# Patient Record
Sex: MALE | Race: WHITE | NOT HISPANIC OR LATINO | Employment: OTHER | URBAN - METROPOLITAN AREA
[De-identification: names, ages, dates, MRNs, and addresses within clinical notes are randomized per-mention and may not be internally consistent; named-entity substitution may affect disease eponyms.]

---

## 2019-05-24 PROBLEM — K92.0 HEMATEMESIS: Status: ACTIVE | Noted: 2019-05-24

## 2019-05-24 PROBLEM — K92.0 GASTROINTESTINAL HEMORRHAGE WITH HEMATEMESIS: Status: ACTIVE | Noted: 2019-05-24

## 2019-05-25 PROBLEM — D49.0 GASTRIC TUMOR: Status: ACTIVE | Noted: 2019-05-24

## 2019-05-31 PROBLEM — D21.4 LEIOMYOMA OF STOMACH: Status: ACTIVE | Noted: 2019-05-31

## 2019-06-04 PROBLEM — C49.A0: Status: ACTIVE | Noted: 2019-06-04

## 2019-08-20 PROBLEM — I70.0 ATHEROSCLEROSIS OF ABDOMINAL AORTA: Status: ACTIVE | Noted: 2019-08-20

## 2019-08-20 PROBLEM — N40.1 BENIGN PROSTATIC HYPERPLASIA WITH NOCTURIA: Status: ACTIVE | Noted: 2019-08-20

## 2019-08-20 PROBLEM — F17.210 CIGARETTE SMOKER: Status: ACTIVE | Noted: 2019-08-20

## 2019-08-20 PROBLEM — G47.33 OSA (OBSTRUCTIVE SLEEP APNEA): Status: ACTIVE | Noted: 2019-08-20

## 2019-08-20 PROBLEM — E66.9 OBESITY (BMI 35.0-39.9 WITHOUT COMORBIDITY): Status: ACTIVE | Noted: 2019-08-20

## 2019-08-20 PROBLEM — Z86.2 HISTORY OF ANEMIA: Status: ACTIVE | Noted: 2019-08-20

## 2019-08-20 PROBLEM — K27.9 PUD (PEPTIC ULCER DISEASE): Status: ACTIVE | Noted: 2019-05-24

## 2019-08-20 PROBLEM — R35.1 BENIGN PROSTATIC HYPERPLASIA WITH NOCTURIA: Status: ACTIVE | Noted: 2019-08-20

## 2019-09-01 PROBLEM — K92.2 UPPER GI BLEED: Status: ACTIVE | Noted: 2019-09-01

## 2019-09-02 PROBLEM — D50.0 CHRONIC BLOOD LOSS ANEMIA: Status: ACTIVE | Noted: 2019-09-02

## 2019-09-02 PROBLEM — K92.1 GASTROINTESTINAL HEMORRHAGE WITH MELENA: Status: ACTIVE | Noted: 2019-09-02

## 2019-09-02 PROBLEM — R55 SYNCOPE: Status: ACTIVE | Noted: 2019-09-02

## 2019-09-03 PROBLEM — K27.4 PEPTIC ULCER DISEASE WITH HEMORRHAGE: Status: ACTIVE | Noted: 2019-05-24

## 2019-09-12 ENCOUNTER — TELEPHONE (OUTPATIENT)
Dept: ENDOSCOPY | Facility: HOSPITAL | Age: 74
End: 2019-09-12

## 2019-09-12 NOTE — TELEPHONE ENCOUNTER
----- Message from Maritza Hurley MA sent at 9/12/2019  3:29 PM CDT -----  Good afternoon,    We received a referral from  for this patient to be seen by  for Endoscopic Resection(EMR/ESD). I have scanned the referral and records into  for review. Please reach out to the patient to schedule.    Thank you   Maritza   Emily Sparks

## 2019-09-12 NOTE — TELEPHONE ENCOUNTER
----- Message from Jocelyn Salas sent at 9/12/2019  2:31 PM CDT -----  Contact: Tena Raimundo pt daughter#[phone]  Pt is calling to schedule endo. Pt states that referral was send.

## 2019-09-13 ENCOUNTER — TELEPHONE (OUTPATIENT)
Dept: ENDOSCOPY | Facility: HOSPITAL | Age: 74
End: 2019-09-13

## 2019-09-13 NOTE — TELEPHONE ENCOUNTER
MD Jessica Betts MA   Caller: Unspecified (Yesterday,  6:38 PM)             I need to repeat EUS/EGD and discuss with patient if we really need to remove this.   Make sure he is on PPI.      Please sign order. Can this be at [facility]?

## 2019-09-13 NOTE — TELEPHONE ENCOUNTER
----- Message from Hanane Deluna sent at 9/13/2019 10:37 AM CDT -----  Contact: Tena Daughter  Type:  Patient Returning Call    Who Called: Tena    Who Left Message for Patient: Jessica    Does the patient know what this is regarding?:    Would the patient rather a call back or a response via [facility]? Call    Best Call Back Number: [phone]     Additional Information:

## 2019-09-19 ENCOUNTER — TELEPHONE (OUTPATIENT)
Dept: ENDOSCOPY | Facility: HOSPITAL | Age: 74
End: 2019-09-19

## 2019-09-19 NOTE — TELEPHONE ENCOUNTER
----- Message from Vielka Huggins sent at 9/19/2019 12:08 PM CDT -----  Contact: Pt's Daugther Tena Eubanks [phone]  Pt's Daughter Tena Eubanks called to speak to the nurse regarding a previous conversation as it relates to the pt's care and would like a call back at [phone]

## 2019-09-23 ENCOUNTER — TELEPHONE (OUTPATIENT)
Dept: GASTROENTEROLOGY | Facility: CLINIC | Age: 74
End: 2019-09-23

## 2019-09-23 ENCOUNTER — TELEPHONE (OUTPATIENT)
Dept: ENDOSCOPY | Facility: HOSPITAL | Age: 74
End: 2019-09-23

## 2019-09-23 DIAGNOSIS — D49.0 GASTRIC TUMOR: Primary | ICD-10-CM

## 2019-09-23 NOTE — TELEPHONE ENCOUNTER
----- Message from Jennifer Faye RN sent at 9/23/2019  8:14 AM CDT -----  Regarding: order  This patient needs an order for his procedure on Wednesday.

## 2019-09-23 NOTE — TELEPHONE ENCOUNTER
Spoke with patient about arrival time @0700 .     NPO status reviewed: Patient may eat until 7:00pm.  After 7pm, pt may have CLEAR liquids ONLY until completely NPO at Midnight.    Medications: Do not take Insulin or oral diabetic medications the day of the procedure.    Take as prescribed: heart, seizure and blood pressure medication in the morning with a sip of water (less than an ounce).  Take any breathing medications and bring inhalers to hospital with you.     Leave all valuables and jewelry at home. Wear comfortable clothes to procedure to change into hospital gown.   You cannot drive for 24 hours after your procedure because you will receive sedation for your procedure to make you comfortable.    A ride must be provided at discharge.

## 2019-09-24 ENCOUNTER — TELEPHONE (OUTPATIENT)
Dept: ENDOSCOPY | Facility: HOSPITAL | Age: 74
End: 2019-09-24

## 2019-09-24 NOTE — TELEPHONE ENCOUNTER
----- Message from Teofilo Cisneros MD sent at 9/24/2019  4:11 PM CDT -----  Annia- I spoke to Dr. Higinio Burnett. He does not think we need to repeat an EGD/EUS. Can you arrange for a clinic visit with him tomorrow instead of procedure tomorrow morning? He needs to discuss ESD, and needs to be seen in clinic first.

## 2019-09-25 ENCOUNTER — OFFICE VISIT (OUTPATIENT)
Dept: GASTROENTEROLOGY | Facility: CLINIC | Age: 74
End: 2019-09-25
Payer: MEDICARE

## 2019-09-25 VITALS
HEART RATE: 71 BPM | WEIGHT: 297.19 LBS | DIASTOLIC BLOOD PRESSURE: 78 MMHG | BODY MASS INDEX: 38.14 KG/M2 | HEIGHT: 74 IN | SYSTOLIC BLOOD PRESSURE: 148 MMHG

## 2019-09-25 DIAGNOSIS — D49.0 GASTRIC TUMOR: Primary | ICD-10-CM

## 2019-09-25 PROCEDURE — 99999 PR PBB SHADOW E&M-EST. PATIENT-LVL III: CPT | Mod: PBBFAC,,, | Performed by: INTERNAL MEDICINE

## 2019-09-25 PROCEDURE — 3288F PR FALLS RISK ASSESSMENT DOCUMENTED: ICD-10-PCS | Mod: CPTII,S$GLB,, | Performed by: INTERNAL MEDICINE

## 2019-09-25 PROCEDURE — 3077F PR MOST RECENT SYSTOLIC BLOOD PRESSURE >= 140 MM HG: ICD-10-PCS | Mod: CPTII,S$GLB,, | Performed by: INTERNAL MEDICINE

## 2019-09-25 PROCEDURE — 3077F SYST BP >= 140 MM HG: CPT | Mod: CPTII,S$GLB,, | Performed by: INTERNAL MEDICINE

## 2019-09-25 PROCEDURE — 1100F PTFALLS ASSESS-DOCD GE2>/YR: CPT | Mod: CPTII,S$GLB,, | Performed by: INTERNAL MEDICINE

## 2019-09-25 PROCEDURE — 3078F DIAST BP <80 MM HG: CPT | Mod: CPTII,S$GLB,, | Performed by: INTERNAL MEDICINE

## 2019-09-25 PROCEDURE — 1100F PR PT FALLS ASSESS DOC 2+ FALLS/FALL W/INJURY/YR: ICD-10-PCS | Mod: CPTII,S$GLB,, | Performed by: INTERNAL MEDICINE

## 2019-09-25 PROCEDURE — 3078F PR MOST RECENT DIASTOLIC BLOOD PRESSURE < 80 MM HG: ICD-10-PCS | Mod: CPTII,S$GLB,, | Performed by: INTERNAL MEDICINE

## 2019-09-25 PROCEDURE — 99203 OFFICE O/P NEW LOW 30 MIN: CPT | Mod: S$GLB,,, | Performed by: INTERNAL MEDICINE

## 2019-09-25 PROCEDURE — 99999 PR PBB SHADOW E&M-EST. PATIENT-LVL III: ICD-10-PCS | Mod: PBBFAC,,, | Performed by: INTERNAL MEDICINE

## 2019-09-25 PROCEDURE — 99203 PR OFFICE/OUTPT VISIT, NEW, LEVL III, 30-44 MIN: ICD-10-PCS | Mod: S$GLB,,, | Performed by: INTERNAL MEDICINE

## 2019-09-25 PROCEDURE — 3288F FALL RISK ASSESSMENT DOCD: CPT | Mod: CPTII,S$GLB,, | Performed by: INTERNAL MEDICINE

## 2019-09-25 NOTE — PROGRESS NOTES
Subjective:      Patient ID: Pasha Harmon is a 74 y.o. male.    Chief Complaint: No chief complaint on file.      HPI:  Pasha Harmon is a 74 y.o. male with hx of COPD and DM with melena found to have a ulcerated gastric lesion back in May. He continued to have melena and was rescoped 3 weeks ago showing an ulcerated mass. The mass was previously found to be a lipoma on EUS. Patient is coming today for discussing treatment options.     Review of patient's allergies indicates:   Allergen Reactions    Meloxicam Other (See Comments)     Ulcer, GI bleed        Past Medical History:   Diagnosis Date    COPD (chronic obstructive pulmonary disease)     Diabetes mellitus     Emphysema of lung     Hypertension     MI (myocardial infarction)        Past Surgical History:   Procedure Laterality Date    APPENDECTOMY      CHOLECYSTECTOMY      ENDOSCOPIC ULTRASOUND OF UPPER GASTROINTESTINAL TRACT Left 6/4/2019    Procedure: ULTRASOUND, UPPER GI TRACT, ENDOSCOPIC;  Surgeon: Lavon Parra MD;  Location: [facility];  Service: Endoscopy;  Laterality: Left;  GIF plus Radial    ESOPHAGOGASTRODUODENOSCOPY N/A 5/25/2019    Procedure: EGD (ESOPHAGOGASTRODUODENOSCOPY);  Surgeon: Mitch Buitrago MD;  Location: [facility];  Service: Endoscopy;  Laterality: N/A;    ESOPHAGOGASTRODUODENOSCOPY N/A 6/4/2019    Procedure: EGD (ESOPHAGOGASTRODUODENOSCOPY);  Surgeon: Lavon Parra MD;  Location: [facility];  Service: Endoscopy;  Laterality: N/A;    ESOPHAGOGASTRODUODENOSCOPY N/A 9/3/2019    Procedure: EGD (ESOPHAGOGASTRODUODENOSCOPY);  Surgeon: Keshav Harris MD;  Location: [facility];  Service: Endoscopy;  Laterality: N/A;    KNEE SURGERY Right        Family History   Problem Relation Age of Onset    Heart disease Mother     Cancer Father         brain        Social History     Socioeconomic History    Marital status: Single     Spouse name: Not on file    Number of children: Not on file    Years  of education: Not on file    Highest education level: Not on file   Occupational History    Not on file   Social Needs    Financial resource strain: Not on file    Food insecurity:     Worry: Not on file     Inability: Not on file    Transportation needs:     Medical: Not on file     Non-medical: Not on file   Tobacco Use    Smoking status: Current Every Day Smoker     Packs/day: 2.00     Types: Cigarettes    Smokeless tobacco: Never Used   Substance and Sexual Activity    Alcohol use: No    Drug use: No    Sexual activity: Not on file   Lifestyle    Physical activity:     Days per week: Not on file     Minutes per session: Not on file    Stress: Not on file   Relationships    Social connections:     Talks on phone: Not on file     Gets together: Not on file     Attends [employer] service: Not on file     Active member of club or organization: Not on file     Attends meetings of clubs or organizations: Not on file     Relationship status: Not on file   Other Topics Concern    Not on file   Social History Narrative    Not on file       Current Outpatient Medications   Medication Sig Dispense Refill    acetaminophen (TYLENOL) 650 MG TbSR Take 1,300 mg by mouth 2 (two) times daily.       albuterol (ACCUNEB) 1.25 mg/3 mL Nebu Take 3 mLs (1.25 mg total) by nebulization every 6 (six) hours as needed. Rescue 3 Box 3    albuterol-ipratropium (DUO-NEB) 2.5 mg-0.5 mg/3 mL nebulizer solution Take 3 mLs by nebulization every 6 (six) hours as needed for Wheezing. Rescue      budesonide-formoterol 160-4.5 mcg (SYMBICORT) 160-4.5 mcg/actuation HFAA Inhale 2 puffs into the lungs every 12 (twelve) hours. 1 Inhaler 6    cholecalciferol, vitamin D3, (VITAMIN D3) 5,000 unit Tab Take 5,000 Units by mouth once daily.      citalopram (CELEXA) 20 MG tablet Take 1 tablet (20 mg total) by mouth nightly. 90 tablet 3    diphenhydrAMINE (BENADRYL) 25 mg capsule Take 25 mg by mouth every evening. 1 tab po am, 3 tabs po HS        lisinopril (PRINIVIL,ZESTRIL) 20 MG tablet Take 1 tablet (20 mg total) by mouth once daily. 90 tablet 3    metFORMIN (GLUCOPHAGE) 500 MG tablet Take 1 tablet (500 mg total) by mouth daily with breakfast. 90 tablet 3    multivit with min-FA-lycopene (ONE-A-DAY MEN'S MULTIVITAMIN) 400-300 mcg Tab Take 1 tablet by mouth nightly.      nicotine (NICODERM CQ) 21 mg/24 hr Place 1 patch onto the skin once daily.  0    pantoprazole (PROTONIX) 40 MG tablet Take 1 tablet (40 mg total) by mouth 2 (two) times daily. 180 tablet 3    simvastatin (ZOCOR) 40 MG tablet Take 1 tablet (40 mg total) by mouth every evening. 90 tablet 3    sucralfate (CARAFATE) 1 gram tablet Take 1 tablet (1 g total) by mouth 4 (four) times daily. 120 tablet 0     No current facility-administered medications for this visit.             Objective:     Physical Exam   Pulmonary/Chest: He has wheezes.   Abdominal: Soft. Bowel sounds are normal. He exhibits no distension. There is no tenderness. There is no guarding.       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   Discussed with patient treatment options including ESD. Discussed potential risks and benefits.   Patient is cleared by cardiology.   He is to see pulmonary tomorrow for his COPD.  Will schedule for ESD next available.

## 2019-10-01 ENCOUNTER — TELEPHONE (OUTPATIENT)
Dept: ENDOSCOPY | Facility: HOSPITAL | Age: 74
End: 2019-10-01

## 2019-10-01 DIAGNOSIS — K31.9 GASTRIC LESION: Primary | ICD-10-CM

## 2019-10-01 NOTE — TELEPHONE ENCOUNTER
Spoke with patient's daughter, Tena, about instructions for UESD scheduled 10/9/19 at 0800.  Instructions mailed.

## 2019-10-02 ENCOUNTER — LAB VISIT (OUTPATIENT)
Dept: LAB | Facility: HOSPITAL | Age: 74
End: 2019-10-02
Attending: INTERNAL MEDICINE
Payer: MEDICARE

## 2019-10-02 DIAGNOSIS — D72.829 LEUKOCYTOSIS, UNSPECIFIED TYPE: ICD-10-CM

## 2019-10-02 DIAGNOSIS — D75.839 THROMBOCYTOSIS: ICD-10-CM

## 2019-10-02 DIAGNOSIS — D50.9 IRON DEFICIENCY ANEMIA, UNSPECIFIED IRON DEFICIENCY ANEMIA TYPE: ICD-10-CM

## 2019-10-02 LAB
BASOPHILS # BLD AUTO: 0.1 K/UL (ref 0–0.2)
BASOPHILS NFR BLD: 1 % (ref 0–1.9)
DIFFERENTIAL METHOD: ABNORMAL
EOSINOPHIL # BLD AUTO: 0.4 K/UL (ref 0–0.5)
EOSINOPHIL NFR BLD: 4.4 % (ref 0–8)
ERYTHROCYTE [DISTWIDTH] IN BLOOD BY AUTOMATED COUNT: 16.4 % (ref 11.5–14.5)
FERRITIN SERPL-MCNC: 7 NG/ML (ref 18–464)
HCT VFR BLD AUTO: 32.1 % (ref 40–54)
HGB BLD-MCNC: 9.7 G/DL (ref 14–18)
IMM GRANULOCYTES # BLD AUTO: 0.06 K/UL (ref 0–0.04)
IMM GRANULOCYTES NFR BLD AUTO: 0.6 % (ref 0–0.5)
IRON SATN MFR SERPL: 9 % (ref 20–50)
IRON SERPL-MCNC: 40 UG/DL (ref 45–160)
LYMPHOCYTES # BLD AUTO: 1.8 K/UL (ref 1–4.8)
LYMPHOCYTES NFR BLD: 18.8 % (ref 18–48)
MCH RBC QN AUTO: 24.3 PG (ref 27–31)
MCHC RBC AUTO-ENTMCNC: 30.2 G/DL (ref 32–36)
MCV RBC AUTO: 80 FL (ref 82–98)
MONOCYTES # BLD AUTO: 0.9 K/UL (ref 0.3–1)
MONOCYTES NFR BLD: 9.4 % (ref 4–15)
NEUTROPHILS # BLD AUTO: 6.5 K/UL (ref 1.8–7.7)
NEUTROPHILS NFR BLD: 65.8 % (ref 38–73)
NRBC BLD-RTO: 0 /100 WBC
PLATELET # BLD AUTO: 529 K/UL (ref 150–350)
PMV BLD AUTO: 8.9 FL (ref 9.2–12.9)
RBC # BLD AUTO: 4 M/UL (ref 4.6–6.2)
TOTAL IRON BINDING CAPACITY: 458 UG/DL (ref 261–462)
WBC # BLD AUTO: 9.81 K/UL (ref 3.9–12.7)

## 2019-10-02 PROCEDURE — 83540 ASSAY OF IRON: CPT

## 2019-10-02 PROCEDURE — 85025 COMPLETE CBC W/AUTO DIFF WBC: CPT

## 2019-10-02 PROCEDURE — 82728 ASSAY OF FERRITIN: CPT

## 2019-10-02 PROCEDURE — 81219 CALR GENE COM VARIANTS: CPT

## 2019-10-02 PROCEDURE — 36415 COLL VENOUS BLD VENIPUNCTURE: CPT | Mod: PN

## 2019-10-02 PROCEDURE — 81403 MOPATH PROCEDURE LEVEL 4: CPT

## 2019-10-02 PROCEDURE — 81207 BCR/ABL1 GENE MINOR BP: CPT

## 2019-10-02 PROCEDURE — 82728 ASSAY OF FERRITIN: CPT | Mod: PN

## 2019-10-02 PROCEDURE — 83540 ASSAY OF IRON: CPT | Mod: PN

## 2019-10-02 PROCEDURE — 85025 COMPLETE CBC W/AUTO DIFF WBC: CPT | Mod: PN

## 2019-10-02 PROCEDURE — 81206 BCR/ABL1 GENE MAJOR BP: CPT

## 2019-10-02 PROCEDURE — 81270 JAK2 GENE: CPT

## 2019-10-07 LAB
BCR/ABL RESULT, P190, QUANT, BLD: NORMAL
BCR/ABL,P210 RESULT: NORMAL
JAK2 P.V617F BLD/T QL: NORMAL
JAK2 V617F MUTATION DETECTION BLD RESULT: NORMAL
PATH REPORT.FINAL DX SPEC: NORMAL
PATH REPORT.FINAL DX SPEC: NORMAL
SPECIMEN TYPE, P190, QUANT, BLD: NORMAL
SPECIMEN TYPE: NORMAL

## 2019-10-08 LAB
CALR FINAL DIAGNOSIS: NORMAL
CALR SPECIMEN TYPE: NORMAL
PATH REPORT.FINAL DX SPEC: NORMAL

## 2019-10-09 ENCOUNTER — ANESTHESIA (OUTPATIENT)
Dept: ENDOSCOPY | Facility: HOSPITAL | Age: 74
DRG: 003 | End: 2019-10-09
Payer: MEDICARE

## 2019-10-09 ENCOUNTER — ANESTHESIA EVENT (OUTPATIENT)
Dept: ENDOSCOPY | Facility: HOSPITAL | Age: 74
DRG: 003 | End: 2019-10-09
Payer: MEDICARE

## 2019-10-09 ENCOUNTER — HOSPITAL ENCOUNTER (INPATIENT)
Facility: HOSPITAL | Age: 74
LOS: 21 days | Discharge: NURSING, REHAB OR LONG-TERM CARE | DRG: 003 | End: 2019-10-31
Attending: INTERNAL MEDICINE | Admitting: HOSPITALIST
Payer: MEDICARE

## 2019-10-09 DIAGNOSIS — K31.9 SUBEPITHELIAL GASTRIC LESION: ICD-10-CM

## 2019-10-09 DIAGNOSIS — N17.9 AKI (ACUTE KIDNEY INJURY): ICD-10-CM

## 2019-10-09 DIAGNOSIS — K31.89 PERFORATED STOMACH, ACUTE: ICD-10-CM

## 2019-10-09 DIAGNOSIS — K63.1 BOWEL PERFORATION: ICD-10-CM

## 2019-10-09 DIAGNOSIS — N17.0 ATN (ACUTE TUBULAR NECROSIS): ICD-10-CM

## 2019-10-09 DIAGNOSIS — K66.8 FREE INTRAPERITONEAL AIR: ICD-10-CM

## 2019-10-09 DIAGNOSIS — J96.02 ACUTE RESPIRATORY FAILURE WITH HYPOXIA AND HYPERCARBIA: ICD-10-CM

## 2019-10-09 DIAGNOSIS — E11.9 TYPE 2 DIABETES MELLITUS WITHOUT COMPLICATION, WITHOUT LONG-TERM CURRENT USE OF INSULIN: ICD-10-CM

## 2019-10-09 DIAGNOSIS — Z99.11 VENTILATOR DEPENDENCE: ICD-10-CM

## 2019-10-09 DIAGNOSIS — J96.01 ACUTE RESPIRATORY FAILURE WITH HYPOXIA AND HYPERCARBIA: ICD-10-CM

## 2019-10-09 DIAGNOSIS — D49.0 GASTRIC TUMOR: Primary | ICD-10-CM

## 2019-10-09 DIAGNOSIS — E87.20 METABOLIC ACIDOSIS: ICD-10-CM

## 2019-10-09 DIAGNOSIS — K22.3 ESOPHAGEAL PERFORATION: ICD-10-CM

## 2019-10-09 DIAGNOSIS — A41.9 SEPSIS, DUE TO UNSPECIFIED ORGANISM, UNSPECIFIED WHETHER ACUTE ORGAN DYSFUNCTION PRESENT: ICD-10-CM

## 2019-10-09 DIAGNOSIS — Z98.890 H/O EXPLORATORY LAPAROTOMY: ICD-10-CM

## 2019-10-09 PROBLEM — G47.00 INSOMNIA: Status: ACTIVE | Noted: 2019-10-09

## 2019-10-09 LAB
ABO + RH BLD: NORMAL
ANION GAP SERPL CALC-SCNC: 11 MMOL/L (ref 8–16)
BASOPHILS # BLD AUTO: 0.07 K/UL (ref 0–0.2)
BASOPHILS NFR BLD: 0.3 % (ref 0–1.9)
BLD GP AB SCN CELLS X3 SERPL QL: NORMAL
BUN SERPL-MCNC: 14 MG/DL (ref 8–23)
CALCIUM SERPL-MCNC: 8.3 MG/DL (ref 8.7–10.5)
CHLORIDE SERPL-SCNC: 104 MMOL/L (ref 95–110)
CO2 SERPL-SCNC: 22 MMOL/L (ref 23–29)
CREAT SERPL-MCNC: 1.4 MG/DL (ref 0.5–1.4)
DIFFERENTIAL METHOD: ABNORMAL
EOSINOPHIL # BLD AUTO: 0.1 K/UL (ref 0–0.5)
EOSINOPHIL NFR BLD: 0.2 % (ref 0–8)
ERYTHROCYTE [DISTWIDTH] IN BLOOD BY AUTOMATED COUNT: 16.7 % (ref 11.5–14.5)
EST. GFR  (AFRICAN AMERICAN): 57.2 ML/MIN/1.73 M^2
EST. GFR  (NON AFRICAN AMERICAN): 49.5 ML/MIN/1.73 M^2
GLUCOSE SERPL-MCNC: 168 MG/DL (ref 70–110)
HCT VFR BLD AUTO: 31.1 % (ref 40–54)
HGB BLD-MCNC: 9.6 G/DL (ref 14–18)
IMM GRANULOCYTES # BLD AUTO: 0.12 K/UL (ref 0–0.04)
IMM GRANULOCYTES NFR BLD AUTO: 0.6 % (ref 0–0.5)
LYMPHOCYTES # BLD AUTO: 0.8 K/UL (ref 1–4.8)
LYMPHOCYTES NFR BLD: 3.5 % (ref 18–48)
MAGNESIUM SERPL-MCNC: 1.9 MG/DL (ref 1.6–2.6)
MCH RBC QN AUTO: 25.3 PG (ref 27–31)
MCHC RBC AUTO-ENTMCNC: 30.9 G/DL (ref 32–36)
MCV RBC AUTO: 82 FL (ref 82–98)
MONOCYTES # BLD AUTO: 1.1 K/UL (ref 0.3–1)
MONOCYTES NFR BLD: 4.9 % (ref 4–15)
NEUTROPHILS # BLD AUTO: 19.6 K/UL (ref 1.8–7.7)
NEUTROPHILS NFR BLD: 90.5 % (ref 38–73)
NRBC BLD-RTO: 0 /100 WBC
PHOSPHATE SERPL-MCNC: 4.8 MG/DL (ref 2.7–4.5)
PLATELET # BLD AUTO: 475 K/UL (ref 150–350)
PMV BLD AUTO: 9.3 FL (ref 9.2–12.9)
POCT GLUCOSE: 131 MG/DL (ref 70–110)
POCT GLUCOSE: 138 MG/DL (ref 70–110)
POCT GLUCOSE: 169 MG/DL (ref 70–110)
POTASSIUM SERPL-SCNC: 4.7 MMOL/L (ref 3.5–5.1)
RBC # BLD AUTO: 3.8 M/UL (ref 4.6–6.2)
SODIUM SERPL-SCNC: 137 MMOL/L (ref 136–145)
WBC # BLD AUTO: 21.63 K/UL (ref 3.9–12.7)

## 2019-10-09 PROCEDURE — 86920 COMPATIBILITY TEST SPIN: CPT

## 2019-10-09 PROCEDURE — 25000003 PHARM REV CODE 250: Performed by: NURSE ANESTHETIST, CERTIFIED REGISTERED

## 2019-10-09 PROCEDURE — 27202363 HC INJECTION AGENT, SUBMUCOSAL, ANY: Performed by: INTERNAL MEDICINE

## 2019-10-09 PROCEDURE — 25000003 PHARM REV CODE 250: Performed by: INTERNAL MEDICINE

## 2019-10-09 PROCEDURE — D9220A PRA ANESTHESIA: ICD-10-PCS | Mod: ANES,,, | Performed by: ANESTHESIOLOGY

## 2019-10-09 PROCEDURE — 27201030 HC OVERTUBE: Performed by: INTERNAL MEDICINE

## 2019-10-09 PROCEDURE — 84100 ASSAY OF PHOSPHORUS: CPT

## 2019-10-09 PROCEDURE — 43999 UNLISTED PROCEDURE STOMACH: CPT | Mod: ,,, | Performed by: INTERNAL MEDICINE

## 2019-10-09 PROCEDURE — 27200997: Performed by: INTERNAL MEDICINE

## 2019-10-09 PROCEDURE — 43999 PR ENDOSCOPIC SUBMUCOSAL DISSECTION - EGD: ICD-10-PCS | Mod: ,,, | Performed by: INTERNAL MEDICINE

## 2019-10-09 PROCEDURE — D9220A PRA ANESTHESIA: ICD-10-PCS | Mod: CRNA,,, | Performed by: NURSE ANESTHETIST, CERTIFIED REGISTERED

## 2019-10-09 PROCEDURE — 25000003 PHARM REV CODE 250: Performed by: PHYSICIAN ASSISTANT

## 2019-10-09 PROCEDURE — 86901 BLOOD TYPING SEROLOGIC RH(D): CPT

## 2019-10-09 PROCEDURE — G0378 HOSPITAL OBSERVATION PER HR: HCPCS

## 2019-10-09 PROCEDURE — 27000190 HC CPAP FULL FACE MASK W/VALVE

## 2019-10-09 PROCEDURE — 85025 COMPLETE CBC W/AUTO DIFF WBC: CPT

## 2019-10-09 PROCEDURE — 82962 GLUCOSE BLOOD TEST: CPT | Performed by: INTERNAL MEDICINE

## 2019-10-09 PROCEDURE — 83735 ASSAY OF MAGNESIUM: CPT

## 2019-10-09 PROCEDURE — 43999 UNLISTED PROCEDURE STOMACH: CPT | Performed by: INTERNAL MEDICINE

## 2019-10-09 PROCEDURE — 94640 AIRWAY INHALATION TREATMENT: CPT

## 2019-10-09 PROCEDURE — 27202299 HC NEEDLE KNIFE, TISSUE RESECTION: Performed by: INTERNAL MEDICINE

## 2019-10-09 PROCEDURE — 88305 TISSUE EXAM BY PATHOLOGIST: CPT | Performed by: PATHOLOGY

## 2019-10-09 PROCEDURE — 37000008 HC ANESTHESIA 1ST 15 MINUTES: Performed by: INTERNAL MEDICINE

## 2019-10-09 PROCEDURE — 80048 BASIC METABOLIC PNL TOTAL CA: CPT

## 2019-10-09 PROCEDURE — 27201042 HC RETRIEVAL NET: Performed by: INTERNAL MEDICINE

## 2019-10-09 PROCEDURE — 27201028 HC NEEDLE, SCLERO: Performed by: INTERNAL MEDICINE

## 2019-10-09 PROCEDURE — 37000009 HC ANESTHESIA EA ADD 15 MINS: Performed by: INTERNAL MEDICINE

## 2019-10-09 PROCEDURE — 99223 1ST HOSP IP/OBS HIGH 75: CPT | Mod: ,,, | Performed by: PHYSICIAN ASSISTANT

## 2019-10-09 PROCEDURE — 27200967 HC COAGRASPER: Performed by: INTERNAL MEDICINE

## 2019-10-09 PROCEDURE — 99223 PR INITIAL HOSPITAL CARE,LEVL III: ICD-10-PCS | Mod: ,,, | Performed by: PHYSICIAN ASSISTANT

## 2019-10-09 PROCEDURE — 27201089 HC SNARE, DISP (ANY): Performed by: INTERNAL MEDICINE

## 2019-10-09 PROCEDURE — 63600175 PHARM REV CODE 636 W HCPCS: Performed by: NURSE ANESTHETIST, CERTIFIED REGISTERED

## 2019-10-09 PROCEDURE — D9220A PRA ANESTHESIA: Mod: ANES,,, | Performed by: ANESTHESIOLOGY

## 2019-10-09 PROCEDURE — 88305 TISSUE EXAM BY PATHOLOGIST: CPT | Mod: 26,,, | Performed by: PATHOLOGY

## 2019-10-09 PROCEDURE — 63600175 PHARM REV CODE 636 W HCPCS: Performed by: INTERNAL MEDICINE

## 2019-10-09 PROCEDURE — 63600175 PHARM REV CODE 636 W HCPCS: Performed by: STUDENT IN AN ORGANIZED HEALTH CARE EDUCATION/TRAINING PROGRAM

## 2019-10-09 PROCEDURE — 99900035 HC TECH TIME PER 15 MIN (STAT)

## 2019-10-09 PROCEDURE — 25000242 PHARM REV CODE 250 ALT 637 W/ HCPCS: Performed by: ANESTHESIOLOGY

## 2019-10-09 PROCEDURE — D9220A PRA ANESTHESIA: Mod: CRNA,,, | Performed by: NURSE ANESTHETIST, CERTIFIED REGISTERED

## 2019-10-09 PROCEDURE — 36415 COLL VENOUS BLD VENIPUNCTURE: CPT

## 2019-10-09 PROCEDURE — 63600175 PHARM REV CODE 636 W HCPCS: Performed by: PHYSICIAN ASSISTANT

## 2019-10-09 PROCEDURE — 88305 TISSUE SPECIMEN TO PATHOLOGY - SURGERY: ICD-10-PCS | Mod: 26,,, | Performed by: PATHOLOGY

## 2019-10-09 RX ORDER — SODIUM CHLORIDE 0.9 % (FLUSH) 0.9 %
10 SYRINGE (ML) INJECTION
Status: DISCONTINUED | OUTPATIENT
Start: 2019-10-09 | End: 2019-10-31 | Stop reason: HOSPADM

## 2019-10-09 RX ORDER — IPRATROPIUM BROMIDE AND ALBUTEROL SULFATE 2.5; .5 MG/3ML; MG/3ML
3 SOLUTION RESPIRATORY (INHALATION) ONCE
Status: COMPLETED | OUTPATIENT
Start: 2019-10-09 | End: 2019-10-09

## 2019-10-09 RX ORDER — MIDAZOLAM HYDROCHLORIDE 1 MG/ML
INJECTION, SOLUTION INTRAMUSCULAR; INTRAVENOUS
Status: DISCONTINUED | OUTPATIENT
Start: 2019-10-09 | End: 2019-10-09

## 2019-10-09 RX ORDER — SUCCINYLCHOLINE CHLORIDE 20 MG/ML
INJECTION INTRAMUSCULAR; INTRAVENOUS
Status: DISCONTINUED | OUTPATIENT
Start: 2019-10-09 | End: 2019-10-09

## 2019-10-09 RX ORDER — SODIUM CHLORIDE 0.9 % (FLUSH) 0.9 %
5 SYRINGE (ML) INJECTION
Status: DISCONTINUED | OUTPATIENT
Start: 2019-10-09 | End: 2019-10-10

## 2019-10-09 RX ORDER — IBUPROFEN 200 MG
16 TABLET ORAL
Status: DISCONTINUED | OUTPATIENT
Start: 2019-10-09 | End: 2019-10-10

## 2019-10-09 RX ORDER — ONDANSETRON 2 MG/ML
4 INJECTION INTRAMUSCULAR; INTRAVENOUS DAILY PRN
Status: DISCONTINUED | OUTPATIENT
Start: 2019-10-09 | End: 2019-10-09 | Stop reason: HOSPADM

## 2019-10-09 RX ORDER — SIMVASTATIN 20 MG/1
40 TABLET, FILM COATED ORAL NIGHTLY
Status: DISCONTINUED | OUTPATIENT
Start: 2019-10-09 | End: 2019-10-10

## 2019-10-09 RX ORDER — SODIUM CHLORIDE 9 MG/ML
INJECTION, SOLUTION INTRAVENOUS CONTINUOUS
Status: DISCONTINUED | OUTPATIENT
Start: 2019-10-09 | End: 2019-10-10

## 2019-10-09 RX ORDER — METHYLENE BLUE 5 MG/ML
INJECTION INTRAVENOUS
Status: COMPLETED | OUTPATIENT
Start: 2019-10-09 | End: 2019-10-09

## 2019-10-09 RX ORDER — ROCURONIUM BROMIDE 10 MG/ML
INJECTION, SOLUTION INTRAVENOUS
Status: DISCONTINUED | OUTPATIENT
Start: 2019-10-09 | End: 2019-10-09

## 2019-10-09 RX ORDER — SODIUM CHLORIDE 0.9 G/100ML
IRRIGANT IRRIGATION
Status: COMPLETED | OUTPATIENT
Start: 2019-10-09 | End: 2019-10-09

## 2019-10-09 RX ORDER — BISACODYL 10 MG
10 SUPPOSITORY, RECTAL RECTAL DAILY PRN
Status: DISCONTINUED | OUTPATIENT
Start: 2019-10-09 | End: 2019-10-10

## 2019-10-09 RX ORDER — ACETAMINOPHEN 500 MG
5000 TABLET ORAL DAILY
Status: DISCONTINUED | OUTPATIENT
Start: 2019-10-10 | End: 2019-10-10

## 2019-10-09 RX ORDER — FLUTICASONE FUROATE AND VILANTEROL 100; 25 UG/1; UG/1
1 POWDER RESPIRATORY (INHALATION) DAILY
Status: DISCONTINUED | OUTPATIENT
Start: 2019-10-10 | End: 2019-10-31 | Stop reason: HOSPADM

## 2019-10-09 RX ORDER — LIDOCAINE HCL/PF 100 MG/5ML
SYRINGE (ML) INTRAVENOUS
Status: DISCONTINUED | OUTPATIENT
Start: 2019-10-09 | End: 2019-10-09

## 2019-10-09 RX ORDER — EPHEDRINE SULFATE 50 MG/ML
INJECTION, SOLUTION INTRAVENOUS
Status: DISCONTINUED | OUTPATIENT
Start: 2019-10-09 | End: 2019-10-09

## 2019-10-09 RX ORDER — IPRATROPIUM BROMIDE AND ALBUTEROL SULFATE 2.5; .5 MG/3ML; MG/3ML
3 SOLUTION RESPIRATORY (INHALATION) EVERY 4 HOURS PRN
Status: DISCONTINUED | OUTPATIENT
Start: 2019-10-09 | End: 2019-10-31 | Stop reason: HOSPADM

## 2019-10-09 RX ORDER — IBUPROFEN 200 MG
1 TABLET ORAL DAILY
Status: DISCONTINUED | OUTPATIENT
Start: 2019-10-10 | End: 2019-10-31 | Stop reason: HOSPADM

## 2019-10-09 RX ORDER — PHENYLEPHRINE HYDROCHLORIDE 10 MG/ML
INJECTION INTRAVENOUS
Status: DISCONTINUED | OUTPATIENT
Start: 2019-10-09 | End: 2019-10-09

## 2019-10-09 RX ORDER — GLUCAGON 1 MG
1 KIT INJECTION
Status: DISCONTINUED | OUTPATIENT
Start: 2019-10-09 | End: 2019-10-31 | Stop reason: HOSPADM

## 2019-10-09 RX ORDER — NEOSTIGMINE METHYLSULFATE 0.5 MG/ML
INJECTION, SOLUTION INTRAVENOUS
Status: DISCONTINUED | OUTPATIENT
Start: 2019-10-09 | End: 2019-10-09

## 2019-10-09 RX ORDER — ACETAMINOPHEN 325 MG/1
650 TABLET ORAL EVERY 4 HOURS PRN
Status: DISCONTINUED | OUTPATIENT
Start: 2019-10-09 | End: 2019-10-10

## 2019-10-09 RX ORDER — ONDANSETRON 2 MG/ML
INJECTION INTRAMUSCULAR; INTRAVENOUS
Status: DISCONTINUED | OUTPATIENT
Start: 2019-10-09 | End: 2019-10-09

## 2019-10-09 RX ORDER — MORPHINE SULFATE 2 MG/ML
1 INJECTION, SOLUTION INTRAMUSCULAR; INTRAVENOUS ONCE AS NEEDED
Status: COMPLETED | OUTPATIENT
Start: 2019-10-10 | End: 2019-10-09

## 2019-10-09 RX ORDER — GLYCOPYRROLATE 0.2 MG/ML
INJECTION INTRAMUSCULAR; INTRAVENOUS
Status: DISCONTINUED | OUTPATIENT
Start: 2019-10-09 | End: 2019-10-09

## 2019-10-09 RX ORDER — SUCRALFATE 1 G/1
1 TABLET ORAL 4 TIMES DAILY
Status: DISCONTINUED | OUTPATIENT
Start: 2019-10-09 | End: 2019-10-10

## 2019-10-09 RX ORDER — FENTANYL CITRATE 50 UG/ML
25 INJECTION, SOLUTION INTRAMUSCULAR; INTRAVENOUS EVERY 5 MIN PRN
Status: DISCONTINUED | OUTPATIENT
Start: 2019-10-09 | End: 2019-10-09 | Stop reason: HOSPADM

## 2019-10-09 RX ORDER — PANTOPRAZOLE SODIUM 40 MG/1
40 TABLET, DELAYED RELEASE ORAL 2 TIMES DAILY
Status: DISCONTINUED | OUTPATIENT
Start: 2019-10-09 | End: 2019-10-10

## 2019-10-09 RX ORDER — DIPHENHYDRAMINE HCL 25 MG
25 CAPSULE ORAL NIGHTLY
Status: DISCONTINUED | OUTPATIENT
Start: 2019-10-09 | End: 2019-10-10

## 2019-10-09 RX ORDER — INSULIN ASPART 100 [IU]/ML
0-5 INJECTION, SOLUTION INTRAVENOUS; SUBCUTANEOUS
Status: DISCONTINUED | OUTPATIENT
Start: 2019-10-09 | End: 2019-10-31 | Stop reason: HOSPADM

## 2019-10-09 RX ORDER — FENTANYL CITRATE 50 UG/ML
INJECTION, SOLUTION INTRAMUSCULAR; INTRAVENOUS
Status: DISCONTINUED | OUTPATIENT
Start: 2019-10-09 | End: 2019-10-09

## 2019-10-09 RX ORDER — ONDANSETRON 2 MG/ML
4 INJECTION INTRAMUSCULAR; INTRAVENOUS EVERY 8 HOURS PRN
Status: DISCONTINUED | OUTPATIENT
Start: 2019-10-09 | End: 2019-10-10

## 2019-10-09 RX ORDER — SODIUM CHLORIDE 0.9 % (FLUSH) 0.9 %
10 SYRINGE (ML) INJECTION
Status: DISCONTINUED | OUTPATIENT
Start: 2019-10-09 | End: 2019-10-09 | Stop reason: HOSPADM

## 2019-10-09 RX ORDER — PROPOFOL 10 MG/ML
VIAL (ML) INTRAVENOUS
Status: DISCONTINUED | OUTPATIENT
Start: 2019-10-09 | End: 2019-10-09

## 2019-10-09 RX ORDER — IBUPROFEN 200 MG
24 TABLET ORAL
Status: DISCONTINUED | OUTPATIENT
Start: 2019-10-09 | End: 2019-10-10

## 2019-10-09 RX ORDER — CITALOPRAM 20 MG/1
20 TABLET, FILM COATED ORAL NIGHTLY
Status: DISCONTINUED | OUTPATIENT
Start: 2019-10-09 | End: 2019-10-10

## 2019-10-09 RX ADMIN — FENTANYL CITRATE 50 MCG: 50 INJECTION, SOLUTION INTRAMUSCULAR; INTRAVENOUS at 11:10

## 2019-10-09 RX ADMIN — PROPOFOL 30 MG: 10 INJECTION, EMULSION INTRAVENOUS at 10:10

## 2019-10-09 RX ADMIN — SODIUM CHLORIDE, SODIUM GLUCONATE, SODIUM ACETATE, POTASSIUM CHLORIDE, MAGNESIUM CHLORIDE, SODIUM PHOSPHATE, DIBASIC, AND POTASSIUM PHOSPHATE: .53; .5; .37; .037; .03; .012; .00082 INJECTION, SOLUTION INTRAVENOUS at 01:10

## 2019-10-09 RX ADMIN — ONDANSETRON 4 MG: 2 INJECTION INTRAMUSCULAR; INTRAVENOUS at 12:10

## 2019-10-09 RX ADMIN — SODIUM CHLORIDE: 0.9 INJECTION, SOLUTION INTRAVENOUS at 07:10

## 2019-10-09 RX ADMIN — ROCURONIUM BROMIDE 10 MG: 10 INJECTION, SOLUTION INTRAVENOUS at 08:10

## 2019-10-09 RX ADMIN — ONDANSETRON 4 MG: 2 INJECTION INTRAMUSCULAR; INTRAVENOUS at 04:10

## 2019-10-09 RX ADMIN — ACETAMINOPHEN 650 MG: 325 TABLET ORAL at 04:10

## 2019-10-09 RX ADMIN — PHENYLEPHRINE HYDROCHLORIDE 100 MCG: 10 INJECTION INTRAVENOUS at 10:10

## 2019-10-09 RX ADMIN — EPHEDRINE SULFATE 5 MG: 50 INJECTION, SOLUTION INTRAMUSCULAR; INTRAVENOUS; SUBCUTANEOUS at 10:10

## 2019-10-09 RX ADMIN — SUCRALFATE 1 G: 1 TABLET ORAL at 09:10

## 2019-10-09 RX ADMIN — GLYCOPYRROLATE 0.4 MG: 0.2 INJECTION, SOLUTION INTRAMUSCULAR; INTRAVENOUS at 01:10

## 2019-10-09 RX ADMIN — PROPOFOL 50 MG: 10 INJECTION, EMULSION INTRAVENOUS at 11:10

## 2019-10-09 RX ADMIN — EPHEDRINE SULFATE 10 MG: 50 INJECTION, SOLUTION INTRAMUSCULAR; INTRAVENOUS; SUBCUTANEOUS at 10:10

## 2019-10-09 RX ADMIN — IPRATROPIUM BROMIDE AND ALBUTEROL SULFATE 3 ML: .5; 3 SOLUTION RESPIRATORY (INHALATION) at 03:10

## 2019-10-09 RX ADMIN — SUCCINYLCHOLINE CHLORIDE 120 MG: 20 INJECTION, SOLUTION INTRAMUSCULAR; INTRAVENOUS at 08:10

## 2019-10-09 RX ADMIN — PHENYLEPHRINE HYDROCHLORIDE 200 MCG: 10 INJECTION INTRAVENOUS at 09:10

## 2019-10-09 RX ADMIN — SODIUM CHLORIDE 500 ML: 0.9 IRRIGANT IRRIGATION at 08:10

## 2019-10-09 RX ADMIN — PHENYLEPHRINE HYDROCHLORIDE 100 MCG: 10 INJECTION INTRAVENOUS at 08:10

## 2019-10-09 RX ADMIN — FENTANYL CITRATE 50 MCG: 50 INJECTION, SOLUTION INTRAMUSCULAR; INTRAVENOUS at 12:10

## 2019-10-09 RX ADMIN — EPHEDRINE SULFATE 10 MG: 50 INJECTION, SOLUTION INTRAMUSCULAR; INTRAVENOUS; SUBCUTANEOUS at 09:10

## 2019-10-09 RX ADMIN — SUCRALFATE 1 G: 1 TABLET ORAL at 04:10

## 2019-10-09 RX ADMIN — FENTANYL CITRATE 50 MCG: 50 INJECTION, SOLUTION INTRAMUSCULAR; INTRAVENOUS at 10:10

## 2019-10-09 RX ADMIN — ROCURONIUM BROMIDE 40 MG: 10 INJECTION, SOLUTION INTRAVENOUS at 08:10

## 2019-10-09 RX ADMIN — EPHEDRINE SULFATE 5 MG: 50 INJECTION, SOLUTION INTRAMUSCULAR; INTRAVENOUS; SUBCUTANEOUS at 09:10

## 2019-10-09 RX ADMIN — PROMETHAZINE HYDROCHLORIDE 6.25 MG: 25 INJECTION INTRAMUSCULAR; INTRAVENOUS at 02:10

## 2019-10-09 RX ADMIN — ONDANSETRON 4 MG: 2 INJECTION INTRAMUSCULAR; INTRAVENOUS at 02:10

## 2019-10-09 RX ADMIN — MIDAZOLAM HYDROCHLORIDE 1 MG: 1 INJECTION, SOLUTION INTRAMUSCULAR; INTRAVENOUS at 08:10

## 2019-10-09 RX ADMIN — MORPHINE SULFATE 1 MG: 2 INJECTION, SOLUTION INTRAMUSCULAR; INTRAVENOUS at 11:10

## 2019-10-09 RX ADMIN — PHENYLEPHRINE HYDROCHLORIDE 100 MCG: 10 INJECTION INTRAVENOUS at 09:10

## 2019-10-09 RX ADMIN — PROPOFOL 150 MG: 10 INJECTION, EMULSION INTRAVENOUS at 08:10

## 2019-10-09 RX ADMIN — SODIUM CHLORIDE, SODIUM GLUCONATE, SODIUM ACETATE, POTASSIUM CHLORIDE, MAGNESIUM CHLORIDE, SODIUM PHOSPHATE, DIBASIC, AND POTASSIUM PHOSPHATE: .53; .5; .37; .037; .03; .012; .00082 INJECTION, SOLUTION INTRAVENOUS at 09:10

## 2019-10-09 RX ADMIN — FENTANYL CITRATE 50 MCG: 50 INJECTION, SOLUTION INTRAMUSCULAR; INTRAVENOUS at 09:10

## 2019-10-09 RX ADMIN — METHYLENE BLUE 5 MG: 5 INJECTION INTRAVENOUS at 08:10

## 2019-10-09 RX ADMIN — NEOSTIGMINE METHYLSULFATE 3 MG: 0.5 INJECTION INTRAVENOUS at 01:10

## 2019-10-09 RX ADMIN — PANTOPRAZOLE SODIUM 40 MG: 40 TABLET, DELAYED RELEASE ORAL at 09:10

## 2019-10-09 RX ADMIN — ROCURONIUM BROMIDE 10 MG: 10 INJECTION, SOLUTION INTRAVENOUS at 09:10

## 2019-10-09 RX ADMIN — SIMVASTATIN 40 MG: 40 TABLET, FILM COATED ORAL at 09:10

## 2019-10-09 RX ADMIN — CITALOPRAM HYDROBROMIDE 20 MG: 20 TABLET ORAL at 09:10

## 2019-10-09 RX ADMIN — ROCURONIUM BROMIDE 10 MG: 10 INJECTION, SOLUTION INTRAVENOUS at 10:10

## 2019-10-09 RX ADMIN — FENTANYL CITRATE 50 MCG: 50 INJECTION, SOLUTION INTRAMUSCULAR; INTRAVENOUS at 08:10

## 2019-10-09 RX ADMIN — LIDOCAINE HYDROCHLORIDE 100 MG: 20 INJECTION, SOLUTION INTRAVENOUS at 08:10

## 2019-10-09 NOTE — TRANSFER OF CARE
"Anesthesia Transfer of Care Note    Patient: Pasha Harmon    Procedure(s) Performed: Procedure(s) (LRB):  DISSECTION, LESION, ESOPHAGUS, STOMACH, OR DUODENUM, SUBMUCOSAL, ENDOSCOPIC (N/A)    Patient location: PACU    Anesthesia Type: general    Transport from OR: Transported from OR on 6-10 L/min O2 by face mask with adequate spontaneous ventilation    Post pain: adequate analgesia    Post assessment: no apparent anesthetic complications and tolerated procedure well    Post vital signs: stable    Level of consciousness: awake    Nausea/Vomiting: no nausea/vomiting    Complications: none    Transfer of care protocol was followed      Last vitals:   Visit Vitals  /76 (BP Location: Left arm, Patient Position: Lying)   Pulse 64   Temp 36.6 °C (97.9 °F) (Temporal)   Resp 16   Ht 6' 2" (1.88 m)   Wt 133.8 kg (295 lb)   SpO2 100%   BMI 37.88 kg/m²     "

## 2019-10-09 NOTE — PLAN OF CARE
Assisted to room via w/c. POC initiated. Pt verbalized understanding. No c/o. No distress noted.

## 2019-10-09 NOTE — [employer] PATIENT INSTRUCTIONS
Discharge Summary/Instructions after an Endoscopic Procedure  Patient Name: Pasha Harmon  Patient MRN: [id number]  Patient YOB: 1945 Wednesday, October 09, 2019  Ghislaine Burnett MD  RESTRICTIONS:  During your procedure today, you received medications for sedation.  These   medications may affect your judgment, balance and coordination.  Therefore,   for 24 hours, you have the following restrictions:   - DO NOT drive a car, operate machinery, make legal/financial decisions,   sign important papers or drink alcohol.    ACTIVITY:  Today: no heavy lifting, straining or running due to procedural   sedation/anesthesia.  The following day: return to full activity including work.  DIET:  Eat and drink normally unless instructed otherwise.     TREATMENT FOR COMMON SIDE EFFECTS:  - Mild abdominal pain, nausea, belching, bloating or excessive gas:  rest,   eat lightly and use a heating pad.  - Sore Throat: treat with throat lozenges and/or gargle with warm salt   water.  - Because air was used during the procedure, expelling large amounts of air   from your rectum or belching is normal.  - If a bowel prep was taken, you may not have a bowel movement for 1-3 days.    This is normal.  SYMPTOMS TO WATCH FOR AND REPORT TO YOUR PHYSICIAN:  1. Abdominal pain or bloating, other than gas cramps.  2. Chest pain.  3. Back pain.  4. Signs of infection such as: chills or fever occurring within 24 hours   after the procedure.  5. Rectal bleeding, which would show as bright red, maroon, or black stools.   (A tablespoon of blood from the rectum is not serious, especially if   hemorrhoids are present.)  6. Vomiting.  7. Weakness or dizziness.  GO DIRECTLY TO THE NEAREST EMERGENCY ROOM IF YOU HAVE ANY OF THE FOLLOWING:      Difficulty breathing              Chills and/or fever over 101 F   Persistent vomiting and/or vomiting blood   Severe abdominal pain   Severe chest pain   Black, tarry stools   Bleeding- more than one  tablespoon   Any other symptom or condition that you feel may need urgent attention  Your doctor recommends these additional instructions:  If any biopsies were taken, your doctors clinic will contact you in 1 to 2   weeks with any results.  - Admit the patient to hospital [facility] for observation.   - Clear liquid diet for 3 days.   - Continue present medications.   - Await pathology results.   - Return to referring physician.   - Carafate Qid for 10 days.  - PPI Bid.    - Repeat EGD in 2 months to check on healing.   For questions, problems or results please call your physician - Ghislaine Burnett MD at Work:  [phone].  [facility], EMERGENCY ROOM PHONE NUMBER: [phone]  IF A COMPLICATION OR EMERGENCY SITUATION ARISES AND YOU ARE UNABLE TO REACH   YOUR PHYSICIAN - GO DIRECTLY TO THE EMERGENCY ROOM.  Ghislaine Burnett MD  10/9/2019 2:46:23 PM  This report has been verified and signed electronically.  [employer]

## 2019-10-09 NOTE — HPI
"Pasha Harmon is a 74M with COPD (current smoker), DMII, CAD, gastric lipoma admitted to hospital medicine after EGD/ ESD done today 10/9. Patient seen in PACU ~45 min post procedure, still groggy, not able to participate much in history. Per chart review, patient was having intermittent episodes of melena, suspected secondary to known large bleeding gastric lipoma. Patient underwent scheduled outpatient ESD today.     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 Known to be a large bleeding lipoma. S/P ESD successfully. The lesion was too large and bulky and caused a long  mucosal esophageal trear and got lodged into the mid esophagus. The mass had to be cut into smaller pieces to be retrieved. Mucosal tear in the middle and lower third of the esophagus.  Recommendations:Admit the patient to hospital [facility] for observation. Clear liquid diet for 3 days. Carafate Qid for 10 days. PPI Bid. Repeat EGD in 2 months to check on healing. no need for esophagram"    VSS in PACU. Ordered CBC, CMP.  "

## 2019-10-09 NOTE — ASSESSMENT & PLAN NOTE
Home regimen: metformin 500mg daily  Lab Results   Component Value Date    HGBA1C 6.1 (H) 09/19/2019   BG well controlled, low dose SSI while on CLD  Check BG ACHS

## 2019-10-09 NOTE — ANESTHESIA PREPROCEDURE EVALUATION
10/09/2019  Psaha Harmon is a 74 y.o., male.  Patient Active Problem List   Diagnosis    COPD (chronic obstructive pulmonary disease)    Abscess of gluteal cleft    Type 2 diabetes mellitus without complication, without long-term current use of insulin    Hyperlipidemia    Essential hypertension    Hyponatremia    Depression    Anemia    Gastric tumor    Peptic ulcer disease with hemorrhage    Leiomyoma of stomach    Stromal tumor of digestive system    History of anemia    Atherosclerosis of abdominal aorta    Obesity (BMI 35.0-39.9 without comorbidity)    Cigarette smoker    Benign prostatic hyperplasia with nocturia    MIGUELINA (obstructive sleep apnea)    Upper GI bleed    Chronic blood loss anemia    Gastrointestinal hemorrhage with melena    Syncope    Leukocytosis    Thrombocytosis    Iron deficiency anemia, unspecified    Subepithelial gastric lesion   2019   · Normal left ventricular systolic function. The estimated ejection fraction is 60%  · Mild concentric left ventricular hypertrophy         Anesthesia Evaluation    I have reviewed the Patient Summary Reports.        Review of Systems      Physical Exam  General:  Well nourished, Obesity    Airway/Jaw/Neck:  Airway Findings: Mouth Opening: Normal General Airway Assessment: Adult  Mallampati: III  Improves to III with phonation.  Jaw/Neck Findings:  Limited Ability to Prognath  Neck ROM: Normal ROM     Eyes/Ears/Nose:  Eyes/Ears/Nose Findings:    Dental:  Dental Findings: In tact   Chest/Lungs:  Chest/Lungs Findings: Clear to auscultation, Normal Respiratory Rate     Heart/Vascular:  Heart Findings: Rate: Normal  Rhythm: Regular Rhythm  Sounds: Normal     Abdomen:  Abdomen Findings:  Normal     Musculoskeletal:  Musculoskeletal Findings:    Skin:  Skin Findings:     Mental Status:  Mental Status Findings:   Cooperative, Alert and Oriented         Anesthesia Plan  Type of Anesthesia, risks & benefits discussed:  Anesthesia Type:  general, MAC  Patient's Preference:   Intra-op Monitoring Plan:   Intra-op Monitoring Plan Comments:   Post Op Pain Control Plan:   Post Op Pain Control Plan Comments:   Induction:   IV  Beta Blocker:  Patient is not currently on a Beta-Blocker (No further documentation required).       Informed Consent: Patient understands risks and agrees with Anesthesia plan.  Questions answered. Anesthesia consent signed with patient.  ASA Score: 2     Day of Surgery Review of History & Physical:    H&P update referred to the surgeon.         Ready For Surgery From Anesthesia Perspective.

## 2019-10-09 NOTE — TREATMENT PLAN
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 Known to be                         a large bleeding lipoma. S/P ESD successfully. The                         lesion was too large and bulky and caused a long                         mucosal esophageal trear and got lodged into the                         mid esophagus. The mass had to be cut into smaller                         pieces to be retrieved.                        - Normal examined duodenum.                        - Mucosal tear in the middle and lower third of the                         esophagus.  Recommendation:  - Admit the patient to hospital [facility] for observation.  - Clear liquid diet for 3 days.  - Continue present medications.  - Await pathology results.  - Return to referring physician.  - Carafate Qid for 10 days.  - PPI Bid.  - Repeat EGD in 2 months to check on healing.  - no need for esophagram

## 2019-10-09 NOTE — ASSESSMENT & PLAN NOTE
"Per chart review, pt initially presented with melena and was found to have gastric lesion in May, found to be lipoma. 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 Known to be a large bleeding lipoma. S/P ESD successfully. The lesion was too large and bulky and caused a long mucosal esophageal trear and got lodged into the mid esophagus. The mass had to be cut into smaller  pieces to be retrieved. Mucosal tear in the middle and lower third of the esophagus."  Recommendation:  - Clear liquid diet for 3 days.  - Continue present medications.  - Await pathology results.  - Carafate Qid for 10 days.  - PPI Bid.  - Repeat EGD in 2 months to check on healing.  - no need for esophagram      - will T&S as precaution  - CLD   - continue home protonix 40mg BID and carafate 1g qid   - tele  "

## 2019-10-09 NOTE — NURSING TRANSFER
Nursing Transfer Note      10/9/2019     Transfer To: [facility]    Transfer via stretcher    Transfer with  to O2    Transported by pct    Medicines sent: none    Chart send with patient: Yes    Notified: family

## 2019-10-09 NOTE — H&P
"[facility] Medicine  History & Physical    Patient Name: Pasha Harmon  MRN: [id number]  Admission Date: 10/9/2019  Attending Physician: Marc Whitman, *   Primary Care Provider: Eze Root MD    [facility] Medicine Team: [facility] Jeanie Bedoya PA-C     Patient information was obtained from patient, past medical records and ER records.     Subjective:     Principal Problem:Subepithelial gastric lesion    Chief Complaint: No chief complaint on file.       HPI: Pasha Harmon is a 74M with COPD (current smoker), DMII, CAD, gastric lipoma admitted to hospital medicine after EGD/ ESD done today 10/9. Patient seen in PACU ~45 min post procedure, still groggy, not able to participate much in history. Per chart review, patient was having intermittent episodes of melena, suspected secondary to known large bleeding gastric lipoma. Patient underwent scheduled outpatient ESD today.     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 Known to be a large bleeding lipoma. S/P ESD successfully. The lesion was too large and bulky and caused a long  mucosal esophageal trear and got lodged into the mid esophagus. The mass had to be cut into smaller pieces to be retrieved. Mucosal tear in the middle and lower third of the esophagus.  Recommendations:Admit the patient to hospital [facility] for observation. Clear liquid diet for 3 days. Carafate Qid for 10 days. PPI Bid. Repeat EGD in 2 months to check on healing. no need for esophagram"    VSS in PACU. Ordered CBC, CMP.    Past Medical History:   Diagnosis Date    COPD (chronic obstructive pulmonary disease)     Diabetes mellitus     Emphysema of lung     Gastric ulcer     Hypertension     MI (myocardial infarction)        Past Surgical History:   Procedure Laterality Date    APPENDECTOMY      CHOLECYSTECTOMY      ENDOSCOPIC ULTRASOUND OF UPPER GASTROINTESTINAL TRACT Left 6/4/2019    Procedure: ULTRASOUND, " UPPER GI TRACT, ENDOSCOPIC;  Surgeon: Lavon Parra MD;  Location: [facility] ENDO;  Service: Endoscopy;  Laterality: Left;  GIF plus Radial    ESOPHAGOGASTRODUODENOSCOPY N/A 5/25/2019    Procedure: EGD (ESOPHAGOGASTRODUODENOSCOPY);  Surgeon: Mitch Buitrago MD;  Location: [facility] ENDO;  Service: Endoscopy;  Laterality: N/A;    ESOPHAGOGASTRODUODENOSCOPY N/A 6/4/2019    Procedure: EGD (ESOPHAGOGASTRODUODENOSCOPY);  Surgeon: Lavon Parra MD;  Location: [facility] ENDO;  Service: Endoscopy;  Laterality: N/A;    ESOPHAGOGASTRODUODENOSCOPY N/A 9/3/2019    Procedure: EGD (ESOPHAGOGASTRODUODENOSCOPY);  Surgeon: Keshav Harris MD;  Location: [facility] ENDO;  Service: Endoscopy;  Laterality: N/A;    KNEE SURGERY Right        Review of patient's allergies indicates:   Allergen Reactions    Meloxicam Other (See Comments)     Ulcer, GI bleed        No current facility-administered medications on file prior to encounter.      Current Outpatient Medications on File Prior to Encounter   Medication Sig    acetaminophen (TYLENOL) 650 MG TbSR Take 1,300 mg by mouth 2 (two) times daily.     budesonide-formoterol 160-4.5 mcg (SYMBICORT) 160-4.5 mcg/actuation HFAA Inhale 2 puffs into the lungs every 12 (twelve) hours.    cholecalciferol, vitamin D3, (VITAMIN D3) 5,000 unit Tab Take 5,000 Units by mouth once daily.    citalopram (CELEXA) 20 MG tablet Take 1 tablet (20 mg total) by mouth nightly.    diphenhydrAMINE (BENADRYL) 25 mg capsule Take 25 mg by mouth every evening. 1 tab po am, 3 tabs po HS     lisinopril (PRINIVIL,ZESTRIL) 20 MG tablet Take 1 tablet (20 mg total) by mouth once daily.    metFORMIN (GLUCOPHAGE) 500 MG tablet Take 1 tablet (500 mg total) by mouth daily with breakfast.    multivit with min-FA-lycopene (ONE-A-DAY MEN'S MULTIVITAMIN) 400-300 mcg Tab Take 1 tablet by mouth nightly.    pantoprazole (PROTONIX) 40 MG tablet Take 1 tablet (40 mg total) by mouth 2 (two) times daily.    simvastatin (ZOCOR)  40 MG tablet Take 1 tablet (40 mg total) by mouth every evening.    sucralfate (CARAFATE) 1 gram tablet Take 1 tablet (1 g total) by mouth 4 (four) times daily.    albuterol (ACCUNEB) 1.25 mg/3 mL Nebu Take 3 mLs (1.25 mg total) by nebulization every 6 (six) hours as needed. Rescue    albuterol-ipratropium (DUO-NEB) 2.5 mg-0.5 mg/3 mL nebulizer solution Take 3 mLs by nebulization every 6 (six) hours as needed for Wheezing. Rescue    nicotine (NICODERM CQ) 21 mg/24 hr Place 1 patch onto the skin once daily.     Family History     Problem Relation (Age of Onset)    Cancer Father    Heart disease Mother        Tobacco Use    Smoking status: Current Every Day Smoker     Packs/day: 2.00     Types: Cigarettes    Smokeless tobacco: Never Used   Substance and Sexual Activity    Alcohol use: No    Drug use: No    Sexual activity: Not on file     Review of Systems   Unable to perform ROS: Other (pt in PACU post procedure)     Objective:     Vital Signs (Most Recent):  Temp: 97.9 °F (36.6 °C) (10/09/19 1412)  Pulse: 74 (10/09/19 1515)  Resp: 18 (10/09/19 1515)  BP: (!) 118/57 (10/09/19 1515)  SpO2: 100 % (10/09/19 1515) Vital Signs (24h Range):  Temp:  [97.9 °F (36.6 °C)-98.8 °F (37.1 °C)] 97.9 °F (36.6 °C)  Pulse:  [64-82] 74  Resp:  [16-23] 18  SpO2:  [94 %-100 %] 100 %  BP: ()/(50-76) 118/57     Weight: 133.8 kg (295 lb)  Body mass index is 37.88 kg/m².    Physical Exam   Constitutional: He appears well-developed and well-nourished. No distress.   HENT:   Head: Normocephalic and atraumatic.   Right Ear: External ear normal.   Left Ear: External ear normal.   Eyes:   Eyes closed, will open eyes to verbal stimuli   Neck: Normal range of motion.   Cardiovascular: Normal rate and regular rhythm.   Pulmonary/Chest: Effort normal. No respiratory distress. He has wheezes (slight wheezing throughout lungfields).   Abdominal: Soft. Bowel sounds are normal. He exhibits no distension. There is no tenderness. There is no  "guarding.   Musculoskeletal: He exhibits no edema or tenderness.   Neurological: He is alert.   Pt seen in PACU ~30 min post procedure, following commands but groggy   Skin: Skin is warm and dry. He is not diaphoretic.   Psychiatric: He is inattentive (sleepy).           Significant Labs: All pertinent labs within the past 24 hours have been reviewed.    Significant Imaging: I have reviewed all pertinent imaging results/findings within the past 24 hours.    Assessment/Plan:     * Subepithelial gastric lesion  Per chart review, pt initially presented with melena and was found to have gastric lesion in May, found to be lipoma. 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 Known to be a large bleeding lipoma. S/P ESD successfully. The lesion was too large and bulky and caused a long mucosal esophageal trear and got lodged into the mid esophagus. The mass had to be cut into smaller  pieces to be retrieved. Mucosal tear in the middle and lower third of the esophagus."  Recommendation:  - Clear liquid diet for 3 days.  - Continue present medications.  - Await pathology results.  - Carafate Qid for 10 days.  - PPI Bid.  - Repeat EGD in 2 months to check on healing.  - no need for esophagram      - will T&S as precaution  - CLD   - continue home protonix 40mg BID and carafate 1g qid   - tele    COPD (chronic obstructive pulmonary disease)  Still active smoker  Maintain sats >88%  - continue home duoneb PRN and symbicort      Essential hypertension  Stable, well controlled  Hold lisinopril 20mg for now      Type 2 diabetes mellitus without complication, without long-term current use of insulin  Home regimen: metformin 500mg daily  Lab Results   Component Value Date    HGBA1C 6.1 (H) 09/19/2019   BG well controlled, low dose SSI while on CLD  Check BG ACHS      Hyperlipidemia  Continue zocor 40mg qhs      MIGUELINA (obstructive sleep apnea)  CPAP " qhs      Anemia  BL ~8-9  Bleeding gastric lipoma treated today      Insomnia  Appears pt takes benadryl qhs for insomnia, continue         VTE Risk Mitigation (From admission, onward)         Ordered     IP VTE HIGH RISK PATIENT  Once      10/09/19 1443     Place PAULY hose  Until discontinued      10/09/19 1443     Place sequential compression device  Until discontinued      10/09/19 1443                   Jeanie Bedoya PA-C  Department of Hospital Medicine   [facility]

## 2019-10-09 NOTE — SUBJECTIVE & OBJECTIVE
Past Medical History:   Diagnosis Date    COPD (chronic obstructive pulmonary disease)     Diabetes mellitus     Emphysema of lung     Gastric ulcer     Hypertension     MI (myocardial infarction)        Past Surgical History:   Procedure Laterality Date    APPENDECTOMY      CHOLECYSTECTOMY      ENDOSCOPIC ULTRASOUND OF UPPER GASTROINTESTINAL TRACT Left 6/4/2019    Procedure: ULTRASOUND, UPPER GI TRACT, ENDOSCOPIC;  Surgeon: Lavon Parra MD;  Location: [facility];  Service: Endoscopy;  Laterality: Left;  GIF plus Radial    ESOPHAGOGASTRODUODENOSCOPY N/A 5/25/2019    Procedure: EGD (ESOPHAGOGASTRODUODENOSCOPY);  Surgeon: Mitch Buitrago MD;  Location: [facility];  Service: Endoscopy;  Laterality: N/A;    ESOPHAGOGASTRODUODENOSCOPY N/A 6/4/2019    Procedure: EGD (ESOPHAGOGASTRODUODENOSCOPY);  Surgeon: Lavon Parra MD;  Location: [facility];  Service: Endoscopy;  Laterality: N/A;    ESOPHAGOGASTRODUODENOSCOPY N/A 9/3/2019    Procedure: EGD (ESOPHAGOGASTRODUODENOSCOPY);  Surgeon: Keshav Harris MD;  Location: [facility];  Service: Endoscopy;  Laterality: N/A;    KNEE SURGERY Right        Review of patient's allergies indicates:   Allergen Reactions    Meloxicam Other (See Comments)     Ulcer, GI bleed        No current facility-administered medications on file prior to encounter.      Current Outpatient Medications on File Prior to Encounter   Medication Sig    acetaminophen (TYLENOL) 650 MG TbSR Take 1,300 mg by mouth 2 (two) times daily.     budesonide-formoterol 160-4.5 mcg (SYMBICORT) 160-4.5 mcg/actuation HFAA Inhale 2 puffs into the lungs every 12 (twelve) hours.    cholecalciferol, vitamin D3, (VITAMIN D3) 5,000 unit Tab Take 5,000 Units by mouth once daily.    citalopram (CELEXA) 20 MG tablet Take 1 tablet (20 mg total) by mouth nightly.    diphenhydrAMINE (BENADRYL) 25 mg capsule Take 25 mg by mouth every evening. 1 tab po am, 3 tabs po HS     lisinopril (PRINIVIL,ZESTRIL)  20 MG tablet Take 1 tablet (20 mg total) by mouth once daily.    metFORMIN (GLUCOPHAGE) 500 MG tablet Take 1 tablet (500 mg total) by mouth daily with breakfast.    multivit with min-FA-lycopene (ONE-A-DAY MEN'S MULTIVITAMIN) 400-300 mcg Tab Take 1 tablet by mouth nightly.    pantoprazole (PROTONIX) 40 MG tablet Take 1 tablet (40 mg total) by mouth 2 (two) times daily.    simvastatin (ZOCOR) 40 MG tablet Take 1 tablet (40 mg total) by mouth every evening.    sucralfate (CARAFATE) 1 gram tablet Take 1 tablet (1 g total) by mouth 4 (four) times daily.    albuterol (ACCUNEB) 1.25 mg/3 mL Nebu Take 3 mLs (1.25 mg total) by nebulization every 6 (six) hours as needed. Rescue    albuterol-ipratropium (DUO-NEB) 2.5 mg-0.5 mg/3 mL nebulizer solution Take 3 mLs by nebulization every 6 (six) hours as needed for Wheezing. Rescue    nicotine (NICODERM CQ) 21 mg/24 hr Place 1 patch onto the skin once daily.     Family History     Problem Relation (Age of Onset)    Cancer Father    Heart disease Mother        Tobacco Use    Smoking status: Current Every Day Smoker     Packs/day: 2.00     Types: Cigarettes    Smokeless tobacco: Never Used   Substance and Sexual Activity    Alcohol use: No    Drug use: No    Sexual activity: Not on file     Review of Systems   Unable to perform ROS: Other (pt in PACU post procedure)     Objective:     Vital Signs (Most Recent):  Temp: 97.9 °F (36.6 °C) (10/09/19 1412)  Pulse: 74 (10/09/19 1515)  Resp: 18 (10/09/19 1515)  BP: (!) 118/57 (10/09/19 1515)  SpO2: 100 % (10/09/19 1515) Vital Signs (24h Range):  Temp:  [97.9 °F (36.6 °C)-98.8 °F (37.1 °C)] 97.9 °F (36.6 °C)  Pulse:  [64-82] 74  Resp:  [16-23] 18  SpO2:  [94 %-100 %] 100 %  BP: ()/(50-76) 118/57     Weight: 133.8 kg (295 lb)  Body mass index is 37.88 kg/m².    Physical Exam   Constitutional: He appears well-developed and well-nourished. No distress.   HENT:   Head: Normocephalic and atraumatic.   Right Ear: External ear  normal.   Left Ear: External ear normal.   Eyes:   Eyes closed, will open eyes to verbal stimuli   Neck: Normal range of motion.   Cardiovascular: Normal rate and regular rhythm.   Pulmonary/Chest: Effort normal. No respiratory distress. He has wheezes (slight wheezing throughout lungfields).   Abdominal: Soft. Bowel sounds are normal. He exhibits no distension. There is no tenderness. There is no guarding.   Musculoskeletal: He exhibits no edema or tenderness.   Neurological: He is alert.   Pt seen in PACU ~30 min post procedure, following commands but groggy   Skin: Skin is warm and dry. He is not diaphoretic.   Psychiatric: He is inattentive (sleepy).           Significant Labs: All pertinent labs within the past 24 hours have been reviewed.    Significant Imaging: I have reviewed all pertinent imaging results/findings within the past 24 hours.

## 2019-10-09 NOTE — H&P
History & Physical - Short Stay  Gastroenterology      SUBJECTIVE:     Procedure: EGD    Chief Complaint/Indication for Procedure: gastric lesion    History of Present Illness:  Patient is a 74 y.o. male with large bleeding gastric lipoma coming for ESD.     PTA Medications   Medication Sig    acetaminophen (TYLENOL) 650 MG TbSR Take 1,300 mg by mouth 2 (two) times daily.     albuterol (ACCUNEB) 1.25 mg/3 mL Nebu Take 3 mLs (1.25 mg total) by nebulization every 6 (six) hours as needed. Rescue    albuterol-ipratropium (DUO-NEB) 2.5 mg-0.5 mg/3 mL nebulizer solution Take 3 mLs by nebulization every 6 (six) hours as needed for Wheezing. Rescue    budesonide-formoterol 160-4.5 mcg (SYMBICORT) 160-4.5 mcg/actuation HFAA Inhale 2 puffs into the lungs every 12 (twelve) hours.    cholecalciferol, vitamin D3, (VITAMIN D3) 5,000 unit Tab Take 5,000 Units by mouth once daily.    citalopram (CELEXA) 20 MG tablet Take 1 tablet (20 mg total) by mouth nightly.    diphenhydrAMINE (BENADRYL) 25 mg capsule Take 25 mg by mouth every evening. 1 tab po am, 3 tabs po HS     lisinopril (PRINIVIL,ZESTRIL) 20 MG tablet Take 1 tablet (20 mg total) by mouth once daily.    metFORMIN (GLUCOPHAGE) 500 MG tablet Take 1 tablet (500 mg total) by mouth daily with breakfast.    multivit with min-FA-lycopene (ONE-A-DAY MEN'S MULTIVITAMIN) 400-300 mcg Tab Take 1 tablet by mouth nightly.    nicotine (NICODERM CQ) 21 mg/24 hr Place 1 patch onto the skin once daily.    pantoprazole (PROTONIX) 40 MG tablet Take 1 tablet (40 mg total) by mouth 2 (two) times daily.    simvastatin (ZOCOR) 40 MG tablet Take 1 tablet (40 mg total) by mouth every evening.    sucralfate (CARAFATE) 1 gram tablet Take 1 tablet (1 g total) by mouth 4 (four) times daily.       Review of patient's allergies indicates:   Allergen Reactions    Meloxicam Other (See Comments)     Ulcer, GI bleed         Past Medical History:   Diagnosis Date    COPD (chronic obstructive  pulmonary disease)     Diabetes mellitus     Emphysema of lung     Gastric ulcer     Hypertension     MI (myocardial infarction)      Past Surgical History:   Procedure Laterality Date    APPENDECTOMY      CHOLECYSTECTOMY      ENDOSCOPIC ULTRASOUND OF UPPER GASTROINTESTINAL TRACT Left 6/4/2019    Procedure: ULTRASOUND, UPPER GI TRACT, ENDOSCOPIC;  Surgeon: Lavon Parra MD;  Location: [facility];  Service: Endoscopy;  Laterality: Left;  GIF plus Radial    ESOPHAGOGASTRODUODENOSCOPY N/A 5/25/2019    Procedure: EGD (ESOPHAGOGASTRODUODENOSCOPY);  Surgeon: Mitch Buitrago MD;  Location: [facility];  Service: Endoscopy;  Laterality: N/A;    ESOPHAGOGASTRODUODENOSCOPY N/A 6/4/2019    Procedure: EGD (ESOPHAGOGASTRODUODENOSCOPY);  Surgeon: Lavon Parra MD;  Location: [facility];  Service: Endoscopy;  Laterality: N/A;    ESOPHAGOGASTRODUODENOSCOPY N/A 9/3/2019    Procedure: EGD (ESOPHAGOGASTRODUODENOSCOPY);  Surgeon: Keshav Harris MD;  Location: [facility] ENDO;  Service: Endoscopy;  Laterality: N/A;    KNEE SURGERY Right      Family History   Problem Relation Age of Onset    Heart disease Mother     Cancer Father         brain      Social History     Tobacco Use    Smoking status: Current Every Day Smoker     Packs/day: 2.00     Types: Cigarettes    Smokeless tobacco: Never Used   Substance Use Topics    Alcohol use: No    Drug use: No          OBJECTIVE:     Vital Signs (Most Recent)          ASSESSMENT/PLAN:     Patient is a 74 y.o. male with large bleeding gastric lipoma coming for ESD.    Plan: EGD/ ESD    Anesthesia Plan: General    ASA Grade: ASA 3 - Patient with moderate systemic disease with functional limitations

## 2019-10-10 ENCOUNTER — ANESTHESIA EVENT (OUTPATIENT)
Dept: SURGERY | Facility: HOSPITAL | Age: 74
DRG: 003 | End: 2019-10-10
Payer: MEDICARE

## 2019-10-10 ENCOUNTER — ANESTHESIA (OUTPATIENT)
Dept: SURGERY | Facility: HOSPITAL | Age: 74
DRG: 003 | End: 2019-10-10
Payer: MEDICARE

## 2019-10-10 ENCOUNTER — TELEPHONE (OUTPATIENT)
Dept: ENDOSCOPY | Facility: HOSPITAL | Age: 74
End: 2019-10-10

## 2019-10-10 DIAGNOSIS — K31.7 GASTRIC POLYP: Primary | ICD-10-CM

## 2019-10-10 PROBLEM — K31.89 PERFORATED STOMACH, ACUTE: Status: ACTIVE | Noted: 2019-10-10

## 2019-10-10 PROBLEM — K63.1 BOWEL PERFORATION: Status: ACTIVE | Noted: 2019-10-10

## 2019-10-10 LAB
ABO + RH BLD: NORMAL
ALBUMIN SERPL BCP-MCNC: 2.5 G/DL (ref 3.5–5.2)
ALBUMIN SERPL BCP-MCNC: 3.5 G/DL (ref 3.5–5.2)
ALLENS TEST: ABNORMAL
ALP SERPL-CCNC: 43 U/L (ref 55–135)
ALP SERPL-CCNC: 57 U/L (ref 55–135)
ALT SERPL W/O P-5'-P-CCNC: 17 U/L (ref 10–44)
ALT SERPL W/O P-5'-P-CCNC: 25 U/L (ref 10–44)
ANION GAP SERPL CALC-SCNC: 11 MMOL/L (ref 8–16)
ANION GAP SERPL CALC-SCNC: 12 MMOL/L (ref 8–16)
ANISOCYTOSIS BLD QL SMEAR: SLIGHT
AST SERPL-CCNC: 32 U/L (ref 10–40)
AST SERPL-CCNC: 37 U/L (ref 10–40)
BASOPHILS # BLD AUTO: 0.03 K/UL (ref 0–0.2)
BASOPHILS # BLD AUTO: 0.05 K/UL (ref 0–0.2)
BASOPHILS NFR BLD: 0.2 % (ref 0–1.9)
BASOPHILS NFR BLD: 0.2 % (ref 0–1.9)
BILIRUB DIRECT SERPL-MCNC: 0.3 MG/DL (ref 0.1–0.3)
BILIRUB SERPL-MCNC: 0.5 MG/DL (ref 0.1–1)
BILIRUB SERPL-MCNC: 0.5 MG/DL (ref 0.1–1)
BLD GP AB SCN CELLS X3 SERPL QL: NORMAL
BUN SERPL-MCNC: 21 MG/DL (ref 8–23)
BUN SERPL-MCNC: 27 MG/DL (ref 8–23)
CALCIUM SERPL-MCNC: 7.5 MG/DL (ref 8.7–10.5)
CALCIUM SERPL-MCNC: 8.8 MG/DL (ref 8.7–10.5)
CHLORIDE SERPL-SCNC: 102 MMOL/L (ref 95–110)
CHLORIDE SERPL-SCNC: 99 MMOL/L (ref 95–110)
CO2 SERPL-SCNC: 21 MMOL/L (ref 23–29)
CO2 SERPL-SCNC: 22 MMOL/L (ref 23–29)
CREAT SERPL-MCNC: 2 MG/DL (ref 0.5–1.4)
CREAT SERPL-MCNC: 2.8 MG/DL (ref 0.5–1.4)
DELSYS: ABNORMAL
DIFFERENTIAL METHOD: ABNORMAL
DIFFERENTIAL METHOD: ABNORMAL
EOSINOPHIL # BLD AUTO: 0 K/UL (ref 0–0.5)
EOSINOPHIL # BLD AUTO: 0 K/UL (ref 0–0.5)
EOSINOPHIL NFR BLD: 0 % (ref 0–8)
EOSINOPHIL NFR BLD: 0.1 % (ref 0–8)
ERYTHROCYTE [DISTWIDTH] IN BLOOD BY AUTOMATED COUNT: 17.1 % (ref 11.5–14.5)
ERYTHROCYTE [DISTWIDTH] IN BLOOD BY AUTOMATED COUNT: 17.2 % (ref 11.5–14.5)
ERYTHROCYTE [SEDIMENTATION RATE] IN BLOOD BY WESTERGREN METHOD: 16 MM/H
ERYTHROCYTE [SEDIMENTATION RATE] IN BLOOD BY WESTERGREN METHOD: 20 MM/H
EST. GFR  (AFRICAN AMERICAN): 24.7 ML/MIN/1.73 M^2
EST. GFR  (AFRICAN AMERICAN): 37.2 ML/MIN/1.73 M^2
EST. GFR  (NON AFRICAN AMERICAN): 21.4 ML/MIN/1.73 M^2
EST. GFR  (NON AFRICAN AMERICAN): 32.2 ML/MIN/1.73 M^2
FIO2: 100
FIO2: 60
FIO2: 70
GLUCOSE SERPL-MCNC: 118 MG/DL (ref 70–110)
GLUCOSE SERPL-MCNC: 142 MG/DL (ref 70–110)
GRAM STN SPEC: NORMAL
GRAM STN SPEC: NORMAL
HCO3 UR-SCNC: 21.1 MMOL/L (ref 24–28)
HCO3 UR-SCNC: 23.1 MMOL/L (ref 24–28)
HCO3 UR-SCNC: 24.1 MMOL/L (ref 24–28)
HCO3 UR-SCNC: 26.3 MMOL/L (ref 24–28)
HCT VFR BLD AUTO: 30.2 % (ref 40–54)
HCT VFR BLD AUTO: 31.7 % (ref 40–54)
HGB BLD-MCNC: 8.7 G/DL (ref 14–18)
HGB BLD-MCNC: 9.3 G/DL (ref 14–18)
IMM GRANULOCYTES # BLD AUTO: 0.11 K/UL (ref 0–0.04)
IMM GRANULOCYTES # BLD AUTO: 0.44 K/UL (ref 0–0.04)
IMM GRANULOCYTES NFR BLD AUTO: 0.7 % (ref 0–0.5)
IMM GRANULOCYTES NFR BLD AUTO: 1.3 % (ref 0–0.5)
INR PPP: 1.2 (ref 0.8–1.2)
LACTATE SERPL-SCNC: 1 MMOL/L (ref 0.5–2.2)
LACTATE SERPL-SCNC: 1.9 MMOL/L (ref 0.5–2.2)
LDH SERPL L TO P-CCNC: 1.33 MMOL/L (ref 0.36–1.25)
LIPASE SERPL-CCNC: 31 U/L (ref 4–60)
LYMPHOCYTES # BLD AUTO: 0.8 K/UL (ref 1–4.8)
LYMPHOCYTES # BLD AUTO: 1 K/UL (ref 1–4.8)
LYMPHOCYTES NFR BLD: 3.1 % (ref 18–48)
LYMPHOCYTES NFR BLD: 5.1 % (ref 18–48)
MAGNESIUM SERPL-MCNC: 2 MG/DL (ref 1.6–2.6)
MAGNESIUM SERPL-MCNC: 2 MG/DL (ref 1.6–2.6)
MCH RBC QN AUTO: 24.2 PG (ref 27–31)
MCH RBC QN AUTO: 24.5 PG (ref 27–31)
MCHC RBC AUTO-ENTMCNC: 28.8 G/DL (ref 32–36)
MCHC RBC AUTO-ENTMCNC: 29.3 G/DL (ref 32–36)
MCV RBC AUTO: 83 FL (ref 82–98)
MCV RBC AUTO: 84 FL (ref 82–98)
MIN VOL: 15.6
MODE: ABNORMAL
MONOCYTES # BLD AUTO: 1.5 K/UL (ref 0.3–1)
MONOCYTES # BLD AUTO: 2.1 K/UL (ref 0.3–1)
MONOCYTES NFR BLD: 6.5 % (ref 4–15)
MONOCYTES NFR BLD: 9.3 % (ref 4–15)
NEUTROPHILS # BLD AUTO: 13.7 K/UL (ref 1.8–7.7)
NEUTROPHILS # BLD AUTO: 29 K/UL (ref 1.8–7.7)
NEUTROPHILS NFR BLD: 84.6 % (ref 38–73)
NEUTROPHILS NFR BLD: 88.9 % (ref 38–73)
NRBC BLD-RTO: 0 /100 WBC
NRBC BLD-RTO: 0 /100 WBC
PCO2 BLDA: 46.9 MMHG (ref 35–45)
PCO2 BLDA: 49.9 MMHG (ref 35–45)
PCO2 BLDA: 61.1 MMHG (ref 35–45)
PCO2 BLDA: 81.3 MMHG (ref 35–45)
PEEP: 10
PEEP: 5
PEEP: 8
PEEP: 8
PH SMN: 7.12 [PH] (ref 7.35–7.45)
PH SMN: 7.18 [PH] (ref 7.35–7.45)
PH SMN: 7.24 [PH] (ref 7.35–7.45)
PH SMN: 7.32 [PH] (ref 7.35–7.45)
PHOSPHATE SERPL-MCNC: 4.4 MG/DL (ref 2.7–4.5)
PHOSPHATE SERPL-MCNC: 4.7 MG/DL (ref 2.7–4.5)
PIP: 25
PLATELET # BLD AUTO: 444 K/UL (ref 150–350)
PLATELET # BLD AUTO: 488 K/UL (ref 150–350)
PMV BLD AUTO: 9.7 FL (ref 9.2–12.9)
PMV BLD AUTO: 9.9 FL (ref 9.2–12.9)
PO2 BLDA: 147 MMHG (ref 80–100)
PO2 BLDA: 160 MMHG (ref 80–100)
PO2 BLDA: 259 MMHG (ref 80–100)
PO2 BLDA: 87 MMHG (ref 80–100)
POC BE: -2 MMOL/L
POC BE: -3 MMOL/L
POC BE: -5 MMOL/L
POC BE: -6 MMOL/L
POC SATURATED O2: 100 % (ref 95–100)
POC SATURATED O2: 94 % (ref 95–100)
POC SATURATED O2: 99 % (ref 95–100)
POC SATURATED O2: 99 % (ref 95–100)
POC TCO2: 23 MMOL/L (ref 23–27)
POC TCO2: 25 MMOL/L (ref 23–27)
POC TCO2: 26 MMOL/L (ref 23–27)
POC TCO2: 29 MMOL/L (ref 23–27)
POCT GLUCOSE: 105 MG/DL (ref 70–110)
POCT GLUCOSE: 146 MG/DL (ref 70–110)
POCT GLUCOSE: 150 MG/DL (ref 70–110)
POLYCHROMASIA BLD QL SMEAR: ABNORMAL
POTASSIUM SERPL-SCNC: 4.2 MMOL/L (ref 3.5–5.1)
POTASSIUM SERPL-SCNC: 4.8 MMOL/L (ref 3.5–5.1)
PROT SERPL-MCNC: 5 G/DL (ref 6–8.4)
PROT SERPL-MCNC: 6.3 G/DL (ref 6–8.4)
PROTHROMBIN TIME: 11.7 SEC (ref 9–12.5)
RBC # BLD AUTO: 3.6 M/UL (ref 4.6–6.2)
RBC # BLD AUTO: 3.8 M/UL (ref 4.6–6.2)
SAMPLE: ABNORMAL
SITE: ABNORMAL
SODIUM SERPL-SCNC: 132 MMOL/L (ref 136–145)
SODIUM SERPL-SCNC: 135 MMOL/L (ref 136–145)
SP02: 98
VT: 450
VT: 550
WBC # BLD AUTO: 16.2 K/UL (ref 3.9–12.7)
WBC # BLD AUTO: 32.63 K/UL (ref 3.9–12.7)

## 2019-10-10 PROCEDURE — 99223 PR INITIAL HOSPITAL CARE,LEVL III: ICD-10-PCS | Mod: AI,,, | Performed by: HOSPITALIST

## 2019-10-10 PROCEDURE — 80048 BASIC METABOLIC PNL TOTAL CA: CPT

## 2019-10-10 PROCEDURE — 85025 COMPLETE CBC W/AUTO DIFF WBC: CPT

## 2019-10-10 PROCEDURE — 87206 SMEAR FLUORESCENT/ACID STAI: CPT

## 2019-10-10 PROCEDURE — S4991 NICOTINE PATCH NONLEGEND: HCPCS | Performed by: PHYSICIAN ASSISTANT

## 2019-10-10 PROCEDURE — 20000000 HC ICU ROOM

## 2019-10-10 PROCEDURE — 63600175 PHARM REV CODE 636 W HCPCS: Performed by: PHYSICIAN ASSISTANT

## 2019-10-10 PROCEDURE — 87116 MYCOBACTERIA CULTURE: CPT

## 2019-10-10 PROCEDURE — D9220A PRA ANESTHESIA: ICD-10-PCS | Mod: ANES,,, | Performed by: ANESTHESIOLOGY

## 2019-10-10 PROCEDURE — 87015 SPECIMEN INFECT AGNT CONCNTJ: CPT

## 2019-10-10 PROCEDURE — 82803 BLOOD GASES ANY COMBINATION: CPT

## 2019-10-10 PROCEDURE — 99900026 HC AIRWAY MAINTENANCE (STAT)

## 2019-10-10 PROCEDURE — 87075 CULTR BACTERIA EXCEPT BLOOD: CPT

## 2019-10-10 PROCEDURE — 36620 PR INSERT CATH,ART,PERCUT,SHORTTERM: ICD-10-PCS | Mod: 59,,, | Performed by: ANESTHESIOLOGY

## 2019-10-10 PROCEDURE — 83605 ASSAY OF LACTIC ACID: CPT

## 2019-10-10 PROCEDURE — 63600175 PHARM REV CODE 636 W HCPCS: Performed by: STUDENT IN AN ORGANIZED HEALTH CARE EDUCATION/TRAINING PROGRAM

## 2019-10-10 PROCEDURE — 36556 PR INSERT NON-TUNNEL CV CATH 5+ YRS OLD: ICD-10-PCS | Mod: 79,RT,GC, | Performed by: SURGERY

## 2019-10-10 PROCEDURE — 25000003 PHARM REV CODE 250: Performed by: STUDENT IN AN ORGANIZED HEALTH CARE EDUCATION/TRAINING PROGRAM

## 2019-10-10 PROCEDURE — 37000008 HC ANESTHESIA 1ST 15 MINUTES: Performed by: SURGERY

## 2019-10-10 PROCEDURE — 99233 PR SUBSEQUENT HOSPITAL CARE,LEVL III: ICD-10-PCS | Mod: ,,, | Performed by: PHYSICIAN ASSISTANT

## 2019-10-10 PROCEDURE — 25000003 PHARM REV CODE 250: Performed by: NURSE ANESTHETIST, CERTIFIED REGISTERED

## 2019-10-10 PROCEDURE — 83690 ASSAY OF LIPASE: CPT

## 2019-10-10 PROCEDURE — 87186 SC STD MICRODIL/AGAR DIL: CPT

## 2019-10-10 PROCEDURE — D9220A PRA ANESTHESIA: Mod: CRNA,,, | Performed by: NURSE ANESTHETIST, CERTIFIED REGISTERED

## 2019-10-10 PROCEDURE — 99291 CRITICAL CARE FIRST HOUR: CPT | Mod: 24,25,GC, | Performed by: SURGERY

## 2019-10-10 PROCEDURE — 27201037 HC PRESSURE MONITORING SET UP

## 2019-10-10 PROCEDURE — 63600175 PHARM REV CODE 636 W HCPCS: Performed by: ANESTHESIOLOGY

## 2019-10-10 PROCEDURE — 83735 ASSAY OF MAGNESIUM: CPT

## 2019-10-10 PROCEDURE — 44015 PR INSERT TUBE-BOWEL,ENTERAL ALIMENT: ICD-10-PCS | Mod: ,,, | Performed by: SURGERY

## 2019-10-10 PROCEDURE — S0028 INJECTION, FAMOTIDINE, 20 MG: HCPCS | Performed by: STUDENT IN AN ORGANIZED HEALTH CARE EDUCATION/TRAINING PROGRAM

## 2019-10-10 PROCEDURE — 83735 ASSAY OF MAGNESIUM: CPT | Mod: 91

## 2019-10-10 PROCEDURE — 36000708 HC OR TIME LEV III 1ST 15 MIN: Performed by: SURGERY

## 2019-10-10 PROCEDURE — 80053 COMPREHEN METABOLIC PANEL: CPT

## 2019-10-10 PROCEDURE — 36556 INSERT NON-TUNNEL CV CATH: CPT | Mod: 79,RT,GC, | Performed by: SURGERY

## 2019-10-10 PROCEDURE — 84100 ASSAY OF PHOSPHORUS: CPT | Mod: 91

## 2019-10-10 PROCEDURE — 32551 PR TUBE THORACOSTOMY INCLUDES WATER SEAL: ICD-10-PCS | Mod: 50,51,79, | Performed by: SURGERY

## 2019-10-10 PROCEDURE — 25500020 PHARM REV CODE 255: Performed by: HOSPITALIST

## 2019-10-10 PROCEDURE — 37000009 HC ANESTHESIA EA ADD 15 MINS: Performed by: SURGERY

## 2019-10-10 PROCEDURE — 87205 SMEAR GRAM STAIN: CPT

## 2019-10-10 PROCEDURE — 36415 COLL VENOUS BLD VENIPUNCTURE: CPT

## 2019-10-10 PROCEDURE — 86850 RBC ANTIBODY SCREEN: CPT

## 2019-10-10 PROCEDURE — 94640 AIRWAY INHALATION TREATMENT: CPT

## 2019-10-10 PROCEDURE — 80076 HEPATIC FUNCTION PANEL: CPT

## 2019-10-10 PROCEDURE — 85610 PROTHROMBIN TIME: CPT

## 2019-10-10 PROCEDURE — 63600175 PHARM REV CODE 636 W HCPCS: Performed by: NURSE ANESTHETIST, CERTIFIED REGISTERED

## 2019-10-10 PROCEDURE — 43840 PR REPAIR, PERF DUOD/GAST ULC-WND/INJ: ICD-10-PCS | Mod: 79,,, | Performed by: SURGERY

## 2019-10-10 PROCEDURE — 43840 GSTRRPHY SUTR DUOL/GSTR ULCR: CPT | Mod: 79,,, | Performed by: SURGERY

## 2019-10-10 PROCEDURE — 87070 CULTURE OTHR SPECIMN AEROBIC: CPT

## 2019-10-10 PROCEDURE — 44015 INSERT NEEDLE CATH BOWEL: CPT | Mod: ,,, | Performed by: SURGERY

## 2019-10-10 PROCEDURE — 27000221 HC OXYGEN, UP TO 24 HOURS

## 2019-10-10 PROCEDURE — 83605 ASSAY OF LACTIC ACID: CPT | Mod: 91

## 2019-10-10 PROCEDURE — 99233 SBSQ HOSP IP/OBS HIGH 50: CPT | Mod: ,,, | Performed by: PHYSICIAN ASSISTANT

## 2019-10-10 PROCEDURE — D9220A PRA ANESTHESIA: ICD-10-PCS | Mod: CRNA,,, | Performed by: NURSE ANESTHETIST, CERTIFIED REGISTERED

## 2019-10-10 PROCEDURE — 99900035 HC TECH TIME PER 15 MIN (STAT)

## 2019-10-10 PROCEDURE — 99223 1ST HOSP IP/OBS HIGH 75: CPT | Mod: AI,,, | Performed by: HOSPITALIST

## 2019-10-10 PROCEDURE — 99291 PR CRITICAL CARE, E/M 30-74 MINUTES: ICD-10-PCS | Mod: 24,25,GC, | Performed by: SURGERY

## 2019-10-10 PROCEDURE — 87102 FUNGUS ISOLATION CULTURE: CPT

## 2019-10-10 PROCEDURE — 37799 UNLISTED PX VASCULAR SURGERY: CPT

## 2019-10-10 PROCEDURE — S0030 INJECTION, METRONIDAZOLE: HCPCS | Performed by: STUDENT IN AN ORGANIZED HEALTH CARE EDUCATION/TRAINING PROGRAM

## 2019-10-10 PROCEDURE — D9220A PRA ANESTHESIA: Mod: ANES,,, | Performed by: ANESTHESIOLOGY

## 2019-10-10 PROCEDURE — 94761 N-INVAS EAR/PLS OXIMETRY MLT: CPT

## 2019-10-10 PROCEDURE — 25000003 PHARM REV CODE 250: Performed by: PHYSICIAN ASSISTANT

## 2019-10-10 PROCEDURE — 87077 CULTURE AEROBIC IDENTIFY: CPT

## 2019-10-10 PROCEDURE — 94002 VENT MGMT INPAT INIT DAY: CPT

## 2019-10-10 PROCEDURE — 36000709 HC OR TIME LEV III EA ADD 15 MIN: Performed by: SURGERY

## 2019-10-10 PROCEDURE — 87106 FUNGI IDENTIFICATION YEAST: CPT

## 2019-10-10 PROCEDURE — 25500020 PHARM REV CODE 255: Performed by: STUDENT IN AN ORGANIZED HEALTH CARE EDUCATION/TRAINING PROGRAM

## 2019-10-10 PROCEDURE — 63600175 PHARM REV CODE 636 W HCPCS

## 2019-10-10 PROCEDURE — 25000242 PHARM REV CODE 250 ALT 637 W/ HCPCS: Performed by: PHYSICIAN ASSISTANT

## 2019-10-10 PROCEDURE — C1729 CATH, DRAINAGE: HCPCS | Performed by: SURGERY

## 2019-10-10 PROCEDURE — 36620 INSERTION CATHETER ARTERY: CPT | Mod: 59,,, | Performed by: ANESTHESIOLOGY

## 2019-10-10 PROCEDURE — 32551 INSERTION OF CHEST TUBE: CPT | Mod: 50,51,79, | Performed by: SURGERY

## 2019-10-10 PROCEDURE — 84100 ASSAY OF PHOSPHORUS: CPT

## 2019-10-10 RX ORDER — NOREPINEPHRINE BITARTRATE/D5W 4MG/250ML
0.02 PLASTIC BAG, INJECTION (ML) INTRAVENOUS CONTINUOUS
Status: DISCONTINUED | OUTPATIENT
Start: 2019-10-10 | End: 2019-10-14

## 2019-10-10 RX ORDER — CEFAZOLIN SODIUM 1 G/3ML
INJECTION, POWDER, FOR SOLUTION INTRAMUSCULAR; INTRAVENOUS
Status: DISCONTINUED | OUTPATIENT
Start: 2019-10-10 | End: 2019-10-10

## 2019-10-10 RX ORDER — FENTANYL CITRATE 50 UG/ML
INJECTION, SOLUTION INTRAMUSCULAR; INTRAVENOUS
Status: DISCONTINUED | OUTPATIENT
Start: 2019-10-10 | End: 2019-10-10

## 2019-10-10 RX ORDER — VASOPRESSIN 20 [USP'U]/ML
INJECTION, SOLUTION INTRAMUSCULAR; SUBCUTANEOUS CONTINUOUS PRN
Status: DISCONTINUED | OUTPATIENT
Start: 2019-10-10 | End: 2019-10-10

## 2019-10-10 RX ORDER — PROPOFOL 10 MG/ML
VIAL (ML) INTRAVENOUS
Status: DISCONTINUED | OUTPATIENT
Start: 2019-10-10 | End: 2019-10-10

## 2019-10-10 RX ORDER — PROPOFOL 10 MG/ML
5 INJECTION, EMULSION INTRAVENOUS CONTINUOUS
Status: DISCONTINUED | OUTPATIENT
Start: 2019-10-10 | End: 2019-10-15

## 2019-10-10 RX ORDER — VASOPRESSIN 20 [USP'U]/ML
INJECTION, SOLUTION INTRAMUSCULAR; SUBCUTANEOUS
Status: DISCONTINUED | OUTPATIENT
Start: 2019-10-10 | End: 2019-10-10

## 2019-10-10 RX ORDER — CEFEPIME HYDROCHLORIDE 1 G/1
1 INJECTION, POWDER, FOR SOLUTION INTRAMUSCULAR; INTRAVENOUS
Status: DISCONTINUED | OUTPATIENT
Start: 2019-10-10 | End: 2019-10-10

## 2019-10-10 RX ORDER — GLYCOPYRROLATE 0.2 MG/ML
INJECTION INTRAMUSCULAR; INTRAVENOUS
Status: DISCONTINUED | OUTPATIENT
Start: 2019-10-10 | End: 2019-10-10

## 2019-10-10 RX ORDER — ROCURONIUM BROMIDE 10 MG/ML
INJECTION, SOLUTION INTRAVENOUS
Status: DISCONTINUED | OUTPATIENT
Start: 2019-10-10 | End: 2019-10-10

## 2019-10-10 RX ORDER — MORPHINE SULFATE 2 MG/ML
1 INJECTION, SOLUTION INTRAMUSCULAR; INTRAVENOUS EVERY 4 HOURS PRN
Status: DISCONTINUED | OUTPATIENT
Start: 2019-10-10 | End: 2019-10-10

## 2019-10-10 RX ORDER — PHENYLEPHRINE HYDROCHLORIDE 10 MG/ML
INJECTION INTRAVENOUS
Status: DISCONTINUED | OUTPATIENT
Start: 2019-10-10 | End: 2019-10-10

## 2019-10-10 RX ORDER — CEFEPIME HYDROCHLORIDE 2 G/1
2 INJECTION, POWDER, FOR SOLUTION INTRAVENOUS
Status: DISCONTINUED | OUTPATIENT
Start: 2019-10-10 | End: 2019-10-10

## 2019-10-10 RX ORDER — FAMOTIDINE 10 MG/ML
20 INJECTION INTRAVENOUS 2 TIMES DAILY
Status: DISCONTINUED | OUTPATIENT
Start: 2019-10-10 | End: 2019-10-11

## 2019-10-10 RX ORDER — OXYCODONE AND ACETAMINOPHEN 5; 325 MG/1; MG/1
1 TABLET ORAL EVERY 6 HOURS PRN
Status: DISCONTINUED | OUTPATIENT
Start: 2019-10-10 | End: 2019-10-10

## 2019-10-10 RX ORDER — LIDOCAINE HCL/PF 100 MG/5ML
SYRINGE (ML) INTRAVENOUS
Status: DISCONTINUED | OUTPATIENT
Start: 2019-10-10 | End: 2019-10-10

## 2019-10-10 RX ORDER — PROPOFOL 10 MG/ML
INJECTION, EMULSION INTRAVENOUS
Status: COMPLETED
Start: 2019-10-10 | End: 2019-10-10

## 2019-10-10 RX ORDER — ONDANSETRON 2 MG/ML
INJECTION INTRAMUSCULAR; INTRAVENOUS
Status: DISCONTINUED | OUTPATIENT
Start: 2019-10-10 | End: 2019-10-10

## 2019-10-10 RX ORDER — MIDAZOLAM HYDROCHLORIDE 1 MG/ML
INJECTION, SOLUTION INTRAMUSCULAR; INTRAVENOUS
Status: DISCONTINUED | OUTPATIENT
Start: 2019-10-10 | End: 2019-10-10

## 2019-10-10 RX ORDER — VANCOMYCIN 1.75 GRAM/500 ML IN 0.9 % SODIUM CHLORIDE INTRAVENOUS
1750
Status: DISCONTINUED | OUTPATIENT
Start: 2019-10-11 | End: 2019-10-14

## 2019-10-10 RX ORDER — NEOSTIGMINE METHYLSULFATE 0.5 MG/ML
INJECTION, SOLUTION INTRAVENOUS
Status: DISCONTINUED | OUTPATIENT
Start: 2019-10-10 | End: 2019-10-10

## 2019-10-10 RX ORDER — MORPHINE SULFATE 2 MG/ML
1 INJECTION, SOLUTION INTRAMUSCULAR; INTRAVENOUS ONCE AS NEEDED
Status: COMPLETED | OUTPATIENT
Start: 2019-10-10 | End: 2019-10-10

## 2019-10-10 RX ORDER — SUCCINYLCHOLINE CHLORIDE 20 MG/ML
INJECTION INTRAMUSCULAR; INTRAVENOUS
Status: DISCONTINUED | OUTPATIENT
Start: 2019-10-10 | End: 2019-10-10

## 2019-10-10 RX ORDER — CEFEPIME HYDROCHLORIDE 2 G/1
2 INJECTION, POWDER, FOR SOLUTION INTRAVENOUS
Status: DISCONTINUED | OUTPATIENT
Start: 2019-10-10 | End: 2019-10-11

## 2019-10-10 RX ORDER — METRONIDAZOLE 500 MG/100ML
500 INJECTION, SOLUTION INTRAVENOUS
Status: COMPLETED | OUTPATIENT
Start: 2019-10-10 | End: 2019-10-16

## 2019-10-10 RX ORDER — SODIUM CHLORIDE 9 MG/ML
INJECTION, SOLUTION INTRAVENOUS CONTINUOUS
Status: ACTIVE | OUTPATIENT
Start: 2019-10-10 | End: 2019-10-11

## 2019-10-10 RX ADMIN — CEFEPIME 2 G: 2 INJECTION, POWDER, FOR SOLUTION INTRAMUSCULAR; INTRAVENOUS at 05:10

## 2019-10-10 RX ADMIN — FAMOTIDINE 20 MG: 10 INJECTION, SOLUTION INTRAVENOUS at 08:10

## 2019-10-10 RX ADMIN — ROCURONIUM BROMIDE 10 MG: 10 INJECTION, SOLUTION INTRAVENOUS at 01:10

## 2019-10-10 RX ADMIN — FENTANYL CITRATE 50 MCG: 50 INJECTION, SOLUTION INTRAMUSCULAR; INTRAVENOUS at 01:10

## 2019-10-10 RX ADMIN — VANCOMYCIN HYDROCHLORIDE 2750 MG: 1 INJECTION, POWDER, FOR SOLUTION INTRAVENOUS at 06:10

## 2019-10-10 RX ADMIN — PHENYLEPHRINE HYDROCHLORIDE 100 MCG: 10 INJECTION INTRAVENOUS at 01:10

## 2019-10-10 RX ADMIN — NICOTINE 1 PATCH: 21 PATCH, EXTENDED RELEASE TRANSDERMAL at 10:10

## 2019-10-10 RX ADMIN — SODIUM CHLORIDE 500 ML: 0.9 INJECTION, SOLUTION INTRAVENOUS at 05:10

## 2019-10-10 RX ADMIN — VASOPRESSIN 0.04 UNITS/MIN: 20 INJECTION, SOLUTION INTRAMUSCULAR; SUBCUTANEOUS at 03:10

## 2019-10-10 RX ADMIN — SUCCINYLCHOLINE CHLORIDE 200 MG: 20 INJECTION, SOLUTION INTRAMUSCULAR; INTRAVENOUS at 12:10

## 2019-10-10 RX ADMIN — CHOLECALCIFEROL TAB 125 MCG (5000 UNIT) 5000 UNITS: 125 TAB at 10:10

## 2019-10-10 RX ADMIN — SODIUM CHLORIDE, SODIUM GLUCONATE, SODIUM ACETATE, POTASSIUM CHLORIDE, MAGNESIUM CHLORIDE, SODIUM PHOSPHATE, DIBASIC, AND POTASSIUM PHOSPHATE: .53; .5; .37; .037; .03; .012; .00082 INJECTION, SOLUTION INTRAVENOUS at 02:10

## 2019-10-10 RX ADMIN — MORPHINE SULFATE 1 MG: 2 INJECTION, SOLUTION INTRAMUSCULAR; INTRAVENOUS at 05:10

## 2019-10-10 RX ADMIN — ONDANSETRON 4 MG: 2 INJECTION INTRAMUSCULAR; INTRAVENOUS at 03:10

## 2019-10-10 RX ADMIN — VASOPRESSIN 1 UNITS: 20 INJECTION INTRAVENOUS at 02:10

## 2019-10-10 RX ADMIN — ROCURONIUM BROMIDE 20 MG: 10 INJECTION, SOLUTION INTRAVENOUS at 02:10

## 2019-10-10 RX ADMIN — MIDAZOLAM HYDROCHLORIDE 2 MG: 1 INJECTION, SOLUTION INTRAMUSCULAR; INTRAVENOUS at 12:10

## 2019-10-10 RX ADMIN — PHENYLEPHRINE HYDROCHLORIDE 200 MCG: 10 INJECTION INTRAVENOUS at 12:10

## 2019-10-10 RX ADMIN — CEFAZOLIN 2 G: 330 INJECTION, POWDER, FOR SOLUTION INTRAMUSCULAR; INTRAVENOUS at 12:10

## 2019-10-10 RX ADMIN — ROCURONIUM BROMIDE 50 MG: 10 INJECTION, SOLUTION INTRAVENOUS at 12:10

## 2019-10-10 RX ADMIN — IOHEXOL 10 ML: 350 INJECTION, SOLUTION INTRAVENOUS at 12:10

## 2019-10-10 RX ADMIN — NEOSTIGMINE METHYLSULFATE 5 MG: 0.5 INJECTION INTRAVENOUS at 03:10

## 2019-10-10 RX ADMIN — SODIUM CHLORIDE, SODIUM GLUCONATE, SODIUM ACETATE, POTASSIUM CHLORIDE, MAGNESIUM CHLORIDE, SODIUM PHOSPHATE, DIBASIC, AND POTASSIUM PHOSPHATE: .53; .5; .37; .037; .03; .012; .00082 INJECTION, SOLUTION INTRAVENOUS at 12:10

## 2019-10-10 RX ADMIN — PROPOFOL 10 MCG/KG/MIN: 10 INJECTION, EMULSION INTRAVENOUS at 04:10

## 2019-10-10 RX ADMIN — SODIUM CHLORIDE 0.2 MCG/KG/MIN: 9 INJECTION, SOLUTION INTRAVENOUS at 01:10

## 2019-10-10 RX ADMIN — GLYCOPYRROLATE 0.6 MG: 0.2 INJECTION, SOLUTION INTRAMUSCULAR; INTRAVENOUS at 03:10

## 2019-10-10 RX ADMIN — VASOPRESSIN 1 UNITS: 20 INJECTION INTRAVENOUS at 03:10

## 2019-10-10 RX ADMIN — PANTOPRAZOLE SODIUM 40 MG: 40 TABLET, DELAYED RELEASE ORAL at 10:10

## 2019-10-10 RX ADMIN — PROPOFOL 100 MG: 10 INJECTION, EMULSION INTRAVENOUS at 12:10

## 2019-10-10 RX ADMIN — PROPOFOL 40 MG: 10 INJECTION, EMULSION INTRAVENOUS at 03:10

## 2019-10-10 RX ADMIN — PHENYLEPHRINE HYDROCHLORIDE 100 MCG: 10 INJECTION INTRAVENOUS at 12:10

## 2019-10-10 RX ADMIN — PHENYLEPHRINE HYDROCHLORIDE 200 MCG: 10 INJECTION INTRAVENOUS at 02:10

## 2019-10-10 RX ADMIN — PROPOFOL 50 MCG/KG/MIN: 10 INJECTION, EMULSION INTRAVENOUS at 07:10

## 2019-10-10 RX ADMIN — Medication 0.02 MCG/KG/MIN: at 05:10

## 2019-10-10 RX ADMIN — SODIUM CHLORIDE, SODIUM LACTATE, POTASSIUM CHLORIDE, AND CALCIUM CHLORIDE 1000 ML: .6; .31; .03; .02 INJECTION, SOLUTION INTRAVENOUS at 05:10

## 2019-10-10 RX ADMIN — IPRATROPIUM BROMIDE AND ALBUTEROL SULFATE 3 ML: .5; 3 SOLUTION RESPIRATORY (INHALATION) at 11:10

## 2019-10-10 RX ADMIN — PHENYLEPHRINE HYDROCHLORIDE 100 MCG: 10 INJECTION INTRAVENOUS at 02:10

## 2019-10-10 RX ADMIN — METRONIDAZOLE 500 MG: 500 INJECTION, SOLUTION INTRAVENOUS at 05:10

## 2019-10-10 RX ADMIN — SODIUM CHLORIDE, SODIUM LACTATE, POTASSIUM CHLORIDE, AND CALCIUM CHLORIDE 500 ML: .6; .31; .03; .02 INJECTION, SOLUTION INTRAVENOUS at 08:10

## 2019-10-10 RX ADMIN — ONDANSETRON 4 MG: 2 INJECTION INTRAMUSCULAR; INTRAVENOUS at 05:10

## 2019-10-10 RX ADMIN — IOHEXOL 15 ML: 350 INJECTION, SOLUTION INTRAVENOUS at 12:10

## 2019-10-10 RX ADMIN — LIDOCAINE HYDROCHLORIDE 100 MG: 20 INJECTION, SOLUTION INTRAVENOUS at 12:10

## 2019-10-10 RX ADMIN — ONDANSETRON 4 MG: 2 INJECTION INTRAMUSCULAR; INTRAVENOUS at 08:10

## 2019-10-10 RX ADMIN — SUCRALFATE 1 G: 1 TABLET ORAL at 10:10

## 2019-10-10 RX ADMIN — VASOPRESSIN 0.04 UNITS/MIN: 20 INJECTION INTRAVENOUS at 09:10

## 2019-10-10 NOTE — NURSING
Arrived to patients room after being informed that he was vomitting, discussed with daughter current status of patient and the vomitting episode, as well as pain concerns. Informed daughter that fluid isn't currently bile, patient in pain at this time. Call placed to primary for pain medication. Order for Morphine received at this time, will administer once available. Encouraged patient to allow us to reposition him in bed and educated both him and his daughter in regard to the use of the CPAP at night and its benefits. Daughter verbalized understanding and reinforced education with father. Patient only allowed the staff to pull him up so far in the bed at this time. Will notify respiratory of need for treatment. Pending inhaler form pharmacy as well.

## 2019-10-10 NOTE — PROGRESS NOTES
Pharmacokinetic Initial Assessment: IV Vancomycin    Assessment/Plan:    Initiate intravenous vancomycin with loading dose of 2750   mg once followed by a maintenance dose of vancomycin 1750mg IV every 24 hours  Desired empiric serum trough concentration is 15 to 20 mcg/mL  Draw vancomycin trough level 30 min prior to third dose on 10/12 at approximately 1700  Pharmacy will continue to follow and monitor vancomycin.      Please contact pharmacy at extension [phone]   with any questions regarding this assessment.     Thank you for the consult,   Franco Roman       Patient brief summary:  Pasha Harmon is a 74 y.o. male initiated on antimicrobial therapy with IV Vancomycin for treatment of suspected intra-abdominal infection    Drug Allergies:   Review of patient's allergies indicates:   Allergen Reactions    Meloxicam Other (See Comments)     Ulcer, GI bleed        Actual Body Weight:   133.8 kg      Renal Function:   Estimated Creatinine Clearance: 47.8 mL/min (A) (based on SCr of 2 mg/dL (H)).,     Dialysis Method (if applicable):      CBC (last 72 hours):  Recent Labs   Lab Result Units 10/09/19  1517 10/10/19  0417 10/10/19  1610   WBC K/uL 21.63* 32.63* 16.20*   Hemoglobin g/dL 9.6* 9.3* 8.7*   Hematocrit % 31.1* 31.7* 30.2*   Platelets K/uL 475* 488* 444*   Gran% % 90.5* 88.9*  --    Lymph% % 3.5* 3.1*  --    Mono% % 4.9 6.5  --    Eosinophil% % 0.2 0.0  --    Basophil% % 0.3 0.2  --    Differential Method  Automated Automated  --        Metabolic Panel (last 72 hours):  Recent Labs   Lab Result Units 10/09/19  1517 10/10/19  0417   Sodium mmol/L 137 132*   Potassium mmol/L 4.7 4.8   Chloride mmol/L 104 99   CO2 mmol/L 22* 21*   Glucose mg/dL 168* 142*   BUN, Bld mg/dL 14 21   Creatinine mg/dL 1.4 2.0*   Albumin g/dL  --  3.5   Total Bilirubin mg/dL  --  0.5   Alkaline Phosphatase U/L  --  57   AST U/L  --  32   ALT U/L  --  17   Magnesium mg/dL 1.9 2.0   Phosphorus mg/dL 4.8* 4.4       Drug levels (last  3 results):  No results for input(s): VANCOMYCINRA, VANCOMYCINPE, VANCOMYCINTR in the last 72 hours.    Microbiologic Results:  Microbiology Results (last 7 days)     Procedure Component Value Units Date/Time    Gram stain [[id number]] Collected:  10/10/19 1325    Order Status:  Completed Specimen:  Body Fluid from Abdomen Updated:  10/10/19 1641     Gram Stain Result Few WBC's      No organisms seen    Narrative:       Abdominal fluid -cultures    Aerobic culture [[id number]] Collected:  10/10/19 1325    Order Status:  Sent Specimen:  Body Fluid from Abdomen Updated:  10/10/19 1425    AFB Culture & Smear [[id number]] Collected:  10/10/19 1325    Order Status:  Sent Specimen:  Body Fluid from Abdomen Updated:  10/10/19 1425    Fungus culture [[id number]] Collected:  10/10/19 1325    Order Status:  Sent Specimen:  Body Fluid from Abdomen Updated:  10/10/19 1425    Culture, Anaerobe [[id number]] Collected:  10/10/19 1325    Order Status:  Sent Specimen:  Body Fluid from Abdomen Updated:  10/10/19 1425

## 2019-10-10 NOTE — CLINICAL REVIEW
Mimi Randolph, on call for IM-E, paged and notified that pt. Has not voided since surgery and bladder scan shows >250cc. Pt does not have the urge to void. No order at this time. Will continue to monitor.

## 2019-10-10 NOTE — PLAN OF CARE
Problem: Fall Injury Risk  Goal: Absence of Fall and Fall-Related Injury  Outcome: Ongoing, Progressing  Intervention: Identify and Manage Contributors to Fall Injury Risk  Flowsheets (Taken 10/10/2019 2356)  Self-Care Promotion: independence encouraged; BADL personal objects within reach  Medication Review/Management: medications reviewed; dosing adjusted

## 2019-10-10 NOTE — ASSESSMENT & PLAN NOTE
Pt admitted s/p scheduled outpatient EGD/ESD for gastric lipoma 10/9 (see below)  - wbc 21k initially after EGD/ESD, suspected reaction after post-procedure  - 10/10 wbc 32k, pt with acutely worsening abd pain this AM as well as distention  - CXR, abd xray, LA, UA ordered: CXR revealing free air under diaphragm  - staff discussed with GI team; notified gen surg  - pt NPO  - likely surgery today  - appreciate gen surg/ GI assistance

## 2019-10-10 NOTE — ASSESSMENT & PLAN NOTE
"Per chart review, pt initially presented with melena and was found to have gastric lesion in May, found to be lipoma. 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 Known to be a large bleeding lipoma. S/P ESD successfully. The lesion was too large and bulky and caused a long mucosal esophageal trear and got lodged into the mid esophagus. The mass had to be cut into smaller  pieces to be retrieved. Mucosal tear in the middle and lower third of the esophagus."  Recommendation:  - Clear liquid diet for 3 days.  - Continue present medications.  - Await pathology results.  - Carafate Qid for 10 days.  - PPI Bid.  - Repeat EGD in 2 months to check on healing.  - no need for esophagram      - will T&S as precaution  - CLD   - continue home protonix 40mg BID and carafate 1g qid   - tele  Please see above  "

## 2019-10-10 NOTE — HOSPITAL COURSE
Patient admitted for observation after EGD/ESD. Initial labs post-procedure showed leukocytosis, suspected secondary to post-procedure. 10/10 WBC increasing, patient reporting severe abd pain this AM. Lactic acid, CXR, Abd X-ray, UA ordered. CXR revealed free air under diaphragm. Attending MD, Dr. Whitman discussed with GI. General Surgery notified, appreciate assistance. Started patient on Cefepime/Flagyl. Stat CT C/A/P ordered. Likely plan for surgery.

## 2019-10-10 NOTE — ANESTHESIA PROCEDURE NOTES
Arterial    Diagnosis: peritoneal free air    Patient location during procedure: done in OR  Procedure start time: 10/10/2019 12:45 PM  Timeout: 10/10/2019 12:44 PM  Procedure end time: 10/10/2019 12:48 PM    Staffing  Authorizing Provider: Natacha Pendleton MD  Performing Provider: Natacha Pendleton MD    Staffing  Other anesthesia staff: Rafa Smallwood MD  Anesthesiologist was present at the time of the procedure.    Preanesthetic Checklist  Completed: patient identified, site marked, surgical consent, pre-op evaluation, timeout performed, IV checked, risks and benefits discussed, monitors and equipment checked and anesthesia consent givenArterial  Skin Prep: chlorhexidine gluconate  Local Infiltration: none  Orientation: right  Location: radial  Catheter Size: 20 G  Catheter placement by Anatomical landmarks. Heme positive aspiration all ports.Insertion Attempts: 1  Assessment  Dressing: secured with tape and tegaderm  Patient: Tolerated well

## 2019-10-10 NOTE — ANESTHESIA PREPROCEDURE EVALUATION
[facility]  Anesthesia Pre-Operative Evaluation         Patient Name: Pasha Harmon  YOB: 1945  MRN: [id number]    SUBJECTIVE:     Pre-operative evaluation for Procedure(s) (LRB):  LAPAROTOMY, EXPLORATORY (N/A)     10/10/2019    Pasha Harmon is a 74 y.o. male w/ a significant PMHx of COPD (current smoker), DMII, CAD, gastric lipoma admitted to hospital medicine after EGD done 10/9. Now with tachypnea and dyspnea, free air under diaphragm on imaging.     NPO since midnight.     Patient now presents for the above procedure(s).      LDA:        Peripheral IV - Single Lumen 10/09/19 0744 20 G Right Forearm (Active)   Site Assessment Intact;Clean;Dry 10/9/2019  7:40 PM   Line Status Saline locked 10/9/2019  7:40 PM   Dressing Status Clean;Dry;Intact 10/9/2019  7:40 PM   Reason Not Rotated Not due 10/9/2019  3:20 PM   Number of days: 1       Prev airway: Present Prior to Hospital Arrival?: No; Placement Date: 10/09/19; Placement Time: 0820; Method of Intubation: Direct laryngoscopy; Inserted by: CRNA; Airway Device: Endotracheal Tube; Mask Ventilation: Very Diff - oral (2 person bag mask); Intubated: Postinduction; Blade:  #2; Airway Device Size: 7.5; Style: Cuffed; Cuff Inflation: Minimal occlusive pressure; Placement Verified By: Auscultation, Capnometry, ETT Condensation; Grade: Grade I; Complicating Factors: Obesity, Poor neck/head extension; Intubation Findings: Positive EtCO2, Bilateral breath sounds, Atraumatic/Condition of teeth unchanged; Securment: Lips; Complications: None; Breath Sounds: Equal Bilateral; Insertion Attempts: 1;    Drips:    sodium chloride 0.9%         Patient Active Problem List   Diagnosis    COPD (chronic obstructive pulmonary disease)    Abscess of gluteal cleft    Type 2 diabetes mellitus without complication, without long-term current use of insulin    Hyperlipidemia    Essential  hypertension    Hyponatremia    Depression    Anemia    Gastric tumor    Peptic ulcer disease with hemorrhage    Leiomyoma of stomach    Stromal tumor of digestive system    History of anemia    Atherosclerosis of abdominal aorta    Obesity (BMI 35.0-39.9 without comorbidity)    Cigarette smoker    Benign prostatic hyperplasia with nocturia    MIGUELINA (obstructive sleep apnea)    Upper GI bleed    Chronic blood loss anemia    Gastrointestinal hemorrhage with melena    Syncope    Leukocytosis    Thrombocytosis    Iron deficiency anemia, unspecified    Subepithelial gastric lesion    Insomnia    Bowel perforation       Review of patient's allergies indicates:   Allergen Reactions    Meloxicam Other (See Comments)     Ulcer, GI bleed        Current Inpatient Medications:   ceFEPime (MAXIPIME) IVPB  2 g Intravenous Q24H    cholecalciferol (vitamin D3)  5,000 Units Oral Daily    citalopram  20 mg Oral Nightly    diphenhydrAMINE  25 mg Oral QHS    fluticasone furoate-vilanterol  1 puff Inhalation Daily    metronidazole  500 mg Intravenous Q8H    nicotine  1 patch Transdermal Daily    pantoprazole  40 mg Oral BID    simvastatin  40 mg Oral QHS    sodium chloride 0.9%  1,000 mL Intravenous Once    sucralfate  1 g Oral QID       No current facility-administered medications on file prior to encounter.      Current Outpatient Medications on File Prior to Encounter   Medication Sig Dispense Refill    acetaminophen (TYLENOL) 650 MG TbSR Take 1,300 mg by mouth 2 (two) times daily.       budesonide-formoterol 160-4.5 mcg (SYMBICORT) 160-4.5 mcg/actuation HFAA Inhale 2 puffs into the lungs every 12 (twelve) hours. 1 Inhaler 6    cholecalciferol, vitamin D3, (VITAMIN D3) 5,000 unit Tab Take 5,000 Units by mouth once daily.      citalopram (CELEXA) 20 MG tablet Take 1 tablet (20 mg total) by mouth nightly. 90 tablet 3    diphenhydrAMINE (BENADRYL) 25 mg capsule Take 25 mg by mouth every evening. 1  tab po am, 3 tabs po HS       lisinopril (PRINIVIL,ZESTRIL) 20 MG tablet Take 1 tablet (20 mg total) by mouth once daily. 90 tablet 3    metFORMIN (GLUCOPHAGE) 500 MG tablet Take 1 tablet (500 mg total) by mouth daily with breakfast. 90 tablet 3    multivit with min-FA-lycopene (ONE-A-DAY MEN'S MULTIVITAMIN) 400-300 mcg Tab Take 1 tablet by mouth nightly.      pantoprazole (PROTONIX) 40 MG tablet Take 1 tablet (40 mg total) by mouth 2 (two) times daily. 180 tablet 3    simvastatin (ZOCOR) 40 MG tablet Take 1 tablet (40 mg total) by mouth every evening. 90 tablet 3    sucralfate (CARAFATE) 1 gram tablet Take 1 tablet (1 g total) by mouth 4 (four) times daily. 120 tablet 0    albuterol (ACCUNEB) 1.25 mg/3 mL Nebu Take 3 mLs (1.25 mg total) by nebulization every 6 (six) hours as needed. Rescue 3 Box 3    albuterol-ipratropium (DUO-NEB) 2.5 mg-0.5 mg/3 mL nebulizer solution Take 3 mLs by nebulization every 6 (six) hours as needed for Wheezing. Rescue      nicotine (NICODERM CQ) 21 mg/24 hr Place 1 patch onto the skin once daily.  0       Past Surgical History:   Procedure Laterality Date    APPENDECTOMY      CHOLECYSTECTOMY      ENDOSCOPIC ULTRASOUND OF UPPER GASTROINTESTINAL TRACT Left 6/4/2019    Procedure: ULTRASOUND, UPPER GI TRACT, ENDOSCOPIC;  Surgeon: Lavon Parra MD;  Location: [facility];  Service: Endoscopy;  Laterality: Left;  GIF plus Radial    ESOPHAGOGASTRODUODENOSCOPY N/A 5/25/2019    Procedure: EGD (ESOPHAGOGASTRODUODENOSCOPY);  Surgeon: Mitch Buitrago MD;  Location: [facility];  Service: Endoscopy;  Laterality: N/A;    ESOPHAGOGASTRODUODENOSCOPY N/A 6/4/2019    Procedure: EGD (ESOPHAGOGASTRODUODENOSCOPY);  Surgeon: Lavon Parra MD;  Location: [facility];  Service: Endoscopy;  Laterality: N/A;    ESOPHAGOGASTRODUODENOSCOPY N/A 9/3/2019    Procedure: EGD (ESOPHAGOGASTRODUODENOSCOPY);  Surgeon: Keshav Harris MD;  Location: [facility];  Service: Endoscopy;  Laterality:  N/A;    KNEE SURGERY Right        Social History     Socioeconomic History    Marital status: Single     Spouse name: Not on file    Number of children: Not on file    Years of education: Not on file    Highest education level: Not on file   Occupational History    Not on file   Social Needs    Financial resource strain: Not on file    Food insecurity:     Worry: Not on file     Inability: Not on file    Transportation needs:     Medical: Not on file     Non-medical: Not on file   Tobacco Use    Smoking status: Current Every Day Smoker     Packs/day: 2.00     Types: Cigarettes    Smokeless tobacco: Never Used   Substance and Sexual Activity    Alcohol use: No    Drug use: No    Sexual activity: Not on file   Lifestyle    Physical activity:     Days per week: Not on file     Minutes per session: Not on file    Stress: Not on file   Relationships    Social connections:     Talks on phone: Not on file     Gets together: Not on file     Attends [employer] service: Not on file     Active member of club or organization: Not on file     Attends meetings of clubs or organizations: Not on file     Relationship status: Not on file   Other Topics Concern    Not on file   Social History Narrative    Not on file       OBJECTIVE:     Vital Signs Range (Last 24H):  Temp:  [36.2 °C (97.1 °F)-37.5 °C (99.5 °F)]   Pulse:  []   Resp:  [16-36]   BP: ()/(50-76)   SpO2:  [91 %-100 %]       Significant Labs:  Lab Results   Component Value Date    WBC 32.63 (H) 10/10/2019    HGB 9.3 (L) 10/10/2019    HCT 31.7 (L) 10/10/2019     (H) 10/10/2019    CHOL 136 09/19/2019    TRIG 113 09/19/2019    HDL 44 09/19/2019    ALT 17 10/10/2019    AST 32 10/10/2019     (L) 10/10/2019    K 4.8 10/10/2019    CL 99 10/10/2019    CREATININE 2.0 (H) 10/10/2019    BUN 21 10/10/2019    CO2 21 (L) 10/10/2019    PSA 0.30 09/19/2019    INR 1.2 05/24/2019    HGBA1C 6.1 (H) 09/19/2019       Diagnostic Studies: No relevant  studies.    EKG:   Results for orders placed or performed during the hospital encounter of 19   EKG 12-lead    Collection Time: 19  3:15 PM    Narrative                             [place]                                                                                  Test Date:    2019  Pat Name:     ALVAREZ FLEMING           Department:     Patient ID:   [id number]                  Room:         EXAM 07EX 07  Gender:       Male                     Technician:   LEXIE  :          1945               Requested By:   Order Number:                          Reading MD:   Alonzo Griggs                                   Measurements  Intervals                              Axis            Rate:         76                       P:            -2  OR:           166                      QRS:          41  QRSD:         86                       T:            38  QT:           452                                      QTc:          508                                                                 Interpretive Statements  Normal sinus rhythm  Prolonged QT  Abnormal ECG  Compared to ECG 2019 21:48:07  Prolonged QT interval now present  Myocardial infarct finding no longer present    Electronically Signed On 9-3-2019 9:55:09 CDT by Alonzo Griggs         ECHOCARDIOGRAM:  TTE:  Results for orders placed or performed during the hospital encounter of 19   Echo   Result Value Ref Range    LVIDS 3.09 2.1 - 4.0 cm    STJ 2.89 cm    AV mean gradient 6 mmHg    Ao peak dejan 1.81 m/s    Ao VTI 36.7 cm    IVS 1.22 (A) 0.6 - 1.1 cm    LA size 4.4 cm    LVIDD 4.65 3.5 - 6.0 cm    LVOT diameter 2.2 cm    LVOT peak VTI 36.40 cm    PW 1.08 0.6 - 1.1 cm    MV Peak A Dejan 1.31 m/s    E wave decelartion time 382 msec    RA Major Axis 4.09 cm    RA Width 3.67 cm    LA WIDTH 4.01 cm    LVOT peak dejan 159.00 m/s    BSA 2.68 m2    TDI SEPTAL 0.09 m/s    LV LATERAL E/E' RATIO 11.09 m/s    LV SEPTAL  E/E' RATIO 13.56 m/s    TDI LATERAL 0.11 m/s    FS 34 28 - 44 %    LA volume 81.51 cm3    LV mass 196.25 g    Left Ventricle Relative Wall Thickness 0.46 cm    AV valve area 3.77 cm2    AV Velocity Ratio 87.85     AV index (prosthetic) 0.99     E/A ratio 0.93     Mean e' 0.10 m/s    LVOT area 3.8 cm2    LVOT stroke volume 138.30 cm3    AV peak gradient 13 mmHg    E/E' ratio 12.20 m/s    MV Peak E Dejan 1.22 m/s    LA Volume Index 31.4 mL/m2    LV Mass Index 76 g/m2    Left Atrium Minor Axis 5.47 cm    Left Atrium Major Axis 5.40 cm    Right Atrial Pressure (from IVC) 3 mmHg    Narrative    · Normal left ventricular systolic function. The estimated ejection   fraction is 60%  · Mild concentric left ventricular hypertrophy.        No results found for this or any previous visit.    NELI:  No results found for this or any previous visit.  No results found for this or any previous visit.    STRESS:  No results found for this or any previous visit.  No results found for this or any previous visit.        ASSESSMENT/PLAN:       Anesthesia Evaluation    I have reviewed the Patient Summary Reports.    I have reviewed the Nursing Notes.   I have reviewed the Medications.     Review of Systems  Anesthesia Hx:  No problems with previous Anesthesia  History of prior surgery of interest to airway management or planning: Denies Family Hx of Anesthesia complications.   Denies Personal Hx of Anesthesia complications.   Cardiovascular:   Hypertension Past MI     Pulmonary:   COPD, severe    Hepatic/GI:   PUD,    Neurological:   Denies TIA. Denies CVA. Denies Seizures.    Endocrine:   Diabetes        Physical Exam  General:  Well nourished, Obesity Dyspnea/tachypnea     Airway/Jaw/Neck:  Airway Findings: Mouth Opening: Normal General Airway Assessment: Adult  Mallampati: I  Jaw/Neck Findings:  Limited Ability to Prognath  Neck ROM: Normal ROM     Eyes/Ears/Nose:  Eyes/Ears/Nose Findings:    Dental:  Dental Findings: Edentulous    Chest/Lungs:  Chest/Lungs Findings: Decreased Breath Sounds Bilateral     Heart/Vascular:  Heart Findings: Rate: Normal  Rhythm: Regular Rhythm  Sounds: Normal     Abdomen:  Abdomen Findings:  Normal     Musculoskeletal:  Musculoskeletal Findings:    Skin:  Skin Findings:     Mental Status:  Mental Status Findings:  Cooperative, Alert and Oriented         Anesthesia Plan  Type of Anesthesia, risks & benefits discussed:  Anesthesia Type:  general  Patient's Preference:   Intra-op Monitoring Plan: standard ASA monitors and arterial line  Intra-op Monitoring Plan Comments:   Post Op Pain Control Plan: multimodal analgesia  Post Op Pain Control Plan Comments:   Induction:   IV  Beta Blocker:  Patient is not currently on a Beta-Blocker (No further documentation required).       Informed Consent: Patient representative understands risks and agrees with Anesthesia plan.  Questions answered. Anesthesia consent signed with patient representative.  ASA Score: 5  emergent   Day of Surgery Review of History & Physical:    H&P update referred to the surgeon.         Ready For Surgery From Anesthesia Perspective.

## 2019-10-10 NOTE — TREATMENT PLAN
[facility]  AES  Treatment plant    Subjective:       He reports significant abdominal pain this morning, nausea, vomiting. WBC elevated.     Objective:     Vitals:    10/10/19 1140   BP:    Pulse:    Resp: (!) 24   Temp:        General: In distress  Abdomen: Hypoactive bowel sounds. Non-distended. Normal tympany. Soft. Tender to palpation diffuse. Distended, firm abdominal pain, guarding present.   Extremities: No peripheral edema.   Neurologic: No gross neurological Deficits  Psych: Calm, cooperative. Normal mood and affect.    Significant Labs:  Recent Labs   Lab 10/09/19  1517 10/10/19  0417   HGB 9.6* 9.3*       Lab Results   Component Value Date    WBC 32.63 (H) 10/10/2019    HGB 9.3 (L) 10/10/2019    HCT 31.7 (L) 10/10/2019    MCV 83 10/10/2019     (H) 10/10/2019       Lab Results   Component Value Date     (L) 10/10/2019    K 4.8 10/10/2019    CL 99 10/10/2019    CO2 21 (L) 10/10/2019    BUN 21 10/10/2019    CREATININE 2.0 (H) 10/10/2019    CALCIUM 8.8 10/10/2019    ANIONGAP 12 10/10/2019    ESTGFRAFRICA 37.2 (A) 10/10/2019    EGFRNONAA 32.2 (A) 10/10/2019       Lab Results   Component Value Date    ALT 17 10/10/2019    AST 32 10/10/2019    ALKPHOS 57 10/10/2019    BILITOT 0.5 10/10/2019       Lab Results   Component Value Date    INR 1.2 05/24/2019       Significant Imaging:  Reviewed pertinent radiology findings.       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 The mass had to be cut into smaller  pieces to be retrieved.). Patient has diffuse abdominal pain, elevated wbc count, xray shows air under diaphragm, discussed with Dr. Villegas.     Plan:  1. Recommend IV abx (agree with cefepime/flagyl)  2. CT abdomen with IV contrast  3. Surgery consult  4. Discussed  plan with Dr. Villegas, primary team, and surgical team  5. Surgery aware- planning for surgery today after CT abdomen    Thank you for involving us in the care of Pasha Harmon. Please call with any additional questions, concerns or changes in the patient's clinical status.    Wally Radford MD  Gastroenterology Fellow PGY IV   [facility]

## 2019-10-10 NOTE — TRANSFER OF CARE
"Anesthesia Transfer of Care Note    Patient: Pasha Harmon    Procedure(s) Performed: Procedure(s) (LRB):  LAPAROTOMY, EXPLORATORY (N/A)  EGD (ESOPHAGOGASTRODUODENOSCOPY) (N/A)    Patient location: ICU    Anesthesia Type: general    Transport from OR: Transported from OR on 6-10 L/min O2 by face mask with adequate spontaneous ventilation. Continuous SpO2 monitoring in transport. Continuous ECG monitoring in transport. Upon arrival to PACU/ICU, patient attached to ventilator and auscultated to confirm bilateral breath sounds and adequate TV. Continuos invasive BP monitoring in transport    Post pain: adequate analgesia    Post assessment: no apparent anesthetic complications and tolerated procedure well    Post vital signs: stable    Level of consciousness: sedated    Nausea/Vomiting: no nausea/vomiting    Complications: none    Transfer of care protocol was followed      Last vitals:   Visit Vitals  /62   Pulse 105   Temp 36.7 °C (98 °F)   Resp (!) 24   Ht 6' 2" (1.88 m)   Wt 133.8 kg (295 lb)   SpO2 (!) 93%   BMI 37.88 kg/m²     "

## 2019-10-10 NOTE — PROGRESS NOTES
"[facility] Hospital Medicine  Progress Note    Patient Name: Pasha Harmon  MRN: [id number]  Patient Class: IP- Inpatient   Admission Date: 10/9/2019  Length of Stay: 0 days  Attending Physician: Marc Whitman, *  Primary Care Provider: Eze Root MD    [facility] Medicine Team: [facility] MED E Jeanie Bedoya PA-C    Subjective:     Principal Problem:Bowel perforation        HPI:  Pasha Harmon is a 74M with COPD (current smoker), DMII, CAD, gastric lipoma admitted to hospital medicine after EGD/ ESD done today 10/9. Patient seen in PACU ~45 min post procedure, still groggy, not able to participate much in history. Per chart review, patient was having intermittent episodes of melena, suspected secondary to known large bleeding gastric lipoma. Patient underwent scheduled outpatient ESD today.     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 Known to be a large bleeding lipoma. S/P ESD successfully. The lesion was too large and bulky and caused a long  mucosal esophageal trear and got lodged into the mid esophagus. The mass had to be cut into smaller pieces to be retrieved. Mucosal tear in the middle and lower third of the esophagus.  Recommendations:Admit the patient to hospital [facility] for observation. Clear liquid diet for 3 days. Carafate Qid for 10 days. PPI Bid. Repeat EGD in 2 months to check on healing. no need for esophagram"    VSS in PACU. Ordered CBC, CMP.    Overview/Hospital Course:  Patient admitted for observation after EGD/ESD. Initial labs post-procedure showed leukocytosis, suspected secondary to post-procedure. 10/10 WBC increasing, patient reporting severe abd pain this AM. Lactic acid, CXR, Abd X-ray, UA ordered. CXR revealed free air under diaphragm. Attending MD, Dr. Whitman discussed with GI. General Surgery notified, appreciate assistance. Started patient on Cefepime/Flagyl. Stat CT C/A/P ordered. Likely plan for surgery. "     Interval History: Overnight pt with difficulty urinating, bladder scan shows >250cc. Straight cath done with 450cc dark yellow urine obtained. This AM pt with increasing WBC 32k, BHASKAR cr 2.0. Notified that pt had worsening abd pain this AM. Ordered CXR, abd x-ray, UA, Lactic acid, IVF. CXR showing free air under diaphragm. GI discussed with hospital medicine staff MD. Case discussed with general surgery. CT C/A/P ordered. Evaluated pt at bedside with gen surg, likely plan for surgery today. Pt made NPO, started Cefepime/Flagyl.     Review of Systems   Unable to perform ROS: Acuity of condition (pt in PACU post procedure)     Objective:     Vital Signs (Most Recent):  Temp: 98 °F (36.7 °C) (10/10/19 1126)  Pulse: 105 (10/10/19 1129)  Resp: (!) 36 (10/10/19 1126)  BP: 120/62 (10/10/19 1126)  SpO2: (!) 93 % (10/10/19 1126) Vital Signs (24h Range):  Temp:  [97.1 °F (36.2 °C)-99.5 °F (37.5 °C)] 98 °F (36.7 °C)  Pulse:  [] 105  Resp:  [16-36] 36  SpO2:  [91 %-100 %] 93 %  BP: ()/(50-76) 120/62     Weight: 133.8 kg (295 lb)  Body mass index is 37.88 kg/m².    Intake/Output Summary (Last 24 hours) at 10/10/2019 1135  Last data filed at 10/10/2019 0530  Gross per 24 hour   Intake 2110 ml   Output 560 ml   Net 1550 ml      Physical Exam   Constitutional: He is oriented to person, place, and time. No distress.   HENT:   Head: Normocephalic and atraumatic.   Eyes: EOM are normal. Right eye exhibits no discharge. Left eye exhibits no discharge.   Neck: Normal range of motion.   Cardiovascular: Normal rate and regular rhythm.   Pulmonary/Chest: He is in respiratory distress (mild respiratory distress, tachypneic; speaking in full sentences).   Abdominal: He exhibits distension. There is tenderness. There is guarding.   Neurological: He is alert and oriented to person, place, and time.   Skin: Skin is warm and dry. He is not diaphoretic.   Nursing note and vitals reviewed.      Significant Labs: All pertinent labs  "within the past 24 hours have been reviewed.    Significant Imaging: I have reviewed all pertinent imaging results/findings within the past 24 hours.      Assessment/Plan:      * Bowel perforation  Pt admitted s/p scheduled outpatient EGD/ESD for gastric lipoma 10/9 (see below)  - wbc 21k initially after EGD/ESD, suspected reaction after post-procedure  - 10/10 wbc 32k, pt with acutely worsening abd pain this AM as well as distention  - CXR, abd xray, LA, UA ordered: CXR revealing free air under diaphragm  - staff discussed with GI team; notified gen surg  - pt NPO  - likely surgery today  - appreciate gen surg/ GI assistance      Subepithelial gastric lesion  Per chart review, pt initially presented with melena and was found to have gastric lesion in May, found to be lipoma. 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 Known to be a large bleeding lipoma. S/P ESD successfully. The lesion was too large and bulky and caused a long mucosal esophageal trear and got lodged into the mid esophagus. The mass had to be cut into smaller  pieces to be retrieved. Mucosal tear in the middle and lower third of the esophagus."  Recommendation:  - Clear liquid diet for 3 days.  - Continue present medications.  - Await pathology results.  - Carafate Qid for 10 days.  - PPI Bid.  - Repeat EGD in 2 months to check on healing.  - no need for esophagram      - will T&S as precaution  - CLD   - continue home protonix 40mg BID and carafate 1g qid   - tele  Please see above    Essential hypertension  Stable, well controlled  Continue home lisinopril 20mg daily when able      COPD (chronic obstructive pulmonary disease)  Still active smoker  Maintain sats >88%  - continue home duoneb PRN and symbicort      Type 2 diabetes mellitus without complication, without long-term current use of insulin  Home regimen: metformin 500mg daily  Lab Results   Component Value Date "    HGBA1C 6.1 (H) 09/19/2019   BG well controlled, low dose SSI while on CLD  Check BG ACHS      Hyperlipidemia  Continue zocor 40mg qhs      MIGUELINA (obstructive sleep apnea)  CPAP qhs      Anemia  BL ~8-9  Bleeding gastric lipoma treated today      Insomnia  Appears pt takes benadryl qhs for insomnia, continue         VTE Risk Mitigation (From admission, onward)         Ordered     IP VTE HIGH RISK PATIENT  Once      10/09/19 1443     Place PAULY hose  Until discontinued      10/09/19 1443     Place sequential compression device  Until discontinued      10/09/19 1443                    Discussed with staff, gen surg team  Jeanie Bedoya PA-C  Department of Hospital Medicine   [facility]

## 2019-10-10 NOTE — NURSING
"Report received from Anesthesia. Pt transported to SICU [address] with portable telemetryAmbubag" in use. Pt connected to ICU monitor Ventilator. SICU team called and made aware of patient arrival. New orders received and implemented. Pt assessed, immediate needs met. Family brought to bedside, updated on the patient's current condition and PoC for remainder of shift. All questions answered, emotional support provided.     Admit Skin Note: No skin breakdown noted.     "

## 2019-10-10 NOTE — ASSESSMENT & PLAN NOTE
Patient is 73 yo M with esophageal vs gastric perforation and free air    Stat CT chest/abd/pelvis to try to delineate sites of injury  To OR class A for ex lap  Type and screen ordered  Consent obtained

## 2019-10-10 NOTE — CONSULTS
"[facility]  General Surgery  Consult Note    Patient Name: Pasha Harmon  MRN: [id number]  Code Status: Full Code  Admission Date: 10/9/2019  Hospital Length of Stay: 0 days  Attending Physician: Marc Whitman, *  Primary Care Provider: Eze Root MD    Patient information was obtained from patient, past medical records and ER records.     Inpatient consult to General Surgery  Consult performed by: Too Solo MD  Consult ordered by: Jeanie Bedoya PA-C  Reason for consult: Free air        Subjective:     Principal Problem: Bowel perforation    History of Present Illness: Patient is a 74 yo M with history of COPD and bleeding gastric mass who underwent EGD with [facility] yesterday where they removed a 7 cm "ulcerated lipoma" from his gastric antrum using submucosal endoscopic dissection. When removing the mass, it became stuck in the distal esophagus. They had to removed it piecemeal, and caused some mucosal esophageal tears. He was admitted for obs overnight, and this morning his WBC was elevated and he was complaining of abd pain. CXR showed free intraperitoneal free air and general surgery was consulted for evaluation.    No current facility-administered medications on file prior to encounter.      Current Outpatient Medications on File Prior to Encounter   Medication Sig    acetaminophen (TYLENOL) 650 MG TbSR Take 1,300 mg by mouth 2 (two) times daily.     budesonide-formoterol 160-4.5 mcg (SYMBICORT) 160-4.5 mcg/actuation HFAA Inhale 2 puffs into the lungs every 12 (twelve) hours.    cholecalciferol, vitamin D3, (VITAMIN D3) 5,000 unit Tab Take 5,000 Units by mouth once daily.    citalopram (CELEXA) 20 MG tablet Take 1 tablet (20 mg total) by mouth nightly.    diphenhydrAMINE (BENADRYL) 25 mg capsule Take 25 mg by mouth every evening. 1 tab po am, 3 tabs po HS     lisinopril (PRINIVIL,ZESTRIL) 20 MG tablet Take 1 tablet (20 mg total) by mouth once daily.    " metFORMIN (GLUCOPHAGE) 500 MG tablet Take 1 tablet (500 mg total) by mouth daily with breakfast.    multivit with min-FA-lycopene (ONE-A-DAY MEN'S MULTIVITAMIN) 400-300 mcg Tab Take 1 tablet by mouth nightly.    pantoprazole (PROTONIX) 40 MG tablet Take 1 tablet (40 mg total) by mouth 2 (two) times daily.    simvastatin (ZOCOR) 40 MG tablet Take 1 tablet (40 mg total) by mouth every evening.    sucralfate (CARAFATE) 1 gram tablet Take 1 tablet (1 g total) by mouth 4 (four) times daily.    albuterol (ACCUNEB) 1.25 mg/3 mL Nebu Take 3 mLs (1.25 mg total) by nebulization every 6 (six) hours as needed. Rescue    albuterol-ipratropium (DUO-NEB) 2.5 mg-0.5 mg/3 mL nebulizer solution Take 3 mLs by nebulization every 6 (six) hours as needed for Wheezing. Rescue    nicotine (NICODERM CQ) 21 mg/24 hr Place 1 patch onto the skin once daily.       Review of patient's allergies indicates:   Allergen Reactions    Meloxicam Other (See Comments)     Ulcer, GI bleed        Past Medical History:   Diagnosis Date    COPD (chronic obstructive pulmonary disease)     Diabetes mellitus     Emphysema of lung     Gastric ulcer     Hypertension     MI (myocardial infarction)      Past Surgical History:   Procedure Laterality Date    APPENDECTOMY      CHOLECYSTECTOMY      ENDOSCOPIC ULTRASOUND OF UPPER GASTROINTESTINAL TRACT Left 6/4/2019    Procedure: ULTRASOUND, UPPER GI TRACT, ENDOSCOPIC;  Surgeon: Lavon Parra MD;  [facility];  Service: Endoscopy;  Laterality: Left;  GIF plus Radial    ESOPHAGOGASTRODUODENOSCOPY N/A 5/25/2019    Procedure: EGD (ESOPHAGOGASTRODUODENOSCOPY);  Surgeon: Mitch Buitrago MD;  [facility];  Service: Endoscopy;  Laterality: N/A;    ESOPHAGOGASTRODUODENOSCOPY N/A 6/4/2019    Procedure: EGD (ESOPHAGOGASTRODUODENOSCOPY);  Surgeon: Lavon Parra MD;  [facility];  Service: Endoscopy;  Laterality: N/A;    ESOPHAGOGASTRODUODENOSCOPY N/A 9/3/2019    Procedure:  EGD (ESOPHAGOGASTRODUODENOSCOPY);  Surgeon: Keshav Harris MD;  Location: [facility];  Service: Endoscopy;  Laterality: N/A;    KNEE SURGERY Right      Family History     Problem Relation (Age of Onset)    Cancer Father    Heart disease Mother        Tobacco Use    Smoking status: Current Every Day Smoker     Packs/day: 2.00     Types: Cigarettes    Smokeless tobacco: Never Used   Substance and Sexual Activity    Alcohol use: No    Drug use: No    Sexual activity: Not on file     Review of Systems   Constitutional: Negative for chills and fever.   HENT: Negative for sinus pain and trouble swallowing.    Eyes: Negative for discharge and redness.   Respiratory: Negative for cough and shortness of breath.    Cardiovascular: Negative for chest pain.   Gastrointestinal: Positive for abdominal distention, abdominal pain and nausea. Negative for constipation, diarrhea and vomiting.   Genitourinary: Negative for dysuria.   Musculoskeletal: Negative for back pain and neck pain.   Skin: Negative for color change.   Allergic/Immunologic: Negative for immunocompromised state.   Neurological: Negative for light-headedness and headaches.   Hematological: Negative for adenopathy.     Objective:     Vital Signs (Most Recent):  Temp: 98 °F (36.7 °C) (10/10/19 1126)  Pulse: 105 (10/10/19 1129)  Resp: (!) 24 (10/10/19 1140)  BP: 120/62 (10/10/19 1126)  SpO2: (!) 93 % (10/10/19 1126) Vital Signs (24h Range):  Temp:  [97.1 °F (36.2 °C)-99.5 °F (37.5 °C)] 98 °F (36.7 °C)  Pulse:  [] 105  Resp:  [16-36] 24  SpO2:  [91 %-100 %] 93 %  BP: ()/(50-76) 120/62     Weight: 133.8 kg (295 lb)  Body mass index is 37.88 kg/m².    Physical Exam   Constitutional: He is oriented to person, place, and time. He appears well-developed and well-nourished. No distress.   HENT:   Head: Normocephalic and atraumatic.   Eyes: Pupils are equal, round, and reactive to light. EOM are normal.   Neck: Normal range of motion. Neck supple.    Cardiovascular: Normal rate and regular rhythm.   Pulmonary/Chest: Effort normal. No respiratory distress.   Abdominal: Soft. He exhibits distension. There is tenderness. There is guarding.   Diffusely distended and peritonitic  Old open appy scar in RLQ, well healed   Musculoskeletal: Normal range of motion.   Neurological: He is alert and oriented to person, place, and time.   Skin: Skin is warm and dry. He is not diaphoretic. There is pallor.   Psychiatric: He has a normal mood and affect. His behavior is normal. Judgment and thought content normal.   Nursing note and vitals reviewed.      Significant Labs:  CBC:   Recent Labs   Lab 10/10/19  0417   WBC 32.63*   RBC 3.80*   HGB 9.3*   HCT 31.7*   *   MCV 83   MCH 24.5*   MCHC 29.3*     CMP:   Recent Labs   Lab 10/10/19  0417   *   CALCIUM 8.8   ALBUMIN 3.5   PROT 6.3   *   K 4.8   CO2 21*   CL 99   BUN 21   CREATININE 2.0*   ALKPHOS 57   ALT 17   AST 32   BILITOT 0.5     Coagulation: No results for input(s): LABPROT, INR, APTT in the last 168 hours.    Significant Diagnostics:  I have reviewed all pertinent imaging results/findings within the past 24 hours.    Assessment/Plan:     * Bowel perforation  Patient is 73 yo M with esophageal vs gastric perforation and free air    Stat CT chest/abd/pelvis to try to delineate sites of injury  To OR class A for ex lap  Type and screen ordered  Consent obtained      VTE Risk Mitigation (From admission, onward)         Ordered     IP VTE HIGH RISK PATIENT  Once      10/09/19 1443     Place PAULY hose  Until discontinued      10/09/19 1443     Place sequential compression device  Until discontinued      10/09/19 1443                Thank you for your consult. I will follow-up with patient. Please contact us if you have any additional questions.    Too Solo MD  General Surgery  [facility]

## 2019-10-10 NOTE — HPI
"Patient is a 74 yo M with history of COPD and bleeding gastric mass who underwent EGD with [facility] yesterday where they removed a 7 cm "ulcerated lipoma" from his gastric antrum using submucosal endoscopic dissection. When removing the mass, it became stuck in the distal esophagus. They had to removed it piecemeal, and caused some mucosal esophageal tears. He was admitted for obs overnight, and this morning his WBC was elevated and he was complaining of abd pain. CXR showed free intraperitoneal free air and general surgery was consulted for evaluation.  "

## 2019-10-10 NOTE — PLAN OF CARE
CM to bedside for d/c planning assessment. NARCISA Everett and ICU resident at bedside. Patient to transfer to surgical ICU at this time.

## 2019-10-10 NOTE — SUBJECTIVE & OBJECTIVE
No current facility-administered medications on file prior to encounter.      Current Outpatient Medications on File Prior to Encounter   Medication Sig    acetaminophen (TYLENOL) 650 MG TbSR Take 1,300 mg by mouth 2 (two) times daily.     budesonide-formoterol 160-4.5 mcg (SYMBICORT) 160-4.5 mcg/actuation HFAA Inhale 2 puffs into the lungs every 12 (twelve) hours.    cholecalciferol, vitamin D3, (VITAMIN D3) 5,000 unit Tab Take 5,000 Units by mouth once daily.    citalopram (CELEXA) 20 MG tablet Take 1 tablet (20 mg total) by mouth nightly.    diphenhydrAMINE (BENADRYL) 25 mg capsule Take 25 mg by mouth every evening. 1 tab po am, 3 tabs po HS     lisinopril (PRINIVIL,ZESTRIL) 20 MG tablet Take 1 tablet (20 mg total) by mouth once daily.    metFORMIN (GLUCOPHAGE) 500 MG tablet Take 1 tablet (500 mg total) by mouth daily with breakfast.    multivit with min-FA-lycopene (ONE-A-DAY MEN'S MULTIVITAMIN) 400-300 mcg Tab Take 1 tablet by mouth nightly.    pantoprazole (PROTONIX) 40 MG tablet Take 1 tablet (40 mg total) by mouth 2 (two) times daily.    simvastatin (ZOCOR) 40 MG tablet Take 1 tablet (40 mg total) by mouth every evening.    sucralfate (CARAFATE) 1 gram tablet Take 1 tablet (1 g total) by mouth 4 (four) times daily.    albuterol (ACCUNEB) 1.25 mg/3 mL Nebu Take 3 mLs (1.25 mg total) by nebulization every 6 (six) hours as needed. Rescue    albuterol-ipratropium (DUO-NEB) 2.5 mg-0.5 mg/3 mL nebulizer solution Take 3 mLs by nebulization every 6 (six) hours as needed for Wheezing. Rescue    nicotine (NICODERM CQ) 21 mg/24 hr Place 1 patch onto the skin once daily.       Review of patient's allergies indicates:   Allergen Reactions    Meloxicam Other (See Comments)     Ulcer, GI bleed        Past Medical History:   Diagnosis Date    COPD (chronic obstructive pulmonary disease)     Diabetes mellitus     Emphysema of lung     Gastric ulcer     Hypertension     MI (myocardial infarction)       Past Surgical History:   Procedure Laterality Date    APPENDECTOMY      CHOLECYSTECTOMY      ENDOSCOPIC ULTRASOUND OF UPPER GASTROINTESTINAL TRACT Left 6/4/2019    Procedure: ULTRASOUND, UPPER GI TRACT, ENDOSCOPIC;  Surgeon: Lavon Parra MD;  Location: [facility];  Service: Endoscopy;  Laterality: Left;  GIF plus Radial    ESOPHAGOGASTRODUODENOSCOPY N/A 5/25/2019    Procedure: EGD (ESOPHAGOGASTRODUODENOSCOPY);  Surgeon: Mitch Buitrago MD;  Location: [facility];  Service: Endoscopy;  Laterality: N/A;    ESOPHAGOGASTRODUODENOSCOPY N/A 6/4/2019    Procedure: EGD (ESOPHAGOGASTRODUODENOSCOPY);  Surgeon: Lavon Parra MD;  Location: [facility];  Service: Endoscopy;  Laterality: N/A;    ESOPHAGOGASTRODUODENOSCOPY N/A 9/3/2019    Procedure: EGD (ESOPHAGOGASTRODUODENOSCOPY);  Surgeon: Keshav Harris MD;  Location: [facility];  Service: Endoscopy;  Laterality: N/A;    KNEE SURGERY Right      Family History     Problem Relation (Age of Onset)    Cancer Father    Heart disease Mother        Tobacco Use    Smoking status: Current Every Day Smoker     Packs/day: 2.00     Types: Cigarettes    Smokeless tobacco: Never Used   Substance and Sexual Activity    Alcohol use: No    Drug use: No    Sexual activity: Not on file     Review of Systems   Constitutional: Negative for chills and fever.   HENT: Negative for sinus pain and trouble swallowing.    Eyes: Negative for discharge and redness.   Respiratory: Negative for cough and shortness of breath.    Cardiovascular: Negative for chest pain.   Gastrointestinal: Positive for abdominal distention, abdominal pain and nausea. Negative for constipation, diarrhea and vomiting.   Genitourinary: Negative for dysuria.   Musculoskeletal: Negative for back pain and neck pain.   Skin: Negative for color change.   Allergic/Immunologic: Negative for immunocompromised state.   Neurological: Negative for light-headedness and headaches.   Hematological: Negative for  adenopathy.     Objective:     Vital Signs (Most Recent):  Temp: 98 °F (36.7 °C) (10/10/19 1126)  Pulse: 105 (10/10/19 1129)  Resp: (!) 24 (10/10/19 1140)  BP: 120/62 (10/10/19 1126)  SpO2: (!) 93 % (10/10/19 1126) Vital Signs (24h Range):  Temp:  [97.1 °F (36.2 °C)-99.5 °F (37.5 °C)] 98 °F (36.7 °C)  Pulse:  [] 105  Resp:  [16-36] 24  SpO2:  [91 %-100 %] 93 %  BP: ()/(50-76) 120/62     Weight: 133.8 kg (295 lb)  Body mass index is 37.88 kg/m².    Physical Exam   Constitutional: He is oriented to person, place, and time. He appears well-developed and well-nourished. No distress.   HENT:   Head: Normocephalic and atraumatic.   Eyes: Pupils are equal, round, and reactive to light. EOM are normal.   Neck: Normal range of motion. Neck supple.   Cardiovascular: Normal rate and regular rhythm.   Pulmonary/Chest: Effort normal. No respiratory distress.   Abdominal: Soft. He exhibits distension. There is tenderness. There is guarding.   Diffusely distended and peritonitic  Old open appy scar in RLQ, well healed   Musculoskeletal: Normal range of motion.   Neurological: He is alert and oriented to person, place, and time.   Skin: Skin is warm and dry. He is not diaphoretic. There is pallor.   Psychiatric: He has a normal mood and affect. His behavior is normal. Judgment and thought content normal.   Nursing note and vitals reviewed.      Significant Labs:  CBC:   Recent Labs   Lab 10/10/19  0417   WBC 32.63*   RBC 3.80*   HGB 9.3*   HCT 31.7*   *   MCV 83   MCH 24.5*   MCHC 29.3*     CMP:   Recent Labs   Lab 10/10/19  0417   *   CALCIUM 8.8   ALBUMIN 3.5   PROT 6.3   *   K 4.8   CO2 21*   CL 99   BUN 21   CREATININE 2.0*   ALKPHOS 57   ALT 17   AST 32   BILITOT 0.5     Coagulation: No results for input(s): LABPROT, INR, APTT in the last 168 hours.    Significant Diagnostics:  I have reviewed all pertinent imaging results/findings within the past 24 hours.

## 2019-10-10 NOTE — CLINICAL REVIEW
Mimi Randolph, on call, paged and notified of pts complaint of pain, with no med.other than TYlenol ordered and that pt. Still has not voided. Orders noted. Will continue to monitor.

## 2019-10-10 NOTE — PT/OT/SLP PROGRESS
Speech Language Pathology      Pasha Harmon  MRN: [id number]    SLP Evaluation Orders received and reviewed with PA.  MD team informed ST Pt with change in status and not appropriate for assessment at this time. Patient not seen today secondary to MD hold. ST to d/c orders per review with MD team. MD team to reconsult if needed. Thank you.     MERNA Montero., [facility]SLP  Speech-Language Pathology  Pager: [phone]  10/10/2019

## 2019-10-10 NOTE — SUBJECTIVE & OBJECTIVE
Interval History: Overnight pt with difficulty urinating, bladder scan shows >250cc. Straight cath done with 450cc dark yellow urine obtained. This AM pt with increasing WBC 32k, BHASKAR cr 2.0. Notified that pt had worsening abd pain this AM. Ordered CXR, abd x-ray, UA, Lactic acid, IVF. CXR showing free air under diaphragm. GI discussed with hospital medicine staff MD. Case discussed with general surgery. CT C/A/P ordered. Evaluated pt at bedside with gen surg, likely plan for surgery today. Pt made NPO, started Cefepime/Flagyl.     Review of Systems   Unable to perform ROS: Acuity of condition (pt in PACU post procedure)     Objective:     Vital Signs (Most Recent):  Temp: 98 °F (36.7 °C) (10/10/19 1126)  Pulse: 105 (10/10/19 1129)  Resp: (!) 36 (10/10/19 1126)  BP: 120/62 (10/10/19 1126)  SpO2: (!) 93 % (10/10/19 1126) Vital Signs (24h Range):  Temp:  [97.1 °F (36.2 °C)-99.5 °F (37.5 °C)] 98 °F (36.7 °C)  Pulse:  [] 105  Resp:  [16-36] 36  SpO2:  [91 %-100 %] 93 %  BP: ()/(50-76) 120/62     Weight: 133.8 kg (295 lb)  Body mass index is 37.88 kg/m².    Intake/Output Summary (Last 24 hours) at 10/10/2019 1135  Last data filed at 10/10/2019 0530  Gross per 24 hour   Intake 2110 ml   Output 560 ml   Net 1550 ml      Physical Exam   Constitutional: He is oriented to person, place, and time. No distress.   HENT:   Head: Normocephalic and atraumatic.   Eyes: EOM are normal. Right eye exhibits no discharge. Left eye exhibits no discharge.   Neck: Normal range of motion.   Cardiovascular: Normal rate and regular rhythm.   Pulmonary/Chest: He is in respiratory distress (mild respiratory distress, tachypneic; speaking in full sentences).   Abdominal: He exhibits distension. There is tenderness. There is guarding.   Neurological: He is alert and oriented to person, place, and time.   Skin: Skin is warm and dry. He is not diaphoretic.   Nursing note and vitals reviewed.      Significant Labs: All pertinent labs  within the past 24 hours have been reviewed.    Significant Imaging: I have reviewed all pertinent imaging results/findings within the past 24 hours.

## 2019-10-10 NOTE — BRIEF OP NOTE
[facility]  Brief Operative Note     SUMMARY     Surgery Date: 10/10/2019     Surgeon(s) and Role:     * Paulino Parrish MD - Primary     * Gabino Jacob MD - Resident - Assisting     * Too Solo MD - Resident - Assisting        Pre-op Diagnosis:  Free intraperitoneal air [K66.8]    Post-op Diagnosis:  Post-Op Diagnosis Codes:     * Free intraperitoneal air [K66.8]    Procedure(s) (LRB):  LAPAROTOMY, EXPLORATORY (N/A)  EGD (ESOPHAGOGASTRODUODENOSCOPY) (N/A)  INSERTION, CATHETER, INTERCOSTAL, FOR DRAINAGE (Bilateral)  REPAIR, PERFORATION, GASTRIC (N/A)  INSERTION, GASTROSTOMY TUBE, PERCUTANEOUS  INSERTION, JEJUNOSTOMY TUBE    Anesthesia: General    Description of the findings of the procedure: 2 x gastric perforations with large amount of free air and gastric contents. Also with 7 cm vertical esophageal defect.    Findings/Key Components: Gastric perf closed in 2 layers and omentum patch placed. NGT placed endoscopically past esophageal defect and will be placed to low continuous suction.    Estimated Blood Loss: 40 mL         Specimens:   Specimen (12h ago, onward)    None        Dispo: Patient to SICU in critical but stable condition

## 2019-10-10 NOTE — CARE UPDATE
Rapid Response Respiratory Therapy Proactive Rounding Note      Time of visit: 1133    Code Status: Full Code   : 1945  Age: 74 y.o.  Weight:   Wt Readings from Last 1 Encounters:   10/09/19 133.8 kg (295 lb)     Sex: male  Race: White   Bed: 81636/20799 A:   MRN: [id number]    SITUATION     Evaluated patient for: tachypenic. resp therapist called to rapid resp therapist to evaluate pt    BACKGROUND     Patient has a past medical history of COPD (chronic obstructive pulmonary disease), Diabetes mellitus, Emphysema of lung, Gastric ulcer, Hypertension, and MI (myocardial infarction).    ASSESSMENT/INTERVENTIONS     Upon arrival in room pt had increased work of breathing. Surgical team at bedside to take pt to CT and OR. Team members were ok with pt resp status at this time. I placed ambu bag on bed for transport and resp placed an ambu bag in room.    Pulse: 105 Respiratory rate: 36 Temperature: Temp: 99 °F (37.2 °C) BP: BP: (!) 110/56 SpO2: 93  Level of Consciousness: Level of Consciousness (AVPU): responds to pain  Respiratory Effort: Respiratory Effort: Normal, Unlabored Expansion/Accessory Muscle Usage: Expansion/Accessory Muscles/Retractions: no retractions, expansion symmetric, no use of accessory muscles  All Lung Field Breath Sounds: All Lung Fields Breath Sounds: Anterior:, Lateral:  Mobility at time of assessment:    O2 Device/Concentration: NC 2 lpm  Most recent blood gas:   Recent Labs     10/10/19  1725   PH 7.185*   PCO2 61.1*   PO2 87   HCO3 23.1*   POCSATURATED 94*   BE -5        NIPPV: No Surgical airway: No  ETCO2 monitored:    Ambu at bedside: Ambu bag with the patient?: No(rapid resp supplied ambu bag. pt going to OR)      RECOMMENDATIONS     No recommendations at this time    ESCALATION      Physician Escalation (Yes/No) NO     Discussed plan of care primary RTGEORGETTE     FOLLOW-UP     Please call back the Rapid Response RT, Unique Vo, CRT at x [phone] for any questions or  concerns.

## 2019-10-11 PROBLEM — S11.21XA ESOPHAGEAL TEAR: Status: ACTIVE | Noted: 2019-10-11

## 2019-10-11 LAB
ALBUMIN SERPL BCP-MCNC: 2.4 G/DL (ref 3.5–5.2)
ALLENS TEST: ABNORMAL
ALLENS TEST: ABNORMAL
ALP SERPL-CCNC: 42 U/L (ref 55–135)
ALT SERPL W/O P-5'-P-CCNC: 21 U/L (ref 10–44)
ANION GAP SERPL CALC-SCNC: 12 MMOL/L (ref 8–16)
ANION GAP SERPL CALC-SCNC: 12 MMOL/L (ref 8–16)
ANISOCYTOSIS BLD QL SMEAR: SLIGHT
ANISOCYTOSIS BLD QL SMEAR: SLIGHT
AST SERPL-CCNC: 33 U/L (ref 10–40)
BASO STIPL BLD QL SMEAR: ABNORMAL
BASO STIPL BLD QL SMEAR: ABNORMAL
BASOPHILS # BLD AUTO: ABNORMAL K/UL (ref 0–0.2)
BASOPHILS # BLD AUTO: ABNORMAL K/UL (ref 0–0.2)
BASOPHILS NFR BLD: 0.5 % (ref 0–1.9)
BASOPHILS NFR BLD: 0.5 % (ref 0–1.9)
BILIRUB SERPL-MCNC: 0.4 MG/DL (ref 0.1–1)
BUN SERPL-MCNC: 31 MG/DL (ref 8–23)
BUN SERPL-MCNC: 31 MG/DL (ref 8–23)
CALCIUM SERPL-MCNC: 7.7 MG/DL (ref 8.7–10.5)
CALCIUM SERPL-MCNC: 7.7 MG/DL (ref 8.7–10.5)
CHLORIDE SERPL-SCNC: 101 MMOL/L (ref 95–110)
CHLORIDE SERPL-SCNC: 101 MMOL/L (ref 95–110)
CO2 SERPL-SCNC: 19 MMOL/L (ref 23–29)
CO2 SERPL-SCNC: 19 MMOL/L (ref 23–29)
CREAT SERPL-MCNC: 2.5 MG/DL (ref 0.5–1.4)
CREAT SERPL-MCNC: 2.5 MG/DL (ref 0.5–1.4)
DELSYS: ABNORMAL
DELSYS: ABNORMAL
DIFFERENTIAL METHOD: ABNORMAL
DIFFERENTIAL METHOD: ABNORMAL
EOSINOPHIL # BLD AUTO: ABNORMAL K/UL (ref 0–0.5)
EOSINOPHIL # BLD AUTO: ABNORMAL K/UL (ref 0–0.5)
EOSINOPHIL NFR BLD: 0 % (ref 0–8)
EOSINOPHIL NFR BLD: 0 % (ref 0–8)
ERYTHROCYTE [DISTWIDTH] IN BLOOD BY AUTOMATED COUNT: 17.4 % (ref 11.5–14.5)
ERYTHROCYTE [DISTWIDTH] IN BLOOD BY AUTOMATED COUNT: 17.4 % (ref 11.5–14.5)
ERYTHROCYTE [SEDIMENTATION RATE] IN BLOOD BY WESTERGREN METHOD: 20 MM/H
ERYTHROCYTE [SEDIMENTATION RATE] IN BLOOD BY WESTERGREN METHOD: 8 MM/H
EST. GFR  (AFRICAN AMERICAN): 28.4 ML/MIN/1.73 M^2
EST. GFR  (AFRICAN AMERICAN): 28.4 ML/MIN/1.73 M^2
EST. GFR  (NON AFRICAN AMERICAN): 24.6 ML/MIN/1.73 M^2
EST. GFR  (NON AFRICAN AMERICAN): 24.6 ML/MIN/1.73 M^2
FIO2: 40
GLUCOSE SERPL-MCNC: 103 MG/DL (ref 70–110)
GLUCOSE SERPL-MCNC: 147 MG/DL (ref 70–110)
GLUCOSE SERPL-MCNC: 147 MG/DL (ref 70–110)
HCO3 UR-SCNC: 20 MMOL/L (ref 24–28)
HCO3 UR-SCNC: 20.2 MMOL/L (ref 24–28)
HCO3 UR-SCNC: 24.7 MMOL/L (ref 24–28)
HCT VFR BLD AUTO: 27.2 % (ref 40–54)
HCT VFR BLD AUTO: 27.2 % (ref 40–54)
HCT VFR BLD CALC: 28 %PCV (ref 36–54)
HGB BLD-MCNC: 8.2 G/DL (ref 14–18)
HGB BLD-MCNC: 8.2 G/DL (ref 14–18)
HYPOCHROMIA BLD QL SMEAR: ABNORMAL
HYPOCHROMIA BLD QL SMEAR: ABNORMAL
IMM GRANULOCYTES # BLD AUTO: ABNORMAL K/UL (ref 0–0.04)
IMM GRANULOCYTES # BLD AUTO: ABNORMAL K/UL (ref 0–0.04)
IMM GRANULOCYTES NFR BLD AUTO: ABNORMAL % (ref 0–0.5)
IMM GRANULOCYTES NFR BLD AUTO: ABNORMAL % (ref 0–0.5)
LACTATE SERPL-SCNC: 1.7 MMOL/L (ref 0.5–2.2)
LDH SERPL L TO P-CCNC: 1.23 MMOL/L (ref 0.36–1.25)
LYMPHOCYTES # BLD AUTO: ABNORMAL K/UL (ref 1–4.8)
LYMPHOCYTES # BLD AUTO: ABNORMAL K/UL (ref 1–4.8)
LYMPHOCYTES NFR BLD: 5 % (ref 18–48)
LYMPHOCYTES NFR BLD: 5 % (ref 18–48)
MAGNESIUM SERPL-MCNC: 1.7 MG/DL (ref 1.6–2.6)
MAGNESIUM SERPL-MCNC: 1.7 MG/DL (ref 1.6–2.6)
MCH RBC QN AUTO: 24.6 PG (ref 27–31)
MCH RBC QN AUTO: 24.6 PG (ref 27–31)
MCHC RBC AUTO-ENTMCNC: 30.1 G/DL (ref 32–36)
MCHC RBC AUTO-ENTMCNC: 30.1 G/DL (ref 32–36)
MCV RBC AUTO: 82 FL (ref 82–98)
MCV RBC AUTO: 82 FL (ref 82–98)
METAMYELOCYTES NFR BLD MANUAL: 0.5 %
METAMYELOCYTES NFR BLD MANUAL: 0.5 %
MODE: ABNORMAL
MODE: ABNORMAL
MONOCYTES # BLD AUTO: ABNORMAL K/UL (ref 0.3–1)
MONOCYTES # BLD AUTO: ABNORMAL K/UL (ref 0.3–1)
MONOCYTES NFR BLD: 5 % (ref 4–15)
MONOCYTES NFR BLD: 5 % (ref 4–15)
MYELOCYTES NFR BLD MANUAL: 0.5 %
MYELOCYTES NFR BLD MANUAL: 0.5 %
NEUTROPHILS NFR BLD: 77 % (ref 38–73)
NEUTROPHILS NFR BLD: 77 % (ref 38–73)
NEUTS BAND NFR BLD MANUAL: 11.5 %
NEUTS BAND NFR BLD MANUAL: 11.5 %
NRBC BLD-RTO: 0 /100 WBC
NRBC BLD-RTO: 0 /100 WBC
PCO2 BLDA: 33.8 MMHG (ref 35–45)
PCO2 BLDA: 38.4 MMHG (ref 35–45)
PCO2 BLDA: 57.7 MMHG (ref 35–45)
PEEP: 5
PEEP: 8
PH SMN: 7.24 [PH] (ref 7.35–7.45)
PH SMN: 7.33 [PH] (ref 7.35–7.45)
PH SMN: 7.38 [PH] (ref 7.35–7.45)
PHOSPHATE SERPL-MCNC: 4.3 MG/DL (ref 2.7–4.5)
PHOSPHATE SERPL-MCNC: 4.3 MG/DL (ref 2.7–4.5)
PLATELET # BLD AUTO: 432 K/UL (ref 150–350)
PLATELET # BLD AUTO: 432 K/UL (ref 150–350)
PLATELET BLD QL SMEAR: ABNORMAL
PLATELET BLD QL SMEAR: ABNORMAL
PMV BLD AUTO: 9.9 FL (ref 9.2–12.9)
PMV BLD AUTO: 9.9 FL (ref 9.2–12.9)
PO2 BLDA: 105 MMHG (ref 80–100)
PO2 BLDA: 254 MMHG (ref 80–100)
PO2 BLDA: 91 MMHG (ref 80–100)
POC BE: -3 MMOL/L
POC BE: -5 MMOL/L
POC BE: -6 MMOL/L
POC IONIZED CALCIUM: 1.12 MMOL/L (ref 1.06–1.42)
POC SATURATED O2: 100 % (ref 95–100)
POC SATURATED O2: 96 % (ref 95–100)
POC SATURATED O2: 98 % (ref 95–100)
POC TCO2: 21 MMOL/L (ref 23–27)
POC TCO2: 21 MMOL/L (ref 23–27)
POC TCO2: 26 MMOL/L (ref 23–27)
POCT GLUCOSE: 111 MG/DL (ref 70–110)
POCT GLUCOSE: 117 MG/DL (ref 70–110)
POCT GLUCOSE: 136 MG/DL (ref 70–110)
POIKILOCYTOSIS BLD QL SMEAR: SLIGHT
POIKILOCYTOSIS BLD QL SMEAR: SLIGHT
POLYCHROMASIA BLD QL SMEAR: ABNORMAL
POLYCHROMASIA BLD QL SMEAR: ABNORMAL
POTASSIUM BLD-SCNC: 3.7 MMOL/L (ref 3.5–5.1)
POTASSIUM SERPL-SCNC: 4.7 MMOL/L (ref 3.5–5.1)
POTASSIUM SERPL-SCNC: 4.7 MMOL/L (ref 3.5–5.1)
PROT SERPL-MCNC: 4.9 G/DL (ref 6–8.4)
RBC # BLD AUTO: 3.33 M/UL (ref 4.6–6.2)
RBC # BLD AUTO: 3.33 M/UL (ref 4.6–6.2)
SAMPLE: ABNORMAL
SITE: ABNORMAL
SITE: ABNORMAL
SODIUM BLD-SCNC: 135 MMOL/L (ref 136–145)
SODIUM SERPL-SCNC: 132 MMOL/L (ref 136–145)
SODIUM SERPL-SCNC: 132 MMOL/L (ref 136–145)
VT: 600
VT: 600
WBC # BLD AUTO: 18.4 K/UL (ref 3.9–12.7)
WBC # BLD AUTO: 18.4 K/UL (ref 3.9–12.7)

## 2019-10-11 PROCEDURE — 25000003 PHARM REV CODE 250: Performed by: STUDENT IN AN ORGANIZED HEALTH CARE EDUCATION/TRAINING PROGRAM

## 2019-10-11 PROCEDURE — 99291 CRITICAL CARE FIRST HOUR: CPT | Mod: 24,GC,, | Performed by: SURGERY

## 2019-10-11 PROCEDURE — S0028 INJECTION, FAMOTIDINE, 20 MG: HCPCS | Performed by: STUDENT IN AN ORGANIZED HEALTH CARE EDUCATION/TRAINING PROGRAM

## 2019-10-11 PROCEDURE — 99223 PR INITIAL HOSPITAL CARE,LEVL III: ICD-10-PCS | Mod: ,,, | Performed by: SURGERY

## 2019-10-11 PROCEDURE — 94761 N-INVAS EAR/PLS OXIMETRY MLT: CPT

## 2019-10-11 PROCEDURE — 63600175 PHARM REV CODE 636 W HCPCS: Performed by: SURGERY

## 2019-10-11 PROCEDURE — 20000000 HC ICU ROOM

## 2019-10-11 PROCEDURE — 99900026 HC AIRWAY MAINTENANCE (STAT)

## 2019-10-11 PROCEDURE — 85007 BL SMEAR W/DIFF WBC COUNT: CPT

## 2019-10-11 PROCEDURE — S4991 NICOTINE PATCH NONLEGEND: HCPCS | Performed by: PHYSICIAN ASSISTANT

## 2019-10-11 PROCEDURE — 63600175 PHARM REV CODE 636 W HCPCS: Performed by: STUDENT IN AN ORGANIZED HEALTH CARE EDUCATION/TRAINING PROGRAM

## 2019-10-11 PROCEDURE — 82803 BLOOD GASES ANY COMBINATION: CPT

## 2019-10-11 PROCEDURE — 99291 PR CRITICAL CARE, E/M 30-74 MINUTES: ICD-10-PCS | Mod: 24,GC,, | Performed by: SURGERY

## 2019-10-11 PROCEDURE — 36415 COLL VENOUS BLD VENIPUNCTURE: CPT

## 2019-10-11 PROCEDURE — 99223 1ST HOSP IP/OBS HIGH 75: CPT | Mod: ,,, | Performed by: SURGERY

## 2019-10-11 PROCEDURE — 80053 COMPREHEN METABOLIC PANEL: CPT

## 2019-10-11 PROCEDURE — 99900035 HC TECH TIME PER 15 MIN (STAT)

## 2019-10-11 PROCEDURE — 83605 ASSAY OF LACTIC ACID: CPT

## 2019-10-11 PROCEDURE — 27000221 HC OXYGEN, UP TO 24 HOURS

## 2019-10-11 PROCEDURE — S0030 INJECTION, METRONIDAZOLE: HCPCS | Performed by: STUDENT IN AN ORGANIZED HEALTH CARE EDUCATION/TRAINING PROGRAM

## 2019-10-11 PROCEDURE — 94003 VENT MGMT INPAT SUBQ DAY: CPT

## 2019-10-11 PROCEDURE — 27200966 HC CLOSED SUCTION SYSTEM

## 2019-10-11 PROCEDURE — 84100 ASSAY OF PHOSPHORUS: CPT

## 2019-10-11 PROCEDURE — 85027 COMPLETE CBC AUTOMATED: CPT

## 2019-10-11 PROCEDURE — 25000003 PHARM REV CODE 250: Performed by: PHYSICIAN ASSISTANT

## 2019-10-11 PROCEDURE — 83735 ASSAY OF MAGNESIUM: CPT

## 2019-10-11 PROCEDURE — 37799 UNLISTED PX VASCULAR SURGERY: CPT

## 2019-10-11 RX ORDER — CHLORHEXIDINE GLUCONATE ORAL RINSE 1.2 MG/ML
15 SOLUTION DENTAL 2 TIMES DAILY
Status: DISCONTINUED | OUTPATIENT
Start: 2019-10-11 | End: 2019-10-15

## 2019-10-11 RX ORDER — FENTANYL CITRATE-0.9 % NACL/PF 10 MCG/ML
25 PLASTIC BAG, INJECTION (ML) INTRAVENOUS CONTINUOUS
Status: DISCONTINUED | OUTPATIENT
Start: 2019-10-11 | End: 2019-10-11

## 2019-10-11 RX ORDER — FAMOTIDINE 10 MG/ML
20 INJECTION INTRAVENOUS DAILY
Status: DISCONTINUED | OUTPATIENT
Start: 2019-10-12 | End: 2019-10-17

## 2019-10-11 RX ORDER — FENTANYL CITRATE 50 UG/ML
25 INJECTION, SOLUTION INTRAMUSCULAR; INTRAVENOUS
Status: DISCONTINUED | OUTPATIENT
Start: 2019-10-11 | End: 2019-10-11

## 2019-10-11 RX ORDER — ONDANSETRON 2 MG/ML
4 INJECTION INTRAMUSCULAR; INTRAVENOUS EVERY 6 HOURS PRN
Status: DISCONTINUED | OUTPATIENT
Start: 2019-10-11 | End: 2019-10-31 | Stop reason: HOSPADM

## 2019-10-11 RX ORDER — CEFEPIME HYDROCHLORIDE 2 G/1
2 INJECTION, POWDER, FOR SOLUTION INTRAVENOUS
Status: COMPLETED | OUTPATIENT
Start: 2019-10-11 | End: 2019-10-16

## 2019-10-11 RX ORDER — SODIUM CHLORIDE 9 MG/ML
INJECTION, SOLUTION INTRAVENOUS CONTINUOUS
Status: ACTIVE | OUTPATIENT
Start: 2019-10-11 | End: 2019-10-12

## 2019-10-11 RX ORDER — HEPARIN SODIUM 5000 [USP'U]/ML
5000 INJECTION, SOLUTION INTRAVENOUS; SUBCUTANEOUS EVERY 8 HOURS
Status: DISCONTINUED | OUTPATIENT
Start: 2019-10-11 | End: 2019-10-18

## 2019-10-11 RX ORDER — MAGNESIUM SULFATE HEPTAHYDRATE 40 MG/ML
2 INJECTION, SOLUTION INTRAVENOUS ONCE
Status: COMPLETED | OUTPATIENT
Start: 2019-10-11 | End: 2019-10-11

## 2019-10-11 RX ORDER — FLUCONAZOLE 2 MG/ML
200 INJECTION, SOLUTION INTRAVENOUS
Status: COMPLETED | OUTPATIENT
Start: 2019-10-11 | End: 2019-10-16

## 2019-10-11 RX ORDER — FENTANYL CITRATE-0.9 % NACL/PF 10 MCG/ML
25 PLASTIC BAG, INJECTION (ML) INTRAVENOUS CONTINUOUS
Status: DISCONTINUED | OUTPATIENT
Start: 2019-10-11 | End: 2019-10-14

## 2019-10-11 RX ORDER — IPRATROPIUM BROMIDE AND ALBUTEROL SULFATE 2.5; .5 MG/3ML; MG/3ML
3 SOLUTION RESPIRATORY (INHALATION) EVERY 4 HOURS
Status: DISCONTINUED | OUTPATIENT
Start: 2019-10-11 | End: 2019-10-28

## 2019-10-11 RX ADMIN — Medication 0.03 MCG/KG/MIN: at 12:10

## 2019-10-11 RX ADMIN — CEFEPIME 2 G: 2 INJECTION, POWDER, FOR SOLUTION INTRAVENOUS at 09:10

## 2019-10-11 RX ADMIN — NICOTINE 1 PATCH: 21 PATCH, EXTENDED RELEASE TRANSDERMAL at 08:10

## 2019-10-11 RX ADMIN — METRONIDAZOLE 500 MG: 500 INJECTION, SOLUTION INTRAVENOUS at 01:10

## 2019-10-11 RX ADMIN — HEPARIN SODIUM 5000 UNITS: 5000 INJECTION, SOLUTION INTRAVENOUS; SUBCUTANEOUS at 09:10

## 2019-10-11 RX ADMIN — METRONIDAZOLE 500 MG: 500 INJECTION, SOLUTION INTRAVENOUS at 08:10

## 2019-10-11 RX ADMIN — CHLORHEXIDINE GLUCONATE 0.12% ORAL RINSE 15 ML: 1.2 LIQUID ORAL at 09:10

## 2019-10-11 RX ADMIN — HEPARIN SODIUM 5000 UNITS: 5000 INJECTION, SOLUTION INTRAVENOUS; SUBCUTANEOUS at 01:10

## 2019-10-11 RX ADMIN — PROPOFOL 50 MCG/KG/MIN: 10 INJECTION, EMULSION INTRAVENOUS at 09:10

## 2019-10-11 RX ADMIN — METRONIDAZOLE 500 MG: 500 INJECTION, SOLUTION INTRAVENOUS at 06:10

## 2019-10-11 RX ADMIN — FAMOTIDINE 20 MG: 10 INJECTION, SOLUTION INTRAVENOUS at 08:10

## 2019-10-11 RX ADMIN — MAGNESIUM SULFATE IN WATER 2 G: 40 INJECTION, SOLUTION INTRAVENOUS at 07:10

## 2019-10-11 RX ADMIN — PROPOFOL 50 MCG/KG/MIN: 10 INJECTION, EMULSION INTRAVENOUS at 08:10

## 2019-10-11 RX ADMIN — PROPOFOL 50 MCG/KG/MIN: 10 INJECTION, EMULSION INTRAVENOUS at 10:10

## 2019-10-11 RX ADMIN — PROPOFOL 50 MCG/KG/MIN: 10 INJECTION, EMULSION INTRAVENOUS at 03:10

## 2019-10-11 RX ADMIN — FLUCONAZOLE 200 MG: 2 INJECTION, SOLUTION INTRAVENOUS at 06:10

## 2019-10-11 RX ADMIN — PROPOFOL 5 MCG/KG/MIN: 10 INJECTION, EMULSION INTRAVENOUS at 07:10

## 2019-10-11 RX ADMIN — SODIUM CHLORIDE: 0.9 INJECTION, SOLUTION INTRAVENOUS at 10:10

## 2019-10-11 RX ADMIN — SODIUM CHLORIDE, SODIUM LACTATE, POTASSIUM CHLORIDE, AND CALCIUM CHLORIDE 500 ML: .6; .31; .03; .02 INJECTION, SOLUTION INTRAVENOUS at 02:10

## 2019-10-11 RX ADMIN — VANCOMYCIN HYDROCHLORIDE 1750 MG: 100 INJECTION, POWDER, LYOPHILIZED, FOR SOLUTION INTRAVENOUS at 06:10

## 2019-10-11 RX ADMIN — SODIUM CHLORIDE, SODIUM LACTATE, POTASSIUM CHLORIDE, AND CALCIUM CHLORIDE 500 ML: .6; .31; .03; .02 INJECTION, SOLUTION INTRAVENOUS at 03:10

## 2019-10-11 RX ADMIN — Medication 25 MCG/HR: at 10:10

## 2019-10-11 RX ADMIN — PROPOFOL 50 MCG/KG/MIN: 10 INJECTION, EMULSION INTRAVENOUS at 12:10

## 2019-10-11 NOTE — OP NOTE
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  Patient underwent endoscopic resection on the October 9th 2019 by Gastroenterology.  There was concern for esophageal mucosal tear and the patient was admitted for observation to medical service.  Overnight the patient complained of severe abdominal pain became hemodynamically labile an abdominal x-ray was obtained that showed free air under the diaphragm.  Surgical consultation obtained.  A CT scan of the chest abdomen pelvis was ordered with oral contrast and concern for possible esophageal injury and gastric injury. The decision then made to take the patient to the operating room for exploration.  With concern for esophageal injury thoracic surgery was also made aware of the patient with plan to perform intraoperative EGD to evaluate the esophageal mucosa.  The plan was discussed with the patient and his family their questions were addressed and answered to their satisfaction risks, benefits, alternatives to the procedures were discussed with the patient and his family we discussed abdominal exploration gastric repair as well as placement of gastrostomy and jejunostomy tubes.  Informed consent was signed and witnessed.    ANESTHESIA: GETA    COMPLICATIONS: None    ESTIMATED BLOOD LOSS: 100cc    PROCEDURE IN DETAIL:    Patient was brought to the operating room and laid supine on the operating table. SCDs were placed on bilateral lower extremities to prevent  against venous thromboembolism.  Patient's pressure points were padded to prevent nerve injury. General endotracheal anesthesia in juice by the Anesthesia Department.  Preoperative antibiotics were given prior to incision.  The patient's abdomen was prepped and draped in the usual sterile fashion. Thoracic surgery then performed an EGD with endoscopic guided placement of nasogastric tube. It appeared on EGD that there was a mucosal injury though not full-thickness.  Thoracic surgery will dictate their portion of the case separately.  Subsequent to the EGD a midline laparotomy incision was made with a 10 blade scalpel Bovie electrocautery was then used to enter the abdominal cavity. Upon entry into the abdomen large volume gastric content encountered this was sent for culture.  The anterior wall the stomach was inspected and appeared healthy.  The lesser sac entered and along the greater curvature of the stomach two small perforations were appreciated.  At this time the endoscope was again passed into the stomach for diagnostic purposes looking for any other small possible perforations.  There were no further perforations appreciated.  The 2 perforations were then closed in 2 layers with 3 0 Vicryl suture in 2 0 silk suture in an interrupted fashion.  A thick tongue of omentum was then tacked in place over the repair with a 3 0 Vicryl suture in an interrupted fashion. The remainder of the stomach was inspected and no further perforations were appreciated.  The small bowel was then run in its entirety there appeared to be no abnormalities.  The abdominal cavity was then irrigated copiously with multiple L of normal saline in all 4 quadrants as well as the pelvis until the affluent was entirely clear.  At this time a jejunostomy tube was placed approximately 40 cm distal to the ligament of Treitz.  A 3 0 silk suture was placed in a pursestring fashion around a small enterotomy made with electrocautery. A 14 Telugu red  rubber then placed through the enterotomy and brought out through the left anterior abdominal wall. This was then Witzeled placed with 3 0 silk suture and tacked to the abdominal wall in a Yo fashion with 2 0 silk suture. This was sutured to the skin with a 0 silk suture. At this time a gastrostomy tube was placed with a 2 0 silk suture in a double pursestring fashion around a small gastrostomy made with Bovie electrocautery. A 20 Afghan Hallman catheter then placed through the  Gastrostomy and brought out through the left upper quadrant anterior abdominal wall. This was then tacked to the abdominal wall with a 2 0 silk suture in a Yo fashion the balloon inflated with 10 cc of saline the tube was secured to the skin with an 0 silk suture.  819 Afghan Yoel drain was then placed in the lesser sac near the gastric repair and brought out through a stab incision in the right upper quadrant.  This was secured in place with a 0 silk suture.  The fascia was then closed with a #1  Looped PDS suture in a running fashion.  The subcutaneous tissue was copiously irrigated.  The skin was loosely approximated with staples.  A sterile dressing was then applied. All sponge, needle, instrument counts were correct at conclusion of this portion of the case I was present scrubbed throughout the entire procedure.      Attention then turned to the patient's chest for placement of bilateral thoracostomy tubes.  This was recommended by the thoracic surgery team with concern for possible esophageal injury to drain the pleural spaces.  At this time the patient's chest was prepped draped the usual sterile fashion.  New clean instruments on a clean tray were prepared. Incisions were made in the mid axillary line in approximately the 8th rib spaces bilaterally with a 15 blade scalpel. The incisions were continued down to the chest wall. Chest cavity was entered bilaterally with tonsil clamps.  Twenty-eight Afghan straight chest tubes were  then placed bilaterally. There was return of clear fluid.  The tubes were then secured in place with 0 silk suture. Sterile dressings were then applied. Sponge, needle, instrument counts were correct at the conclusion of this portion of the case. I was present scrubbed throughout this entire procedure.    The patient was then transferred to the SICU in critical condition intubated and on multiple vasopressors.    SPECIMEN: Abdominal fluid for culture    DISPOSITION: Patient critically ill, transferred to ICU intubated, on pressors.

## 2019-10-11 NOTE — HPI
74 yo male with COPF recently underwent EGD for resection of 7cm gastric leiomyoma. There was difficulty retrieving mass through the distal esophagus. Mass was subsequently removed piecemeal and there was concern for esophageal tears. POD 1 had increasing leukocytosis and pneumoperitoneum on AXR. ACS took patient to OR for emergent exp laparotomy. Thoracic surgery available during exp lap and performed EGD which showed long segment esophageal tear however no full thickness tear. NG tube was placed under direct vision at site of defect - it is not in the stomach and should not be advanced. Brought up from OR intubated. Stable overnight. Remains on levo.ACS placed bilateral chest tubes which have minimal output. Leukocytosis stable.

## 2019-10-11 NOTE — PROCEDURES
"Pasha Harmon is a 74 y.o. male patient.    Temp: 99 °F (37.2 °C) (10/10/19 1600)  Pulse: 89 (10/10/19 1845)  Resp: (!) 23 (10/10/19 1845)  BP: (!) 119/46 (10/10/19 1815)  SpO2: (!) 94 % (10/10/19 1845)  Weight: 133.8 kg (295 lb) (10/09/19 0740)  Height: 6' 2" (188 cm) (10/09/19 0740)       Central Line  Date/Time: 10/10/2019 7:30 PM  Location procedure was performed: [facility] CRITICAL CARE SURGERY  Performed by: Johan Morataya MD  Consent Done: Yes  Time out: Immediately prior to procedure a "time out" was called to verify the correct patient, procedure, equipment, support staff and site/side marked as required.  Indications: med administration and vascular access  Anesthesia: see MAR for details    Anesthesia:  Local anesthesia used: no  Preparation: skin prepped with ChloraPrep  Skin prep agent dried: skin prep agent completely dried prior to procedure  Sterile barriers: all five maximum sterile barriers used - cap, mask, sterile gown, sterile gloves, and large sterile sheet  Hand hygiene: hand hygiene performed prior to central venous catheter insertion  Location details: right internal jugular  Catheter type: triple lumen  Catheter size: 7 Fr  Catheter Length: 16cm    Ultrasound guidance: yes  Vessel Caliber: large, patent, compressibility normal  Needle advanced into vessel with real time Ultrasound guidance.  Guidewire confirmed in vessel.  Sterile sheath used.  Manometry: Yes  Number of attempts: 1  Complications: none  Estimated blood loss (mL): 5  Post-procedure: line sutured,  chlorhexidine patch,  sterile dressing applied and blood return through all ports  Complications: No          Johan Morataya  10/10/2019  "

## 2019-10-11 NOTE — HPI
Mr Harmon is a 73M with a history of COPD, current smoker, DMII, CAD, and bleeding gastric mass who underwent EGD with AES 10/9/19 where a 7 cm ulcerated lipoma was removed from his gastric antrum. The mass became stuck int he distal esophagus and was removed piecemeal. He was admitted for observation overnight, and in the morning his WBC was elevated and he was complaining of abdominal pain. CXR showed intraperitoneal free air. He was then taken for a diagnostic ex lap where gastric and esophageal perforations were found, with intraperitoneal gastric contents. The defects were repaired.    He arrived in the SICU intubated requiring vasopressin and phenylephrine. A propofol drip was started and a central line was placed.

## 2019-10-11 NOTE — ASSESSMENT & PLAN NOTE
74M current smoker who underwent excision of bleeding gastric mass, post op course complicated by intraperitoneal free air. Now admitted to SICU following ex-lap 10/10/19 which revealed esophageal and gastric perforations which have been repaired.    Neuro:  - Sedated on propofol  - PRN pain control    CV  - Fluid resuscitate as necessary  - Titrate norepinephrine and vasopressin  - Echo 9/12/10 wnl    Pulm  - Intubated, on vent  - Plan to wean vent tomorrow  - Significant history of smoking    GI  - Post-op esophageal and gastric perforations 10/9, now repaired via ex-lap  - Cefepime, flagyl, and vancomycin  - Monitor drains, care per surgery    Renal  - BHASKAR; Cr 2.8 on admit on baseline 1  - Monitor UOP  - Daily CMP  - Electrolyte repletion PRN\    Heme/ID  - Hgb >8, no signs of active bleeding  - CBC daily  - WBC down, monitor  - Cefepime, flagyl, vancomycin for contaminated peritoneum    Endo  - DM2  - POCT glucose q4h  - SSI    Dispo: Continue ICU care

## 2019-10-11 NOTE — SUBJECTIVE & OBJECTIVE
Follow-up For: Procedure(s) (LRB):  LAPAROTOMY, EXPLORATORY (N/A)  EGD (ESOPHAGOGASTRODUODENOSCOPY) (N/A)  INSERTION, CATHETER, INTERCOSTAL, FOR DRAINAGE (Bilateral)  REPAIR, PERFORATION, GASTRIC (N/A)  INSERTION, GASTROSTOMY TUBE, PERCUTANEOUS (Left)  INSERTION, JEJUNOSTOMY TUBE (Left)    Post-Operative Day: Day of Surgery     Past Medical History:   Diagnosis Date    COPD (chronic obstructive pulmonary disease)     Diabetes mellitus     Emphysema of lung     Gastric ulcer     Hypertension     MI (myocardial infarction)        Past Surgical History:   Procedure Laterality Date    APPENDECTOMY      CHOLECYSTECTOMY      ENDOSCOPIC ULTRASOUND OF UPPER GASTROINTESTINAL TRACT Left 6/4/2019    Procedure: ULTRASOUND, UPPER GI TRACT, ENDOSCOPIC;  Surgeon: Lavon Parra MD;  Location: [facility];  Service: Endoscopy;  Laterality: Left;  GIF plus Radial    ESOPHAGOGASTRODUODENOSCOPY N/A 5/25/2019    Procedure: EGD (ESOPHAGOGASTRODUODENOSCOPY);  Surgeon: Mitch Buitrago MD;  Location: [facility];  Service: Endoscopy;  Laterality: N/A;    ESOPHAGOGASTRODUODENOSCOPY N/A 6/4/2019    Procedure: EGD (ESOPHAGOGASTRODUODENOSCOPY);  Surgeon: Lavon Parra MD;  Location: [facility];  Service: Endoscopy;  Laterality: N/A;    ESOPHAGOGASTRODUODENOSCOPY N/A 9/3/2019    Procedure: EGD (ESOPHAGOGASTRODUODENOSCOPY);  Surgeon: Keshav Harris MD;  Location: [facility];  Service: Endoscopy;  Laterality: N/A;    KNEE SURGERY Right        Review of patient's allergies indicates:   Allergen Reactions    Meloxicam Other (See Comments)     Ulcer, GI bleed        Family History     Problem Relation (Age of Onset)    Cancer Father    Heart disease Mother        Tobacco Use    Smoking status: Current Every Day Smoker     Packs/day: 2.00     Types: Cigarettes    Smokeless tobacco: Never Used   Substance and Sexual Activity    Alcohol use: No    Drug use: No    Sexual activity: Not on file      Review of Systems    Unable to perform ROS: Intubated     Objective:     Vital Signs (Most Recent):  Temp: 99 °F (37.2 °C) (10/10/19 1600)  Pulse: 91 (10/10/19 1953)  Resp: (!) 23 (10/10/19 1953)  BP: (!) 119/46 (10/10/19 1815)  SpO2: 97 % (10/10/19 1953) Vital Signs (24h Range):  Temp:  [98 °F (36.7 °C)-99.5 °F (37.5 °C)] 99 °F (37.2 °C)  Pulse:  [] 91  Resp:  [18-36] 23  SpO2:  [88 %-100 %] 97 %  BP: (110-132)/(46-76) 119/46  Arterial Line BP: ()/(48-62) 105/54     Weight: 133.8 kg (295 lb)  Body mass index is 37.88 kg/m².      Intake/Output Summary (Last 24 hours) at 10/10/2019 2018  Last data filed at 10/10/2019 1800  Gross per 24 hour   Intake 4442 ml   Output 1145 ml   Net 3297 ml       Physical Exam   Constitutional: He appears well-developed and well-nourished.   HENT:   Head: Normocephalic and atraumatic.   Eyes: Pupils are equal, round, and reactive to light. Conjunctivae are normal.   Neck: Normal range of motion.   Cardiovascular: Normal rate, regular rhythm and normal heart sounds.   Pulmonary/Chest: Breath sounds normal.   Intubated   Abdominal:   Abdominal drains present, dressing c/d/i   Neurological:   Sedated   Skin: Skin is warm and dry.       Vents:  Vent Mode: A/C (10/10/19 1953)  Ventilator Initiated: Yes (10/10/19 1621)  Set Rate: 20 bmp (10/10/19 1953)  Vt Set: 550 mL (10/10/19 1953)  PEEP/CPAP: 8 cmH20 (10/10/19 1953)  Oxygen Concentration (%): 69 (10/10/19 1953)  Peak Airway Pressure: 24 cmH2O (10/10/19 1953)  Plateau Pressure: 0 cmH20 (10/10/19 1953)  Total Ve: 13.3 mL (10/10/19 1953)  F/VT Ratio<105 (RSBI): (!) 38.85 (10/10/19 1953)    Lines/Drains/Airways     Central Venous Catheter Line                 Percutaneous Central Line Insertion/Assessment - triple lumen  10/10/19 1853 right internal jugular less than 1 day          Drain                 Chest Tube 10/10/19 1510 1 Right Midaxillary;Fourth intercostal space 28 Fr. less than 1 day         Chest Tube 10/10/19 1513 Left Midaxillary;Fourth  intercostal space 28 Fr. less than 1 day         Closed/Suction Drain 10/10/19 1349 Right RUQ Bulb 19 Fr. less than 1 day         Closed/Suction Drain 10/10/19 1408 Left;Superior LUQ Other (Comment) 20 Fr. less than 1 day         Closed/Suction Drain 10/10/19 1434 Left;Distal LUQ Other (Comment) 14 Fr. less than 1 day         Urethral Catheter 10/10/19 1530 Straight-tip;Double-lumen;Non-latex 16 Fr. less than 1 day          Airway                 Airway - Non-Surgical 10/10/19 1234 Endotracheal Tube less than 1 day          Arterial Line                 Arterial Line 10/10/19 1245 Right Radial less than 1 day          Peripheral Intravenous Line                 Peripheral IV - Single Lumen 10/09/19 0744 20 G Right Forearm 1 day         Peripheral IV - Single Lumen 10/10/19 1237 16 G Left Antecubital less than 1 day         Peripheral IV - Single Lumen 10/10/19 1240 18 G Right Antecubital less than 1 day                Significant Labs:    CBC/Anemia Profile:  Recent Labs   Lab 10/09/19  1517 10/10/19  0417 10/10/19  1610   WBC 21.63* 32.63* 16.20*   HGB 9.6* 9.3* 8.7*   HCT 31.1* 31.7* 30.2*   * 488* 444*   MCV 82 83 84   RDW 16.7* 17.1* 17.2*        Chemistries:  Recent Labs   Lab 10/09/19  1517 10/10/19  0417 10/10/19  1610    132* 135*   K 4.7 4.8 4.2    99 102   CO2 22* 21* 22*   BUN 14 21 27*   CREATININE 1.4 2.0* 2.8*   CALCIUM 8.3* 8.8 7.5*   ALBUMIN  --  3.5 2.5*   PROT  --  6.3 5.0*   BILITOT  --  0.5 0.5   ALKPHOS  --  57 43*   ALT  --  17 25   AST  --  32 37   MG 1.9 2.0 2.0   PHOS 4.8* 4.4 4.7*       All pertinent labs within the past 24 hours have been reviewed.    Significant Imaging: I have reviewed all pertinent imaging results/findings within the past 24 hours.

## 2019-10-11 NOTE — PROGRESS NOTES
"[facility]  General Surgery  Progress Note    Subjective:     History of Present Illness:  Patient is a 72 yo M with history of COPD and bleeding gastric mass who underwent EGD with AES yesterday where they removed a 7 cm "ulcerated lipoma" from his gastric antrum using submucosal endoscopic dissection. When removing the mass, it became stuck in the distal esophagus. They had to removed it piecemeal, and caused some mucosal esophageal tears. He was admitted for obs overnight, and this morning his WBC was elevated and he was complaining of abd pain. CXR showed free intraperitoneal free air and general surgery was consulted for evaluation.    Post-Op Info:  Procedure(s) (LRB):  LAPAROTOMY, EXPLORATORY (N/A)  EGD (ESOPHAGOGASTRODUODENOSCOPY) (N/A)  INSERTION, CATHETER, INTERCOSTAL, FOR DRAINAGE (Bilateral)  REPAIR, PERFORATION, GASTRIC (N/A)  INSERTION, GASTROSTOMY TUBE, PERCUTANEOUS (Left)  INSERTION, JEJUNOSTOMY TUBE (Left)   1 Day Post-Op     Interval History: NAEON, pressors weaned, UOP low, vent minimal    Medications:  Continuous Infusions:   sodium chloride 0.9%      norepinephrine bitartrate-D5W 0.01 mcg/kg/min (10/11/19 0500)    propofol 50 mcg/kg/min (10/11/19 0500)    vasopressin (PITRESSIN) infusion Stopped (10/11/19 0300)     Scheduled Meds:   ceFEPime (MAXIPIME) IVPB  2 g Intravenous Q24H    famotidine (PF)  20 mg Intravenous BID    fluconazole (DIFLUCAN) IVPB  200 mg Intravenous Q24H    fluticasone furoate-vilanterol  1 puff Inhalation Daily    magnesium sulfate IVPB  2 g Intravenous Once    metronidazole  500 mg Intravenous Q8H    nicotine  1 patch Transdermal Daily    vancomycin (VANCOCIN) IVPB  1,750 mg Intravenous Q24H     PRN Meds:albuterol-ipratropium, Dextrose 10% Bolus, Dextrose 10% Bolus, glucagon (human recombinant), insulin aspart U-100, sodium chloride 0.9%     Review of patient's allergies indicates:   Allergen Reactions    Meloxicam Other (See Comments)     " Ulcer, GI bleed      Objective:     Vital Signs (Most Recent):  Temp: 98.9 °F (37.2 °C) (10/11/19 0300)  Pulse: 100 (10/11/19 0600)  Resp: (!) 27 (10/11/19 0600)  BP: (!) 107/55 (10/11/19 0600)  SpO2: 95 % (10/11/19 0600) Vital Signs (24h Range):  Temp:  [98 °F (36.7 °C)-99.1 °F (37.3 °C)] 98.9 °F (37.2 °C)  Pulse:  [] 100  Resp:  [20-38] 27  SpO2:  [88 %-100 %] 95 %  BP: (107-132)/(46-88) 107/55  Arterial Line BP: ()/(48-62) 99/53     Weight: 133.8 kg (295 lb)  Body mass index is 37.88 kg/m².    Intake/Output - Last 3 Shifts       10/09 0700 - 10/10 0659 10/10 0700 - 10/11 0659 10/11 0700 - 10/12 0659    P.O. 650      I.V. (mL/kg) 2360 (17.6) 4670 (34.9)     IV Piggyback 500 1000     Total Intake(mL/kg) 3510 (26.2) 5670 (42.4)     Urine (mL/kg/hr) 560 (0.2) 545 (0.2)     Drains  255     Blood 100 300     Chest Tube  200     Total Output 660 1300     Net +2850 +4370                  Physical Exam   Constitutional: He appears well-developed and well-nourished. No distress.   HENT:   Head: Normocephalic and atraumatic.   NGT with bridle, to low continuous suction   Cardiovascular: Normal rate and regular rhythm.   Pulmonary/Chest: Effort normal. No respiratory distress.   Intubated  Bilateral chest tubes with serosanguinous drainage, to water seal, no air leaks   Abdominal: Soft. He exhibits no distension. There is tenderness (Appropriate). There is no guarding.   J tube clamped  G tube to gravity, gastric contents in bag  RUQ DENISE drain with serosanguinous fluid  Midline incision CDI, dressing in place   Genitourinary:   Genitourinary Comments: Hallman in palce   Neurological:   Sedated   Skin: Skin is warm and dry. He is not diaphoretic.   Nursing note and vitals reviewed.      Significant Labs:  CBC:   Recent Labs   Lab 10/11/19  0308   WBC 18.40*  18.40*   RBC 3.33*  3.33*   HGB 8.2*  8.2*   HCT 27.2*  27.2*   *  432*   MCV 82  82   MCH 24.6*  24.6*   MCHC 30.1*  30.1*     CMP:   Recent Labs    Lab 10/11/19  0308   *  147*   CALCIUM 7.7*  7.7*   ALBUMIN 2.4*   PROT 4.9*   *  132*   K 4.7  4.7   CO2 19*  19*     101   BUN 31*  31*   CREATININE 2.5*  2.5*   ALKPHOS 42*   ALT 21   AST 33   BILITOT 0.4     ABGs:   Recent Labs   Lab 10/11/19  0337   PH 7.380   PCO2 33.8*   PO2 105*   HCO3 20.0*   POCSATURATED 98   BE -5       Significant Diagnostics:  I have reviewed all pertinent imaging results/findings within the past 24 hours.    Assessment/Plan:     * Bowel perforation  Patient is 73 yo M with 7 cm contained esophageal perforation and gastric perforation after AES procedure who underwent emergent ex lap and EGD on 10/10/19    Keep NGT to low continuous suction - NOT IN STOMACH - do not advance, do not remove - management per thoracic  Bilateral chest tubes in place to monitor for esophageal leak - do not remove unless thoracic surgery ok  Continue G tube to gravity for gastric decompression - do not use for meds or feeds  Continue to clamp J tube - do not start tube feeds today  Continue to resuscitate and wean pressors and vent as tolerated  Rest of care per SICU         Too Solo MD  General Surgery  [facility]

## 2019-10-11 NOTE — PROGRESS NOTES
[facility]  AES  Progress note.     Subjective:       Patient sedated and intubated. Discussed with nursing staff, no acute events overnight. Minimal pressor requirements    Objective:     Vitals:    10/11/19 0730   BP:    Pulse: 96   Resp: (!) 27   Temp:        General: sedated and intubated  Abdomen: Hypoactive bowel sounds. Distended. Soft.       Significant Labs:  Recent Labs   Lab 10/10/19  0417 10/10/19  1610 10/11/19  0308   HGB 9.3* 8.7* 8.2*  8.2*       Lab Results   Component Value Date    WBC 18.40 (H) 10/11/2019    WBC 18.40 (H) 10/11/2019    HGB 8.2 (L) 10/11/2019    HGB 8.2 (L) 10/11/2019    HCT 27.2 (L) 10/11/2019    HCT 27.2 (L) 10/11/2019    MCV 82 10/11/2019    MCV 82 10/11/2019     (H) 10/11/2019     (H) 10/11/2019       Lab Results   Component Value Date     (L) 10/11/2019     (L) 10/11/2019    K 4.7 10/11/2019    K 4.7 10/11/2019     10/11/2019     10/11/2019    CO2 19 (L) 10/11/2019    CO2 19 (L) 10/11/2019    BUN 31 (H) 10/11/2019    BUN 31 (H) 10/11/2019    CREATININE 2.5 (H) 10/11/2019    CREATININE 2.5 (H) 10/11/2019    CALCIUM 7.7 (L) 10/11/2019    CALCIUM 7.7 (L) 10/11/2019    ANIONGAP 12 10/11/2019    ANIONGAP 12 10/11/2019    ESTGFRAFRICA 28.4 (A) 10/11/2019    ESTGFRAFRICA 28.4 (A) 10/11/2019    EGFRNONAA 24.6 (A) 10/11/2019    EGFRNONAA 24.6 (A) 10/11/2019       Lab Results   Component Value Date    ALT 21 10/11/2019    AST 33 10/11/2019    ALKPHOS 42 (L) 10/11/2019    BILITOT 0.4 10/11/2019       Lab Results   Component Value Date    INR 1.2 10/10/2019    INR 1.2 05/24/2019       Significant Imaging:  Reviewed pertinent radiology findings.       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 The mass had to be cut into  smaller  pieces to be retrieved.). Patient had diffuse abdominal pain, elevated wbc count, xray shows air under diaphragm CT showed pneumoperitoneum w/ free fluid likely from contrast mixing along the lateral aspect of the greater curvature extending downward concerning for gastric perforation, esophagus showed no extravasation. General surgery performed ex lap, found 2 X gastric perforations with large amount of free air and gastric contents and 7 cm vertical esophogeal defect(no perforation noted) (EGD done, no perforation noted, only tear). Gastric perforation closed in 2 layers and omentum patch placed, NGT placed endoscopically past esophageal defect and is on low continuous suction. Patient stayed intubated post procedure, intermittently requiring pressors. Thoracic surgery on board, bilateral chest tubes in place to monitor for esophgeal leak, G tube for decompression, J tube in place.     Plan:  1. Recommend IV abx broad spectrum  2. SICU on board  3. We will continue to follow      Thank you for involving us in the care of Pasha Harmon. Please call with any additional questions, concerns or changes in the patient's clinical status.    Wally Radford MD  Gastroenterology Fellow PGY IV   [facility]

## 2019-10-11 NOTE — PROGRESS NOTES
[facility]  Critical Care - Surgery  Progress Note    Patient Name: Pasha Harmon  MRN: [id number]  Admission Date: 10/9/2019  Hospital Length of Stay: 1 days  Code Status: Full Code  Attending Provider: Paulino Parrish MD  Primary Care Provider: Eze Root MD   Principal Problem: Bowel perforation    Subjective:     Hospital/ICU Course:  10/10: Admitted to SICU. R IJ TLC placed. On minimal pressors.   10/11: Remaining intubated. On minimal pressors.     Interval History/Significant Events: Patient intubated on propofol. No analgesics. On and off minimal pressors throughout the night, s/p 1.5L of fluid. Slightly overbreathing the vent. Oliguric but per family this may baseline. On broad spectrum Abx.     Follow-up For: Procedure(s) (LRB):  LAPAROTOMY, EXPLORATORY (N/A)  EGD (ESOPHAGOGASTRODUODENOSCOPY) (N/A)  INSERTION, CATHETER, INTERCOSTAL, FOR DRAINAGE (Bilateral)  REPAIR, PERFORATION, GASTRIC (N/A)  INSERTION, GASTROSTOMY TUBE, PERCUTANEOUS (Left)  INSERTION, JEJUNOSTOMY TUBE (Left)    Post-Operative Day: 1 Day Post-Op    Objective:     Vital Signs (Most Recent):  Temp: 99.8 °F (37.7 °C) (10/11/19 1129)  Pulse: 94 (10/11/19 1310)  Resp: (!) 25 (10/11/19 1310)  BP: (!) 124/57 (10/11/19 1300)  SpO2: 97 % (10/11/19 1310) Vital Signs (24h Range):  Temp:  [98.9 °F (37.2 °C)-99.8 °F (37.7 °C)] 99.8 °F (37.7 °C)  Pulse:  [] 94  Resp:  [20-38] 25  SpO2:  [88 %-100 %] 97 %  BP: (106-130)/(46-88) 124/57  Arterial Line BP: ()/(44-62) 121/58     Weight: 133.8 kg (295 lb)  Body mass index is 37.86 kg/m².      Intake/Output Summary (Last 24 hours) at 10/11/2019 1329  Last data filed at 10/11/2019 1300  Gross per 24 hour   Intake 5220 ml   Output 2003 ml   Net 3217 ml       Physical Exam   Constitutional: He appears well-developed and well-nourished.   HENT:   Head: Normocephalic and atraumatic.   Eyes: Pupils are equal, round, and reactive to light. Conjunctivae are normal.   Neck:  Normal range of motion.   Cardiovascular: Normal rate, regular rhythm and normal heart sounds.   Pulmonary/Chest: Breath sounds normal.   Intubated   Abdominal:   G-tube to gravity with bilious output.  J-tube clamped.    Neurological:   Sedated   Skin: Skin is warm and dry.       Vents:  Vent Mode: A/C (10/11/19 1310)  Ventilator Initiated: Yes (10/10/19 1621)  Set Rate: 20 bmp (10/11/19 1310)  Vt Set: 600 mL (10/11/19 1310)  PEEP/CPAP: 8 cmH20 (10/11/19 1310)  Oxygen Concentration (%): 40 (10/11/19 1310)  Peak Airway Pressure: 21 cmH2O (10/11/19 1310)  Plateau Pressure: 0 cmH20 (10/11/19 1310)  Total Ve: 14.1 mL (10/11/19 1310)  F/VT Ratio<105 (RSBI): (!) 41.19 (10/11/19 1310)    Lines/Drains/Airways     Central Venous Catheter Line                 Percutaneous Central Line Insertion/Assessment - triple lumen  10/10/19 1853 right internal jugular less than 1 day          Drain                 NG/OG Tube 10/10/19 Left nostril 1 day         Chest Tube 10/10/19 1510 1 Right Midaxillary;Fourth intercostal space 28 Fr. less than 1 day         Chest Tube 10/10/19 1513 Left Midaxillary;Fourth intercostal space 28 Fr. less than 1 day         Closed/Suction Drain 10/10/19 1349 Right RUQ Bulb 19 Fr. less than 1 day         Closed/Suction Drain 10/10/19 1408 Left;Superior LUQ Other (Comment) 20 Fr. less than 1 day         Closed/Suction Drain 10/10/19 1434 Left;Distal LUQ Other (Comment) 14 Fr. less than 1 day         Urethral Catheter 10/10/19 1530 Straight-tip;Double-lumen;Non-latex 16 Fr. less than 1 day          Airway                 Airway - Non-Surgical 10/10/19 1234 Endotracheal Tube 1 day          Arterial Line                 Arterial Line 10/10/19 1245 Right Radial 1 day          Peripheral Intravenous Line                 Peripheral IV - Single Lumen 10/09/19 0744 20 G Right Forearm 2 days         Peripheral IV - Single Lumen 10/10/19 1237 16 G Left Antecubital 1 day         Peripheral IV - Single Lumen 10/10/19  1240 18 G Right Antecubital 1 day                Significant Labs:    CBC/Anemia Profile:  Recent Labs   Lab 10/10/19  0417 10/10/19  1401 10/10/19  1610 10/11/19  0308   WBC 32.63*  --  16.20* 18.40*  18.40*   HGB 9.3*  --  8.7* 8.2*  8.2*   HCT 31.7* 28* 30.2* 27.2*  27.2*   *  --  444* 432*  432*   MCV 83  --  84 82  82   RDW 17.1*  --  17.2* 17.4*  17.4*        Chemistries:  Recent Labs   Lab 10/10/19  0417 10/10/19  1610 10/11/19  0308   * 135* 132*  132*   K 4.8 4.2 4.7  4.7   CL 99 102 101  101   CO2 21* 22* 19*  19*   BUN 21 27* 31*  31*   CREATININE 2.0* 2.8* 2.5*  2.5*   CALCIUM 8.8 7.5* 7.7*  7.7*   ALBUMIN 3.5 2.5* 2.4*   PROT 6.3 5.0* 4.9*   BILITOT 0.5 0.5 0.4   ALKPHOS 57 43* 42*   ALT 17 25 21   AST 32 37 33   MG 2.0 2.0 1.7  1.7   PHOS 4.4 4.7* 4.3  4.3       ABGs:   Recent Labs   Lab 10/11/19  0337   PH 7.380   PCO2 33.8*   HCO3 20.0*   POCSATURATED 98   BE -5     Lactic Acid:   Recent Labs   Lab 10/10/19  0839 10/10/19  1610 10/11/19  0308   LACTATE 1.0 1.9 1.7       Significant Imaging:  I have reviewed all pertinent imaging results/findings within the past 24 hours.    Assessment/Plan:     * Bowel perforation  74M current smoker who underwent excision of bleeding gastric mass, post op course complicated by intraperitoneal free air. Now admitted to SICU following ex-lap 10/10/19 which revealed esophageal and gastric perforations which have been repaired.    Neuro:  - Sedated on propofol  - Fentanyl ggt for pain control.     CV  - Fluid resuscitate as necessary  - Titrate norepinephrine, at 0.02. Will likely come off with discontinuation of propofol and fentanyl.   - vasopressin off  - Echo 9/12/10 wnl    Pulm  - Intubated, on vent  - Fentanyl to help with vent stacking.   - Plan to leave intubated today  - Significant history of smoking    GI  - Post-op esophageal and gastric perforations 10/9, now repaired via ex-lap  - Cefepime, flagyl, and vancomycin for broad  coverage.   - Monitor drains, care per surgery  - Do not use or advance NGT. At site of repair.  - Do not use G-tube. Leave to gravity.  - J-tube clamped today.     Renal  - BHASKAR; Cr 2.8 on admit on baseline 1  - Improved on this morning's labs.   - Monitor UOP. Possibly oliguric at baseline.   - Daily CMP  - Electrolyte repletion PRN    Heme/ID  - Hgb >8, no signs of active bleeding  - CBC daily  - WBC up  - Cefepime, flagyl, vancomycin for contaminated peritoneum    Endo  - DM2  - POCT glucose q4h  - SSI    Dispo: Continue ICU care. Leave intubated today.       Critical care was time spent personally by me on the following activities: development of treatment plan with patient or surrogate and bedside caregivers, discussions with consultants, evaluation of patient's response to treatment, examination of patient, ordering and performing treatments and interventions, ordering and review of laboratory studies, ordering and review of radiographic studies, pulse oximetry, re-evaluation of patient's condition.  This critical care time did not overlap with that of any other provider or involve time for any procedures.     Jac Zamudio MD  Critical Care - Surgery  [facility]

## 2019-10-11 NOTE — SUBJECTIVE & OBJECTIVE
Interval History: NAEON, pressors weaned, UOP low, vent minimal    Medications:  Continuous Infusions:   sodium chloride 0.9%      norepinephrine bitartrate-D5W 0.01 mcg/kg/min (10/11/19 0500)    propofol 50 mcg/kg/min (10/11/19 0500)    vasopressin (PITRESSIN) infusion Stopped (10/11/19 0300)     Scheduled Meds:   ceFEPime (MAXIPIME) IVPB  2 g Intravenous Q24H    famotidine (PF)  20 mg Intravenous BID    fluconazole (DIFLUCAN) IVPB  200 mg Intravenous Q24H    fluticasone furoate-vilanterol  1 puff Inhalation Daily    magnesium sulfate IVPB  2 g Intravenous Once    metronidazole  500 mg Intravenous Q8H    nicotine  1 patch Transdermal Daily    vancomycin (VANCOCIN) IVPB  1,750 mg Intravenous Q24H     PRN Meds:albuterol-ipratropium, Dextrose 10% Bolus, Dextrose 10% Bolus, glucagon (human recombinant), insulin aspart U-100, sodium chloride 0.9%     Review of patient's allergies indicates:   Allergen Reactions    Meloxicam Other (See Comments)     Ulcer, GI bleed      Objective:     Vital Signs (Most Recent):  Temp: 98.9 °F (37.2 °C) (10/11/19 0300)  Pulse: 100 (10/11/19 0600)  Resp: (!) 27 (10/11/19 0600)  BP: (!) 107/55 (10/11/19 0600)  SpO2: 95 % (10/11/19 0600) Vital Signs (24h Range):  Temp:  [98 °F (36.7 °C)-99.1 °F (37.3 °C)] 98.9 °F (37.2 °C)  Pulse:  [] 100  Resp:  [20-38] 27  SpO2:  [88 %-100 %] 95 %  BP: (107-132)/(46-88) 107/55  Arterial Line BP: ()/(48-62) 99/53     Weight: 133.8 kg (295 lb)  Body mass index is 37.88 kg/m².    Intake/Output - Last 3 Shifts       10/09 0700 - 10/10 0659 10/10 0700 - 10/11 0659 10/11 0700 - 10/12 0659    P.O. 650      I.V. (mL/kg) 2360 (17.6) 4670 (34.9)     IV Piggyback 500 1000     Total Intake(mL/kg) 3510 (26.2) 5670 (42.4)     Urine (mL/kg/hr) 560 (0.2) 545 (0.2)     Drains  255     Blood 100 300     Chest Tube  200     Total Output 660 1300     Net +2850 +4370                  Physical Exam   Constitutional: He appears well-developed and  well-nourished. No distress.   HENT:   Head: Normocephalic and atraumatic.   NGT with bridle, to low continuous suction   Cardiovascular: Normal rate and regular rhythm.   Pulmonary/Chest: Effort normal. No respiratory distress.   Intubated  Bilateral chest tubes with serosanguinous drainage, to water seal, no air leaks   Abdominal: Soft. He exhibits no distension. There is tenderness (Appropriate). There is no guarding.   J tube clamped  G tube to gravity, gastric contents in bag  RUQ DENISE drain with serosanguinous fluid  Midline incision CDI, dressing in place   Genitourinary:   Genitourinary Comments: Hallman in candi   Neurological:   Sedated   Skin: Skin is warm and dry. He is not diaphoretic.   Nursing note and vitals reviewed.      Significant Labs:  CBC:   Recent Labs   Lab 10/11/19  0308   WBC 18.40*  18.40*   RBC 3.33*  3.33*   HGB 8.2*  8.2*   HCT 27.2*  27.2*   *  432*   MCV 82  82   MCH 24.6*  24.6*   MCHC 30.1*  30.1*     CMP:   Recent Labs   Lab 10/11/19  0308   *  147*   CALCIUM 7.7*  7.7*   ALBUMIN 2.4*   PROT 4.9*   *  132*   K 4.7  4.7   CO2 19*  19*     101   BUN 31*  31*   CREATININE 2.5*  2.5*   ALKPHOS 42*   ALT 21   AST 33   BILITOT 0.4     ABGs:   Recent Labs   Lab 10/11/19  0337   PH 7.380   PCO2 33.8*   PO2 105*   HCO3 20.0*   POCSATURATED 98   BE -5       Significant Diagnostics:  I have reviewed all pertinent imaging results/findings within the past 24 hours.

## 2019-10-11 NOTE — ASSESSMENT & PLAN NOTE
EGD by Dr. Taylor yesterday during case. EGD which showed long segment esophageal tear however no full thickness tear. NG tube was placed under direct vision at site of defect - it is not in the stomach and should not be advanced. NGT bridled.     Continue NGT to low continuous suction. NGT should not be advanced. It is sitting at site of esophageal defect. NGT should remain in place until ready to start diet per ACS.   Bilateral chest tubes to suction.    ACS can manage above drains. Call with questions.

## 2019-10-11 NOTE — TREATMENT PLAN
[facility]  AES  Treatment plan.    Subjective:       Patient sedated and intubated. Discussed with nursing staff, no acute events overnight. Minimal pressor requirements    Objective:     Vitals:    10/11/19 0730   BP:    Pulse: 96   Resp: (!) 27   Temp:        General: sedated and intubated  Abdomen: Hypoactive bowel sounds. Distended. Soft. Distended,      Significant Labs:  Recent Labs   Lab 10/10/19  0417 10/10/19  1610 10/11/19  0308   HGB 9.3* 8.7* 8.2*  8.2*       Lab Results   Component Value Date    WBC 18.40 (H) 10/11/2019    WBC 18.40 (H) 10/11/2019    HGB 8.2 (L) 10/11/2019    HGB 8.2 (L) 10/11/2019    HCT 27.2 (L) 10/11/2019    HCT 27.2 (L) 10/11/2019    MCV 82 10/11/2019    MCV 82 10/11/2019     (H) 10/11/2019     (H) 10/11/2019       Lab Results   Component Value Date     (L) 10/11/2019     (L) 10/11/2019    K 4.7 10/11/2019    K 4.7 10/11/2019     10/11/2019     10/11/2019    CO2 19 (L) 10/11/2019    CO2 19 (L) 10/11/2019    BUN 31 (H) 10/11/2019    BUN 31 (H) 10/11/2019    CREATININE 2.5 (H) 10/11/2019    CREATININE 2.5 (H) 10/11/2019    CALCIUM 7.7 (L) 10/11/2019    CALCIUM 7.7 (L) 10/11/2019    ANIONGAP 12 10/11/2019    ANIONGAP 12 10/11/2019    ESTGFRAFRICA 28.4 (A) 10/11/2019    ESTGFRAFRICA 28.4 (A) 10/11/2019    EGFRNONAA 24.6 (A) 10/11/2019    EGFRNONAA 24.6 (A) 10/11/2019       Lab Results   Component Value Date    ALT 21 10/11/2019    AST 33 10/11/2019    ALKPHOS 42 (L) 10/11/2019    BILITOT 0.4 10/11/2019       Lab Results   Component Value Date    INR 1.2 10/10/2019    INR 1.2 05/24/2019       Significant Imaging:  Reviewed pertinent radiology findings.       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 The mass had to  be cut into smaller  pieces to be retrieved.). Patient had diffuse abdominal pain, elevated wbc count, xray shows air under diaphragm CT showed pneumoperitoneum w/ free fluid likely from contrast mixing along the lateral aspect of the greater curvature extending downward concerning for gastric perforation, esophagus showed no extravasation. General surgery performed ex lap, found 2 X gastric perforations with large amount of free air and gastric contents and 7 cm vertical esophogeal defect(no perforation noted) (EGD done, no perforation noted, only tear). Gastric perforation closed in 2 layers and omentum patch placed, NGT placed endoscopically past esophageal defect and is on low continuous suction. Patient stayed intubated post procedure, intermittently requiring pressors. Thoracic surgery on board, bilateral chest tubes in place to monitor for esophgeal leak, G tube for decompression, J tube in place.     Plan:  1. Recommend IV abx broad spectrum  2. SICU on board  3. We will continue to follow      Thank you for involving us in the care of Pasha Harmon. Please call with any additional questions, concerns or changes in the patient's clinical status.    Wally Radford MD  Gastroenterology Fellow PGY IV   [facility]

## 2019-10-11 NOTE — HOSPITAL COURSE
10/10: Admitted to SICU. R IJ TLC placed. On minimal pressors.   10/11: Remaining intubated. On minimal pressors.   10/12: Difficulty weaning pressors while on propofol and fentanyl to control resp rate from acidosis. Received 2u PRBCs after 1.25L albumin.   10/13: Pressors weaned, still not following commands after stopping sedation  10/14: NGT removed by primary team. Metoprolol started for HTN  10/21: Tolerating trach collar >24hrs. Tolerating HD sessions. HDS without pressor requirement. Plan for SLP eval today.    10/22: Plan for esophogram today. No evidence of leak but esophageal dysmotility noted.   10/23: Stable. Right chest tube pulled.   10/24: Gave 1 unit pRBC. Permacath placement today.

## 2019-10-11 NOTE — ASSESSMENT & PLAN NOTE
Patient is 75 yo M with 7 cm contained esophageal perforation and gastric perforation after AES procedure who underwent emergent ex lap and EGD on 10/10/19    Keep NGT to low continuous suction - NOT IN STOMACH - do not advance, do not remove - management per thoracic  Bilateral chest tubes in place to monitor for esophageal leak - do not remove unless thoracic surgery ok  Continue G tube to gravity for gastric decompression - do not use for meds or feeds  Continue to clamp J tube - do not start tube feeds today  Continue to resuscitate and wean pressors and vent as tolerated  Rest of care per SICU

## 2019-10-11 NOTE — SUBJECTIVE & OBJECTIVE
Interval History/Significant Events: Patient intubated on propofol. No analgesics. On and off minimal pressors throughout the night, s/p 1.5L of fluid. Slightly overbreathing the vent. Oliguric but per family this may baseline. On broad spectrum Abx.     Follow-up For: Procedure(s) (LRB):  LAPAROTOMY, EXPLORATORY (N/A)  EGD (ESOPHAGOGASTRODUODENOSCOPY) (N/A)  INSERTION, CATHETER, INTERCOSTAL, FOR DRAINAGE (Bilateral)  REPAIR, PERFORATION, GASTRIC (N/A)  INSERTION, GASTROSTOMY TUBE, PERCUTANEOUS (Left)  INSERTION, JEJUNOSTOMY TUBE (Left)    Post-Operative Day: 1 Day Post-Op    Objective:     Vital Signs (Most Recent):  Temp: 99.8 °F (37.7 °C) (10/11/19 1129)  Pulse: 94 (10/11/19 1310)  Resp: (!) 25 (10/11/19 1310)  BP: (!) 124/57 (10/11/19 1300)  SpO2: 97 % (10/11/19 1310) Vital Signs (24h Range):  Temp:  [98.9 °F (37.2 °C)-99.8 °F (37.7 °C)] 99.8 °F (37.7 °C)  Pulse:  [] 94  Resp:  [20-38] 25  SpO2:  [88 %-100 %] 97 %  BP: (106-130)/(46-88) 124/57  Arterial Line BP: ()/(44-62) 121/58     Weight: 133.8 kg (295 lb)  Body mass index is 37.86 kg/m².      Intake/Output Summary (Last 24 hours) at 10/11/2019 1329  Last data filed at 10/11/2019 1300  Gross per 24 hour   Intake 5220 ml   Output 2003 ml   Net 3217 ml       Physical Exam   Constitutional: He appears well-developed and well-nourished.   HENT:   Head: Normocephalic and atraumatic.   Eyes: Pupils are equal, round, and reactive to light. Conjunctivae are normal.   Neck: Normal range of motion.   Cardiovascular: Normal rate, regular rhythm and normal heart sounds.   Pulmonary/Chest: Breath sounds normal.   Intubated   Abdominal:   G-tube to gravity with bilious output.  J-tube clamped.    Neurological:   Sedated   Skin: Skin is warm and dry.       Vents:  Vent Mode: A/C (10/11/19 1310)  Ventilator Initiated: Yes (10/10/19 1621)  Set Rate: 20 bmp (10/11/19 1310)  Vt Set: 600 mL (10/11/19 1310)  PEEP/CPAP: 8 cmH20 (10/11/19 1310)  Oxygen Concentration  (%): 40 (10/11/19 1310)  Peak Airway Pressure: 21 cmH2O (10/11/19 1310)  Plateau Pressure: 0 cmH20 (10/11/19 1310)  Total Ve: 14.1 mL (10/11/19 1310)  F/VT Ratio<105 (RSBI): (!) 41.19 (10/11/19 1310)    Lines/Drains/Airways     Central Venous Catheter Line                 Percutaneous Central Line Insertion/Assessment - triple lumen  10/10/19 1853 right internal jugular less than 1 day          Drain                 NG/OG Tube 10/10/19 Left nostril 1 day         Chest Tube 10/10/19 1510 1 Right Midaxillary;Fourth intercostal space 28 Fr. less than 1 day         Chest Tube 10/10/19 1513 Left Midaxillary;Fourth intercostal space 28 Fr. less than 1 day         Closed/Suction Drain 10/10/19 1349 Right RUQ Bulb 19 Fr. less than 1 day         Closed/Suction Drain 10/10/19 1408 Left;Superior LUQ Other (Comment) 20 Fr. less than 1 day         Closed/Suction Drain 10/10/19 1434 Left;Distal LUQ Other (Comment) 14 Fr. less than 1 day         Urethral Catheter 10/10/19 1530 Straight-tip;Double-lumen;Non-latex 16 Fr. less than 1 day          Airway                 Airway - Non-Surgical 10/10/19 1234 Endotracheal Tube 1 day          Arterial Line                 Arterial Line 10/10/19 1245 Right Radial 1 day          Peripheral Intravenous Line                 Peripheral IV - Single Lumen 10/09/19 0744 20 G Right Forearm 2 days         Peripheral IV - Single Lumen 10/10/19 1237 16 G Left Antecubital 1 day         Peripheral IV - Single Lumen 10/10/19 1240 18 G Right Antecubital 1 day                Significant Labs:    CBC/Anemia Profile:  Recent Labs   Lab 10/10/19  0417 10/10/19  1401 10/10/19  1610 10/11/19  0308   WBC 32.63*  --  16.20* 18.40*  18.40*   HGB 9.3*  --  8.7* 8.2*  8.2*   HCT 31.7* 28* 30.2* 27.2*  27.2*   *  --  444* 432*  432*   MCV 83  --  84 82  82   RDW 17.1*  --  17.2* 17.4*  17.4*        Chemistries:  Recent Labs   Lab 10/10/19  0417 10/10/19  1610 10/11/19  0308   * 135* 132*  132*   K  4.8 4.2 4.7  4.7   CL 99 102 101  101   CO2 21* 22* 19*  19*   BUN 21 27* 31*  31*   CREATININE 2.0* 2.8* 2.5*  2.5*   CALCIUM 8.8 7.5* 7.7*  7.7*   ALBUMIN 3.5 2.5* 2.4*   PROT 6.3 5.0* 4.9*   BILITOT 0.5 0.5 0.4   ALKPHOS 57 43* 42*   ALT 17 25 21   AST 32 37 33   MG 2.0 2.0 1.7  1.7   PHOS 4.4 4.7* 4.3  4.3       ABGs:   Recent Labs   Lab 10/11/19  0337   PH 7.380   PCO2 33.8*   HCO3 20.0*   POCSATURATED 98   BE -5     Lactic Acid:   Recent Labs   Lab 10/10/19  0839 10/10/19  1610 10/11/19  0308   LACTATE 1.0 1.9 1.7       Significant Imaging:  I have reviewed all pertinent imaging results/findings within the past 24 hours.

## 2019-10-11 NOTE — PLAN OF CARE
"Dx: Bowel perforation    Shift Events: VSS at this time. Patient on levophed and vasopressin to maintain a MAP >65. Vent settings AC 70% 8 PEEP. NG tube to low wall continuous suction. 1 L LR given. Plan of care reviewed with patient's family. Questions and concerns addressed. Will continue to monitor.     Neuro: Sedated, Arouses to Voice, Follows Commands and Moves All Extremities    Vital Signs: BP (!) 119/46 (BP Location: Left arm, Patient Position: Lying)   Pulse 89   Temp 99 °F (37.2 °C) (Oral)   Resp (!) 23   Ht 6' 2" (1.88 m)   Wt 133.8 kg (295 lb)   SpO2 (!) 94%   BMI 37.88 kg/m²     Diet: NPO    Gtts: Propfol, Norepinephrine and Vasopressin    Urine Output: Urinary Catheter 180 cc/ 3 hour    Drains:   DENISE Drain 55 cc since admit  G Tube 50 cc since admit  J Tube 0 cc since admit  Right chest tube 30 cc since admit  Left chest tube 80 cc since admit  NG tube 0 cc since admit    Skin: No skin breakdown noted.        "

## 2019-10-11 NOTE — ANESTHESIA POSTPROCEDURE EVALUATION
Anesthesia Post Evaluation    Patient: Pasha Harmon    Procedure(s) Performed: Procedure(s) (LRB):  LAPAROTOMY, EXPLORATORY (N/A)  EGD (ESOPHAGOGASTRODUODENOSCOPY) (N/A)  INSERTION, CATHETER, INTERCOSTAL, FOR DRAINAGE (Bilateral)  REPAIR, PERFORATION, GASTRIC (N/A)  INSERTION, GASTROSTOMY TUBE, PERCUTANEOUS (Left)  INSERTION, JEJUNOSTOMY TUBE (Left)    Final Anesthesia Type: general  Patient location during evaluation: ICU  Patient participation: No - Unable to Participate, Intubation  Level of consciousness: sedated  Pain management: adequate  Airway patency: patent  PONV status at discharge: No PONV  Anesthetic complications: no      Cardiovascular status: blood pressure returned to baseline  Respiratory status: ETT and ventilator  Hydration status: euvolemic  Follow-up not needed.          Vitals Value Taken Time   /58 10/11/2019  7:02 AM   Temp 37.2 °C (98.9 °F) 10/11/2019  3:00 AM   Pulse 101 10/11/2019  7:18 AM   Resp 28 10/11/2019  7:18 AM   SpO2 96 % 10/11/2019  7:18 AM   Vitals shown include unvalidated device data.      No case tracking events are documented in the log.      Pain/Anish Score: Pain Rating Prior to Med Admin: 8 (10/10/2019  5:20 AM)

## 2019-10-11 NOTE — PLAN OF CARE
Spont., Follows commands  NSR, MAP > 65  AC, 40%, PEEP 8  NPO  UO 30-50 mL/hr  Levo 0.02, Vaso. Off, Propofol 50

## 2019-10-11 NOTE — ASSESSMENT & PLAN NOTE
74M current smoker who underwent excision of bleeding gastric mass, post op course complicated by intraperitoneal free air. Now admitted to SICU following ex-lap 10/10/19 which revealed esophageal and gastric perforations which have been repaired.    Neuro:  - Sedated on propofol  - Fentanyl ggt for pain control.     CV  - Fluid resuscitate as necessary  - Titrate norepinephrine, at 0.02. Will likely come off with discontinuation of propofol and fentanyl.   - vasopressin off  - Echo 9/12/10 wnl    Pulm  - Intubated, on vent  - Fentanyl to help with vent stacking.   - Plan to leave intubated today  - Significant history of smoking    GI  - Post-op esophageal and gastric perforations 10/9, now repaired via ex-lap  - Cefepime, flagyl, and vancomycin for broad coverage.   - Monitor drains, care per surgery  - Do not use or advance NGT. At site of repair.  - Do not use G-tube. Leave to gravity.  - J-tube clamped today.     Renal  - BHASKAR; Cr 2.8 on admit on baseline 1  - Improved on this morning's labs.   - Monitor UOP. Possibly oliguric at baseline.   - Daily CMP  - Electrolyte repletion PRN    Heme/ID  - Hgb >8, no signs of active bleeding  - CBC daily  - WBC up  - Cefepime, flagyl, vancomycin for contaminated peritoneum    Endo  - DM2  - POCT glucose q4h  - SSI    Dispo: Continue ICU care. Leave intubated today.

## 2019-10-11 NOTE — H&P
[facility]  Critical Care - Surgery  History & Physical    Patient Name: Pasha Harmon  MRN: [id number]  Admission Date: 10/9/2019  Code Status: Full Code  Attending Physician: Paulino Parrish MD   Primary Care Provider: Eze Root MD   Principal Problem: Bowel perforation    Subjective:     HPI:  Mr Harmon is a 73M with a history of COPD, current smoker, DMII, CAD, and bleeding gastric mass who underwent EGD with AES 10/9/19 where a 7 cm ulcerated lipoma was removed from his gastric antrum. The mass became stuck int he distal esophagus and was removed piecemeal. He was admitted for observation overnight, and in the morning his WBC was elevated and he was complaining of abdominal pain. CXR showed intraperitoneal free air. He was then taken for a diagnostic ex lap where gastric and esophageal perforations were found, with intraperitoneal gastric contents. The defects were repaired.    He arrived in the SICU intubated requiring vasopressin and phenylephrine. A propofol drip was started and a central line was placed.    Hospital/ICU Course:  No notes on file    Follow-up For: Procedure(s) (LRB):  LAPAROTOMY, EXPLORATORY (N/A)  EGD (ESOPHAGOGASTRODUODENOSCOPY) (N/A)  INSERTION, CATHETER, INTERCOSTAL, FOR DRAINAGE (Bilateral)  REPAIR, PERFORATION, GASTRIC (N/A)  INSERTION, GASTROSTOMY TUBE, PERCUTANEOUS (Left)  INSERTION, JEJUNOSTOMY TUBE (Left)    Post-Operative Day: Day of Surgery     Past Medical History:   Diagnosis Date    COPD (chronic obstructive pulmonary disease)     Diabetes mellitus     Emphysema of lung     Gastric ulcer     Hypertension     MI (myocardial infarction)        Past Surgical History:   Procedure Laterality Date    APPENDECTOMY      CHOLECYSTECTOMY      ENDOSCOPIC ULTRASOUND OF UPPER GASTROINTESTINAL TRACT Left 6/4/2019    Procedure: ULTRASOUND, UPPER GI TRACT, ENDOSCOPIC;  Surgeon: Lavon Parra MD;  Location: [facility];  Service: Endoscopy;   Laterality: Left;  GIF plus Radial    ESOPHAGOGASTRODUODENOSCOPY N/A 5/25/2019    Procedure: EGD (ESOPHAGOGASTRODUODENOSCOPY);  Surgeon: Mitch Buitrago MD;  Location: [facility] ENDO;  Service: Endoscopy;  Laterality: N/A;    ESOPHAGOGASTRODUODENOSCOPY N/A 6/4/2019    Procedure: EGD (ESOPHAGOGASTRODUODENOSCOPY);  Surgeon: Lavon Parra MD;  Location: [facility] ENDO;  Service: Endoscopy;  Laterality: N/A;    ESOPHAGOGASTRODUODENOSCOPY N/A 9/3/2019    Procedure: EGD (ESOPHAGOGASTRODUODENOSCOPY);  Surgeon: Keshav Harris MD;  Location: [facility] ENDO;  Service: Endoscopy;  Laterality: N/A;    KNEE SURGERY Right        Review of patient's allergies indicates:   Allergen Reactions    Meloxicam Other (See Comments)     Ulcer, GI bleed        Family History     Problem Relation (Age of Onset)    Cancer Father    Heart disease Mother        Tobacco Use    Smoking status: Current Every Day Smoker     Packs/day: 2.00     Types: Cigarettes    Smokeless tobacco: Never Used   Substance and Sexual Activity    Alcohol use: No    Drug use: No    Sexual activity: Not on file      Review of Systems   Unable to perform ROS: Intubated     Objective:     Vital Signs (Most Recent):  Temp: 99 °F (37.2 °C) (10/10/19 1600)  Pulse: 91 (10/10/19 1953)  Resp: (!) 23 (10/10/19 1953)  BP: (!) 119/46 (10/10/19 1815)  SpO2: 97 % (10/10/19 1953) Vital Signs (24h Range):  Temp:  [98 °F (36.7 °C)-99.5 °F (37.5 °C)] 99 °F (37.2 °C)  Pulse:  [] 91  Resp:  [18-36] 23  SpO2:  [88 %-100 %] 97 %  BP: (110-132)/(46-76) 119/46  Arterial Line BP: ()/(48-62) 105/54     Weight: 133.8 kg (295 lb)  Body mass index is 37.88 kg/m².      Intake/Output Summary (Last 24 hours) at 10/10/2019 2018  Last data filed at 10/10/2019 1800  Gross per 24 hour   Intake 4442 ml   Output 1145 ml   Net 3297 ml       Physical Exam   Constitutional: He appears well-developed and well-nourished.   HENT:   Head: Normocephalic and atraumatic.   Eyes: Pupils are  equal, round, and reactive to light. Conjunctivae are normal.   Neck: Normal range of motion.   Cardiovascular: Normal rate, regular rhythm and normal heart sounds.   Pulmonary/Chest: Breath sounds normal.   Intubated   Abdominal:   Abdominal drains present, dressing c/d/i   Neurological:   Sedated   Skin: Skin is warm and dry.       Vents:  Vent Mode: A/C (10/10/19 1953)  Ventilator Initiated: Yes (10/10/19 1621)  Set Rate: 20 bmp (10/10/19 1953)  Vt Set: 550 mL (10/10/19 1953)  PEEP/CPAP: 8 cmH20 (10/10/19 1953)  Oxygen Concentration (%): 69 (10/10/19 1953)  Peak Airway Pressure: 24 cmH2O (10/10/19 1953)  Plateau Pressure: 0 cmH20 (10/10/19 1953)  Total Ve: 13.3 mL (10/10/19 1953)  F/VT Ratio<105 (RSBI): (!) 38.85 (10/10/19 1953)    Lines/Drains/Airways     Central Venous Catheter Line                 Percutaneous Central Line Insertion/Assessment - triple lumen  10/10/19 1853 right internal jugular less than 1 day          Drain                 Chest Tube 10/10/19 1510 1 Right Midaxillary;Fourth intercostal space 28 Fr. less than 1 day         Chest Tube 10/10/19 1513 Left Midaxillary;Fourth intercostal space 28 Fr. less than 1 day         Closed/Suction Drain 10/10/19 1349 Right RUQ Bulb 19 Fr. less than 1 day         Closed/Suction Drain 10/10/19 1408 Left;Superior LUQ Other (Comment) 20 Fr. less than 1 day         Closed/Suction Drain 10/10/19 1434 Left;Distal LUQ Other (Comment) 14 Fr. less than 1 day         Urethral Catheter 10/10/19 1530 Straight-tip;Double-lumen;Non-latex 16 Fr. less than 1 day          Airway                 Airway - Non-Surgical 10/10/19 1234 Endotracheal Tube less than 1 day          Arterial Line                 Arterial Line 10/10/19 1245 Right Radial less than 1 day          Peripheral Intravenous Line                 Peripheral IV - Single Lumen 10/09/19 0744 20 G Right Forearm 1 day         Peripheral IV - Single Lumen 10/10/19 1237 16 G Left Antecubital less than 1 day          Peripheral IV - Single Lumen 10/10/19 1240 18 G Right Antecubital less than 1 day                Significant Labs:    CBC/Anemia Profile:  Recent Labs   Lab 10/09/19  1517 10/10/19  0417 10/10/19  1610   WBC 21.63* 32.63* 16.20*   HGB 9.6* 9.3* 8.7*   HCT 31.1* 31.7* 30.2*   * 488* 444*   MCV 82 83 84   RDW 16.7* 17.1* 17.2*        Chemistries:  Recent Labs   Lab 10/09/19  1517 10/10/19  0417 10/10/19  1610    132* 135*   K 4.7 4.8 4.2    99 102   CO2 22* 21* 22*   BUN 14 21 27*   CREATININE 1.4 2.0* 2.8*   CALCIUM 8.3* 8.8 7.5*   ALBUMIN  --  3.5 2.5*   PROT  --  6.3 5.0*   BILITOT  --  0.5 0.5   ALKPHOS  --  57 43*   ALT  --  17 25   AST  --  32 37   MG 1.9 2.0 2.0   PHOS 4.8* 4.4 4.7*       All pertinent labs within the past 24 hours have been reviewed.    Significant Imaging: I have reviewed all pertinent imaging results/findings within the past 24 hours.    Assessment/Plan:     * Bowel perforation  74M current smoker who underwent excision of bleeding gastric mass, post op course complicated by intraperitoneal free air. Now admitted to SICU following ex-lap 10/10/19 which revealed esophageal and gastric perforations which have been repaired.    Neuro:  - Sedated on propofol  - PRN pain control    CV  - Fluid resuscitate as necessary  - Titrate norepinephrine and vasopressin  - Echo 9/12/10 wnl    Pulm  - Intubated, on vent  - Plan to wean vent tomorrow  - Significant history of smoking    GI  - Post-op esophageal and gastric perforations 10/9, now repaired via ex-lap  - Cefepime, flagyl, and vancomycin  - Monitor drains, care per surgery    Renal  - BHASKAR; Cr 2.8 on admit on baseline 1  - Monitor UOP  - Daily CMP  - Electrolyte repletion PRN\    Heme/ID  - Hgb >8, no signs of active bleeding  - CBC daily  - WBC down, monitor  - Cefepime, flagyl, vancomycin for contaminated peritoneum    Endo  - DM2  - POCT glucose q4h  - SSI    Dispo: Continue ICU care         Critical care was time spent  personally by me on the following activities: development of treatment plan with patient or surrogate and bedside caregivers, discussions with consultants, evaluation of patient's response to treatment, examination of patient, ordering and performing treatments and interventions, ordering and review of laboratory studies, ordering and review of radiographic studies, pulse oximetry, re-evaluation of patient's condition.  This critical care time did not overlap with that of any other provider or involve time for any procedures.     Johan Morataya MD  Critical Care - Surgery  [facility]

## 2019-10-11 NOTE — CONSULTS
[facility]  Thoracic Surgery  Consult Note    Patient Name: Pasha Harmon  MRN: [id number]  Code Status: Full Code  Admission Date: 10/9/2019  Hospital Length of Stay: 1 days  Consult Requesting Physician: Dr. Parrish  Consulting Physician: Dr. Harmon   Primary Care Provider: Eze Root MD    Inpatient consult to Cardiothoracic Surgery  Consult performed by: Cassie Lacey PA-C  Consult ordered by: Too Solo MD        Subjective:     Reason for Consult: esophageal tear     History of Present Illness: 74 yo male with COPF recently underwent EGD for resection of 7cm gastric leiomyoma. There was difficulty retrieving mass through the distal esophagus. Mass was subsequently removed piecemeal and there was concern for esophageal tears. POD 1 had increasing leukocytosis and pneumoperitoneum on AXR. ACS took patient to OR for emergent exp laparotomy. Thoracic surgery available during exp lap and performed EGD which showed long segment esophageal tear however no full thickness tear. NG tube was placed under direct vision at site of defect - it is not in the stomach and should not be advanced. Brought up from OR intubated. Stable overnight. Remains on levo.ACS placed bilateral chest tubes which have minimal output. Leukocytosis stable.       No current facility-administered medications on file prior to encounter.      Current Outpatient Medications on File Prior to Encounter   Medication Sig    acetaminophen (TYLENOL) 650 MG TbSR Take 1,300 mg by mouth 2 (two) times daily.     budesonide-formoterol 160-4.5 mcg (SYMBICORT) 160-4.5 mcg/actuation HFAA Inhale 2 puffs into the lungs every 12 (twelve) hours.    cholecalciferol, vitamin D3, (VITAMIN D3) 5,000 unit Tab Take 5,000 Units by mouth once daily.    citalopram (CELEXA) 20 MG tablet Take 1 tablet (20 mg total) by mouth nightly.    diphenhydrAMINE (BENADRYL) 25 mg capsule Take 25 mg by mouth every evening. 1 tab po am, 3 tabs  po HS     lisinopril (PRINIVIL,ZESTRIL) 20 MG tablet Take 1 tablet (20 mg total) by mouth once daily.    metFORMIN (GLUCOPHAGE) 500 MG tablet Take 1 tablet (500 mg total) by mouth daily with breakfast.    multivit with min-FA-lycopene (ONE-A-DAY MEN'S MULTIVITAMIN) 400-300 mcg Tab Take 1 tablet by mouth nightly.    pantoprazole (PROTONIX) 40 MG tablet Take 1 tablet (40 mg total) by mouth 2 (two) times daily.    simvastatin (ZOCOR) 40 MG tablet Take 1 tablet (40 mg total) by mouth every evening.    sucralfate (CARAFATE) 1 gram tablet Take 1 tablet (1 g total) by mouth 4 (four) times daily.    albuterol (ACCUNEB) 1.25 mg/3 mL Nebu Take 3 mLs (1.25 mg total) by nebulization every 6 (six) hours as needed. Rescue    albuterol-ipratropium (DUO-NEB) 2.5 mg-0.5 mg/3 mL nebulizer solution Take 3 mLs by nebulization every 6 (six) hours as needed for Wheezing. Rescue    nicotine (NICODERM CQ) 21 mg/24 hr Place 1 patch onto the skin once daily.       Review of patient's allergies indicates:   Allergen Reactions    Meloxicam Other (See Comments)     Ulcer, GI bleed        Past Medical History:   Diagnosis Date    COPD (chronic obstructive pulmonary disease)     Diabetes mellitus     Emphysema of lung     Gastric ulcer     Hypertension     MI (myocardial infarction)      Past Surgical History:   Procedure Laterality Date    APPENDECTOMY      CHOLECYSTECTOMY      ENDOSCOPIC ULTRASOUND OF UPPER GASTROINTESTINAL TRACT Left 6/4/2019    Procedure: ULTRASOUND, UPPER GI TRACT, ENDOSCOPIC;  Surgeon: Lavon Parra MD;  Location: [facility];  Service: Endoscopy;  Laterality: Left;  GIF plus Radial    ESOPHAGOGASTRODUODENOSCOPY N/A 5/25/2019    Procedure: EGD (ESOPHAGOGASTRODUODENOSCOPY);  Surgeon: Mitch Buitrago MD;  Location: [facility];  Service: Endoscopy;  Laterality: N/A;    ESOPHAGOGASTRODUODENOSCOPY N/A 6/4/2019    Procedure: EGD (ESOPHAGOGASTRODUODENOSCOPY);  Surgeon: Lavon Parra MD;  Location:  [facility] ENDO;  Service: Endoscopy;  Laterality: N/A;    ESOPHAGOGASTRODUODENOSCOPY N/A 9/3/2019    Procedure: EGD (ESOPHAGOGASTRODUODENOSCOPY);  Surgeon: Keshav Harris MD;  Location: [facility];  Service: Endoscopy;  Laterality: N/A;    KNEE SURGERY Right      Family History     Problem Relation (Age of Onset)    Cancer Father    Heart disease Mother        Tobacco Use    Smoking status: Current Every Day Smoker     Packs/day: 2.00     Types: Cigarettes    Smokeless tobacco: Never Used   Substance and Sexual Activity    Alcohol use: No    Drug use: No    Sexual activity: Not on file     Review of Systems   Unable to perform ROS: Intubated     Objective:     Vital Signs (Most Recent):  Temp: 99.6 °F (37.6 °C) (10/11/19 0700)  Pulse: 94 (10/11/19 0745)  Resp: (!) 23 (10/11/19 0745)  BP: (!) 107/58 (10/11/19 0700)  SpO2: 96 % (10/11/19 0745) Vital Signs (24h Range):  Temp:  [98 °F (36.7 °C)-99.6 °F (37.6 °C)] 99.6 °F (37.6 °C)  Pulse:  [] 94  Resp:  [20-38] 23  SpO2:  [88 %-100 %] 96 %  BP: (107-132)/(46-88) 107/58  Arterial Line BP: ()/(46-62) 113/53     Weight: 133.8 kg (295 lb)  Body mass index is 37.88 kg/m².    Intake/Output - Last 3 Shifts       10/09 0700 - 10/10 0659 10/10 0700 - 10/11 0659 10/11 0700 - 10/12 0659    P.O. 650      I.V. (mL/kg) 2360 (17.6) 4670 (34.9)     IV Piggyback 500 1000     Total Intake(mL/kg) 3510 (26.2) 5670 (42.4)     Urine (mL/kg/hr) 560 (0.2) 545 (0.2) 60 (0.4)    Drains  255 85    Blood 100 300     Chest Tube  200 20    Total Output 660 1300 165    Net +2850 +4370 -165                 SpO2: 96 %  O2 Device (Oxygen Therapy): ventilator    Physical Exam   Constitutional: He appears well-developed and well-nourished. No distress.   HENT:   Head: Atraumatic.   NGT with bridle, to low continuous suction   Cardiovascular: Normal rate and regular rhythm.   Pulmonary/Chest: Effort normal. No respiratory distress.   Intubated  Bilateral chest tubes with serosanguinous  drainage, to water seal, no air leaks   Abdominal: Soft. He exhibits no distension. There is tenderness (Appropriate). There is no guarding.   J tube, G tube, DENISE drain with ss fluid  Midline incision CDI, dressing in place   Genitourinary:   Genitourinary Comments: Hallman in palce   Neurological:   Sedated   Skin: He is not diaphoretic.   Psychiatric: He has a normal mood and affect.   Nursing note reviewed.      Significant Labs:  BMP:   Recent Labs   Lab 10/11/19  0308   *  147*   *  132*   K 4.7  4.7     101   CO2 19*  19*   BUN 31*  31*   CREATININE 2.5*  2.5*   CALCIUM 7.7*  7.7*   MG 1.7  1.7     CBC:   Recent Labs   Lab 10/11/19  0308   WBC 18.40*  18.40*   RBC 3.33*  3.33*   HGB 8.2*  8.2*   HCT 27.2*  27.2*   *  432*   MCV 82  82   MCH 24.6*  24.6*   MCHC 30.1*  30.1*       Significant Diagnostics:  CT: I have reviewed all pertinent results/findings within the past 24 hours  CXR: I have reviewed all pertinent results/findings within the past 24 hours    VTE Risk Mitigation (From admission, onward)         Ordered     IP VTE HIGH RISK PATIENT  Once      10/09/19 1443     Place PAULY hose  Until discontinued      10/09/19 1443     Place sequential compression device  Until discontinued      10/09/19 1443              Assessment/Plan:     Esophageal tear  EGD by Dr. Taylor yesterday during case. EGD which showed long segment esophageal tear however no full thickness tear. NG tube was placed under direct vision at site of defect - it is not in the stomach and should not be advanced. NGT bridled.     Continue NGT to low continuous suction. NGT should not be advanced. It is sitting at site of esophageal defect. NGT should remain in place until ready to start diet per ACS.   Bilateral chest tubes to suction.    ACS can manage above drains. Call with questions.               Thank you for your consult. I will sign off. Please contact us if you have any additional  questions.    Cassie Lacey PA-C  Thoracic Surgery  [facility]

## 2019-10-11 NOTE — SUBJECTIVE & OBJECTIVE
No current facility-administered medications on file prior to encounter.      Current Outpatient Medications on File Prior to Encounter   Medication Sig    acetaminophen (TYLENOL) 650 MG TbSR Take 1,300 mg by mouth 2 (two) times daily.     budesonide-formoterol 160-4.5 mcg (SYMBICORT) 160-4.5 mcg/actuation HFAA Inhale 2 puffs into the lungs every 12 (twelve) hours.    cholecalciferol, vitamin D3, (VITAMIN D3) 5,000 unit Tab Take 5,000 Units by mouth once daily.    citalopram (CELEXA) 20 MG tablet Take 1 tablet (20 mg total) by mouth nightly.    diphenhydrAMINE (BENADRYL) 25 mg capsule Take 25 mg by mouth every evening. 1 tab po am, 3 tabs po HS     lisinopril (PRINIVIL,ZESTRIL) 20 MG tablet Take 1 tablet (20 mg total) by mouth once daily.    metFORMIN (GLUCOPHAGE) 500 MG tablet Take 1 tablet (500 mg total) by mouth daily with breakfast.    multivit with min-FA-lycopene (ONE-A-DAY MEN'S MULTIVITAMIN) 400-300 mcg Tab Take 1 tablet by mouth nightly.    pantoprazole (PROTONIX) 40 MG tablet Take 1 tablet (40 mg total) by mouth 2 (two) times daily.    simvastatin (ZOCOR) 40 MG tablet Take 1 tablet (40 mg total) by mouth every evening.    sucralfate (CARAFATE) 1 gram tablet Take 1 tablet (1 g total) by mouth 4 (four) times daily.    albuterol (ACCUNEB) 1.25 mg/3 mL Nebu Take 3 mLs (1.25 mg total) by nebulization every 6 (six) hours as needed. Rescue    albuterol-ipratropium (DUO-NEB) 2.5 mg-0.5 mg/3 mL nebulizer solution Take 3 mLs by nebulization every 6 (six) hours as needed for Wheezing. Rescue    nicotine (NICODERM CQ) 21 mg/24 hr Place 1 patch onto the skin once daily.       Review of patient's allergies indicates:   Allergen Reactions    Meloxicam Other (See Comments)     Ulcer, GI bleed        Past Medical History:   Diagnosis Date    COPD (chronic obstructive pulmonary disease)     Diabetes mellitus     Emphysema of lung     Gastric ulcer     Hypertension     MI (myocardial infarction)       Past Surgical History:   Procedure Laterality Date    APPENDECTOMY      CHOLECYSTECTOMY      ENDOSCOPIC ULTRASOUND OF UPPER GASTROINTESTINAL TRACT Left 6/4/2019    Procedure: ULTRASOUND, UPPER GI TRACT, ENDOSCOPIC;  Surgeon: Lavon Parra MD;  Location: [facility] ENDO;  Service: Endoscopy;  Laterality: Left;  GIF plus Radial    ESOPHAGOGASTRODUODENOSCOPY N/A 5/25/2019    Procedure: EGD (ESOPHAGOGASTRODUODENOSCOPY);  Surgeon: Mitch Buitrago MD;  Location: [facility] ENDO;  Service: Endoscopy;  Laterality: N/A;    ESOPHAGOGASTRODUODENOSCOPY N/A 6/4/2019    Procedure: EGD (ESOPHAGOGASTRODUODENOSCOPY);  Surgeon: Lavon Parra MD;  Location: [facility] ENDO;  Service: Endoscopy;  Laterality: N/A;    ESOPHAGOGASTRODUODENOSCOPY N/A 9/3/2019    Procedure: EGD (ESOPHAGOGASTRODUODENOSCOPY);  Surgeon: Keshav Harris MD;  Location: [facility] ENDO;  Service: Endoscopy;  Laterality: N/A;    KNEE SURGERY Right      Family History     Problem Relation (Age of Onset)    Cancer Father    Heart disease Mother        Tobacco Use    Smoking status: Current Every Day Smoker     Packs/day: 2.00     Types: Cigarettes    Smokeless tobacco: Never Used   Substance and Sexual Activity    Alcohol use: No    Drug use: No    Sexual activity: Not on file     Review of Systems   Unable to perform ROS: Intubated     Objective:     Vital Signs (Most Recent):  Temp: 99.6 °F (37.6 °C) (10/11/19 0700)  Pulse: 94 (10/11/19 0745)  Resp: (!) 23 (10/11/19 0745)  BP: (!) 107/58 (10/11/19 0700)  SpO2: 96 % (10/11/19 0745) Vital Signs (24h Range):  Temp:  [98 °F (36.7 °C)-99.6 °F (37.6 °C)] 99.6 °F (37.6 °C)  Pulse:  [] 94  Resp:  [20-38] 23  SpO2:  [88 %-100 %] 96 %  BP: (107-132)/(46-88) 107/58  Arterial Line BP: ()/(46-62) 113/53     Weight: 133.8 kg (295 lb)  Body mass index is 37.88 kg/m².    Intake/Output - Last 3 Shifts       10/09 0700 - 10/10 0659 10/10 0700 - 10/11 0659 10/11 0700 - 10/12 0659    P.O. 650      I.V. (mL/kg)  2360 (17.6) 4670 (34.9)     IV Piggyback 500 1000     Total Intake(mL/kg) 3510 (26.2) 5670 (42.4)     Urine (mL/kg/hr) 560 (0.2) 545 (0.2) 60 (0.4)    Drains  255 85    Blood 100 300     Chest Tube  200 20    Total Output 660 1300 165    Net +2850 +4370 -165                 SpO2: 96 %  O2 Device (Oxygen Therapy): ventilator    Physical Exam   Constitutional: He appears well-developed and well-nourished. No distress.   HENT:   Head: Atraumatic.   NGT with bridle, to low continuous suction   Cardiovascular: Normal rate and regular rhythm.   Pulmonary/Chest: Effort normal. No respiratory distress.   Intubated  Bilateral chest tubes with serosanguinous drainage, to water seal, no air leaks   Abdominal: Soft. He exhibits no distension. There is tenderness (Appropriate). There is no guarding.   J tube, G tube, DENISE drain with ss fluid  Midline incision CDI, dressing in place   Genitourinary:   Genitourinary Comments: Hallman in palce   Neurological:   Sedated   Skin: He is not diaphoretic.   Psychiatric: He has a normal mood and affect.   Nursing note reviewed.      Significant Labs:  BMP:   Recent Labs   Lab 10/11/19  0308   *  147*   *  132*   K 4.7  4.7     101   CO2 19*  19*   BUN 31*  31*   CREATININE 2.5*  2.5*   CALCIUM 7.7*  7.7*   MG 1.7  1.7     CBC:   Recent Labs   Lab 10/11/19  0308   WBC 18.40*  18.40*   RBC 3.33*  3.33*   HGB 8.2*  8.2*   HCT 27.2*  27.2*   *  432*   MCV 82  82   MCH 24.6*  24.6*   MCHC 30.1*  30.1*       Significant Diagnostics:  CT: I have reviewed all pertinent results/findings within the past 24 hours  CXR: I have reviewed all pertinent results/findings within the past 24 hours    VTE Risk Mitigation (From admission, onward)         Ordered     IP VTE HIGH RISK PATIENT  Once      10/09/19 1443     Place PAULY hose  Until discontinued      10/09/19 1443     Place sequential compression device  Until discontinued      10/09/19 1443

## 2019-10-12 LAB
ALBUMIN SERPL BCP-MCNC: 2 G/DL (ref 3.5–5.2)
ALLENS TEST: ABNORMAL
ALLENS TEST: ABNORMAL
ALP SERPL-CCNC: 64 U/L (ref 55–135)
ALT SERPL W/O P-5'-P-CCNC: 13 U/L (ref 10–44)
ANION GAP SERPL CALC-SCNC: 8 MMOL/L (ref 8–16)
ANISOCYTOSIS BLD QL SMEAR: SLIGHT
APTT BLDCRRT: 34.5 SEC (ref 21–32)
AST SERPL-CCNC: 19 U/L (ref 10–40)
BACTERIA SPEC AEROBE CULT: ABNORMAL
BASO STIPL BLD QL SMEAR: ABNORMAL
BASOPHILS # BLD AUTO: 0.02 K/UL (ref 0–0.2)
BASOPHILS # BLD AUTO: 0.05 K/UL (ref 0–0.2)
BASOPHILS # BLD AUTO: 0.05 K/UL (ref 0–0.2)
BASOPHILS NFR BLD: 0.1 % (ref 0–1.9)
BASOPHILS NFR BLD: 0.2 % (ref 0–1.9)
BASOPHILS NFR BLD: 0.2 % (ref 0–1.9)
BILIRUB SERPL-MCNC: 0.4 MG/DL (ref 0.1–1)
BLD PROD TYP BPU: NORMAL
BLD PROD TYP BPU: NORMAL
BLOOD UNIT EXPIRATION DATE: NORMAL
BLOOD UNIT EXPIRATION DATE: NORMAL
BLOOD UNIT TYPE CODE: 6200
BLOOD UNIT TYPE CODE: 6200
BLOOD UNIT TYPE: NORMAL
BLOOD UNIT TYPE: NORMAL
BUN SERPL-MCNC: 45 MG/DL (ref 8–23)
BURR CELLS BLD QL SMEAR: ABNORMAL
CALCIUM SERPL-MCNC: 8.1 MG/DL (ref 8.7–10.5)
CHLORIDE SERPL-SCNC: 101 MMOL/L (ref 95–110)
CO2 SERPL-SCNC: 23 MMOL/L (ref 23–29)
CODING SYSTEM: NORMAL
CODING SYSTEM: NORMAL
CREAT SERPL-MCNC: 1.9 MG/DL (ref 0.5–1.4)
DELSYS: ABNORMAL
DELSYS: ABNORMAL
DIFFERENTIAL METHOD: ABNORMAL
DISPENSE STATUS: NORMAL
DISPENSE STATUS: NORMAL
EOSINOPHIL # BLD AUTO: 0 K/UL (ref 0–0.5)
EOSINOPHIL # BLD AUTO: 0 K/UL (ref 0–0.5)
EOSINOPHIL # BLD AUTO: 0.1 K/UL (ref 0–0.5)
EOSINOPHIL NFR BLD: 0.2 % (ref 0–8)
EOSINOPHIL NFR BLD: 0.2 % (ref 0–8)
EOSINOPHIL NFR BLD: 0.3 % (ref 0–8)
ERYTHROCYTE [DISTWIDTH] IN BLOOD BY AUTOMATED COUNT: 17.4 % (ref 11.5–14.5)
ERYTHROCYTE [DISTWIDTH] IN BLOOD BY AUTOMATED COUNT: 17.5 % (ref 11.5–14.5)
ERYTHROCYTE [DISTWIDTH] IN BLOOD BY AUTOMATED COUNT: 17.7 % (ref 11.5–14.5)
ERYTHROCYTE [SEDIMENTATION RATE] IN BLOOD BY WESTERGREN METHOD: 13 MM/H
ERYTHROCYTE [SEDIMENTATION RATE] IN BLOOD BY WESTERGREN METHOD: 15 MM/H
EST. GFR  (AFRICAN AMERICAN): 39.6 ML/MIN/1.73 M^2
EST. GFR  (NON AFRICAN AMERICAN): 34.2 ML/MIN/1.73 M^2
FIO2: 40
FIO2: 40
GIANT PLATELETS BLD QL SMEAR: PRESENT
GLUCOSE SERPL-MCNC: 135 MG/DL (ref 70–110)
HCO3 UR-SCNC: 22.8 MMOL/L (ref 24–28)
HCO3 UR-SCNC: 23.4 MMOL/L (ref 24–28)
HCT VFR BLD AUTO: 21.4 % (ref 40–54)
HCT VFR BLD AUTO: 25.7 % (ref 40–54)
HCT VFR BLD AUTO: 27.1 % (ref 40–54)
HGB BLD-MCNC: 6 G/DL (ref 14–18)
HGB BLD-MCNC: 7.5 G/DL (ref 14–18)
HGB BLD-MCNC: 8.3 G/DL (ref 14–18)
HYPOCHROMIA BLD QL SMEAR: ABNORMAL
IMM GRANULOCYTES # BLD AUTO: 0.3 K/UL (ref 0–0.04)
IMM GRANULOCYTES # BLD AUTO: 0.4 K/UL (ref 0–0.04)
IMM GRANULOCYTES # BLD AUTO: 0.68 K/UL (ref 0–0.04)
IMM GRANULOCYTES NFR BLD AUTO: 1.6 % (ref 0–0.5)
IMM GRANULOCYTES NFR BLD AUTO: 1.8 % (ref 0–0.5)
IMM GRANULOCYTES NFR BLD AUTO: 2.8 % (ref 0–0.5)
INR PPP: 1 (ref 0.8–1.2)
LACTATE SERPL-SCNC: 0.7 MMOL/L (ref 0.5–2.2)
LYMPHOCYTES # BLD AUTO: 0.5 K/UL (ref 1–4.8)
LYMPHOCYTES # BLD AUTO: 0.5 K/UL (ref 1–4.8)
LYMPHOCYTES # BLD AUTO: 0.6 K/UL (ref 1–4.8)
LYMPHOCYTES NFR BLD: 2.1 % (ref 18–48)
LYMPHOCYTES NFR BLD: 2.5 % (ref 18–48)
LYMPHOCYTES NFR BLD: 2.9 % (ref 18–48)
MAGNESIUM SERPL-MCNC: 2.3 MG/DL (ref 1.6–2.6)
MCH RBC QN AUTO: 23.6 PG (ref 27–31)
MCH RBC QN AUTO: 23.8 PG (ref 27–31)
MCH RBC QN AUTO: 26.3 PG (ref 27–31)
MCHC RBC AUTO-ENTMCNC: 28 G/DL (ref 32–36)
MCHC RBC AUTO-ENTMCNC: 29.2 G/DL (ref 32–36)
MCHC RBC AUTO-ENTMCNC: 30.6 G/DL (ref 32–36)
MCV RBC AUTO: 82 FL (ref 82–98)
MCV RBC AUTO: 84 FL (ref 82–98)
MCV RBC AUTO: 86 FL (ref 82–98)
MIN VOL: 8.39
MODE: ABNORMAL
MODE: ABNORMAL
MONOCYTES # BLD AUTO: 1.6 K/UL (ref 0.3–1)
MONOCYTES # BLD AUTO: 1.8 K/UL (ref 0.3–1)
MONOCYTES # BLD AUTO: 2 K/UL (ref 0.3–1)
MONOCYTES NFR BLD: 8.1 % (ref 4–15)
MONOCYTES NFR BLD: 8.3 % (ref 4–15)
MONOCYTES NFR BLD: 8.9 % (ref 4–15)
NEUTROPHILS # BLD AUTO: 15.8 K/UL (ref 1.8–7.7)
NEUTROPHILS # BLD AUTO: 18.8 K/UL (ref 1.8–7.7)
NEUTROPHILS # BLD AUTO: 20.9 K/UL (ref 1.8–7.7)
NEUTROPHILS NFR BLD: 86.3 % (ref 38–73)
NEUTROPHILS NFR BLD: 86.7 % (ref 38–73)
NEUTROPHILS NFR BLD: 86.8 % (ref 38–73)
NRBC BLD-RTO: 0 /100 WBC
OVALOCYTES BLD QL SMEAR: ABNORMAL
PCO2 BLDA: 53.1 MMHG (ref 35–45)
PCO2 BLDA: 58.7 MMHG (ref 35–45)
PEEP: 8
PEEP: 8
PH SMN: 7.21 [PH] (ref 7.35–7.45)
PH SMN: 7.24 [PH] (ref 7.35–7.45)
PHOSPHATE SERPL-MCNC: 5.1 MG/DL (ref 2.7–4.5)
PIP: 27
PLATELET # BLD AUTO: 309 K/UL (ref 150–350)
PLATELET # BLD AUTO: 383 K/UL (ref 150–350)
PLATELET # BLD AUTO: 433 K/UL (ref 150–350)
PLATELET BLD QL SMEAR: ABNORMAL
PMV BLD AUTO: 10.2 FL (ref 9.2–12.9)
PMV BLD AUTO: 9.4 FL (ref 9.2–12.9)
PMV BLD AUTO: 9.5 FL (ref 9.2–12.9)
PO2 BLDA: 120 MMHG (ref 80–100)
PO2 BLDA: 57 MMHG (ref 40–60)
POC BE: -5 MMOL/L
POC BE: -5 MMOL/L
POC SATURATED O2: 82 % (ref 95–100)
POC SATURATED O2: 98 % (ref 95–100)
POC TCO2: 24 MMOL/L (ref 23–27)
POC TCO2: 25 MMOL/L (ref 24–29)
POCT GLUCOSE: 102 MG/DL (ref 70–110)
POCT GLUCOSE: 125 MG/DL (ref 70–110)
POCT GLUCOSE: 138 MG/DL (ref 70–110)
POIKILOCYTOSIS BLD QL SMEAR: SLIGHT
POLYCHROMASIA BLD QL SMEAR: ABNORMAL
POTASSIUM SERPL-SCNC: 4.4 MMOL/L (ref 3.5–5.1)
PROT SERPL-MCNC: 5.1 G/DL (ref 6–8.4)
PROTHROMBIN TIME: 10.2 SEC (ref 9–12.5)
RBC # BLD AUTO: 2.54 M/UL (ref 4.6–6.2)
RBC # BLD AUTO: 3.15 M/UL (ref 4.6–6.2)
RBC # BLD AUTO: 3.16 M/UL (ref 4.6–6.2)
SAMPLE: ABNORMAL
SAMPLE: ABNORMAL
SCHISTOCYTES BLD QL SMEAR: ABNORMAL
SITE: ABNORMAL
SITE: ABNORMAL
SODIUM SERPL-SCNC: 132 MMOL/L (ref 136–145)
SP02: 96
TOXIC GRANULES BLD QL SMEAR: PRESENT
TRANS ERYTHROCYTES VOL PATIENT: NORMAL ML
TRANS ERYTHROCYTES VOL PATIENT: NORMAL ML
VANCOMYCIN TROUGH SERPL-MCNC: 11.8 UG/ML (ref 10–22)
VT: 400
VT: 450
WBC # BLD AUTO: 18.25 K/UL (ref 3.9–12.7)
WBC # BLD AUTO: 21.66 K/UL (ref 3.9–12.7)
WBC # BLD AUTO: 24.26 K/UL (ref 3.9–12.7)

## 2019-10-12 PROCEDURE — 99900026 HC AIRWAY MAINTENANCE (STAT)

## 2019-10-12 PROCEDURE — 94002 VENT MGMT INPAT INIT DAY: CPT

## 2019-10-12 PROCEDURE — 99291 PR CRITICAL CARE, E/M 30-74 MINUTES: ICD-10-PCS | Mod: 24,GC,, | Performed by: SURGERY

## 2019-10-12 PROCEDURE — 27000221 HC OXYGEN, UP TO 24 HOURS

## 2019-10-12 PROCEDURE — 82803 BLOOD GASES ANY COMBINATION: CPT

## 2019-10-12 PROCEDURE — P9045 ALBUMIN (HUMAN), 5%, 250 ML: HCPCS | Mod: JG | Performed by: STUDENT IN AN ORGANIZED HEALTH CARE EDUCATION/TRAINING PROGRAM

## 2019-10-12 PROCEDURE — 80053 COMPREHEN METABOLIC PANEL: CPT

## 2019-10-12 PROCEDURE — 25000003 PHARM REV CODE 250: Performed by: PHYSICIAN ASSISTANT

## 2019-10-12 PROCEDURE — 83605 ASSAY OF LACTIC ACID: CPT

## 2019-10-12 PROCEDURE — 25000003 PHARM REV CODE 250: Performed by: STUDENT IN AN ORGANIZED HEALTH CARE EDUCATION/TRAINING PROGRAM

## 2019-10-12 PROCEDURE — 63600175 PHARM REV CODE 636 W HCPCS: Performed by: STUDENT IN AN ORGANIZED HEALTH CARE EDUCATION/TRAINING PROGRAM

## 2019-10-12 PROCEDURE — 63600175 PHARM REV CODE 636 W HCPCS: Mod: JG

## 2019-10-12 PROCEDURE — 85025 COMPLETE CBC W/AUTO DIFF WBC: CPT | Mod: 91

## 2019-10-12 PROCEDURE — 80202 ASSAY OF VANCOMYCIN: CPT

## 2019-10-12 PROCEDURE — 36415 COLL VENOUS BLD VENIPUNCTURE: CPT

## 2019-10-12 PROCEDURE — P9021 RED BLOOD CELLS UNIT: HCPCS

## 2019-10-12 PROCEDURE — 94640 AIRWAY INHALATION TREATMENT: CPT

## 2019-10-12 PROCEDURE — 25000242 PHARM REV CODE 250 ALT 637 W/ HCPCS: Performed by: STUDENT IN AN ORGANIZED HEALTH CARE EDUCATION/TRAINING PROGRAM

## 2019-10-12 PROCEDURE — P9045 ALBUMIN (HUMAN), 5%, 250 ML: HCPCS | Mod: JG

## 2019-10-12 PROCEDURE — 25000003 PHARM REV CODE 250: Performed by: SURGERY

## 2019-10-12 PROCEDURE — 83735 ASSAY OF MAGNESIUM: CPT

## 2019-10-12 PROCEDURE — 85730 THROMBOPLASTIN TIME PARTIAL: CPT

## 2019-10-12 PROCEDURE — 99900035 HC TECH TIME PER 15 MIN (STAT)

## 2019-10-12 PROCEDURE — 94761 N-INVAS EAR/PLS OXIMETRY MLT: CPT

## 2019-10-12 PROCEDURE — 37799 UNLISTED PX VASCULAR SURGERY: CPT

## 2019-10-12 PROCEDURE — 36430 TRANSFUSION BLD/BLD COMPNT: CPT

## 2019-10-12 PROCEDURE — 85610 PROTHROMBIN TIME: CPT

## 2019-10-12 PROCEDURE — 20000000 HC ICU ROOM

## 2019-10-12 PROCEDURE — S4991 NICOTINE PATCH NONLEGEND: HCPCS | Performed by: PHYSICIAN ASSISTANT

## 2019-10-12 PROCEDURE — 63600175 PHARM REV CODE 636 W HCPCS: Performed by: SURGERY

## 2019-10-12 PROCEDURE — S0030 INJECTION, METRONIDAZOLE: HCPCS | Performed by: STUDENT IN AN ORGANIZED HEALTH CARE EDUCATION/TRAINING PROGRAM

## 2019-10-12 PROCEDURE — 99291 CRITICAL CARE FIRST HOUR: CPT | Mod: 24,GC,, | Performed by: SURGERY

## 2019-10-12 PROCEDURE — 93005 ELECTROCARDIOGRAM TRACING: CPT

## 2019-10-12 PROCEDURE — S0028 INJECTION, FAMOTIDINE, 20 MG: HCPCS | Performed by: SURGERY

## 2019-10-12 PROCEDURE — 93010 ELECTROCARDIOGRAM REPORT: CPT | Mod: ,,, | Performed by: INTERNAL MEDICINE

## 2019-10-12 PROCEDURE — 93010 EKG 12-LEAD: ICD-10-PCS | Mod: ,,, | Performed by: INTERNAL MEDICINE

## 2019-10-12 PROCEDURE — 84100 ASSAY OF PHOSPHORUS: CPT

## 2019-10-12 RX ORDER — HYDROCODONE BITARTRATE AND ACETAMINOPHEN 500; 5 MG/1; MG/1
TABLET ORAL
Status: DISCONTINUED | OUTPATIENT
Start: 2019-10-12 | End: 2019-10-14

## 2019-10-12 RX ORDER — ALBUMIN HUMAN 50 G/1000ML
25 SOLUTION INTRAVENOUS ONCE
Status: COMPLETED | OUTPATIENT
Start: 2019-10-12 | End: 2019-10-12

## 2019-10-12 RX ORDER — ALBUMIN HUMAN 50 G/1000ML
SOLUTION INTRAVENOUS
Status: COMPLETED
Start: 2019-10-12 | End: 2019-10-12

## 2019-10-12 RX ADMIN — Medication 0.1 MCG/KG/MIN: at 07:10

## 2019-10-12 RX ADMIN — CHLORHEXIDINE GLUCONATE 0.12% ORAL RINSE 15 ML: 1.2 LIQUID ORAL at 09:10

## 2019-10-12 RX ADMIN — METRONIDAZOLE 500 MG: 500 INJECTION, SOLUTION INTRAVENOUS at 01:10

## 2019-10-12 RX ADMIN — PROPOFOL 50 MCG/KG/MIN: 10 INJECTION, EMULSION INTRAVENOUS at 10:10

## 2019-10-12 RX ADMIN — IPRATROPIUM BROMIDE AND ALBUTEROL SULFATE 3 ML: .5; 3 SOLUTION RESPIRATORY (INHALATION) at 11:10

## 2019-10-12 RX ADMIN — VANCOMYCIN HYDROCHLORIDE 1750 MG: 100 INJECTION, POWDER, LYOPHILIZED, FOR SOLUTION INTRAVENOUS at 05:10

## 2019-10-12 RX ADMIN — METRONIDAZOLE 500 MG: 500 INJECTION, SOLUTION INTRAVENOUS at 08:10

## 2019-10-12 RX ADMIN — PROPOFOL 50 MCG/KG/MIN: 10 INJECTION, EMULSION INTRAVENOUS at 08:10

## 2019-10-12 RX ADMIN — FLUCONAZOLE 200 MG: 2 INJECTION, SOLUTION INTRAVENOUS at 05:10

## 2019-10-12 RX ADMIN — IPRATROPIUM BROMIDE AND ALBUTEROL SULFATE 3 ML: .5; 3 SOLUTION RESPIRATORY (INHALATION) at 03:10

## 2019-10-12 RX ADMIN — PROPOFOL 50 MCG/KG/MIN: 10 INJECTION, EMULSION INTRAVENOUS at 03:10

## 2019-10-12 RX ADMIN — HEPARIN SODIUM 5000 UNITS: 5000 INJECTION, SOLUTION INTRAVENOUS; SUBCUTANEOUS at 09:10

## 2019-10-12 RX ADMIN — ALBUMIN (HUMAN) 25 G: 12.5 SOLUTION INTRAVENOUS at 11:10

## 2019-10-12 RX ADMIN — NICOTINE 1 PATCH: 21 PATCH, EXTENDED RELEASE TRANSDERMAL at 11:10

## 2019-10-12 RX ADMIN — HEPARIN SODIUM 5000 UNITS: 5000 INJECTION, SOLUTION INTRAVENOUS; SUBCUTANEOUS at 02:10

## 2019-10-12 RX ADMIN — IPRATROPIUM BROMIDE AND ALBUTEROL SULFATE 3 ML: .5; 3 SOLUTION RESPIRATORY (INHALATION) at 08:10

## 2019-10-12 RX ADMIN — ALBUMIN HUMAN 25 G: 50 SOLUTION INTRAVENOUS at 09:10

## 2019-10-12 RX ADMIN — HEPARIN SODIUM 5000 UNITS: 5000 INJECTION, SOLUTION INTRAVENOUS; SUBCUTANEOUS at 05:10

## 2019-10-12 RX ADMIN — PROPOFOL 50 MCG/KG/MIN: 10 INJECTION, EMULSION INTRAVENOUS at 12:10

## 2019-10-12 RX ADMIN — Medication 0.06 MCG/KG/MIN: at 06:10

## 2019-10-12 RX ADMIN — CHLORHEXIDINE GLUCONATE 0.12% ORAL RINSE 15 ML: 1.2 LIQUID ORAL at 08:10

## 2019-10-12 RX ADMIN — Medication 0.1 MCG/KG/MIN: at 01:10

## 2019-10-12 RX ADMIN — PROPOFOL 50 MCG/KG/MIN: 10 INJECTION, EMULSION INTRAVENOUS at 05:10

## 2019-10-12 RX ADMIN — IPRATROPIUM BROMIDE AND ALBUTEROL SULFATE 3 ML: .5; 3 SOLUTION RESPIRATORY (INHALATION) at 07:10

## 2019-10-12 RX ADMIN — FAMOTIDINE 20 MG: 10 INJECTION, SOLUTION INTRAVENOUS at 08:10

## 2019-10-12 RX ADMIN — METRONIDAZOLE 500 MG: 500 INJECTION, SOLUTION INTRAVENOUS at 05:10

## 2019-10-12 RX ADMIN — ALBUMIN (HUMAN) 25 G: 12.5 SOLUTION INTRAVENOUS at 09:10

## 2019-10-12 RX ADMIN — IPRATROPIUM BROMIDE AND ALBUTEROL SULFATE 3 ML: .5; 3 SOLUTION RESPIRATORY (INHALATION) at 12:10

## 2019-10-12 RX ADMIN — Medication 100 MCG/HR: at 11:10

## 2019-10-12 RX ADMIN — CEFEPIME 2 G: 2 INJECTION, POWDER, FOR SOLUTION INTRAVENOUS at 09:10

## 2019-10-12 RX ADMIN — CEFEPIME 2 G: 2 INJECTION, POWDER, FOR SOLUTION INTRAVENOUS at 08:10

## 2019-10-12 NOTE — PROGRESS NOTES
[facility]  Critical Care - Surgery  Progress Note    Patient Name: Pasha Harmon  MRN: [id number]  Admission Date: 10/9/2019  Hospital Length of Stay: 2 days  Code Status: Full Code  Attending Provider: Paulino Parrish MD  Primary Care Provider: Eze Root MD   Principal Problem: Bowel perforation    Subjective:     Hospital/ICU Course:  10/10: Admitted to SICU. R IJ TLC placed. On minimal pressors.   10/11: Remaining intubated. On minimal pressors.     Interval History/Significant Events: Patient intubated on propofol and fentanyl drip. Increased Levo to 0.1 overnight. On broad spectrum Abx. WBC increased to 21.7. Afebrile. Drains: R CT-150, L CT- 40, DENISE- 21, G-tube 275, J-tube 0. UOP still low. Getting 1L albumin today.     Follow-up For: Procedure(s) (LRB):  LAPAROTOMY, EXPLORATORY (N/A)  EGD (ESOPHAGOGASTRODUODENOSCOPY) (N/A)  INSERTION, CATHETER, INTERCOSTAL, FOR DRAINAGE (Bilateral)  REPAIR, PERFORATION, GASTRIC (N/A)  INSERTION, GASTROSTOMY TUBE, PERCUTANEOUS (Left)  INSERTION, JEJUNOSTOMY TUBE (Left)    Post-Operative Day: 1 Day Post-Op    Objective:     Vital Signs (Most Recent):  Temp: 99.6 °F (37.6 °C) (10/12/19 1101)  Pulse: 90 (10/12/19 1345)  Resp: 15 (10/12/19 1345)  BP: 130/60 (10/12/19 1300)  SpO2: 96 % (10/12/19 1345) Vital Signs (24h Range):  Temp:  [99.2 °F (37.3 °C)-99.7 °F (37.6 °C)] 99.6 °F (37.6 °C)  Pulse:  [85-98] 90  Resp:  [5-25] 15  SpO2:  [89 %-99 %] 96 %  BP: (111-160)/(54-74) 130/60  Arterial Line BP: (101-141)/(39-61) 118/45     Weight: 133.8 kg (295 lb)  Body mass index is 37.86 kg/m².      Intake/Output Summary (Last 24 hours) at 10/12/2019 1354  Last data filed at 10/12/2019 1300  Gross per 24 hour   Intake 4396.08 ml   Output 2574 ml   Net 1822.08 ml       Physical Exam   Constitutional: He appears well-developed and well-nourished.   HENT:   Head: Normocephalic and atraumatic.   Eyes: Pupils are equal, round, and reactive to light. Conjunctivae  are normal.   Neck: Normal range of motion.   Cardiovascular: Normal rate, regular rhythm and normal heart sounds.   Pulmonary/Chest: Breath sounds normal.   Intubated   Abdominal:   G-tube to gravity with bilious output.  J-tube clamped.    Neurological:   Sedated   Skin: Skin is warm and dry.       Vents:  Vent Mode: A/C (10/12/19 1345)  Ventilator Initiated: Yes (10/10/19 1621)  Set Rate: 15 bmp (10/12/19 1345)  Vt Set: 400 mL (10/12/19 1345)  PEEP/CPAP: 8 cmH20 (10/12/19 1345)  Oxygen Concentration (%): 39 (10/12/19 1345)  Peak Airway Pressure: 27 cmH2O (10/12/19 1345)  Plateau Pressure: 0 cmH20 (10/12/19 1345)  Total Ve: 9.2 mL (10/12/19 1345)  F/VT Ratio<105 (RSBI): (!) 46.88 (10/12/19 1345)    Lines/Drains/Airways     Central Venous Catheter Line                 Percutaneous Central Line Insertion/Assessment - triple lumen  10/10/19 1853 right internal jugular 1 day          Drain                 Closed/Suction Drain 10/10/19 1349 Right RUQ Bulb 19 Fr. 2 days         NG/OG Tube 10/10/19 Left nostril 2 days         Chest Tube 10/10/19 1510 1 Right Midaxillary;Fourth intercostal space 28 Fr. 1 day         Chest Tube 10/10/19 1513 Left Midaxillary;Fourth intercostal space 28 Fr. 1 day         Closed/Suction Drain 10/10/19 1408 Left;Superior LUQ Other (Comment) 20 Fr. 1 day         Closed/Suction Drain 10/10/19 1434 Left;Distal LUQ Other (Comment) 14 Fr. 1 day         Urethral Catheter 10/10/19 1530 Straight-tip;Double-lumen;Non-latex 16 Fr. 1 day          Airway                 Airway - Non-Surgical 10/10/19 1234 Endotracheal Tube 2 days          Arterial Line                 Arterial Line 10/10/19 1245 Right Radial 2 days          Peripheral Intravenous Line                 Peripheral IV - Single Lumen 10/09/19 0744 20 G Right Forearm 3 days         Peripheral IV - Single Lumen 10/10/19 1237 16 G Left Antecubital 2 days         Peripheral IV - Single Lumen 10/10/19 1240 18 G Right Antecubital 2 days                 Significant Labs:    CBC/Anemia Profile:  Recent Labs   Lab 10/10/19  1610 10/11/19  0308 10/12/19  0427   WBC 16.20* 18.40*  18.40* 21.66*   HGB 8.7* 8.2*  8.2* 7.5*   HCT 30.2* 27.2*  27.2* 25.7*   * 432*  432* 433*   MCV 84 82  82 82   RDW 17.2* 17.4*  17.4* 17.5*        Chemistries:  Recent Labs   Lab 10/10/19  1610 10/11/19  0308 10/12/19  0427   * 132*  132* 132*   K 4.2 4.7  4.7 4.4    101  101 101   CO2 22* 19*  19* 23   BUN 27* 31*  31* 45*   CREATININE 2.8* 2.5*  2.5* 1.9*   CALCIUM 7.5* 7.7*  7.7* 8.1*   ALBUMIN 2.5* 2.4* 2.0*   PROT 5.0* 4.9* 5.1*   BILITOT 0.5 0.4 0.4   ALKPHOS 43* 42* 64   ALT 25 21 13   AST 37 33 19   MG 2.0 1.7  1.7 2.3   PHOS 4.7* 4.3  4.3 5.1*       ABGs:   Recent Labs   Lab 10/12/19  0948   PH 7.209*   PCO2 58.7*   HCO3 23.4*   POCSATURATED 82*   BE -5     Lactic Acid:   Recent Labs   Lab 10/10/19  1610 10/11/19  0308 10/12/19  0918   LACTATE 1.9 1.7 0.7       Significant Imaging:  I have reviewed all pertinent imaging results/findings within the past 24 hours.    Assessment/Plan:     * Bowel perforation  74M current smoker who underwent excision of bleeding gastric mass, post op course complicated by intraperitoneal free air. Now admitted to SICU following ex-lap 10/10/19 which revealed esophageal and gastric perforations which have been repaired.    Neuro:  - Sedated on propofol  - Fentanyl ggt for pain control.     CV  - Fluid resuscitate as necessary  - Titrate norepinephrine, at 0.1. Will likely come off with discontinuation of propofol and fentanyl.   - vasopressin off  - Echo 9/12/10 wnl    Pulm  - Intubated, on vent  - Fentanyl to help with vent stacking.   - Plan to leave intubated today  - Significant history of smoking    GI  - Post-op esophageal and gastric perforations 10/9, now repaired via ex-lap  - Cefepime, flagyl, vanc, diflucan for broad coverage.   - Monitor drains, care per surgery  - Do not use or advance NGT. At site  of repair.  - Do not use G-tube. Leave to gravity.  - J-tube clamped today.     Renal  - BHASKAR; Cr 2.8 on admit on baseline  - Improved on this morning's labs.   - Monitor UOP. Possibly oliguric at baseline.   - Daily CMP  - Electrolyte repletion PRN    Heme/ID  - Hgb 7.5, no signs of active bleeding  - CBC this PM after giving fluids to monitor  - WBC up  - Cefepime, flagyl, vancomycin, and diflucan for contaminated peritoneum    Endo  - DM2  - POCT glucose q4h  - SSI    Dispo: Continue ICU care. Leave intubated today.            Travis Malone MD  Critical Care - Surgery  [facility]

## 2019-10-12 NOTE — PLAN OF CARE
POC reviewed with Pt and family. Pt is awake and alert. Pt follows commands. Pt on AC 40/8. Pts HR has been 80s NSR. MAP >65. Levo titrated for map goals. 1L Albumin given and 2 units PRBCs given. Propofol and fentanyl given for RASS, pain, and comfort. CVP has been 8-9.  X2 CTs intact with 20-40cc/4hrs. Right DENISE intact with minimal output. G tube to gravity and J tube clamped. NG tube to LIWS. Hallman intact with 50-75cc/hr U/O. Family at bedside. All questions answered. Plan to possibly try SBT in the AM. VSS. Will continue to monitor.

## 2019-10-12 NOTE — ASSESSMENT & PLAN NOTE
Patient is 75 yo M with 7 cm contained esophageal perforation and gastric perforation after AES procedure who underwent emergent ex lap and EGD on 10/10/19    Keep NGT to low continuous suction - NOT IN STOMACH - do not advance, do not remove - management per thoracic  Bilateral chest tubes in place to monitor for esophageal leak - do not remove unless thoracic surgery ok  Continue G tube to gravity for gastric decompression - do not use for meds or feeds  Continue to clamp J tube - do not start tube feeds today, preferably wait till patient off pressors  Continue to resuscitate and wean pressors and vent as tolerated  Rest of care per SICU

## 2019-10-12 NOTE — SUBJECTIVE & OBJECTIVE
Interval History/Significant Events: Patient intubated on propofol and fentanyl drip. Increased Levo to 0.1 overnight. On broad spectrum Abx. WBC increased to 21.7. Afebrile. Drains: R CT-150, L CT- 40, DENISE- 21, G-tube 275, J-tube 0. UOP still low. Getting 1L albumin today.     Follow-up For: Procedure(s) (LRB):  LAPAROTOMY, EXPLORATORY (N/A)  EGD (ESOPHAGOGASTRODUODENOSCOPY) (N/A)  INSERTION, CATHETER, INTERCOSTAL, FOR DRAINAGE (Bilateral)  REPAIR, PERFORATION, GASTRIC (N/A)  INSERTION, GASTROSTOMY TUBE, PERCUTANEOUS (Left)  INSERTION, JEJUNOSTOMY TUBE (Left)    Post-Operative Day: 1 Day Post-Op    Objective:     Vital Signs (Most Recent):  Temp: 99.6 °F (37.6 °C) (10/12/19 1101)  Pulse: 90 (10/12/19 1345)  Resp: 15 (10/12/19 1345)  BP: 130/60 (10/12/19 1300)  SpO2: 96 % (10/12/19 1345) Vital Signs (24h Range):  Temp:  [99.2 °F (37.3 °C)-99.7 °F (37.6 °C)] 99.6 °F (37.6 °C)  Pulse:  [85-98] 90  Resp:  [5-25] 15  SpO2:  [89 %-99 %] 96 %  BP: (111-160)/(54-74) 130/60  Arterial Line BP: (101-141)/(39-61) 118/45     Weight: 133.8 kg (295 lb)  Body mass index is 37.86 kg/m².      Intake/Output Summary (Last 24 hours) at 10/12/2019 1354  Last data filed at 10/12/2019 1300  Gross per 24 hour   Intake 4396.08 ml   Output 2574 ml   Net 1822.08 ml       Physical Exam   Constitutional: He appears well-developed and well-nourished.   HENT:   Head: Normocephalic and atraumatic.   Eyes: Pupils are equal, round, and reactive to light. Conjunctivae are normal.   Neck: Normal range of motion.   Cardiovascular: Normal rate, regular rhythm and normal heart sounds.   Pulmonary/Chest: Breath sounds normal.   Intubated   Abdominal:   G-tube to gravity with bilious output.  J-tube clamped.    Neurological:   Sedated   Skin: Skin is warm and dry.       Vents:  Vent Mode: A/C (10/12/19 1345)  Ventilator Initiated: Yes (10/10/19 1621)  Set Rate: 15 bmp (10/12/19 1345)  Vt Set: 400 mL (10/12/19 1345)  PEEP/CPAP: 8 cmH20 (10/12/19  1345)  Oxygen Concentration (%): 39 (10/12/19 1345)  Peak Airway Pressure: 27 cmH2O (10/12/19 1345)  Plateau Pressure: 0 cmH20 (10/12/19 1345)  Total Ve: 9.2 mL (10/12/19 1345)  F/VT Ratio<105 (RSBI): (!) 46.88 (10/12/19 1345)    Lines/Drains/Airways     Central Venous Catheter Line                 Percutaneous Central Line Insertion/Assessment - triple lumen  10/10/19 1853 right internal jugular 1 day          Drain                 Closed/Suction Drain 10/10/19 1349 Right RUQ Bulb 19 Fr. 2 days         NG/OG Tube 10/10/19 Left nostril 2 days         Chest Tube 10/10/19 1510 1 Right Midaxillary;Fourth intercostal space 28 Fr. 1 day         Chest Tube 10/10/19 1513 Left Midaxillary;Fourth intercostal space 28 Fr. 1 day         Closed/Suction Drain 10/10/19 1408 Left;Superior LUQ Other (Comment) 20 Fr. 1 day         Closed/Suction Drain 10/10/19 1434 Left;Distal LUQ Other (Comment) 14 Fr. 1 day         Urethral Catheter 10/10/19 1530 Straight-tip;Double-lumen;Non-latex 16 Fr. 1 day          Airway                 Airway - Non-Surgical 10/10/19 1234 Endotracheal Tube 2 days          Arterial Line                 Arterial Line 10/10/19 1245 Right Radial 2 days          Peripheral Intravenous Line                 Peripheral IV - Single Lumen 10/09/19 0744 20 G Right Forearm 3 days         Peripheral IV - Single Lumen 10/10/19 1237 16 G Left Antecubital 2 days         Peripheral IV - Single Lumen 10/10/19 1240 18 G Right Antecubital 2 days                Significant Labs:    CBC/Anemia Profile:  Recent Labs   Lab 10/10/19  1610 10/11/19  0308 10/12/19  0427   WBC 16.20* 18.40*  18.40* 21.66*   HGB 8.7* 8.2*  8.2* 7.5*   HCT 30.2* 27.2*  27.2* 25.7*   * 432*  432* 433*   MCV 84 82  82 82   RDW 17.2* 17.4*  17.4* 17.5*        Chemistries:  Recent Labs   Lab 10/10/19  1610 10/11/19  0308 10/12/19  0427   * 132*  132* 132*   K 4.2 4.7  4.7 4.4    101  101 101   CO2 22* 19*  19* 23   BUN 27* 31*   31* 45*   CREATININE 2.8* 2.5*  2.5* 1.9*   CALCIUM 7.5* 7.7*  7.7* 8.1*   ALBUMIN 2.5* 2.4* 2.0*   PROT 5.0* 4.9* 5.1*   BILITOT 0.5 0.4 0.4   ALKPHOS 43* 42* 64   ALT 25 21 13   AST 37 33 19   MG 2.0 1.7  1.7 2.3   PHOS 4.7* 4.3  4.3 5.1*       ABGs:   Recent Labs   Lab 10/12/19  0948   PH 7.209*   PCO2 58.7*   HCO3 23.4*   POCSATURATED 82*   BE -5     Lactic Acid:   Recent Labs   Lab 10/10/19  1610 10/11/19  0308 10/12/19  0918   LACTATE 1.9 1.7 0.7       Significant Imaging:  I have reviewed all pertinent imaging results/findings within the past 24 hours.

## 2019-10-12 NOTE — PLAN OF CARE
Dx: Bowel perforation     Shift Events: VSS at this time. Temp max 99.8 F. No BM during shift. Patient on levophed to maintain a MAP >65. Vent settings AC 40% 8 PEEP. NG tube to low wall continuous suction. Fentanyl gtt started and titrated to maintain patient comfort. Chest tubes placed to suction per Dr. Taylor. Plan of care reviewed with patient's family. Questions and concerns addressed. Will continue to monitor.      Neuro: Sedated, Arouses to Voice, Follows Commands and Moves All Extremities     Diet: NPO     Gtts: Propfol, Norepinephrine and Fentanyl NS @ 100 cc     Urine Output: Urinary Catheter 180 cc/ 3 hour     Drains:   DENISE Drain 21 cc/shift  G Tube 275 cc/shift  J Tube 0 cc/shift  Right Chest Tube 150 cc/shift  Left Chest Tube 40 cc/shift  NG Tube 20 cc/shift     Skin: No skin breakdown noted. Foam dressings and heel boots applied. Turned q 2 hours to prevent skin breakdown.    R hip pain

## 2019-10-12 NOTE — NURSING
"Dx: Bowel perforation     Shift Events: No acute events.    Neuro: Follows commands.    Vital Signs: BP (!) 160/74   Pulse 92   Temp 99.7 °F (37.6 °C) (Oral)   Resp 17   Ht 6' 2.02" (1.88 m)   Wt 133.8 kg (295 lb)   SpO2 97%   BMI 37.86 kg/m²     Diet: NPO    Gtts: Norepinephrine, Propofol, MIVF, and Fentanyl    Urine Output: Urinary Catheter 820 cc/shift    Drains:   RUQ DENISE Drain: 25  Right CT: 62  Left CT: 74  G Tube: 130    Bilateral soft wrist restraints applied due to patient being intubated. No injuries noted.     Labs/Accuchecks:   - WBC: 21.66  - Hgb: 7.5  - Hct: 25.7  - Platelets: 433  - Na: 132  - K 4.4  - BUN: 45  - Creatinine: 1.9  - Ma.3  - Phos: 5.1       "

## 2019-10-12 NOTE — ASSESSMENT & PLAN NOTE
74M current smoker who underwent excision of bleeding gastric mass, post op course complicated by intraperitoneal free air. Now admitted to SICU following ex-lap 10/10/19 which revealed esophageal and gastric perforations which have been repaired.    Neuro:  - Sedated on propofol  - Fentanyl ggt for pain control.     CV  - Fluid resuscitate as necessary  - Titrate norepinephrine, at 0.1. Will likely come off with discontinuation of propofol and fentanyl.   - vasopressin off  - Echo 9/12/10 wnl    Pulm  - Intubated, on vent  - Fentanyl to help with vent stacking.   - Plan to leave intubated today  - Significant history of smoking    GI  - Post-op esophageal and gastric perforations 10/9, now repaired via ex-lap  - Cefepime, flagyl, vanc, diflucan for broad coverage.   - Monitor drains, care per surgery  - Do not use or advance NGT. At site of repair.  - Do not use G-tube. Leave to gravity.  - J-tube clamped today.     Renal  - BHASKAR; Cr 2.8 on admit on baseline  - Improved on this morning's labs.   - Monitor UOP. Possibly oliguric at baseline.   - Daily CMP  - Electrolyte repletion PRN    Heme/ID  - Hgb 7.5, no signs of active bleeding  - CBC this PM after giving fluids to monitor  - WBC up  - Cefepime, flagyl, vancomycin, and diflucan for contaminated peritoneum    Endo  - DM2  - POCT glucose q4h  - SSI    Dispo: Continue ICU care. Leave intubated today.

## 2019-10-12 NOTE — PROGRESS NOTES
"[facility]  General Surgery  Progress Note    Subjective:     History of Present Illness:  Patient is a 72 yo M with history of COPD and bleeding gastric mass who underwent EGD with AES yesterday where they removed a 7 cm "ulcerated lipoma" from his gastric antrum using submucosal endoscopic dissection. When removing the mass, it became stuck in the distal esophagus. They had to removed it piecemeal, and caused some mucosal esophageal tears. He was admitted for obs overnight, and this morning his WBC was elevated and he was complaining of abd pain. CXR showed free intraperitoneal free air and general surgery was consulted for evaluation.    Post-Op Info:  Procedure(s) (LRB):  LAPAROTOMY, EXPLORATORY (N/A)  EGD (ESOPHAGOGASTRODUODENOSCOPY) (N/A)  INSERTION, CATHETER, INTERCOSTAL, FOR DRAINAGE (Bilateral)  REPAIR, PERFORATION, GASTRIC (N/A)  INSERTION, GASTROSTOMY TUBE, PERCUTANEOUS (Left)  INSERTION, JEJUNOSTOMY TUBE (Left)   2 Days Post-Op     Interval History: Patient fighting vent overnight, sedation increased, subsequent increase in pressor requirement. Culture positive for klebsiella. No other acute events overnight.     Medications:  Continuous Infusions:   sodium chloride 0.9% 100 mL/hr at 10/12/19 0700    fentanyl 150 mcg/hr (10/12/19 0700)    norepinephrine bitartrate-D5W 0.1 mcg/kg/min (10/12/19 0700)    propofol 50 mcg/kg/min (10/12/19 0700)    vasopressin (PITRESSIN) infusion Stopped (10/11/19 0300)     Scheduled Meds:   albuterol-ipratropium  3 mL Nebulization Q4H    ceFEPime (MAXIPIME) IVPB  2 g Intravenous Q12H    chlorhexidine  15 mL Mouth/Throat BID    famotidine (PF)  20 mg Intravenous Daily    fluconazole (DIFLUCAN) IVPB  200 mg Intravenous Q24H    fluticasone furoate-vilanterol  1 puff Inhalation Daily    heparin (porcine)  5,000 Units Subcutaneous Q8H    metronidazole  500 mg Intravenous Q8H    nicotine  1 patch Transdermal Daily    vancomycin (VANCOCIN) IVPB  " 1,750 mg Intravenous Q24H     PRN Meds:albuterol-ipratropium, Dextrose 10% Bolus, Dextrose 10% Bolus, glucagon (human recombinant), insulin aspart U-100, ondansetron, sodium chloride 0.9%     Review of patient's allergies indicates:   Allergen Reactions    Meloxicam Other (See Comments)     Ulcer, GI bleed      Objective:     Vital Signs (Most Recent):  Temp: 99.5 °F (37.5 °C) (10/11/19 2300)  Pulse: 85 (10/12/19 0721)  Resp: 12 (10/12/19 0721)  BP: (!) 118/57 (10/12/19 0701)  SpO2: 97 % (10/12/19 0721) Vital Signs (24h Range):  Temp:  [99.5 °F (37.5 °C)-99.8 °F (37.7 °C)] 99.5 °F (37.5 °C)  Pulse:  [85-96] 85  Resp:  [8-31] 12  SpO2:  [94 %-99 %] 97 %  BP: (106-127)/(51-62) 118/57  Arterial Line BP: (101-123)/(44-58) 107/46     Weight: 133.8 kg (295 lb)  Body mass index is 37.86 kg/m².    Intake/Output - Last 3 Shifts       10/10 0700 - 10/11 0659 10/11 0700 - 10/12 0659 10/12 0700 - 10/13 0659    P.O.       I.V. (mL/kg) 4670 (34.9) 3446.1 (25.8)     IV Piggyback 1000      Total Intake(mL/kg) 5670 (42.4) 3446.1 (25.8)     Urine (mL/kg/hr) 545 (0.2) 1465 (0.5)     Drains 255 676     Blood 300      Chest Tube 200 250     Total Output 1300 2391     Net +4370 +1055.1                  Physical Exam   Constitutional: He appears ill. He is sedated and intubated.   HENT:   Bridled NGT in place   Pulmonary/Chest: He is intubated.   Vent Mode: A/C  Oxygen Concentration (%):  (39-98) 40  Resp Rate Total:  (17 br/min-31 br/min) 17 br/min  Vt Set:  (400 mL-600 mL) 400 mL  PEEP/CPAP:  (8 cmH20-10 cmH20) 8 cmH20  Mean Airway Pressure:  (12 weF60-08 cmH20) 12 cmH20       Abdominal: Soft. He exhibits no distension.       Significant Labs:  CBC:   Recent Labs   Lab 10/12/19  0427   WBC 21.66*   RBC 3.15*   HGB 7.5*   HCT 25.7*   *   MCV 82   MCH 23.8*   MCHC 29.2*     CMP:   Recent Labs   Lab 10/12/19  0427   *   CALCIUM 8.1*   ALBUMIN 2.0*   PROT 5.1*   *   K 4.4   CO2 23      BUN 45*   CREATININE 1.9*    ALKPHOS 64   ALT 13   AST 19   BILITOT 0.4       Significant Diagnostics:  I have reviewed all pertinent imaging results/findings within the past 24 hours.    Assessment/Plan:     * Bowel perforation  Patient is 73 yo M with 7 cm contained esophageal perforation and gastric perforation after AES procedure who underwent emergent ex lap and EGD on 10/10/19    Keep NGT to low continuous suction - NOT IN STOMACH - do not advance, do not remove - management per thoracic  Bilateral chest tubes in place to monitor for esophageal leak - do not remove unless thoracic surgery ok  Continue G tube to gravity for gastric decompression - do not use for meds or feeds  Continue to clamp J tube - do not start tube feeds today, preferably wait till patient off pressors  Continue to resuscitate and wean pressors and vent as tolerated  Rest of care per SICU         Gabino Santoro MD  General Surgery  [facility]

## 2019-10-12 NOTE — SUBJECTIVE & OBJECTIVE
Interval History: Patient fighting vent overnight, sedation increased, subsequent increase in pressor requirement. Culture positive for klebsiella. No other acute events overnight.     Medications:  Continuous Infusions:   sodium chloride 0.9% 100 mL/hr at 10/12/19 0700    fentanyl 150 mcg/hr (10/12/19 0700)    norepinephrine bitartrate-D5W 0.1 mcg/kg/min (10/12/19 0700)    propofol 50 mcg/kg/min (10/12/19 0700)    vasopressin (PITRESSIN) infusion Stopped (10/11/19 0300)     Scheduled Meds:   albuterol-ipratropium  3 mL Nebulization Q4H    ceFEPime (MAXIPIME) IVPB  2 g Intravenous Q12H    chlorhexidine  15 mL Mouth/Throat BID    famotidine (PF)  20 mg Intravenous Daily    fluconazole (DIFLUCAN) IVPB  200 mg Intravenous Q24H    fluticasone furoate-vilanterol  1 puff Inhalation Daily    heparin (porcine)  5,000 Units Subcutaneous Q8H    metronidazole  500 mg Intravenous Q8H    nicotine  1 patch Transdermal Daily    vancomycin (VANCOCIN) IVPB  1,750 mg Intravenous Q24H     PRN Meds:albuterol-ipratropium, Dextrose 10% Bolus, Dextrose 10% Bolus, glucagon (human recombinant), insulin aspart U-100, ondansetron, sodium chloride 0.9%     Review of patient's allergies indicates:   Allergen Reactions    Meloxicam Other (See Comments)     Ulcer, GI bleed      Objective:     Vital Signs (Most Recent):  Temp: 99.5 °F (37.5 °C) (10/11/19 2300)  Pulse: 85 (10/12/19 0721)  Resp: 12 (10/12/19 0721)  BP: (!) 118/57 (10/12/19 0701)  SpO2: 97 % (10/12/19 0721) Vital Signs (24h Range):  Temp:  [99.5 °F (37.5 °C)-99.8 °F (37.7 °C)] 99.5 °F (37.5 °C)  Pulse:  [85-96] 85  Resp:  [8-31] 12  SpO2:  [94 %-99 %] 97 %  BP: (106-127)/(51-62) 118/57  Arterial Line BP: (101-123)/(44-58) 107/46     Weight: 133.8 kg (295 lb)  Body mass index is 37.86 kg/m².    Intake/Output - Last 3 Shifts       10/10 0700 - 10/11 0659 10/11 0700 - 10/12 0659 10/12 0700 - 10/13 0659    P.O.       I.V. (mL/kg) 4670 (34.9) 3446.1 (25.8)     IV Piggyback  1000      Total Intake(mL/kg) 5670 (42.4) 3446.1 (25.8)     Urine (mL/kg/hr) 545 (0.2) 1465 (0.5)     Drains 255 676     Blood 300      Chest Tube 200 250     Total Output 1300 2391     Net +4370 +1055.1                  Physical Exam   Constitutional: He appears ill. He is sedated and intubated.   HENT:   Bridled NGT in place   Pulmonary/Chest: He is intubated.   Vent Mode: A/C  Oxygen Concentration (%):  (39-98) 40  Resp Rate Total:  (17 br/min-31 br/min) 17 br/min  Vt Set:  (400 mL-600 mL) 400 mL  PEEP/CPAP:  (8 cmH20-10 cmH20) 8 cmH20  Mean Airway Pressure:  (12 opI13-23 cmH20) 12 cmH20       Abdominal: Soft. He exhibits no distension.       Significant Labs:  CBC:   Recent Labs   Lab 10/12/19  0427   WBC 21.66*   RBC 3.15*   HGB 7.5*   HCT 25.7*   *   MCV 82   MCH 23.8*   MCHC 29.2*     CMP:   Recent Labs   Lab 10/12/19  0427   *   CALCIUM 8.1*   ALBUMIN 2.0*   PROT 5.1*   *   K 4.4   CO2 23      BUN 45*   CREATININE 1.9*   ALKPHOS 64   ALT 13   AST 19   BILITOT 0.4       Significant Diagnostics:  I have reviewed all pertinent imaging results/findings within the past 24 hours.

## 2019-10-12 NOTE — TREATMENT PLAN
AES Treatment Plan    Pasha Harmon is a 74 y.o. male admitted to hospital 10/9/2019 (Hospital Day: 4) due to Bowel perforation.     Interval History  - remains intubated in AM, sedated.  - per wife, no concerns overnight. Asking if he is going to be extubated today  - stable on levophed 0.1mcg  - Cx positive yesterday for klebsiella. On broad antibiotic coverage with fluc, flagyl and cefepieme  - SUP with famotidine    Objective  Temp:  [99.2 °F (37.3 °C)-99.8 °F (37.7 °C)] 99.7 °F (37.6 °C) (10/12 0701)  Pulse:  [85-98] 92 (10/12 0930)  BP: (111-160)/(54-74) 117/57 (10/12 0900)  Resp:  [5-31] 17 (10/12 0930)  SpO2:  [94 %-99 %] 97 % (10/12 0930)  Arterial Line BP: (101-141)/(44-61) 107/48 (10/12 0930)    General: intubated, sedated, appears comfortable.  Abdomen: Normoactive bowel sounds. Non-distended. Normal tympany. Soft. Non-tender. No peritoneal signs. Drain present.    Laboratory    Recent Labs   Lab 10/10/19  1610 10/11/19  0308 10/12/19  0427   HGB 8.7* 8.2*  8.2* 7.5*       Lab Results   Component Value Date    WBC 21.66 (H) 10/12/2019    HGB 7.5 (L) 10/12/2019    HCT 25.7 (L) 10/12/2019    MCV 82 10/12/2019     (H) 10/12/2019       Lab Results   Component Value Date     (L) 10/12/2019    K 4.4 10/12/2019     10/12/2019    CO2 23 10/12/2019    BUN 45 (H) 10/12/2019    CREATININE 1.9 (H) 10/12/2019    CALCIUM 8.1 (L) 10/12/2019    ANIONGAP 8 10/12/2019    ESTGFRAFRICA 39.6 (A) 10/12/2019    EGFRNONAA 34.2 (A) 10/12/2019       Lab Results   Component Value Date    ALT 13 10/12/2019    AST 19 10/12/2019    ALKPHOS 64 10/12/2019    BILITOT 0.4 10/12/2019       Lab Results   Component Value Date    INR 1.0 10/12/2019    INR 1.2 10/10/2019    INR 1.2 05/24/2019       Plan  - s/p gastric lipoma ESD c/b delayed gastric perforation s/p operative intervention  - appreciate general surgery and critical care management  - We will continue to follow.    Thank you for involving us in the  care of Pasha Hamron. Please call with any additional questions, concerns or changes in the patient's clinical status.    Molina Ba MD  Gastroenterology Fellow  Spectralink: [phone]

## 2019-10-13 LAB
ABO + RH BLD: NORMAL
ALBUMIN SERPL BCP-MCNC: 2.1 G/DL (ref 3.5–5.2)
ALBUMIN SERPL BCP-MCNC: 2.2 G/DL (ref 3.5–5.2)
ALLENS TEST: ABNORMAL
ALLENS TEST: NORMAL
ALP SERPL-CCNC: 52 U/L (ref 55–135)
ALP SERPL-CCNC: 66 U/L (ref 55–135)
ALT SERPL W/O P-5'-P-CCNC: 12 U/L (ref 10–44)
ALT SERPL W/O P-5'-P-CCNC: 9 U/L (ref 10–44)
ANION GAP SERPL CALC-SCNC: 11 MMOL/L (ref 8–16)
ANION GAP SERPL CALC-SCNC: 11 MMOL/L (ref 8–16)
ANION GAP SERPL CALC-SCNC: 9 MMOL/L (ref 8–16)
ANISOCYTOSIS BLD QL SMEAR: SLIGHT
ANISOCYTOSIS BLD QL SMEAR: SLIGHT
AST SERPL-CCNC: 15 U/L (ref 10–40)
AST SERPL-CCNC: 16 U/L (ref 10–40)
BASOPHILS # BLD AUTO: 0.04 K/UL (ref 0–0.2)
BASOPHILS # BLD AUTO: 0.04 K/UL (ref 0–0.2)
BASOPHILS # BLD AUTO: 0.05 K/UL (ref 0–0.2)
BASOPHILS # BLD AUTO: ABNORMAL K/UL (ref 0–0.2)
BASOPHILS NFR BLD: 0.2 % (ref 0–1.9)
BASOPHILS NFR BLD: 0.2 % (ref 0–1.9)
BASOPHILS NFR BLD: 0.3 % (ref 0–1.9)
BASOPHILS NFR BLD: 1 % (ref 0–1.9)
BILIRUB SERPL-MCNC: 0.7 MG/DL (ref 0.1–1)
BILIRUB SERPL-MCNC: 0.7 MG/DL (ref 0.1–1)
BLD GP AB SCN CELLS X3 SERPL QL: NORMAL
BUN SERPL-MCNC: 50 MG/DL (ref 8–23)
BUN SERPL-MCNC: 51 MG/DL (ref 8–23)
BUN SERPL-MCNC: 64 MG/DL (ref 8–23)
BURR CELLS BLD QL SMEAR: ABNORMAL
BURR CELLS BLD QL SMEAR: ABNORMAL
CALCIUM SERPL-MCNC: 7.4 MG/DL (ref 8.7–10.5)
CALCIUM SERPL-MCNC: 8.5 MG/DL (ref 8.7–10.5)
CALCIUM SERPL-MCNC: 8.8 MG/DL (ref 8.7–10.5)
CHLORIDE SERPL-SCNC: 100 MMOL/L (ref 95–110)
CHLORIDE SERPL-SCNC: 103 MMOL/L (ref 95–110)
CHLORIDE SERPL-SCNC: 107 MMOL/L (ref 95–110)
CO2 SERPL-SCNC: 15 MMOL/L (ref 23–29)
CO2 SERPL-SCNC: 18 MMOL/L (ref 23–29)
CO2 SERPL-SCNC: 21 MMOL/L (ref 23–29)
CREAT SERPL-MCNC: 2 MG/DL (ref 0.5–1.4)
CREAT SERPL-MCNC: 2.1 MG/DL (ref 0.5–1.4)
CREAT SERPL-MCNC: 2.7 MG/DL (ref 0.5–1.4)
DELSYS: ABNORMAL
DIFFERENTIAL METHOD: ABNORMAL
EOSINOPHIL # BLD AUTO: 0 K/UL (ref 0–0.5)
EOSINOPHIL # BLD AUTO: 0.1 K/UL (ref 0–0.5)
EOSINOPHIL # BLD AUTO: 0.1 K/UL (ref 0–0.5)
EOSINOPHIL # BLD AUTO: ABNORMAL K/UL (ref 0–0.5)
EOSINOPHIL NFR BLD: 0 % (ref 0–8)
EOSINOPHIL NFR BLD: 0.3 % (ref 0–8)
EOSINOPHIL NFR BLD: 0.3 % (ref 0–8)
EOSINOPHIL NFR BLD: 0.4 % (ref 0–8)
ERYTHROCYTE [DISTWIDTH] IN BLOOD BY AUTOMATED COUNT: 17.3 % (ref 11.5–14.5)
ERYTHROCYTE [DISTWIDTH] IN BLOOD BY AUTOMATED COUNT: 17.4 % (ref 11.5–14.5)
ERYTHROCYTE [DISTWIDTH] IN BLOOD BY AUTOMATED COUNT: 17.6 % (ref 11.5–14.5)
ERYTHROCYTE [DISTWIDTH] IN BLOOD BY AUTOMATED COUNT: 17.7 % (ref 11.5–14.5)
ERYTHROCYTE [SEDIMENTATION RATE] IN BLOOD BY WESTERGREN METHOD: 22 MM/H
EST. GFR  (AFRICAN AMERICAN): 25.9 ML/MIN/1.73 M^2
EST. GFR  (AFRICAN AMERICAN): 35 ML/MIN/1.73 M^2
EST. GFR  (AFRICAN AMERICAN): 37.2 ML/MIN/1.73 M^2
EST. GFR  (NON AFRICAN AMERICAN): 22.4 ML/MIN/1.73 M^2
EST. GFR  (NON AFRICAN AMERICAN): 30.3 ML/MIN/1.73 M^2
EST. GFR  (NON AFRICAN AMERICAN): 32.2 ML/MIN/1.73 M^2
FIO2: 40
GIANT PLATELETS BLD QL SMEAR: PRESENT
GLUCOSE SERPL-MCNC: 109 MG/DL (ref 70–110)
GLUCOSE SERPL-MCNC: 128 MG/DL (ref 70–110)
GLUCOSE SERPL-MCNC: 134 MG/DL (ref 70–110)
HCO3 UR-SCNC: 17.2 MMOL/L (ref 24–28)
HCO3 UR-SCNC: 19.4 MMOL/L (ref 24–28)
HCO3 UR-SCNC: 19.7 MMOL/L (ref 24–28)
HCO3 UR-SCNC: 20.5 MMOL/L (ref 24–28)
HCO3 UR-SCNC: 20.6 MMOL/L (ref 24–28)
HCO3 UR-SCNC: 20.6 MMOL/L (ref 24–28)
HCO3 UR-SCNC: 20.9 MMOL/L (ref 24–28)
HCO3 UR-SCNC: 21.3 MMOL/L (ref 24–28)
HCT VFR BLD AUTO: 26.6 % (ref 40–54)
HCT VFR BLD AUTO: 27 % (ref 40–54)
HCT VFR BLD AUTO: 28.7 % (ref 40–54)
HCT VFR BLD AUTO: 30.6 % (ref 40–54)
HGB BLD-MCNC: 7.8 G/DL (ref 14–18)
HGB BLD-MCNC: 7.9 G/DL (ref 14–18)
HGB BLD-MCNC: 8.7 G/DL (ref 14–18)
HGB BLD-MCNC: 8.8 G/DL (ref 14–18)
HYPOCHROMIA BLD QL SMEAR: ABNORMAL
HYPOCHROMIA BLD QL SMEAR: ABNORMAL
IMM GRANULOCYTES # BLD AUTO: 0.42 K/UL (ref 0–0.04)
IMM GRANULOCYTES # BLD AUTO: 0.5 K/UL (ref 0–0.04)
IMM GRANULOCYTES # BLD AUTO: 0.57 K/UL (ref 0–0.04)
IMM GRANULOCYTES # BLD AUTO: ABNORMAL K/UL (ref 0–0.04)
IMM GRANULOCYTES NFR BLD AUTO: 2.6 % (ref 0–0.5)
IMM GRANULOCYTES NFR BLD AUTO: 2.7 % (ref 0–0.5)
IMM GRANULOCYTES NFR BLD AUTO: 3.1 % (ref 0–0.5)
IMM GRANULOCYTES NFR BLD AUTO: ABNORMAL % (ref 0–0.5)
LACTATE SERPL-SCNC: 0.6 MMOL/L (ref 0.5–2.2)
LDH SERPL L TO P-CCNC: 0.48 MMOL/L (ref 0.36–1.25)
LYMPHOCYTES # BLD AUTO: 0.4 K/UL (ref 1–4.8)
LYMPHOCYTES # BLD AUTO: ABNORMAL K/UL (ref 1–4.8)
LYMPHOCYTES NFR BLD: 2 % (ref 18–48)
LYMPHOCYTES NFR BLD: 2.3 % (ref 18–48)
LYMPHOCYTES NFR BLD: 2.4 % (ref 18–48)
LYMPHOCYTES NFR BLD: 3 % (ref 18–48)
MAGNESIUM SERPL-MCNC: 2.2 MG/DL (ref 1.6–2.6)
MAGNESIUM SERPL-MCNC: 2.6 MG/DL (ref 1.6–2.6)
MCH RBC QN AUTO: 24.7 PG (ref 27–31)
MCH RBC QN AUTO: 24.8 PG (ref 27–31)
MCH RBC QN AUTO: 25.4 PG (ref 27–31)
MCH RBC QN AUTO: 25.8 PG (ref 27–31)
MCHC RBC AUTO-ENTMCNC: 28.4 G/DL (ref 32–36)
MCHC RBC AUTO-ENTMCNC: 28.9 G/DL (ref 32–36)
MCHC RBC AUTO-ENTMCNC: 29.7 G/DL (ref 32–36)
MCHC RBC AUTO-ENTMCNC: 30.7 G/DL (ref 32–36)
MCV RBC AUTO: 83 FL (ref 82–98)
MCV RBC AUTO: 86 FL (ref 82–98)
MCV RBC AUTO: 87 FL (ref 82–98)
MCV RBC AUTO: 87 FL (ref 82–98)
METAMYELOCYTES NFR BLD MANUAL: 1 %
MODE: ABNORMAL
MONOCYTES # BLD AUTO: 1.9 K/UL (ref 0.3–1)
MONOCYTES # BLD AUTO: 2.2 K/UL (ref 0.3–1)
MONOCYTES # BLD AUTO: 2.3 K/UL (ref 0.3–1)
MONOCYTES # BLD AUTO: ABNORMAL K/UL (ref 0.3–1)
MONOCYTES NFR BLD: 11 % (ref 4–15)
MONOCYTES NFR BLD: 11.8 % (ref 4–15)
MONOCYTES NFR BLD: 12 % (ref 4–15)
MONOCYTES NFR BLD: 12.4 % (ref 4–15)
MYELOCYTES NFR BLD MANUAL: 1 %
NEUTROPHILS # BLD AUTO: 13 K/UL (ref 1.8–7.7)
NEUTROPHILS # BLD AUTO: 15.2 K/UL (ref 1.8–7.7)
NEUTROPHILS # BLD AUTO: 15.8 K/UL (ref 1.8–7.7)
NEUTROPHILS NFR BLD: 80 % (ref 38–73)
NEUTROPHILS NFR BLD: 81.6 % (ref 38–73)
NEUTROPHILS NFR BLD: 82.3 % (ref 38–73)
NEUTROPHILS NFR BLD: 83.1 % (ref 38–73)
NEUTS BAND NFR BLD MANUAL: 3 %
NRBC BLD-RTO: 0 /100 WBC
OVALOCYTES BLD QL SMEAR: ABNORMAL
OVALOCYTES BLD QL SMEAR: ABNORMAL
PCO2 BLDA: 36.4 MMHG (ref 35–45)
PCO2 BLDA: 39.1 MMHG (ref 35–45)
PCO2 BLDA: 42.3 MMHG (ref 35–45)
PCO2 BLDA: 46.3 MMHG (ref 35–45)
PCO2 BLDA: 47.1 MMHG (ref 35–45)
PCO2 BLDA: 48.7 MMHG (ref 35–45)
PCO2 BLDA: 52.2 MMHG (ref 35–45)
PCO2 BLDA: 61.2 MMHG (ref 35–45)
PEEP: 8
PH SMN: 7.15 [PH] (ref 7.35–7.45)
PH SMN: 7.21 [PH] (ref 7.35–7.45)
PH SMN: 7.23 [PH] (ref 7.35–7.45)
PH SMN: 7.25 [PH] (ref 7.35–7.45)
PH SMN: 7.26 [PH] (ref 7.35–7.45)
PH SMN: 7.27 [PH] (ref 7.35–7.45)
PH SMN: 7.28 [PH] (ref 7.35–7.45)
PH SMN: 7.31 [PH] (ref 7.35–7.45)
PHOSPHATE SERPL-MCNC: 6.2 MG/DL (ref 2.7–4.5)
PHOSPHATE SERPL-MCNC: 6.7 MG/DL (ref 2.7–4.5)
PLATELET # BLD AUTO: 311 K/UL (ref 150–350)
PLATELET # BLD AUTO: 329 K/UL (ref 150–350)
PLATELET # BLD AUTO: 371 K/UL (ref 150–350)
PLATELET # BLD AUTO: 389 K/UL (ref 150–350)
PLATELET BLD QL SMEAR: ABNORMAL
PMV BLD AUTO: 9.4 FL (ref 9.2–12.9)
PMV BLD AUTO: 9.5 FL (ref 9.2–12.9)
PMV BLD AUTO: 9.7 FL (ref 9.2–12.9)
PMV BLD AUTO: 9.9 FL (ref 9.2–12.9)
PO2 BLDA: 75 MMHG (ref 80–100)
PO2 BLDA: 78 MMHG (ref 80–100)
PO2 BLDA: 83 MMHG (ref 80–100)
PO2 BLDA: 83 MMHG (ref 80–100)
PO2 BLDA: 86 MMHG (ref 80–100)
PO2 BLDA: 89 MMHG (ref 80–100)
PO2 BLDA: 92 MMHG (ref 80–100)
PO2 BLDA: 93 MMHG (ref 80–100)
POC BE: -10 MMOL/L
POC BE: -7 MMOL/L
POC BE: -8 MMOL/L
POC BE: -8 MMOL/L
POC SATURATED O2: 91 % (ref 95–100)
POC SATURATED O2: 93 % (ref 95–100)
POC SATURATED O2: 93 % (ref 95–100)
POC SATURATED O2: 94 % (ref 95–100)
POC SATURATED O2: 94 % (ref 95–100)
POC SATURATED O2: 96 % (ref 95–100)
POC TCO2: 18 MMOL/L (ref 23–27)
POC TCO2: 21 MMOL/L (ref 23–27)
POC TCO2: 21 MMOL/L (ref 23–27)
POC TCO2: 22 MMOL/L (ref 23–27)
POC TCO2: 23 MMOL/L (ref 23–27)
POC TCO2: 23 MMOL/L (ref 23–27)
POCT GLUCOSE: 113 MG/DL (ref 70–110)
POCT GLUCOSE: 119 MG/DL (ref 70–110)
POCT GLUCOSE: 123 MG/DL (ref 70–110)
POCT GLUCOSE: 85 MG/DL (ref 70–110)
POCT GLUCOSE: 85 MG/DL (ref 70–110)
POCT GLUCOSE: 97 MG/DL (ref 70–110)
POIKILOCYTOSIS BLD QL SMEAR: SLIGHT
POIKILOCYTOSIS BLD QL SMEAR: SLIGHT
POLYCHROMASIA BLD QL SMEAR: ABNORMAL
POLYCHROMASIA BLD QL SMEAR: ABNORMAL
POTASSIUM SERPL-SCNC: 4.4 MMOL/L (ref 3.5–5.1)
POTASSIUM SERPL-SCNC: 5.1 MMOL/L (ref 3.5–5.1)
POTASSIUM SERPL-SCNC: 5.2 MMOL/L (ref 3.5–5.1)
PROT SERPL-MCNC: 4.7 G/DL (ref 6–8.4)
PROT SERPL-MCNC: 5.5 G/DL (ref 6–8.4)
RBC # BLD AUTO: 3.06 M/UL (ref 4.6–6.2)
RBC # BLD AUTO: 3.14 M/UL (ref 4.6–6.2)
RBC # BLD AUTO: 3.47 M/UL (ref 4.6–6.2)
RBC # BLD AUTO: 3.52 M/UL (ref 4.6–6.2)
SAMPLE: ABNORMAL
SAMPLE: NORMAL
SCHISTOCYTES BLD QL SMEAR: ABNORMAL
SITE: ABNORMAL
SITE: NORMAL
SODIUM SERPL-SCNC: 130 MMOL/L (ref 136–145)
SODIUM SERPL-SCNC: 132 MMOL/L (ref 136–145)
SODIUM SERPL-SCNC: 133 MMOL/L (ref 136–145)
TOXIC GRANULES BLD QL SMEAR: PRESENT
VT: 225
VT: 450
WBC # BLD AUTO: 15.73 K/UL (ref 3.9–12.7)
WBC # BLD AUTO: 18.65 K/UL (ref 3.9–12.7)
WBC # BLD AUTO: 19.06 K/UL (ref 3.9–12.7)
WBC # BLD AUTO: 22.81 K/UL (ref 3.9–12.7)

## 2019-10-13 PROCEDURE — 99900026 HC AIRWAY MAINTENANCE (STAT)

## 2019-10-13 PROCEDURE — 80053 COMPREHEN METABOLIC PANEL: CPT

## 2019-10-13 PROCEDURE — 85007 BL SMEAR W/DIFF WBC COUNT: CPT

## 2019-10-13 PROCEDURE — 25000003 PHARM REV CODE 250: Performed by: STUDENT IN AN ORGANIZED HEALTH CARE EDUCATION/TRAINING PROGRAM

## 2019-10-13 PROCEDURE — S0028 INJECTION, FAMOTIDINE, 20 MG: HCPCS | Performed by: SURGERY

## 2019-10-13 PROCEDURE — 83735 ASSAY OF MAGNESIUM: CPT | Mod: 91

## 2019-10-13 PROCEDURE — 63600175 PHARM REV CODE 636 W HCPCS: Performed by: STUDENT IN AN ORGANIZED HEALTH CARE EDUCATION/TRAINING PROGRAM

## 2019-10-13 PROCEDURE — S4991 NICOTINE PATCH NONLEGEND: HCPCS | Performed by: PHYSICIAN ASSISTANT

## 2019-10-13 PROCEDURE — 25000242 PHARM REV CODE 250 ALT 637 W/ HCPCS: Performed by: STUDENT IN AN ORGANIZED HEALTH CARE EDUCATION/TRAINING PROGRAM

## 2019-10-13 PROCEDURE — 25000003 PHARM REV CODE 250: Performed by: SURGERY

## 2019-10-13 PROCEDURE — S0030 INJECTION, METRONIDAZOLE: HCPCS | Performed by: STUDENT IN AN ORGANIZED HEALTH CARE EDUCATION/TRAINING PROGRAM

## 2019-10-13 PROCEDURE — 99233 PR SUBSEQUENT HOSPITAL CARE,LEVL III: ICD-10-PCS | Mod: 24,GC,, | Performed by: SURGERY

## 2019-10-13 PROCEDURE — 85027 COMPLETE CBC AUTOMATED: CPT

## 2019-10-13 PROCEDURE — 99233 SBSQ HOSP IP/OBS HIGH 50: CPT | Mod: 24,GC,, | Performed by: SURGERY

## 2019-10-13 PROCEDURE — 85025 COMPLETE CBC W/AUTO DIFF WBC: CPT | Mod: 91

## 2019-10-13 PROCEDURE — 94640 AIRWAY INHALATION TREATMENT: CPT

## 2019-10-13 PROCEDURE — 84100 ASSAY OF PHOSPHORUS: CPT | Mod: 91

## 2019-10-13 PROCEDURE — 82803 BLOOD GASES ANY COMBINATION: CPT

## 2019-10-13 PROCEDURE — 84100 ASSAY OF PHOSPHORUS: CPT

## 2019-10-13 PROCEDURE — 63600175 PHARM REV CODE 636 W HCPCS: Performed by: SURGERY

## 2019-10-13 PROCEDURE — P9045 ALBUMIN (HUMAN), 5%, 250 ML: HCPCS | Mod: JG | Performed by: STUDENT IN AN ORGANIZED HEALTH CARE EDUCATION/TRAINING PROGRAM

## 2019-10-13 PROCEDURE — 27000221 HC OXYGEN, UP TO 24 HOURS

## 2019-10-13 PROCEDURE — 94003 VENT MGMT INPAT SUBQ DAY: CPT

## 2019-10-13 PROCEDURE — 94761 N-INVAS EAR/PLS OXIMETRY MLT: CPT

## 2019-10-13 PROCEDURE — 63600175 PHARM REV CODE 636 W HCPCS: Mod: JG | Performed by: STUDENT IN AN ORGANIZED HEALTH CARE EDUCATION/TRAINING PROGRAM

## 2019-10-13 PROCEDURE — 25000003 PHARM REV CODE 250: Performed by: PHYSICIAN ASSISTANT

## 2019-10-13 PROCEDURE — 86850 RBC ANTIBODY SCREEN: CPT

## 2019-10-13 PROCEDURE — 37799 UNLISTED PX VASCULAR SURGERY: CPT

## 2019-10-13 PROCEDURE — 83605 ASSAY OF LACTIC ACID: CPT

## 2019-10-13 PROCEDURE — 80048 BASIC METABOLIC PNL TOTAL CA: CPT

## 2019-10-13 PROCEDURE — 99900035 HC TECH TIME PER 15 MIN (STAT)

## 2019-10-13 PROCEDURE — 20000000 HC ICU ROOM

## 2019-10-13 PROCEDURE — 27200966 HC CLOSED SUCTION SYSTEM

## 2019-10-13 PROCEDURE — 83735 ASSAY OF MAGNESIUM: CPT

## 2019-10-13 RX ORDER — DEXMEDETOMIDINE HYDROCHLORIDE 4 UG/ML
0.2 INJECTION, SOLUTION INTRAVENOUS CONTINUOUS
Status: DISCONTINUED | OUTPATIENT
Start: 2019-10-13 | End: 2019-10-20

## 2019-10-13 RX ORDER — ALBUMIN HUMAN 50 G/1000ML
12.5 SOLUTION INTRAVENOUS ONCE
Status: COMPLETED | OUTPATIENT
Start: 2019-10-13 | End: 2019-10-13

## 2019-10-13 RX ORDER — INDOMETHACIN 25 MG/1
50 CAPSULE ORAL ONCE
Status: DISCONTINUED | OUTPATIENT
Start: 2019-10-13 | End: 2019-10-13

## 2019-10-13 RX ORDER — INDOMETHACIN 25 MG/1
25 CAPSULE ORAL ONCE
Status: DISCONTINUED | OUTPATIENT
Start: 2019-10-13 | End: 2019-10-13

## 2019-10-13 RX ORDER — SODIUM BICARBONATE 42 MG/ML
24 INJECTION, SOLUTION INTRAVENOUS ONCE
Status: DISCONTINUED | OUTPATIENT
Start: 2019-10-13 | End: 2019-10-13 | Stop reason: SDUPTHER

## 2019-10-13 RX ORDER — HYDROMORPHONE HYDROCHLORIDE 1 MG/ML
0.5 INJECTION, SOLUTION INTRAMUSCULAR; INTRAVENOUS; SUBCUTANEOUS ONCE
Status: COMPLETED | OUTPATIENT
Start: 2019-10-13 | End: 2019-10-13

## 2019-10-13 RX ORDER — INDOMETHACIN 25 MG/1
50 CAPSULE ORAL ONCE
Status: COMPLETED | OUTPATIENT
Start: 2019-10-13 | End: 2019-10-13

## 2019-10-13 RX ORDER — FUROSEMIDE 10 MG/ML
20 INJECTION INTRAMUSCULAR; INTRAVENOUS ONCE
Status: COMPLETED | OUTPATIENT
Start: 2019-10-13 | End: 2019-10-13

## 2019-10-13 RX ADMIN — DEXMEDETOMIDINE HYDROCHLORIDE 0.2 MCG/KG/HR: 4 INJECTION, SOLUTION INTRAVENOUS at 09:10

## 2019-10-13 RX ADMIN — Medication 0.1 MCG/KG/MIN: at 02:10

## 2019-10-13 RX ADMIN — DEXMEDETOMIDINE HYDROCHLORIDE 0.8 MCG/KG/HR: 4 INJECTION, SOLUTION INTRAVENOUS at 08:10

## 2019-10-13 RX ADMIN — IPRATROPIUM BROMIDE AND ALBUTEROL SULFATE 3 ML: .5; 3 SOLUTION RESPIRATORY (INHALATION) at 03:10

## 2019-10-13 RX ADMIN — ALBUMIN (HUMAN) 12.5 G: 12.5 SOLUTION INTRAVENOUS at 04:10

## 2019-10-13 RX ADMIN — PROPOFOL 50 MCG/KG/MIN: 10 INJECTION, EMULSION INTRAVENOUS at 01:10

## 2019-10-13 RX ADMIN — SODIUM BICARBONATE 50 MEQ: 84 INJECTION, SOLUTION INTRAVENOUS at 09:10

## 2019-10-13 RX ADMIN — VANCOMYCIN HYDROCHLORIDE 1750 MG: 100 INJECTION, POWDER, LYOPHILIZED, FOR SOLUTION INTRAVENOUS at 05:10

## 2019-10-13 RX ADMIN — HEPARIN SODIUM 5000 UNITS: 5000 INJECTION, SOLUTION INTRAVENOUS; SUBCUTANEOUS at 06:10

## 2019-10-13 RX ADMIN — IPRATROPIUM BROMIDE AND ALBUTEROL SULFATE 3 ML: .5; 3 SOLUTION RESPIRATORY (INHALATION) at 07:10

## 2019-10-13 RX ADMIN — Medication 0.12 MCG/KG/MIN: at 07:10

## 2019-10-13 RX ADMIN — HYDROMORPHONE HYDROCHLORIDE 0.5 MG: 1 INJECTION, SOLUTION INTRAMUSCULAR; INTRAVENOUS; SUBCUTANEOUS at 05:10

## 2019-10-13 RX ADMIN — METRONIDAZOLE 500 MG: 500 INJECTION, SOLUTION INTRAVENOUS at 04:10

## 2019-10-13 RX ADMIN — PROPOFOL 35 MCG/KG/MIN: 10 INJECTION, EMULSION INTRAVENOUS at 06:10

## 2019-10-13 RX ADMIN — CHLORHEXIDINE GLUCONATE 0.12% ORAL RINSE 15 ML: 1.2 LIQUID ORAL at 09:10

## 2019-10-13 RX ADMIN — IPRATROPIUM BROMIDE AND ALBUTEROL SULFATE 3 ML: .5; 3 SOLUTION RESPIRATORY (INHALATION) at 10:10

## 2019-10-13 RX ADMIN — METRONIDAZOLE 500 MG: 500 INJECTION, SOLUTION INTRAVENOUS at 09:10

## 2019-10-13 RX ADMIN — HEPARIN SODIUM 5000 UNITS: 5000 INJECTION, SOLUTION INTRAVENOUS; SUBCUTANEOUS at 01:10

## 2019-10-13 RX ADMIN — FLUCONAZOLE 200 MG: 2 INJECTION, SOLUTION INTRAVENOUS at 06:10

## 2019-10-13 RX ADMIN — CEFEPIME 2 G: 2 INJECTION, POWDER, FOR SOLUTION INTRAVENOUS at 09:10

## 2019-10-13 RX ADMIN — METRONIDAZOLE 500 MG: 500 INJECTION, SOLUTION INTRAVENOUS at 12:10

## 2019-10-13 RX ADMIN — DEXMEDETOMIDINE HYDROCHLORIDE 0.4 MCG/KG/HR: 4 INJECTION, SOLUTION INTRAVENOUS at 02:10

## 2019-10-13 RX ADMIN — FUROSEMIDE 20 MG: 10 INJECTION, SOLUTION INTRAMUSCULAR; INTRAVENOUS at 10:10

## 2019-10-13 RX ADMIN — IPRATROPIUM BROMIDE AND ALBUTEROL SULFATE 3 ML: .5; 3 SOLUTION RESPIRATORY (INHALATION) at 08:10

## 2019-10-13 RX ADMIN — HEPARIN SODIUM 5000 UNITS: 5000 INJECTION, SOLUTION INTRAVENOUS; SUBCUTANEOUS at 09:10

## 2019-10-13 RX ADMIN — FAMOTIDINE 20 MG: 10 INJECTION, SOLUTION INTRAVENOUS at 09:10

## 2019-10-13 RX ADMIN — IPRATROPIUM BROMIDE AND ALBUTEROL SULFATE 3 ML: .5; 3 SOLUTION RESPIRATORY (INHALATION) at 11:10

## 2019-10-13 RX ADMIN — NICOTINE 1 PATCH: 21 PATCH, EXTENDED RELEASE TRANSDERMAL at 09:10

## 2019-10-13 RX ADMIN — PROPOFOL 40 MCG/KG/MIN: 10 INJECTION, EMULSION INTRAVENOUS at 03:10

## 2019-10-13 NOTE — PROGRESS NOTES
[facility]  Critical Care - Surgery  Progress Note    Patient Name: Pasha Harmon  MRN: [id number]  Admission Date: 10/9/2019  Hospital Length of Stay: 3 days  Code Status: Full Code  Attending Provider: Paulino Parrish MD  Primary Care Provider: Eze Root MD   Principal Problem: Bowel perforation    Subjective:     Hospital/ICU Course:  10/10: Admitted to SICU. R IJ TLC placed. On minimal pressors.   10/11: Remaining intubated. On minimal pressors.   10/12: Difficulty weaning pressors while on propofol and fentanyl to control resp rate from acidosis. Received 2u PRBCs after 1.25L albumin.     Interval History/Significant Events: Patient intubated and placed on precedex after stopping propofol and fentanyl for neuro exam this AM. Still not responding to commands. Weaned levo to 0.02. On broad spectrum Abx. WBC decreased to 22.8. Afebrile. Drains: R CT-200, L CT- 250, DENISE- 60 and 115, G-tube 25, J-tube 0. UOP still low.     Follow-up For: Procedure(s) (LRB):  LAPAROTOMY, EXPLORATORY (N/A)  EGD (ESOPHAGOGASTRODUODENOSCOPY) (N/A)  INSERTION, CATHETER, INTERCOSTAL, FOR DRAINAGE (Bilateral)  REPAIR, PERFORATION, GASTRIC (N/A)  INSERTION, GASTROSTOMY TUBE, PERCUTANEOUS (Left)  INSERTION, JEJUNOSTOMY TUBE (Left)    Post-Operative Day: 1 Day Post-Op    Objective:     Vital Signs (Most Recent):  Temp: 99.1 °F (37.3 °C) (10/13/19 1101)  Pulse: 85 (10/13/19 1400)  Resp: (!) 27 (10/13/19 1101)  BP: (!) 135/53 (10/13/19 1101)  SpO2: 97 % (10/13/19 1400) Vital Signs (24h Range):  Temp:  [98.9 °F (37.2 °C)-99.6 °F (37.6 °C)] 99.1 °F (37.3 °C)  Pulse:  [80-93] 85  Resp:  [6-27] 27  SpO2:  [92 %-98 %] 97 %  BP: (121-135)/(42-63) 135/53  Arterial Line BP: (104-146)/(41-55) 133/55     Weight: (!) 148.3 kg (326 lb 15.1 oz)  Body mass index is 41.96 kg/m².      Intake/Output Summary (Last 24 hours) at 10/13/2019 1402  Last data filed at 10/13/2019 1300  Gross per 24 hour   Intake 6932 ml   Output 1320 ml    Net 5612 ml       Physical Exam   Constitutional: He appears well-developed and well-nourished.   HENT:   Head: Normocephalic and atraumatic.   Eyes: Pupils are equal, round, and reactive to light. Conjunctivae are normal.   Neck: Normal range of motion.   Cardiovascular: Normal rate, regular rhythm and normal heart sounds.   Pulmonary/Chest: Breath sounds normal.   Intubated   Abdominal:   G-tube to gravity with bilious output.  J-tube clamped.    Neurological:   Sedated   Skin: Skin is warm and dry.       Vents:  Vent Mode: A/C (10/13/19 1350)  Ventilator Initiated: Yes (10/10/19 1621)  Set Rate: 22 bmp (10/13/19 1350)  Vt Set: 450 mL (10/13/19 1350)  PEEP/CPAP: 8 cmH20 (10/13/19 1350)  Oxygen Concentration (%): 40 (10/13/19 1400)  Peak Airway Pressure: 26 cmH2O (10/13/19 1350)  Plateau Pressure: 0 cmH20 (10/13/19 1350)  Total Ve: 9.37 mL (10/13/19 1350)  F/VT Ratio<105 (RSBI): (!) 75.71 (10/13/19 0830)    Lines/Drains/Airways     Central Venous Catheter Line                 Percutaneous Central Line Insertion/Assessment - triple lumen  10/10/19 1853 right internal jugular 2 days          Drain                 Closed/Suction Drain 10/10/19 1349 Right RUQ Bulb 19 Fr. 3 days         NG/OG Tube 10/10/19 Left nostril 3 days         Chest Tube 10/10/19 1510 1 Right Midaxillary;Fourth intercostal space 28 Fr. 2 days         Chest Tube 10/10/19 1513 Left Midaxillary;Fourth intercostal space 28 Fr. 2 days         Closed/Suction Drain 10/10/19 1408 Left;Superior LUQ Other (Comment) 20 Fr. 2 days         Closed/Suction Drain 10/10/19 1434 Left;Distal LUQ Other (Comment) 14 Fr. 2 days         Urethral Catheter 10/10/19 1530 Straight-tip;Double-lumen;Non-latex 16 Fr. 2 days          Airway                 Airway - Non-Surgical 10/10/19 1234 Endotracheal Tube 3 days          Arterial Line                 Arterial Line 10/10/19 1245 Right Radial 3 days          Peripheral Intravenous Line                 Peripheral IV -  Single Lumen 10/09/19 0744 20 G Right Forearm 4 days         Peripheral IV - Single Lumen 10/10/19 1237 16 G Left Antecubital 3 days         Peripheral IV - Single Lumen 10/10/19 1240 18 G Right Antecubital 3 days                Significant Labs:    CBC/Anemia Profile:  Recent Labs   Lab 10/12/19  1845 10/13/19  0310 10/13/19  1150   WBC 24.26* 22.81* 18.65*   HGB 8.3* 8.7* 7.9*   HCT 27.1* 30.6* 26.6*   * 389* 311   MCV 86 87 87   RDW 17.7* 17.3* 17.4*        Chemistries:  Recent Labs   Lab 10/12/19  0427 10/13/19  0310 10/13/19  1150   * 130* 133*   K 4.4 5.1 4.4    100 107   CO2 23 21* 15*   BUN 45* 51* 50*   CREATININE 1.9* 2.1* 2.0*   CALCIUM 8.1* 8.5* 7.4*   ALBUMIN 2.0*  --  2.1*   PROT 5.1*  --  4.7*   BILITOT 0.4  --  0.7   ALKPHOS 64  --  52*   ALT 13  --  9*   AST 19  --  15   MG 2.3 2.6 2.2   PHOS 5.1* 6.7* 6.2*       ABGs:   Recent Labs   Lab 10/13/19  1224   PH 7.247*   PCO2 47.1*   HCO3 20.5*   POCSATURATED 96   BE -7     Lactic Acid:   Recent Labs   Lab 10/12/19  0918 10/13/19  1254   LACTATE 0.7 0.6       Significant Imaging:  CXR: I have reviewed all pertinent results/findings within the past 24 hours and my personal findings are:  No pneumothorax appreciated bilaterally when compared to previous imaging.    Assessment/Plan:     * Bowel perforation  74M current smoker who underwent excision of bleeding gastric mass, post op course complicated by intraperitoneal free air. Now admitted to SICU following ex-lap 10/10/19 which revealed esophageal and gastric perforations which have been repaired.    Neuro:  - Sedated on precedex  - hold Fentanyl ggt for pain control as to not decrease resp drive    CV  - Fluid resuscitate as necessary  - Titrate norepinephrine, at 0.02  - vasopressin off  - Echo 9/12/10 wnl    Pulm  - Intubated, on vent  - Plan to leave intubated today  - Significant history of smoking  - ABGs PRN w/ lactates    GI  - Post-op esophageal and gastric perforations 10/9,  now repaired via ex-lap  - Cefepime, flagyl, vanc, diflucan for broad coverage.   - Monitor drains, care per surgery  - Do not use or advance NGT. At site of repair.  - Do not use G-tube. Leave to gravity.  - J-tube clamped.    Renal  - BHASKAR; Cr 2.8 on admit on baseline  - Improved on this morning's labs.   - Monitor UOP. Possibly oliguric at baseline.   - Daily CMP  - Electrolyte repletion PRN  - Once off pressors, likely start Lasix drip at rate of 5.     Heme/ID  - Hgb 8.7, no signs of active bleeding  - CBCs daily  - WBC elevated, but decreasing  - Cefepime, flagyl, vancomycin, and diflucan for contaminated peritoneum    Endo  - DM2  - POCT glucose q4h  - SSI    Dispo: Continue ICU care. Leave intubated today.          Travis Malone MD  Critical Care - Surgery  [facility]

## 2019-10-13 NOTE — ASSESSMENT & PLAN NOTE
Patient is 75 yo M with 7 cm contained esophageal perforation and gastric perforation after AES procedure who underwent emergent ex lap and EGD on 10/10/19    Keep NGT to low continuous suction - NOT IN STOMACH - do not advance, do not remove - management per thoracic  Bilateral chest tubes in place to monitor for esophageal leak - do not remove unless thoracic surgery ok, keep to suction.   Continue G tube to gravity for gastric decompression - do not use for meds or feeds  Continue to clamp J tube - do not start tube feeds today, preferably wait till patient off pressors  Continue to resuscitate and wean pressors and vent as tolerated  Rest of care per SICU

## 2019-10-13 NOTE — TREATMENT PLAN
GI Treatment Plan    Pasha Harmon is a 74 y.o. male admitted to hospital 10/9/2019 (Hospital Day: 5) due to Bowel perforation.     Interval History  - uptrended levophed requirement overnight, on evaluation in AM off sedation and vsopressers.  - not responding to commands  - wife at bedside    Objective  Temp:  [98.9 °F (37.2 °C)-99.7 °F (37.6 °C)] 99.7 °F (37.6 °C) (10/13 1501)  Pulse:  [80-93] 86 (10/13 1515)  BP: (121-135)/(42-63) 122/49 (10/13 1501)  Resp:  [6-29] 29 (10/13 1514)  SpO2:  [92 %-98 %] 96 % (10/13 1515)  Arterial Line BP: (104-146)/(41-55) 134/53 (10/13 1515)    General: intubated, sedated.  Abdomen: Normoactive bowel sounds. Non-distended. Normal tympany. Soft. Non-tender. No peritoneal signs.    Laboratory    Recent Labs   Lab 10/12/19  1845 10/13/19  0310 10/13/19  1150   HGB 8.3* 8.7* 7.9*       Lab Results   Component Value Date    WBC 18.65 (H) 10/13/2019    HGB 7.9 (L) 10/13/2019    HCT 26.6 (L) 10/13/2019    MCV 87 10/13/2019     10/13/2019       Lab Results   Component Value Date     (L) 10/13/2019    K 4.4 10/13/2019     10/13/2019    CO2 15 (L) 10/13/2019    BUN 50 (H) 10/13/2019    CREATININE 2.0 (H) 10/13/2019    CALCIUM 7.4 (L) 10/13/2019    ANIONGAP 11 10/13/2019    ESTGFRAFRICA 37.2 (A) 10/13/2019    EGFRNONAA 32.2 (A) 10/13/2019       Lab Results   Component Value Date    ALT 9 (L) 10/13/2019    AST 15 10/13/2019    ALKPHOS 52 (L) 10/13/2019    BILITOT 0.7 10/13/2019       Lab Results   Component Value Date    INR 1.0 10/12/2019    INR 1.2 10/10/2019    INR 1.2 05/24/2019       Plan  - s/p gastric lipoma ESD c/b delayed gastric perforation s/p operative intervention  - appreciate general surgery and critical care management  - We will continue to follow.    Thank you for involving us in the care of Pasha Harmon. Please call with any additional questions, concerns or changes in the patient's clinical status.    Molina Ba,  MD  Gastroenterology Fellow  Spectralink: [phone]

## 2019-10-13 NOTE — PLAN OF CARE
Dr. Malone notified of pt's levo requirement increasing to 0.1mcg/kg/min.  CVP: 13.  No new orders @ this time.  Will continue to monitor.

## 2019-10-13 NOTE — CARE UPDATE
ABG results read and verified with Dr Leon. PH 7.211 PCO2 52.2 PO2 83 -7 BE HCO3 20.9. Current vent settings R22  1.0i-time peep 8 40%. Ventilating pt at a 2.8ml/kg. Verbal given to increase VT to 400 or at minimum 6ml/kg. vt increased to 450 all other settings remained. No complications, RN at bedside. Will continue to monitor.

## 2019-10-13 NOTE — PROGRESS NOTES
Dr. Olmos @ bedside, updated on low UOP, Levo requirement @ 0.11mcg/kg/min, & Labs.  250cc Albumin ordered.

## 2019-10-13 NOTE — SUBJECTIVE & OBJECTIVE
Interval History: Respiratory acidosis yesterday, improving this morning with vent changes. Pressors weaned off then back on minimal amount this am. Making progress.     Medications:  Continuous Infusions:   dexmedetomidine (PRECEDEX) infusion 0.2 mcg/kg/hr (10/13/19 1000)    fentanyl Stopped (10/13/19 0800)    norepinephrine bitartrate-D5W 0.04 mcg/kg/min (10/13/19 1000)    propofol Stopped (10/13/19 0800)    vasopressin (PITRESSIN) infusion Stopped (10/11/19 0300)     Scheduled Meds:   albuterol-ipratropium  3 mL Nebulization Q4H    ceFEPime (MAXIPIME) IVPB  2 g Intravenous Q12H    chlorhexidine  15 mL Mouth/Throat BID    famotidine (PF)  20 mg Intravenous Daily    fluconazole (DIFLUCAN) IVPB  200 mg Intravenous Q24H    fluticasone furoate-vilanterol  1 puff Inhalation Daily    heparin (porcine)  5,000 Units Subcutaneous Q8H    lactated ringers  1,000 mL Intravenous Once    metronidazole  500 mg Intravenous Q8H    nicotine  1 patch Transdermal Daily    vancomycin (VANCOCIN) IVPB  1,750 mg Intravenous Q24H     PRN Meds:sodium chloride, albuterol-ipratropium, Dextrose 10% Bolus, Dextrose 10% Bolus, glucagon (human recombinant), insulin aspart U-100, ondansetron, sodium chloride 0.9%     Review of patient's allergies indicates:   Allergen Reactions    Meloxicam Other (See Comments)     Ulcer, GI bleed      Objective:     Vital Signs (Most Recent):  Temp: 98.9 °F (37.2 °C) (10/13/19 0701)  Pulse: 84 (10/13/19 1015)  Resp: (!) 24 (10/13/19 0830)  BP: (!) 121/42 (10/13/19 0701)  SpO2: 95 % (10/13/19 1015) Vital Signs (24h Range):  Temp:  [98.9 °F (37.2 °C)-99.6 °F (37.6 °C)] 98.9 °F (37.2 °C)  Pulse:  [82-93] 84  Resp:  [6-26] 24  SpO2:  [89 %-98 %] 95 %  BP: (117-132)/(42-63) 121/42  Arterial Line BP: (106-146)/(39-53) 118/47     Weight: (!) 148.3 kg (326 lb 15.1 oz)  Body mass index is 41.96 kg/m².    Intake/Output - Last 3 Shifts       10/11 0700 - 10/12 0659 10/12 0700 - 10/13 0659 10/13 0700 -  10/14 0659    I.V. (mL/kg) 3446.1 (25.8) 7481 (50.4)     Blood  451     IV Piggyback       Total Intake(mL/kg) 3446.1 (25.8) 7932 (53.5)     Urine (mL/kg/hr) 1465 (0.5) 1175 (0.3) 95 (0.2)    Drains 691 200 0    Blood       Chest Tube 306 430 90    Total Output 2462 1805 185    Net +984.1 +6127 -185                 Physical Exam   Constitutional: He appears ill. He is sedated and intubated.   HENT:   Bridled NGT in place   Pulmonary/Chest: He is intubated.   Vent Mode: A/C  Oxygen Concentration (%):  (39-98) 40  Resp Rate Total:  (17 br/min-31 br/min) 17 br/min  Vt Set:  (400 mL-600 mL) 400 mL  PEEP/CPAP:  (8 cmH20-10 cmH20) 8 cmH20  Mean Airway Pressure:  (12 xdA17-17 cmH20) 12 cmH20       Abdominal: Soft. He exhibits no distension.       Significant Labs:  CBC:   Recent Labs   Lab 10/13/19  0310   WBC 22.81*   RBC 3.52*   HGB 8.7*   HCT 30.6*   *   MCV 87   MCH 24.7*   MCHC 28.4*     CMP:   Recent Labs   Lab 10/12/19  0427 10/13/19  0310   * 128*   CALCIUM 8.1* 8.5*   ALBUMIN 2.0*  --    PROT 5.1*  --    * 130*   K 4.4 5.1   CO2 23 21*    100   BUN 45* 51*   CREATININE 1.9* 2.1*   ALKPHOS 64  --    ALT 13  --    AST 19  --    BILITOT 0.4  --        Significant Diagnostics:  I have reviewed all pertinent imaging results/findings within the past 24 hours.

## 2019-10-13 NOTE — SUBJECTIVE & OBJECTIVE
Interval History/Significant Events: Patient intubated and placed on precedex after stopping propofol and fentanyl for neuro exam this AM. Still not responding to commands. Weaned levo to 0.02. On broad spectrum Abx. WBC decreased to 22.8. Afebrile. Drains: R CT-200, L CT- 250, DENISE- 60 and 115, G-tube 25, J-tube 0. UOP still low.     Follow-up For: Procedure(s) (LRB):  LAPAROTOMY, EXPLORATORY (N/A)  EGD (ESOPHAGOGASTRODUODENOSCOPY) (N/A)  INSERTION, CATHETER, INTERCOSTAL, FOR DRAINAGE (Bilateral)  REPAIR, PERFORATION, GASTRIC (N/A)  INSERTION, GASTROSTOMY TUBE, PERCUTANEOUS (Left)  INSERTION, JEJUNOSTOMY TUBE (Left)    Post-Operative Day: 1 Day Post-Op    Objective:     Vital Signs (Most Recent):  Temp: 99.1 °F (37.3 °C) (10/13/19 1101)  Pulse: 85 (10/13/19 1400)  Resp: (!) 27 (10/13/19 1101)  BP: (!) 135/53 (10/13/19 1101)  SpO2: 97 % (10/13/19 1400) Vital Signs (24h Range):  Temp:  [98.9 °F (37.2 °C)-99.6 °F (37.6 °C)] 99.1 °F (37.3 °C)  Pulse:  [80-93] 85  Resp:  [6-27] 27  SpO2:  [92 %-98 %] 97 %  BP: (121-135)/(42-63) 135/53  Arterial Line BP: (104-146)/(41-55) 133/55     Weight: (!) 148.3 kg (326 lb 15.1 oz)  Body mass index is 41.96 kg/m².      Intake/Output Summary (Last 24 hours) at 10/13/2019 1402  Last data filed at 10/13/2019 1300  Gross per 24 hour   Intake 6932 ml   Output 1320 ml   Net 5612 ml       Physical Exam   Constitutional: He appears well-developed and well-nourished.   HENT:   Head: Normocephalic and atraumatic.   Eyes: Pupils are equal, round, and reactive to light. Conjunctivae are normal.   Neck: Normal range of motion.   Cardiovascular: Normal rate, regular rhythm and normal heart sounds.   Pulmonary/Chest: Breath sounds normal.   Intubated   Abdominal:   G-tube to gravity with bilious output.  J-tube clamped.    Neurological:   Sedated   Skin: Skin is warm and dry.       Vents:  Vent Mode: A/C (10/13/19 1350)  Ventilator Initiated: Yes (10/10/19 1621)  Set Rate: 22 bmp (10/13/19 1350)  Vt  Set: 450 mL (10/13/19 1350)  PEEP/CPAP: 8 cmH20 (10/13/19 1350)  Oxygen Concentration (%): 40 (10/13/19 1400)  Peak Airway Pressure: 26 cmH2O (10/13/19 1350)  Plateau Pressure: 0 cmH20 (10/13/19 1350)  Total Ve: 9.37 mL (10/13/19 1350)  F/VT Ratio<105 (RSBI): (!) 75.71 (10/13/19 0830)    Lines/Drains/Airways     Central Venous Catheter Line                 Percutaneous Central Line Insertion/Assessment - triple lumen  10/10/19 1853 right internal jugular 2 days          Drain                 Closed/Suction Drain 10/10/19 1349 Right RUQ Bulb 19 Fr. 3 days         NG/OG Tube 10/10/19 Left nostril 3 days         Chest Tube 10/10/19 1510 1 Right Midaxillary;Fourth intercostal space 28 Fr. 2 days         Chest Tube 10/10/19 1513 Left Midaxillary;Fourth intercostal space 28 Fr. 2 days         Closed/Suction Drain 10/10/19 1408 Left;Superior LUQ Other (Comment) 20 Fr. 2 days         Closed/Suction Drain 10/10/19 1434 Left;Distal LUQ Other (Comment) 14 Fr. 2 days         Urethral Catheter 10/10/19 1530 Straight-tip;Double-lumen;Non-latex 16 Fr. 2 days          Airway                 Airway - Non-Surgical 10/10/19 1234 Endotracheal Tube 3 days          Arterial Line                 Arterial Line 10/10/19 1245 Right Radial 3 days          Peripheral Intravenous Line                 Peripheral IV - Single Lumen 10/09/19 0744 20 G Right Forearm 4 days         Peripheral IV - Single Lumen 10/10/19 1237 16 G Left Antecubital 3 days         Peripheral IV - Single Lumen 10/10/19 1240 18 G Right Antecubital 3 days                Significant Labs:    CBC/Anemia Profile:  Recent Labs   Lab 10/12/19  1845 10/13/19  0310 10/13/19  1150   WBC 24.26* 22.81* 18.65*   HGB 8.3* 8.7* 7.9*   HCT 27.1* 30.6* 26.6*   * 389* 311   MCV 86 87 87   RDW 17.7* 17.3* 17.4*        Chemistries:  Recent Labs   Lab 10/12/19  0427 10/13/19  0310 10/13/19  1150   * 130* 133*   K 4.4 5.1 4.4    100 107   CO2 23 21* 15*   BUN 45* 51* 50*    CREATININE 1.9* 2.1* 2.0*   CALCIUM 8.1* 8.5* 7.4*   ALBUMIN 2.0*  --  2.1*   PROT 5.1*  --  4.7*   BILITOT 0.4  --  0.7   ALKPHOS 64  --  52*   ALT 13  --  9*   AST 19  --  15   MG 2.3 2.6 2.2   PHOS 5.1* 6.7* 6.2*       ABGs:   Recent Labs   Lab 10/13/19  1224   PH 7.247*   PCO2 47.1*   HCO3 20.5*   POCSATURATED 96   BE -7     Lactic Acid:   Recent Labs   Lab 10/12/19  0918 10/13/19  1254   LACTATE 0.7 0.6       Significant Imaging:  CXR: I have reviewed all pertinent results/findings within the past 24 hours and my personal findings are:  No pneumothorax appreciated bilaterally when compared to previous imaging.

## 2019-10-13 NOTE — PROGRESS NOTES
Pharmacokinetic Assessment Follow Up: IV Vancomycin    Vancomycin serum concentration assessment(s):    The trough level was drawn correctly and can be used to guide therapy at this time. The measurement is within the desired definitive target range of 10 to 20 mcg/mL.    Vancomycin Regimen Plan:    Continue regimen to Vancomycin 1750 mg mg IV every 24 hours with next serum trough concentration measured prior to 3rd dose on 10/14.    Drug levels (last 3 results):  Recent Labs   Lab Result Units 10/12/19  1700   Vancomycin-Trough ug/mL 11.8       Pharmacy will continue to follow and monitor vancomycin.    Please contact pharmacy at extension [phone] for questions regarding this assessment.    Thank you for the consult,   Mihai Hernández       Patient brief summary:  Pasha Harmon is a 74 y.o. male initiated on antimicrobial therapy with IV Vancomycin for treatment of intra-abdominal infection    The patient's current regimen is 1750 mg every 24 hours.     Drug Allergies:   Review of patient's allergies indicates:   Allergen Reactions    Meloxicam Other (See Comments)     Ulcer, GI bleed        Actual Body Weight:   148.3 kg    Renal Function:   Estimated Creatinine Clearance: 48.1 mL/min (A) (based on SCr of 2.1 mg/dL (H)).,     CBC (last 72 hours):  Recent Labs   Lab Result Units 10/10/19  1610 10/11/19  0308 10/12/19  0427 10/12/19  1416 10/12/19  1845 10/13/19  0310   WBC K/uL 16.20* 18.40*  18.40* 21.66* 18.25* 24.26* 22.81*   Hemoglobin g/dL 8.7* 8.2*  8.2* 7.5* 6.0* 8.3* 8.7*   Hematocrit % 30.2* 27.2*  27.2* 25.7* 21.4* 27.1* 30.6*   Platelets K/uL 444* 432*  432* 433* 309 383* 389*   Gran% % 84.6* 77.0*  77.0* 86.8* 86.7* 86.3* 80.0*   Lymph% % 5.1* 5.0*  5.0* 2.9* 2.5* 2.1* 3.0*   Mono% % 9.3 5.0  5.0 8.1 8.9 8.3 11.0   Eosinophil% % 0.1 0.0  0.0 0.2 0.2 0.3 0.0   Basophil% % 0.2 0.5  0.5 0.2 0.1 0.2 1.0   Differential Method  Automated Manual  Manual Automated Automated Automated Manual        Metabolic Panel (last 72 hours):  Recent Labs   Lab Result Units 10/10/19  1610 10/11/19  0308 10/12/19  0427 10/13/19  0310   Sodium mmol/L 135* 132*  132* 132* 130*   Potassium mmol/L 4.2 4.7  4.7 4.4 5.1   Chloride mmol/L 102 101  101 101 100   CO2 mmol/L 22* 19*  19* 23 21*   Glucose mg/dL 118* 147*  147* 135* 128*   BUN, Bld mg/dL 27* 31*  31* 45* 51*   Creatinine mg/dL 2.8* 2.5*  2.5* 1.9* 2.1*   Albumin g/dL 2.5* 2.4* 2.0*  --    Total Bilirubin mg/dL 0.5 0.4 0.4  --    Alkaline Phosphatase U/L 43* 42* 64  --    AST U/L 37 33 19  --    ALT U/L 25 21 13  --    Magnesium mg/dL 2.0 1.7  1.7 2.3 2.6   Phosphorus mg/dL 4.7* 4.3  4.3 5.1* 6.7*       Vancomycin Administrations:  vancomycin given in the last 96 hours                   vancomycin 1750 mg in 0.9% sodium chloride 500 mL IVPB (mg) 1,750 mg New Bag 10/12/19 1756     1,750 mg New Bag 10/11/19 1800                Microbiologic Results:  Microbiology Results (last 7 days)     Procedure Component Value Units Date/Time    Aerobic culture [[id number]]  (Abnormal)  (Susceptibility) Collected:  10/10/19 1325    Order Status:  Completed Specimen:  Body Fluid from Abdomen Updated:  10/12/19 1115     Aerobic Bacterial Culture KLEBSIELLA PNEUMONIAE  Few      Narrative:       Abdominal fluid -cultures    AFB Culture & Smear [[id number]] Collected:  10/10/19 1325    Order Status:  Completed Specimen:  Body Fluid from Abdomen Updated:  10/11/19 2127     AFB Culture & Smear Culture in progress     AFB CULTURE STAIN No acid fast bacilli seen.    Narrative:       Abdominal fluid -cultures    Culture, Anaerobe [[id number]] Collected:  10/10/19 1325    Order Status:  Completed Specimen:  Body Fluid from Abdomen Updated:  10/11/19 0717     Anaerobic Culture Culture in progress    Narrative:       Abdominal fluid -cultures    Gram stain [[id number]] Collected:  10/10/19 1325    Order Status:  Completed Specimen:  Body Fluid from Abdomen Updated:  10/10/19 4512      Gram Stain Result Few WBC's      No organisms seen    Narrative:       Abdominal fluid -cultures    Fungus culture [[id number]] Collected:  10/10/19 1325    Order Status:  Sent Specimen:  Body Fluid from Abdomen Updated:  10/10/19 1483

## 2019-10-13 NOTE — ASSESSMENT & PLAN NOTE
74M current smoker who underwent excision of bleeding gastric mass, post op course complicated by intraperitoneal free air. Now admitted to SICU following ex-lap 10/10/19 which revealed esophageal and gastric perforations which have been repaired.    Neuro:  - Sedated on precedex  - hold Fentanyl ggt for pain control as to not decrease resp drive    CV  - Fluid resuscitate as necessary  - Titrate norepinephrine, at 0.02  - vasopressin off  - Echo 9/12/10 wnl    Pulm  - Intubated, on vent  - Plan to leave intubated today  - Significant history of smoking  - ABGs PRN w/ lactates    GI  - Post-op esophageal and gastric perforations 10/9, now repaired via ex-lap  - Cefepime, flagyl, vanc, diflucan for broad coverage.   - Monitor drains, care per surgery  - Do not use or advance NGT. At site of repair.  - Do not use G-tube. Leave to gravity.  - J-tube clamped.    Renal  - BHASKAR; Cr 2.8 on admit on baseline  - Improved on this morning's labs.   - Monitor UOP. Possibly oliguric at baseline.   - Daily CMP  - Electrolyte repletion PRN  - Once off pressors, likely start Lasix drip at rate of 5.     Heme/ID  - Hgb 8.7, no signs of active bleeding  - CBCs daily  - WBC elevated, but decreasing  - Cefepime, flagyl, vancomycin, and diflucan for contaminated peritoneum    Endo  - DM2  - POCT glucose q4h  - SSI    Dispo: Continue ICU care. Leave intubated today.

## 2019-10-13 NOTE — PLAN OF CARE
Pt vented on AC, 40%, & 8PEEP, O2 sats maintained >88%. MAPs maintained >65.  Gtts include: Propofol @ 35mcg/kg/min, Fentanyl @ 100mcg/hr, & Levo currently @ 0.11mcg/kg/min.  250cc Albumin given for low UOP & increasing pressor requirements. Chest tube output: 170cc serous. Minimal output noted from Gtube, NGT, & DENISE. Pt restrained. Plan of acre reviewed w/pt & spouse; all questions answered.

## 2019-10-13 NOTE — PLAN OF CARE
POC reviewed with Pt and family. Pt is awake and alert. Pt follows commands but took a 1.5 hours off sedation. Pt on AC 40/8. Pts HR has been 80s-90s NSR. MAP >65. Levo titrated off. Propofol and precedex given for RASS and comfort. CVP has been 12-14. Left CT intact with 50-100cc/4hrs. Right CT intact with 30-50cc/4hr. Right DENISE intact with minimal output. G tube to gravity and J tube clamped. NG tube to LIWS. Hallman intact with 25-35cc/hr U/O. ABG Q4. Family at bedside. All questions answered. Plan to possibly try SBT in the AM. VSS. Will continue to monitor

## 2019-10-13 NOTE — PROGRESS NOTES
"[facility]  General Surgery  Progress Note    Subjective:     History of Present Illness:  Patient is a 74 yo M with history of COPD and bleeding gastric mass who underwent EGD with AES yesterday where they removed a 7 cm "ulcerated lipoma" from his gastric antrum using submucosal endoscopic dissection. When removing the mass, it became stuck in the distal esophagus. They had to removed it piecemeal, and caused some mucosal esophageal tears. He was admitted for obs overnight, and this morning his WBC was elevated and he was complaining of abd pain. CXR showed free intraperitoneal free air and general surgery was consulted for evaluation.    Post-Op Info:  Procedure(s) (LRB):  LAPAROTOMY, EXPLORATORY (N/A)  EGD (ESOPHAGOGASTRODUODENOSCOPY) (N/A)  INSERTION, CATHETER, INTERCOSTAL, FOR DRAINAGE (Bilateral)  REPAIR, PERFORATION, GASTRIC (N/A)  INSERTION, GASTROSTOMY TUBE, PERCUTANEOUS (Left)  INSERTION, JEJUNOSTOMY TUBE (Left)   3 Days Post-Op     Interval History: Respiratory acidosis yesterday, improving this morning with vent changes. Pressors weaned off then back on minimal amount this am. Making progress.     Medications:  Continuous Infusions:   dexmedetomidine (PRECEDEX) infusion 0.2 mcg/kg/hr (10/13/19 1000)    fentanyl Stopped (10/13/19 0800)    norepinephrine bitartrate-D5W 0.04 mcg/kg/min (10/13/19 1000)    propofol Stopped (10/13/19 0800)    vasopressin (PITRESSIN) infusion Stopped (10/11/19 0300)     Scheduled Meds:   albuterol-ipratropium  3 mL Nebulization Q4H    ceFEPime (MAXIPIME) IVPB  2 g Intravenous Q12H    chlorhexidine  15 mL Mouth/Throat BID    famotidine (PF)  20 mg Intravenous Daily    fluconazole (DIFLUCAN) IVPB  200 mg Intravenous Q24H    fluticasone furoate-vilanterol  1 puff Inhalation Daily    heparin (porcine)  5,000 Units Subcutaneous Q8H    lactated ringers  1,000 mL Intravenous Once    metronidazole  500 mg Intravenous Q8H    nicotine  1 patch " Transdermal Daily    vancomycin (VANCOCIN) IVPB  1,750 mg Intravenous Q24H     PRN Meds:sodium chloride, albuterol-ipratropium, Dextrose 10% Bolus, Dextrose 10% Bolus, glucagon (human recombinant), insulin aspart U-100, ondansetron, sodium chloride 0.9%     Review of patient's allergies indicates:   Allergen Reactions    Meloxicam Other (See Comments)     Ulcer, GI bleed      Objective:     Vital Signs (Most Recent):  Temp: 98.9 °F (37.2 °C) (10/13/19 0701)  Pulse: 84 (10/13/19 1015)  Resp: (!) 24 (10/13/19 0830)  BP: (!) 121/42 (10/13/19 0701)  SpO2: 95 % (10/13/19 1015) Vital Signs (24h Range):  Temp:  [98.9 °F (37.2 °C)-99.6 °F (37.6 °C)] 98.9 °F (37.2 °C)  Pulse:  [82-93] 84  Resp:  [6-26] 24  SpO2:  [89 %-98 %] 95 %  BP: (117-132)/(42-63) 121/42  Arterial Line BP: (106-146)/(39-53) 118/47     Weight: (!) 148.3 kg (326 lb 15.1 oz)  Body mass index is 41.96 kg/m².    Intake/Output - Last 3 Shifts       10/11 0700 - 10/12 0659 10/12 0700 - 10/13 0659 10/13 0700 - 10/14 0659    I.V. (mL/kg) 3446.1 (25.8) 7481 (50.4)     Blood  451     IV Piggyback       Total Intake(mL/kg) 3446.1 (25.8) 7932 (53.5)     Urine (mL/kg/hr) 1465 (0.5) 1175 (0.3) 95 (0.2)    Drains 691 200 0    Blood       Chest Tube 306 430 90    Total Output 2462 1805 185    Net +984.1 +6127 -185                 Physical Exam   Constitutional: He appears ill. He is sedated and intubated.   HENT:   Bridled NGT in place   Pulmonary/Chest: He is intubated.   Vent Mode: A/C  Oxygen Concentration (%):  (39-98) 40  Resp Rate Total:  (17 br/min-31 br/min) 17 br/min  Vt Set:  (400 mL-600 mL) 400 mL  PEEP/CPAP:  (8 cmH20-10 cmH20) 8 cmH20  Mean Airway Pressure:  (12 zfW75-43 cmH20) 12 cmH20       Abdominal: Soft. He exhibits no distension.       Significant Labs:  CBC:   Recent Labs   Lab 10/13/19  0310   WBC 22.81*   RBC 3.52*   HGB 8.7*   HCT 30.6*   *   MCV 87   MCH 24.7*   MCHC 28.4*     CMP:   Recent Labs   Lab 10/12/19  0427 10/13/19  0310   GLU  135* 128*   CALCIUM 8.1* 8.5*   ALBUMIN 2.0*  --    PROT 5.1*  --    * 130*   K 4.4 5.1   CO2 23 21*    100   BUN 45* 51*   CREATININE 1.9* 2.1*   ALKPHOS 64  --    ALT 13  --    AST 19  --    BILITOT 0.4  --        Significant Diagnostics:  I have reviewed all pertinent imaging results/findings within the past 24 hours.    Assessment/Plan:     * Bowel perforation  Patient is 73 yo M with 7 cm contained esophageal perforation and gastric perforation after AES procedure who underwent emergent ex lap and EGD on 10/10/19    Keep NGT to low continuous suction - NOT IN STOMACH - do not advance, do not remove - management per thoracic  Bilateral chest tubes in place to monitor for esophageal leak - do not remove unless thoracic surgery ok, keep to suction.   Continue G tube to gravity for gastric decompression - do not use for meds or feeds  Continue to clamp J tube - do not start tube feeds today, preferably wait till patient off pressors  Continue to resuscitate and wean pressors and vent as tolerated  Rest of care per SICU         Gabino Santoro MD  General Surgery  [facility]

## 2019-10-14 LAB
ALBUMIN SERPL BCP-MCNC: 2.1 G/DL (ref 3.5–5.2)
ALLENS TEST: ABNORMAL
ALP SERPL-CCNC: 67 U/L (ref 55–135)
ALT SERPL W/O P-5'-P-CCNC: 12 U/L (ref 10–44)
ANION GAP SERPL CALC-SCNC: 11 MMOL/L (ref 8–16)
ANION GAP SERPL CALC-SCNC: 12 MMOL/L (ref 8–16)
ANION GAP SERPL CALC-SCNC: 13 MMOL/L (ref 8–16)
ANISOCYTOSIS BLD QL SMEAR: SLIGHT
AST SERPL-CCNC: 19 U/L (ref 10–40)
BACTERIA SPEC ANAEROBE CULT: NORMAL
BASOPHILS # BLD AUTO: 0.06 K/UL (ref 0–0.2)
BASOPHILS NFR BLD: 0.4 % (ref 0–1.9)
BILIRUB SERPL-MCNC: 1 MG/DL (ref 0.1–1)
BUN SERPL-MCNC: 67 MG/DL (ref 8–23)
BUN SERPL-MCNC: 68 MG/DL (ref 8–23)
BUN SERPL-MCNC: 79 MG/DL (ref 8–23)
BURR CELLS BLD QL SMEAR: ABNORMAL
CALCIUM SERPL-MCNC: 8.4 MG/DL (ref 8.7–10.5)
CALCIUM SERPL-MCNC: 8.5 MG/DL (ref 8.7–10.5)
CALCIUM SERPL-MCNC: 8.7 MG/DL (ref 8.7–10.5)
CHLORIDE SERPL-SCNC: 102 MMOL/L (ref 95–110)
CHLORIDE SERPL-SCNC: 102 MMOL/L (ref 95–110)
CHLORIDE SERPL-SCNC: 104 MMOL/L (ref 95–110)
CO2 SERPL-SCNC: 17 MMOL/L (ref 23–29)
CO2 SERPL-SCNC: 17 MMOL/L (ref 23–29)
CO2 SERPL-SCNC: 18 MMOL/L (ref 23–29)
CREAT SERPL-MCNC: 2.6 MG/DL (ref 0.5–1.4)
CREAT SERPL-MCNC: 2.7 MG/DL (ref 0.5–1.4)
CREAT SERPL-MCNC: 2.8 MG/DL (ref 0.5–1.4)
DELSYS: ABNORMAL
DIFFERENTIAL METHOD: ABNORMAL
EOSINOPHIL # BLD AUTO: 0.1 K/UL (ref 0–0.5)
EOSINOPHIL NFR BLD: 0.5 % (ref 0–8)
ERYTHROCYTE [DISTWIDTH] IN BLOOD BY AUTOMATED COUNT: 17.8 % (ref 11.5–14.5)
ERYTHROCYTE [SEDIMENTATION RATE] IN BLOOD BY WESTERGREN METHOD: 19 MM/H
ERYTHROCYTE [SEDIMENTATION RATE] IN BLOOD BY WESTERGREN METHOD: 24 MM/H
EST. GFR  (AFRICAN AMERICAN): 24.7 ML/MIN/1.73 M^2
EST. GFR  (AFRICAN AMERICAN): 25.9 ML/MIN/1.73 M^2
EST. GFR  (AFRICAN AMERICAN): 27.1 ML/MIN/1.73 M^2
EST. GFR  (NON AFRICAN AMERICAN): 21.4 ML/MIN/1.73 M^2
EST. GFR  (NON AFRICAN AMERICAN): 22.4 ML/MIN/1.73 M^2
EST. GFR  (NON AFRICAN AMERICAN): 23.4 ML/MIN/1.73 M^2
FIO2: 40
FIO2: 40
FIO2: 70
FLOW: 30
GLUCOSE SERPL-MCNC: 134 MG/DL (ref 70–110)
GLUCOSE SERPL-MCNC: 138 MG/DL (ref 70–110)
GLUCOSE SERPL-MCNC: 143 MG/DL (ref 70–110)
HCO3 UR-SCNC: 17.9 MMOL/L (ref 24–28)
HCO3 UR-SCNC: 18.4 MMOL/L (ref 24–28)
HCO3 UR-SCNC: 18.4 MMOL/L (ref 24–28)
HCO3 UR-SCNC: 18.7 MMOL/L (ref 24–28)
HCO3 UR-SCNC: 18.9 MMOL/L (ref 24–28)
HCO3 UR-SCNC: 19.5 MMOL/L (ref 24–28)
HCO3 UR-SCNC: 20 MMOL/L (ref 24–28)
HCT VFR BLD AUTO: 28.2 % (ref 40–54)
HGB BLD-MCNC: 8.7 G/DL (ref 14–18)
HYPOCHROMIA BLD QL SMEAR: ABNORMAL
IMM GRANULOCYTES # BLD AUTO: 0.55 K/UL (ref 0–0.04)
IMM GRANULOCYTES NFR BLD AUTO: 3.2 % (ref 0–0.5)
LYMPHOCYTES # BLD AUTO: 0.5 K/UL (ref 1–4.8)
LYMPHOCYTES NFR BLD: 3.1 % (ref 18–48)
MAGNESIUM SERPL-MCNC: 2.7 MG/DL (ref 1.6–2.6)
MAGNESIUM SERPL-MCNC: 2.8 MG/DL (ref 1.6–2.6)
MCH RBC QN AUTO: 25.4 PG (ref 27–31)
MCHC RBC AUTO-ENTMCNC: 30.9 G/DL (ref 32–36)
MCV RBC AUTO: 82 FL (ref 82–98)
MODE: ABNORMAL
MONOCYTES # BLD AUTO: 2.1 K/UL (ref 0.3–1)
MONOCYTES NFR BLD: 12.3 % (ref 4–15)
NEUTROPHILS # BLD AUTO: 13.8 K/UL (ref 1.8–7.7)
NEUTROPHILS NFR BLD: 80.5 % (ref 38–73)
NRBC BLD-RTO: 0 /100 WBC
OVALOCYTES BLD QL SMEAR: ABNORMAL
PCO2 BLDA: 31.2 MMHG (ref 35–45)
PCO2 BLDA: 31.8 MMHG (ref 35–45)
PCO2 BLDA: 33.7 MMHG (ref 35–45)
PCO2 BLDA: 34 MMHG (ref 35–45)
PCO2 BLDA: 35.1 MMHG (ref 35–45)
PCO2 BLDA: 36.6 MMHG (ref 35–45)
PCO2 BLDA: 39.3 MMHG (ref 35–45)
PH SMN: 7.3 [PH] (ref 7.35–7.45)
PH SMN: 7.31 [PH] (ref 7.35–7.45)
PH SMN: 7.33 [PH] (ref 7.35–7.45)
PH SMN: 7.35 [PH] (ref 7.35–7.45)
PH SMN: 7.37 [PH] (ref 7.35–7.45)
PH SMN: 7.37 [PH] (ref 7.35–7.45)
PH SMN: 7.38 [PH] (ref 7.35–7.45)
PHOSPHATE SERPL-MCNC: 5.8 MG/DL (ref 2.7–4.5)
PHOSPHATE SERPL-MCNC: 6 MG/DL (ref 2.7–4.5)
PLATELET # BLD AUTO: 377 K/UL (ref 150–350)
PLATELET BLD QL SMEAR: ABNORMAL
PMV BLD AUTO: 9.9 FL (ref 9.2–12.9)
PO2 BLDA: 67 MMHG (ref 80–100)
PO2 BLDA: 71 MMHG (ref 80–100)
PO2 BLDA: 73 MMHG (ref 80–100)
PO2 BLDA: 78 MMHG (ref 80–100)
PO2 BLDA: 84 MMHG (ref 80–100)
PO2 BLDA: 90 MMHG (ref 80–100)
PO2 BLDA: 93 MMHG (ref 80–100)
POC BE: -5 MMOL/L
POC BE: -7 MMOL/L
POC BE: -8 MMOL/L
POC SATURATED O2: 93 % (ref 95–100)
POC SATURATED O2: 94 % (ref 95–100)
POC SATURATED O2: 94 % (ref 95–100)
POC SATURATED O2: 95 % (ref 95–100)
POC SATURATED O2: 95 % (ref 95–100)
POC SATURATED O2: 96 % (ref 95–100)
POC SATURATED O2: 97 % (ref 95–100)
POC TCO2: 19 MMOL/L (ref 23–27)
POC TCO2: 19 MMOL/L (ref 23–27)
POC TCO2: 20 MMOL/L (ref 23–27)
POC TCO2: 21 MMOL/L (ref 23–27)
POC TCO2: 21 MMOL/L (ref 23–27)
POCT GLUCOSE: 137 MG/DL (ref 70–110)
POCT GLUCOSE: 139 MG/DL (ref 70–110)
POCT GLUCOSE: 149 MG/DL (ref 70–110)
POCT GLUCOSE: 99 MG/DL (ref 70–110)
POIKILOCYTOSIS BLD QL SMEAR: SLIGHT
POLYCHROMASIA BLD QL SMEAR: ABNORMAL
POTASSIUM SERPL-SCNC: 4.3 MMOL/L (ref 3.5–5.1)
POTASSIUM SERPL-SCNC: 4.5 MMOL/L (ref 3.5–5.1)
POTASSIUM SERPL-SCNC: 4.8 MMOL/L (ref 3.5–5.1)
PROT SERPL-MCNC: 5.6 G/DL (ref 6–8.4)
RBC # BLD AUTO: 3.43 M/UL (ref 4.6–6.2)
SAMPLE: ABNORMAL
SITE: ABNORMAL
SODIUM SERPL-SCNC: 131 MMOL/L (ref 136–145)
SODIUM SERPL-SCNC: 131 MMOL/L (ref 136–145)
SODIUM SERPL-SCNC: 134 MMOL/L (ref 136–145)
WBC # BLD AUTO: 17.12 K/UL (ref 3.9–12.7)

## 2019-10-14 PROCEDURE — 63600175 PHARM REV CODE 636 W HCPCS: Performed by: STUDENT IN AN ORGANIZED HEALTH CARE EDUCATION/TRAINING PROGRAM

## 2019-10-14 PROCEDURE — 37799 UNLISTED PX VASCULAR SURGERY: CPT

## 2019-10-14 PROCEDURE — 83735 ASSAY OF MAGNESIUM: CPT

## 2019-10-14 PROCEDURE — 84100 ASSAY OF PHOSPHORUS: CPT | Mod: 91

## 2019-10-14 PROCEDURE — 94640 AIRWAY INHALATION TREATMENT: CPT

## 2019-10-14 PROCEDURE — 99900026 HC AIRWAY MAINTENANCE (STAT)

## 2019-10-14 PROCEDURE — 25000003 PHARM REV CODE 250: Performed by: SURGERY

## 2019-10-14 PROCEDURE — 25000003 PHARM REV CODE 250: Performed by: PHYSICIAN ASSISTANT

## 2019-10-14 PROCEDURE — 99900035 HC TECH TIME PER 15 MIN (STAT)

## 2019-10-14 PROCEDURE — 80048 BASIC METABOLIC PNL TOTAL CA: CPT

## 2019-10-14 PROCEDURE — 63600175 PHARM REV CODE 636 W HCPCS: Performed by: SURGERY

## 2019-10-14 PROCEDURE — 99291 CRITICAL CARE FIRST HOUR: CPT | Mod: 24,GC,, | Performed by: SURGERY

## 2019-10-14 PROCEDURE — 25000003 PHARM REV CODE 250: Performed by: STUDENT IN AN ORGANIZED HEALTH CARE EDUCATION/TRAINING PROGRAM

## 2019-10-14 PROCEDURE — 80053 COMPREHEN METABOLIC PANEL: CPT

## 2019-10-14 PROCEDURE — 25000242 PHARM REV CODE 250 ALT 637 W/ HCPCS: Performed by: STUDENT IN AN ORGANIZED HEALTH CARE EDUCATION/TRAINING PROGRAM

## 2019-10-14 PROCEDURE — S0030 INJECTION, METRONIDAZOLE: HCPCS | Performed by: STUDENT IN AN ORGANIZED HEALTH CARE EDUCATION/TRAINING PROGRAM

## 2019-10-14 PROCEDURE — 94150 VITAL CAPACITY TEST: CPT

## 2019-10-14 PROCEDURE — S0030 INJECTION, METRONIDAZOLE: HCPCS | Performed by: SURGERY

## 2019-10-14 PROCEDURE — S4991 NICOTINE PATCH NONLEGEND: HCPCS | Performed by: PHYSICIAN ASSISTANT

## 2019-10-14 PROCEDURE — 82803 BLOOD GASES ANY COMBINATION: CPT

## 2019-10-14 PROCEDURE — 27100092 HC HIGH FLOW DELIVERY CANNULA

## 2019-10-14 PROCEDURE — S0028 INJECTION, FAMOTIDINE, 20 MG: HCPCS | Performed by: SURGERY

## 2019-10-14 PROCEDURE — 99900017 HC EXTUBATION W/PARAMETERS (STAT)

## 2019-10-14 PROCEDURE — 80048 BASIC METABOLIC PNL TOTAL CA: CPT | Mod: 91

## 2019-10-14 PROCEDURE — 99291 PR CRITICAL CARE, E/M 30-74 MINUTES: ICD-10-PCS | Mod: 24,GC,, | Performed by: SURGERY

## 2019-10-14 PROCEDURE — 94761 N-INVAS EAR/PLS OXIMETRY MLT: CPT

## 2019-10-14 PROCEDURE — 83735 ASSAY OF MAGNESIUM: CPT | Mod: 91

## 2019-10-14 PROCEDURE — 85025 COMPLETE CBC W/AUTO DIFF WBC: CPT

## 2019-10-14 PROCEDURE — 27100171 HC OXYGEN HIGH FLOW UP TO 24 HOURS

## 2019-10-14 PROCEDURE — 84100 ASSAY OF PHOSPHORUS: CPT

## 2019-10-14 PROCEDURE — 94003 VENT MGMT INPAT SUBQ DAY: CPT

## 2019-10-14 PROCEDURE — 20000000 HC ICU ROOM

## 2019-10-14 RX ORDER — INDOMETHACIN 25 MG/1
50 CAPSULE ORAL ONCE
Status: COMPLETED | OUTPATIENT
Start: 2019-10-14 | End: 2019-10-14

## 2019-10-14 RX ORDER — ROCURONIUM BROMIDE 10 MG/ML
INJECTION, SOLUTION INTRAVENOUS
Status: DISPENSED
Start: 2019-10-14 | End: 2019-10-14

## 2019-10-14 RX ORDER — SUCCINYLCHOLINE CHLORIDE 20 MG/ML
INJECTION INTRAMUSCULAR; INTRAVENOUS
Status: DISPENSED
Start: 2019-10-14 | End: 2019-10-14

## 2019-10-14 RX ORDER — METOPROLOL TARTRATE 1 MG/ML
5 INJECTION, SOLUTION INTRAVENOUS ONCE
Status: COMPLETED | OUTPATIENT
Start: 2019-10-14 | End: 2019-10-14

## 2019-10-14 RX ORDER — FUROSEMIDE 10 MG/ML
20 INJECTION INTRAMUSCULAR; INTRAVENOUS ONCE
Status: COMPLETED | OUTPATIENT
Start: 2019-10-14 | End: 2019-10-14

## 2019-10-14 RX ORDER — ETOMIDATE 2 MG/ML
INJECTION INTRAVENOUS
Status: DISPENSED
Start: 2019-10-14 | End: 2019-10-14

## 2019-10-14 RX ORDER — ACETAMINOPHEN 10 MG/ML
1000 INJECTION, SOLUTION INTRAVENOUS EVERY 8 HOURS
Status: COMPLETED | OUTPATIENT
Start: 2019-10-14 | End: 2019-10-15

## 2019-10-14 RX ORDER — OLANZAPINE 10 MG/2ML
2.5 INJECTION, POWDER, FOR SOLUTION INTRAMUSCULAR ONCE AS NEEDED
Status: COMPLETED | OUTPATIENT
Start: 2019-10-14 | End: 2019-10-15

## 2019-10-14 RX ADMIN — IPRATROPIUM BROMIDE AND ALBUTEROL SULFATE 3 ML: .5; 3 SOLUTION RESPIRATORY (INHALATION) at 03:10

## 2019-10-14 RX ADMIN — CEFEPIME 2 G: 2 INJECTION, POWDER, FOR SOLUTION INTRAVENOUS at 09:10

## 2019-10-14 RX ADMIN — ACETAMINOPHEN 1000 MG: 10 INJECTION, SOLUTION INTRAVENOUS at 09:10

## 2019-10-14 RX ADMIN — ACETAMINOPHEN 1000 MG: 10 INJECTION, SOLUTION INTRAVENOUS at 02:10

## 2019-10-14 RX ADMIN — METRONIDAZOLE 500 MG: 500 INJECTION, SOLUTION INTRAVENOUS at 01:10

## 2019-10-14 RX ADMIN — HEPARIN SODIUM 5000 UNITS: 5000 INJECTION, SOLUTION INTRAVENOUS; SUBCUTANEOUS at 09:10

## 2019-10-14 RX ADMIN — NICOTINE 1 PATCH: 21 PATCH, EXTENDED RELEASE TRANSDERMAL at 08:10

## 2019-10-14 RX ADMIN — CEFEPIME 2 G: 2 INJECTION, POWDER, FOR SOLUTION INTRAVENOUS at 08:10

## 2019-10-14 RX ADMIN — METOPROLOL TARTRATE 5 MG: 5 INJECTION INTRAVENOUS at 08:10

## 2019-10-14 RX ADMIN — METRONIDAZOLE 500 MG: 500 INJECTION, SOLUTION INTRAVENOUS at 06:10

## 2019-10-14 RX ADMIN — DEXMEDETOMIDINE HYDROCHLORIDE 1 MCG/KG/HR: 4 INJECTION, SOLUTION INTRAVENOUS at 09:10

## 2019-10-14 RX ADMIN — HEPARIN SODIUM 5000 UNITS: 5000 INJECTION, SOLUTION INTRAVENOUS; SUBCUTANEOUS at 03:10

## 2019-10-14 RX ADMIN — DEXMEDETOMIDINE HYDROCHLORIDE 1.4 MCG/KG/HR: 4 INJECTION, SOLUTION INTRAVENOUS at 11:10

## 2019-10-14 RX ADMIN — METRONIDAZOLE 500 MG: 500 INJECTION, SOLUTION INTRAVENOUS at 08:10

## 2019-10-14 RX ADMIN — FAMOTIDINE 20 MG: 10 INJECTION, SOLUTION INTRAVENOUS at 08:10

## 2019-10-14 RX ADMIN — DEXMEDETOMIDINE HYDROCHLORIDE 0.8 MCG/KG/HR: 4 INJECTION, SOLUTION INTRAVENOUS at 06:10

## 2019-10-14 RX ADMIN — FUROSEMIDE 20 MG: 10 INJECTION, SOLUTION INTRAMUSCULAR; INTRAVENOUS at 02:10

## 2019-10-14 RX ADMIN — IPRATROPIUM BROMIDE AND ALBUTEROL SULFATE 3 ML: .5; 3 SOLUTION RESPIRATORY (INHALATION) at 07:10

## 2019-10-14 RX ADMIN — FLUCONAZOLE 200 MG: 2 INJECTION, SOLUTION INTRAVENOUS at 05:10

## 2019-10-14 RX ADMIN — CHLORHEXIDINE GLUCONATE 0.12% ORAL RINSE 15 ML: 1.2 LIQUID ORAL at 09:10

## 2019-10-14 RX ADMIN — HEPARIN SODIUM 5000 UNITS: 5000 INJECTION, SOLUTION INTRAVENOUS; SUBCUTANEOUS at 05:10

## 2019-10-14 RX ADMIN — IPRATROPIUM BROMIDE AND ALBUTEROL SULFATE 3 ML: .5; 3 SOLUTION RESPIRATORY (INHALATION) at 11:10

## 2019-10-14 RX ADMIN — CHLORHEXIDINE GLUCONATE 0.12% ORAL RINSE 15 ML: 1.2 LIQUID ORAL at 08:10

## 2019-10-14 RX ADMIN — PROPOFOL 5 MCG/KG/MIN: 10 INJECTION, EMULSION INTRAVENOUS at 05:10

## 2019-10-14 RX ADMIN — SODIUM BICARBONATE 50 MEQ: 84 INJECTION, SOLUTION INTRAVENOUS at 08:10

## 2019-10-14 RX ADMIN — DEXMEDETOMIDINE HYDROCHLORIDE 1.4 MCG/KG/HR: 4 INJECTION, SOLUTION INTRAVENOUS at 05:10

## 2019-10-14 NOTE — ASSESSMENT & PLAN NOTE
74M current smoker who underwent excision of bleeding gastric mass, post op course complicated by intraperitoneal free air. Now admitted to SICU following ex-lap 10/10/19 which revealed esophageal and gastric perforations which have been repaired.    Neuro:  - Off sedation, extubated. Monitor neuro status  - hold Fentanyl ggt for pain control as to not decrease resp drive    CV  - Fluid resuscitate as necessary  - Off pressors  - Echo 9/12/10 wnl    Pulm  - Extubated 10/14, on comfort flow, titrate for SpO2 >88%  - Uses CPAP at home, NO CPAP due to esophageal rupture  - COPD with Significant history of smoking (3 ppd)    GI  - Post-op esophageal and gastric perforations 10/9, now repaired via ex-lap  - Cefepime, flagyl, vanc, diflucan for broad coverage.   - Monitor drains, care per surgery  - Do not use or advance NGT. At site of repair.  - Do not use G-tube. Leave to gravity.  - J-tube clamped.    Renal  - Nonanion gap metabolic acidosis with BHASKAR  - Monitor UOP. Possibly oliguric at baseline.   - CMP Q12h  - Electrolyte repletion PRN; hyperphosphatemia, monitor    Heme/ID  - Hgb stable  - CBCs daily  - WBC elevated  - Cefepime, flagyl, vancomycin, and diflucan for contaminated peritoneum  - Abdomen cultures growing Klebsiella; will deescalate abx 10/15    Endo  - DM2  - POCT glucose q4h  - SSI    Dispo: Continue ICU care.

## 2019-10-14 NOTE — SUBJECTIVE & OBJECTIVE
Interval History/Significant Events: Extubated this AM after encouraging ABG, good tidal volumes on 20% support PAV, and intermittently following commands. UO responded to lasix.    Follow-up For: Procedure(s) (LRB):  LAPAROTOMY, EXPLORATORY (N/A)  EGD (ESOPHAGOGASTRODUODENOSCOPY) (N/A)  INSERTION, CATHETER, INTERCOSTAL, FOR DRAINAGE (Bilateral)  REPAIR, PERFORATION, GASTRIC (N/A)  INSERTION, GASTROSTOMY TUBE, PERCUTANEOUS (Left)  INSERTION, JEJUNOSTOMY TUBE (Left)    Post-Operative Day: 4 Days Post-Op    Objective:     Vital Signs (Most Recent):  Temp: 99.2 °F (37.3 °C) (10/14/19 0300)  Pulse: 85 (10/14/19 1039)  Resp: (!) 29 (10/14/19 1039)  BP: (!) 170/81 (10/14/19 0745)  SpO2: (!) 92 % (10/14/19 1039) Vital Signs (24h Range):  Temp:  [98.2 °F (36.8 °C)-99.8 °F (37.7 °C)] 99.2 °F (37.3 °C)  Pulse:  [76-99] 85  Resp:  [18-29] 29  SpO2:  [89 %-100 %] 92 %  BP: (122-170)/(49-81) 170/81  Arterial Line BP: (104-187)/(44-85) 185/80     Weight: (!) 148.3 kg (326 lb 15.1 oz)  Body mass index is 41.96 kg/m².      Intake/Output Summary (Last 24 hours) at 10/14/2019 1045  Last data filed at 10/14/2019 0900  Gross per 24 hour   Intake 1971 ml   Output 2704 ml   Net -733 ml       Physical Exam   Constitutional: He appears well-developed and well-nourished.   HENT:   Head: Normocephalic and atraumatic.   Eyes: Pupils are equal, round, and reactive to light. Conjunctivae are normal.   Neck: Normal range of motion.   Cardiovascular: Normal rate, regular rhythm and normal heart sounds.   Pulmonary/Chest: Breath sounds normal.   Extubated, on comfort flow  Abdominal:   G-tube to gravity with bilious output.  J-tube clamped.    Neurological:   Off sedation, intermittently follows commands  Skin: Skin is warm and dry.     Vents:  Vent Mode: Spont (10/14/19 1002)  Ventilator Initiated: Yes (10/10/19 1621)  Set Rate: 22 bmp (10/13/19 1915)  Vt Set: 450 mL (10/13/19 1915)  PEEP/CPAP: 8 cmH20 (10/14/19 1002)  Oxygen Concentration (%):  70 (10/14/19 1002)  Peak Airway Pressure: 26 cmH2O (10/14/19 1002)  Plateau Pressure: 0 cmH20 (10/14/19 1002)  Total Ve: 9.99 mL (10/14/19 1002)  F/VT Ratio<105 (RSBI): (!) 36.2 (10/14/19 0734)    Lines/Drains/Airways     Central Venous Catheter Line                 Percutaneous Central Line Insertion/Assessment - triple lumen  10/10/19 1853 right internal jugular 3 days          Drain                 NG/OG Tube 10/10/19 Left nostril 4 days         Chest Tube 10/10/19 1510 1 Right Midaxillary;Fourth intercostal space 28 Fr. 3 days         Chest Tube 10/10/19 1513 Left Midaxillary;Fourth intercostal space 28 Fr. 3 days         Closed/Suction Drain 10/10/19 1349 Right RUQ Bulb 19 Fr. 3 days         Closed/Suction Drain 10/10/19 1408 Left;Superior LUQ Other (Comment) 20 Fr. 3 days         Closed/Suction Drain 10/10/19 1434 Left;Distal LUQ Other (Comment) 14 Fr. 3 days         Urethral Catheter 10/10/19 1530 Straight-tip;Double-lumen;Non-latex 16 Fr. 3 days          Arterial Line                 Arterial Line 10/10/19 1245 Right Radial 3 days          Peripheral Intravenous Line                 Peripheral IV - Single Lumen 10/14/19 0034 18 G Anterior;Right Upper Arm less than 1 day                Significant Labs:    CBC/Anemia Profile:  Recent Labs   Lab 10/13/19  1654 10/13/19  2107 10/14/19  0400   WBC 19.06* 15.73* 17.12*   HGB 7.8* 8.8* 8.7*   HCT 27.0* 28.7* 28.2*    371* 377*   MCV 86 83 82   RDW 17.7* 17.6* 17.8*        Chemistries:  Recent Labs   Lab 10/13/19  0310 10/13/19  1150 10/13/19  2107 10/14/19  0002 10/14/19  0400   * 133* 132* 131* 131*   K 5.1 4.4 5.2* 4.8 4.5    107 103 102 102   CO2 21* 15* 18* 17* 18*   BUN 51* 50* 64* 67* 68*   CREATININE 2.1* 2.0* 2.7* 2.6* 2.7*   CALCIUM 8.5* 7.4* 8.8 8.4* 8.5*   ALBUMIN  --  2.1* 2.2*  --   --    PROT  --  4.7* 5.5*  --   --    BILITOT  --  0.7 0.7  --   --    ALKPHOS  --  52* 66  --   --    ALT  --  9* 12  --   --    AST  --  15 16  --    --    MG 2.6 2.2  --   --  2.7*   PHOS 6.7* 6.2*  --   --  5.8*       All pertinent labs within the past 24 hours have been reviewed.    Significant Imaging:  I have reviewed all pertinent imaging results/findings within the past 24 hours.

## 2019-10-14 NOTE — PROGRESS NOTES
[facility]  Critical Care - Surgery  Progress Note    Patient Name: Pasha Harmon  MRN: [id number]  Admission Date: 10/9/2019  Hospital Length of Stay: 4 days  Code Status: Full Code  Attending Provider: Paulino Parrish MD  Primary Care Provider: Eze Root MD   Principal Problem: Bowel perforation    Subjective:     Hospital/ICU Course:  10/10: Admitted to SICU. R IJ TLC placed. On minimal pressors.   10/11: Remaining intubated. On minimal pressors.   10/12: Difficulty weaning pressors while on propofol and fentanyl to control resp rate from acidosis. Received 2u PRBCs after 1.25L albumin.   10/13: Pressors weaned, still not following commands after stopping sedation    Interval History/Significant Events: Extubated this AM after encouraging ABG, good tidal volumes on 20% support PAV, and intermittently following commands. UO responded to lasix.    Follow-up For: Procedure(s) (LRB):  LAPAROTOMY, EXPLORATORY (N/A)  EGD (ESOPHAGOGASTRODUODENOSCOPY) (N/A)  INSERTION, CATHETER, INTERCOSTAL, FOR DRAINAGE (Bilateral)  REPAIR, PERFORATION, GASTRIC (N/A)  INSERTION, GASTROSTOMY TUBE, PERCUTANEOUS (Left)  INSERTION, JEJUNOSTOMY TUBE (Left)    Post-Operative Day: 4 Days Post-Op    Objective:     Vital Signs (Most Recent):  Temp: 99.2 °F (37.3 °C) (10/14/19 0300)  Pulse: 85 (10/14/19 1039)  Resp: (!) 29 (10/14/19 1039)  BP: (!) 170/81 (10/14/19 0745)  SpO2: (!) 92 % (10/14/19 1039) Vital Signs (24h Range):  Temp:  [98.2 °F (36.8 °C)-99.8 °F (37.7 °C)] 99.2 °F (37.3 °C)  Pulse:  [76-99] 85  Resp:  [18-29] 29  SpO2:  [89 %-100 %] 92 %  BP: (122-170)/(49-81) 170/81  Arterial Line BP: (104-187)/(44-85) 185/80     Weight: (!) 148.3 kg (326 lb 15.1 oz)  Body mass index is 41.96 kg/m².      Intake/Output Summary (Last 24 hours) at 10/14/2019 1045  Last data filed at 10/14/2019 0900  Gross per 24 hour   Intake 1971 ml   Output 2704 ml   Net -733 ml       Physical Exam   Constitutional: He appears  well-developed and well-nourished.   HENT:   Head: Normocephalic and atraumatic.   Eyes: Pupils are equal, round, and reactive to light. Conjunctivae are normal.   Neck: Normal range of motion.   Cardiovascular: Normal rate, regular rhythm and normal heart sounds.   Pulmonary/Chest: Breath sounds normal.   Extubated, on comfort flow  Abdominal:   G-tube to gravity with bilious output.  J-tube clamped.    Neurological:   Off sedation, intermittently follows commands  Skin: Skin is warm and dry.     Vents:  Vent Mode: Spont (10/14/19 1002)  Ventilator Initiated: Yes (10/10/19 1621)  Set Rate: 22 bmp (10/13/19 1915)  Vt Set: 450 mL (10/13/19 1915)  PEEP/CPAP: 8 cmH20 (10/14/19 1002)  Oxygen Concentration (%): 70 (10/14/19 1002)  Peak Airway Pressure: 26 cmH2O (10/14/19 1002)  Plateau Pressure: 0 cmH20 (10/14/19 1002)  Total Ve: 9.99 mL (10/14/19 1002)  F/VT Ratio<105 (RSBI): (!) 36.2 (10/14/19 0734)    Lines/Drains/Airways     Central Venous Catheter Line                 Percutaneous Central Line Insertion/Assessment - triple lumen  10/10/19 1853 right internal jugular 3 days          Drain                 NG/OG Tube 10/10/19 Left nostril 4 days         Chest Tube 10/10/19 1510 1 Right Midaxillary;Fourth intercostal space 28 Fr. 3 days         Chest Tube 10/10/19 1513 Left Midaxillary;Fourth intercostal space 28 Fr. 3 days         Closed/Suction Drain 10/10/19 1349 Right RUQ Bulb 19 Fr. 3 days         Closed/Suction Drain 10/10/19 1408 Left;Superior LUQ Other (Comment) 20 Fr. 3 days         Closed/Suction Drain 10/10/19 1434 Left;Distal LUQ Other (Comment) 14 Fr. 3 days         Urethral Catheter 10/10/19 1530 Straight-tip;Double-lumen;Non-latex 16 Fr. 3 days          Arterial Line                 Arterial Line 10/10/19 1245 Right Radial 3 days          Peripheral Intravenous Line                 Peripheral IV - Single Lumen 10/14/19 0034 18 G Anterior;Right Upper Arm less than 1 day                Significant  Labs:    CBC/Anemia Profile:  Recent Labs   Lab 10/13/19  1654 10/13/19  2107 10/14/19  0400   WBC 19.06* 15.73* 17.12*   HGB 7.8* 8.8* 8.7*   HCT 27.0* 28.7* 28.2*    371* 377*   MCV 86 83 82   RDW 17.7* 17.6* 17.8*        Chemistries:  Recent Labs   Lab 10/13/19  0310 10/13/19  1150 10/13/19  2107 10/14/19  0002 10/14/19  0400   * 133* 132* 131* 131*   K 5.1 4.4 5.2* 4.8 4.5    107 103 102 102   CO2 21* 15* 18* 17* 18*   BUN 51* 50* 64* 67* 68*   CREATININE 2.1* 2.0* 2.7* 2.6* 2.7*   CALCIUM 8.5* 7.4* 8.8 8.4* 8.5*   ALBUMIN  --  2.1* 2.2*  --   --    PROT  --  4.7* 5.5*  --   --    BILITOT  --  0.7 0.7  --   --    ALKPHOS  --  52* 66  --   --    ALT  --  9* 12  --   --    AST  --  15 16  --   --    MG 2.6 2.2  --   --  2.7*   PHOS 6.7* 6.2*  --   --  5.8*       All pertinent labs within the past 24 hours have been reviewed.    Significant Imaging:  I have reviewed all pertinent imaging results/findings within the past 24 hours.    Assessment/Plan:     * Bowel perforation  74M current smoker who underwent excision of bleeding gastric mass, post op course complicated by intraperitoneal free air. Now admitted to SICU following ex-lap 10/10/19 which revealed esophageal and gastric perforations which have been repaired.    Neuro:  - Off sedation, extubated. Monitor neuro status  - hold Fentanyl ggt for pain control as to not decrease resp drive    CV  - Fluid resuscitate as necessary  - Off pressors  - Echo 9/12/10 wnl    Pulm  - Extubated 10/14, on comfort flow, titrate for SpO2 >88%  - Uses CPAP at home, NO CPAP due to esophageal rupture  - COPD with Significant history of smoking (3 ppd)    GI  - Post-op esophageal and gastric perforations 10/9, now repaired via ex-lap  - Cefepime, flagyl, vanc, diflucan for broad coverage.   - Monitor drains, care per surgery  - Do not use or advance NGT. At site of repair.  - Do not use G-tube. Leave to gravity.  - J-tube clamped.    Renal  - Nonanion gap  metabolic acidosis with BHASKAR  - Monitor UOP. Possibly oliguric at baseline.   - CMP Q12h  - Electrolyte repletion PRN; hyperphosphatemia, monitor    Heme/ID  - Hgb stable  - CBCs daily  - WBC elevated  - Cefepime, flagyl, vancomycin, and diflucan for contaminated peritoneum  - Abdomen cultures growing Klebsiella; will deescalate abx 10/15    Endo  - DM2  - POCT glucose q4h  - SSI    Dispo: Continue ICU care.        Critical care was time spent personally by me on the following activities: development of treatment plan with patient or surrogate and bedside caregivers, discussions with consultants, evaluation of patient's response to treatment, examination of patient, ordering and performing treatments and interventions, ordering and review of laboratory studies, ordering and review of radiographic studies, pulse oximetry, re-evaluation of patient's condition.  This critical care time did not overlap with that of any other provider or involve time for any procedures.     Johan Morataya MD  Critical Care - Surgery  [facility]

## 2019-10-14 NOTE — ASSESSMENT & PLAN NOTE
Patient is 73 yo M with 7 cm contained esophageal perforation and gastric perforation after AES procedure who underwent emergent ex lap and EGD on 10/10/19    Keep NGT to low continuous suction - NOT IN STOMACH - do not advance, do not remove - management per thoracic  Bilateral chest tubes in place to monitor for esophageal leak - do not remove unless thoracic surgery ok, keep to suction.   Continue G tube to gravity for gastric decompression - do not use for meds or feeds  Continue to clamp J tube - do not start tube feeds today, preferably wait till patient having some bowel function  Possible extubation today  Rest of care per SICU

## 2019-10-14 NOTE — SUBJECTIVE & OBJECTIVE
Interval History: Sedation weaned and started on PAV, comfortable, adequate UOP, afebrile, off pressors    Medications:  Continuous Infusions:   dexmedetomidine (PRECEDEX) infusion 1.4 mcg/kg/hr (10/14/19 0600)    fentanyl Stopped (10/13/19 0800)    norepinephrine bitartrate-D5W Stopped (10/13/19 1500)    propofol 10 mcg/kg/min (10/14/19 0600)    vasopressin (PITRESSIN) infusion Stopped (10/11/19 0300)     Scheduled Meds:   albuterol-ipratropium  3 mL Nebulization Q4H    ceFEPime (MAXIPIME) IVPB  2 g Intravenous Q12H    chlorhexidine  15 mL Mouth/Throat BID    famotidine (PF)  20 mg Intravenous Daily    fluconazole (DIFLUCAN) IVPB  200 mg Intravenous Q24H    fluticasone furoate-vilanterol  1 puff Inhalation Daily    heparin (porcine)  5,000 Units Subcutaneous Q8H    lactated ringers  1,000 mL Intravenous Once    metronidazole  500 mg Intravenous Q8H    nicotine  1 patch Transdermal Daily    vancomycin (VANCOCIN) IVPB  1,750 mg Intravenous Q24H     PRN Meds:sodium chloride, albuterol-ipratropium, Dextrose 10% Bolus, Dextrose 10% Bolus, glucagon (human recombinant), insulin aspart U-100, ondansetron, sodium chloride 0.9%     Review of patient's allergies indicates:   Allergen Reactions    Meloxicam Other (See Comments)     Ulcer, GI bleed      Objective:     Vital Signs (Most Recent):  Temp: 99.2 °F (37.3 °C) (10/14/19 0300)  Pulse: 84 (10/14/19 0600)  Resp: 18 (10/14/19 0320)  BP: (!) 122/49 (10/13/19 1501)  SpO2: 100 % (10/14/19 0600) Vital Signs (24h Range):  Temp:  [98.2 °F (36.8 °C)-99.8 °F (37.7 °C)] 99.2 °F (37.3 °C)  Pulse:  [76-99] 84  Resp:  [18-29] 18  SpO2:  [92 %-100 %] 100 %  BP: (121-135)/(42-53) 122/49  Arterial Line BP: (104-173)/(42-73) 173/72     Weight: (!) 148.3 kg (326 lb 15.1 oz)  Body mass index is 41.96 kg/m².    Intake/Output - Last 3 Shifts       10/12 0700 - 10/13 0659 10/13 0700 - 10/14 0659    I.V. (mL/kg) 7481 (50.4) 1971 (13.3)    Blood 451     Total Intake(mL/kg) 7932  (53.5) 1971 (13.3)    Urine (mL/kg/hr) 1175 (0.3) 1410 (0.4)    Drains 200 331    Chest Tube 430 650    Total Output 1805 2391    Net +6168 -420                Physical Exam   Constitutional: He appears well-developed and well-nourished. He appears ill. He is sedated and intubated.   HENT:   Bridled NGT in place   Pulmonary/Chest: He is intubated.   Vent Mode: Spont  Oxygen Concentration (%):  (39-98) 41  Resp Rate Total:  (19 br/min-33 br/min) 31 br/min  Vt Set:  (225 mL-450 mL) 450 mL  PEEP/CPAP:  (8 cmH20) 8 cmH20  Mean Airway Pressure:  (10 nmK64-94 cmH20) 11 cmH20    Bilateral chest tubes, draining SS, no air leak   Abdominal: Soft. He exhibits no distension.   G tube in place, draining gastric contents  Jtube in place, clamped   Genitourinary:   Genitourinary Comments: Hallman in place   Psychiatric: He has a normal mood and affect. His behavior is normal. Judgment and thought content normal.   Vitals reviewed.      Significant Labs:  CBC:   Recent Labs   Lab 10/14/19  0400   WBC 17.12*   RBC 3.43*   HGB 8.7*   HCT 28.2*   *   MCV 82   MCH 25.4*   MCHC 30.9*     CMP:   Recent Labs   Lab 10/13/19  2107  10/14/19  0400   *   < > 138*   CALCIUM 8.8   < > 8.5*   ALBUMIN 2.2*  --   --    PROT 5.5*  --   --    *   < > 131*   K 5.2*   < > 4.5   CO2 18*   < > 18*      < > 102   BUN 64*   < > 68*   CREATININE 2.7*   < > 2.7*   ALKPHOS 66  --   --    ALT 12  --   --    AST 16  --   --    BILITOT 0.7  --   --     < > = values in this interval not displayed.     Coagulation:   Recent Labs   Lab 10/12/19  0427   LABPROT 10.2   INR 1.0   APTT 34.5*     ABGs:   Recent Labs   Lab 10/14/19  0411   PH 7.305*   PCO2 39.3   PO2 90   HCO3 19.5*   POCSATURATED 96   BE -7       Significant Diagnostics:  I have reviewed all pertinent imaging results/findings within the past 24 hours.

## 2019-10-14 NOTE — PLAN OF CARE
Patient lives in a 1 story house, w/4 [address], without railings, w/his family. Daughters, Estefani & Brisa, as well as 2 other family members are at his BS. They state a ramp is being built to his entrance of home. Prior to this admit patient was independent. Lots of family live nearby & are able to assist him, as he needs. Currently he is in ICU, POD # 4, w/G-tube to gravity, J-tube clamped.     Discussed as his CM, together w/SW, our role is to assist w/discharge planning;We are waiting for him to be able to partake in PT/OT. Their recommendations are what drives the discharge plan. CM will be following him, & get back in touch w/them to discuss d/c plan when is gets closer to his discharge. They verbalized his understanding & state is he needs HH they want to use [facility].     [facility] given to patient after informed about it;patient verbalized their understanding.        10/14/19 1310   Discharge Assessment   Assessment Type Discharge Planning Assessment   Confirmed/corrected address and phone number on facesheet? Yes   Assessment information obtained from? Medical Record;Other  (Daughter-Estefani)   Expected Length of Stay (days)   (TBD)   Communicated expected length of stay with patient/caregiver no   Prior to hospitilization cognitive status: Alert/Oriented;No Deficits   Prior to hospitalization functional status: Independent;Assistive Equipment   Current cognitive status: Coma/Sedated/Intubated  (Sedated, on O2 30L, weaned from VENT today. )   Current Functional Status: Completely Dependent   Facility Arrived From:   (N/A)   Lives With child(eduard), adult;grandchild(eduard)  (Daughter-Gogo, Adult granddaughter-Yudith)   Able to Return to Prior Arrangements other (see comments)  (TBD. Currently in ICU. Will need PT/OT ordered when patient can partake in therapy. )   Is patient able to care for self after discharge? Unable to determine at this time (comments)   Who are your caregiver(s) and their  phone number(s)?   (1. Tena Eubanks Daughter [phone], 2. Granddaughter-Yudith no # given. 3. Estefani Lomas Daughter [phone] , 4. Brisa Gracia Daughter     [phone]  , 5. Son no name nor # given. )   Patient's perception of discharge disposition home or selfcare;home health;skilled nursing facility  (TBD: HH vs SNF? )   Readmission Within the Last 30 Days no previous admission in last 30 days   Patient currently being followed by outpatient case management? No   Patient currently receives any other outside agency services? No   Equipment Currently Used at Home nebulizer;CPAP  (Not using CPAP at home.)   Do you have any problems affording any of your prescribed medications? No   Is the patient taking medications as prescribed? yes   Does the patient have transportation home? Yes   Transportation Anticipated family or friend will provide   Dialysis Name and Scheduled days   (N/A)   Does the patient receive services at the [facility]? No   Discharge Plan A Skilled Nursing Facility   Discharge Plan B Home with family;Home Health   DME Needed Upon Discharge    (TBD)   Patient/Family in Agreement with Plan unable to assess

## 2019-10-14 NOTE — PLAN OF CARE
Pt vented, switched overnight to PAV 40% & 8PEEP, for pt's comfort. MAPs maintained >65.  Gtts include: Precedex @ 1.4mcg/kg/min & Propofol @ 5mcg/kg/min.   1 Amp Bicarb given. 40mg Lasix given for low UOP.  UOP: 920cc.   Chest tube output: L- 170cc serous & R-110cc serous. Gtube- 125cc bile. ABGs Q4.   Pt restrained. Plans for possible extubation. Plan of care reviewed w/pt & daughters; all questions answered.

## 2019-10-14 NOTE — PROGRESS NOTES
"[facility]  General Surgery  Progress Note    Subjective:     History of Present Illness:  Patient is a 72 yo M with history of COPD and bleeding gastric mass who underwent EGD with AES yesterday where they removed a 7 cm "ulcerated lipoma" from his gastric antrum using submucosal endoscopic dissection. When removing the mass, it became stuck in the distal esophagus. They had to removed it piecemeal, and caused some mucosal esophageal tears. He was admitted for obs overnight, and this morning his WBC was elevated and he was complaining of abd pain. CXR showed free intraperitoneal free air and general surgery was consulted for evaluation.    Post-Op Info:  Procedure(s) (LRB):  LAPAROTOMY, EXPLORATORY (N/A)  EGD (ESOPHAGOGASTRODUODENOSCOPY) (N/A)  INSERTION, CATHETER, INTERCOSTAL, FOR DRAINAGE (Bilateral)  REPAIR, PERFORATION, GASTRIC (N/A)  INSERTION, GASTROSTOMY TUBE, PERCUTANEOUS (Left)  INSERTION, JEJUNOSTOMY TUBE (Left)   4 Days Post-Op     Interval History: Sedation weaned and started on PAV, comfortable, adequate UOP, afebrile, off pressors    Medications:  Continuous Infusions:   dexmedetomidine (PRECEDEX) infusion 1.4 mcg/kg/hr (10/14/19 0600)    fentanyl Stopped (10/13/19 0800)    norepinephrine bitartrate-D5W Stopped (10/13/19 1500)    propofol 10 mcg/kg/min (10/14/19 0600)    vasopressin (PITRESSIN) infusion Stopped (10/11/19 0300)     Scheduled Meds:   albuterol-ipratropium  3 mL Nebulization Q4H    ceFEPime (MAXIPIME) IVPB  2 g Intravenous Q12H    chlorhexidine  15 mL Mouth/Throat BID    famotidine (PF)  20 mg Intravenous Daily    fluconazole (DIFLUCAN) IVPB  200 mg Intravenous Q24H    fluticasone furoate-vilanterol  1 puff Inhalation Daily    heparin (porcine)  5,000 Units Subcutaneous Q8H    lactated ringers  1,000 mL Intravenous Once    metronidazole  500 mg Intravenous Q8H    nicotine  1 patch Transdermal Daily    vancomycin (VANCOCIN) IVPB  1,750 mg Intravenous " Q24H     PRN Meds:sodium chloride, albuterol-ipratropium, Dextrose 10% Bolus, Dextrose 10% Bolus, glucagon (human recombinant), insulin aspart U-100, ondansetron, sodium chloride 0.9%     Review of patient's allergies indicates:   Allergen Reactions    Meloxicam Other (See Comments)     Ulcer, GI bleed      Objective:     Vital Signs (Most Recent):  Temp: 99.2 °F (37.3 °C) (10/14/19 0300)  Pulse: 84 (10/14/19 0600)  Resp: 18 (10/14/19 0320)  BP: (!) 122/49 (10/13/19 1501)  SpO2: 100 % (10/14/19 0600) Vital Signs (24h Range):  Temp:  [98.2 °F (36.8 °C)-99.8 °F (37.7 °C)] 99.2 °F (37.3 °C)  Pulse:  [76-99] 84  Resp:  [18-29] 18  SpO2:  [92 %-100 %] 100 %  BP: (121-135)/(42-53) 122/49  Arterial Line BP: (104-173)/(42-73) 173/72     Weight: (!) 148.3 kg (326 lb 15.1 oz)  Body mass index is 41.96 kg/m².    Intake/Output - Last 3 Shifts       10/12 0700 - 10/13 0659 10/13 0700 - 10/14 0659    I.V. (mL/kg) 7481 (50.4) 1971 (13.3)    Blood 451     Total Intake(mL/kg) 7932 (53.5) 1971 (13.3)    Urine (mL/kg/hr) 1175 (0.3) 1410 (0.4)    Drains 200 331    Chest Tube 430 650    Total Output 1805 2391    Net +6127 -420                Physical Exam   Constitutional: He appears well-developed and well-nourished. He appears ill. He is sedated and intubated.   HENT:   Bridled NGT in place   Pulmonary/Chest: He is intubated.   Vent Mode: Spont  Oxygen Concentration (%):  (39-98) 41  Resp Rate Total:  (19 br/min-33 br/min) 31 br/min  Vt Set:  (225 mL-450 mL) 450 mL  PEEP/CPAP:  (8 cmH20) 8 cmH20  Mean Airway Pressure:  (10 otT69-53 cmH20) 11 cmH20    Bilateral chest tubes, draining SS, no air leak   Abdominal: Soft. He exhibits no distension.   G tube in place, draining gastric contents  Jtube in place, clamped   Genitourinary:   Genitourinary Comments: Hallman in place   Psychiatric: He has a normal mood and affect. His behavior is normal. Judgment and thought content normal.   Vitals reviewed.      Significant Labs:  CBC:   Recent  Labs   Lab 10/14/19  0400   WBC 17.12*   RBC 3.43*   HGB 8.7*   HCT 28.2*   *   MCV 82   MCH 25.4*   MCHC 30.9*     CMP:   Recent Labs   Lab 10/13/19  2107  10/14/19  0400   *   < > 138*   CALCIUM 8.8   < > 8.5*   ALBUMIN 2.2*  --   --    PROT 5.5*  --   --    *   < > 131*   K 5.2*   < > 4.5   CO2 18*   < > 18*      < > 102   BUN 64*   < > 68*   CREATININE 2.7*   < > 2.7*   ALKPHOS 66  --   --    ALT 12  --   --    AST 16  --   --    BILITOT 0.7  --   --     < > = values in this interval not displayed.     Coagulation:   Recent Labs   Lab 10/12/19  0427   LABPROT 10.2   INR 1.0   APTT 34.5*     ABGs:   Recent Labs   Lab 10/14/19  0411   PH 7.305*   PCO2 39.3   PO2 90   HCO3 19.5*   POCSATURATED 96   BE -7       Significant Diagnostics:  I have reviewed all pertinent imaging results/findings within the past 24 hours.    Assessment/Plan:     * Bowel perforation  Patient is 73 yo M with 7 cm contained esophageal perforation and gastric perforation after AES procedure who underwent emergent ex lap and EGD on 10/10/19    Keep NGT to low continuous suction - NOT IN STOMACH - do not advance, do not remove - management per thoracic  Bilateral chest tubes in place to monitor for esophageal leak - do not remove unless thoracic surgery ok, keep to suction.   Continue G tube to gravity for gastric decompression - do not use for meds or feeds  Continue to clamp J tube - do not start tube feeds today, preferably wait till patient having some bowel function  Possible extubation today  Rest of care per SICU         Too Solo MD  General Surgery  [facility]

## 2019-10-14 NOTE — CARE UPDATE
Pt extubated per MD order. Edward LOPEZ with SICU team and intubation cart at bedside for extubation. No parameters gotten for extubation, due to patient aggitation. Pt tolerated extubation well, and placed on comfort flow at documented settings. Will obtain ABG 30 mins after extubation. Will continue to monitor.

## 2019-10-15 ENCOUNTER — TELEPHONE (OUTPATIENT)
Dept: ENDOSCOPY | Facility: HOSPITAL | Age: 74
End: 2019-10-15

## 2019-10-15 DIAGNOSIS — K31.89 SUBEPITHELIAL GASTRIC MASS: Primary | ICD-10-CM

## 2019-10-15 PROBLEM — N17.9 AKI (ACUTE KIDNEY INJURY): Status: ACTIVE | Noted: 2019-10-15

## 2019-10-15 LAB
ALLENS TEST: ABNORMAL
AMORPH CRY UR QL COMP ASSIST: ABNORMAL
ANION GAP SERPL CALC-SCNC: 13 MMOL/L (ref 8–16)
ANISOCYTOSIS BLD QL SMEAR: SLIGHT
BACTERIA #/AREA URNS AUTO: ABNORMAL /HPF
BASO STIPL BLD QL SMEAR: ABNORMAL
BASOPHILS NFR BLD: 0 % (ref 0–1.9)
BILIRUB UR QL STRIP: NEGATIVE
BUN SERPL-MCNC: 86 MG/DL (ref 8–23)
CALCIUM SERPL-MCNC: 8.9 MG/DL (ref 8.7–10.5)
CHLORIDE SERPL-SCNC: 106 MMOL/L (ref 95–110)
CLARITY UR REFRACT.AUTO: ABNORMAL
CO2 SERPL-SCNC: 18 MMOL/L (ref 23–29)
COLOR UR AUTO: YELLOW
CREAT SERPL-MCNC: 2.6 MG/DL (ref 0.5–1.4)
CREAT UR-MCNC: 71 MG/DL (ref 23–375)
CREAT UR-MCNC: 79 MG/DL (ref 23–375)
DELSYS: ABNORMAL
DIFFERENTIAL METHOD: ABNORMAL
DOHLE BOD BLD QL SMEAR: PRESENT
EOSINOPHIL NFR BLD: 2 % (ref 0–8)
ERYTHROCYTE [DISTWIDTH] IN BLOOD BY AUTOMATED COUNT: 18.1 % (ref 11.5–14.5)
ERYTHROCYTE [SEDIMENTATION RATE] IN BLOOD BY WESTERGREN METHOD: 22 MM/H
EST. GFR  (AFRICAN AMERICAN): 27.1 ML/MIN/1.73 M^2
EST. GFR  (NON AFRICAN AMERICAN): 23.4 ML/MIN/1.73 M^2
FIO2: 35
FIO2: 40
FIO2: 45
FIO2: 50
FIO2: 50
FLOW: 13
FLOW: 30
GIANT PLATELETS BLD QL SMEAR: PRESENT
GLUCOSE SERPL-MCNC: 133 MG/DL (ref 70–110)
GLUCOSE UR QL STRIP: NEGATIVE
HCO3 UR-SCNC: 16 MMOL/L (ref 24–28)
HCO3 UR-SCNC: 17.2 MMOL/L (ref 24–28)
HCO3 UR-SCNC: 18.4 MMOL/L (ref 24–28)
HCO3 UR-SCNC: 19 MMOL/L (ref 24–28)
HCO3 UR-SCNC: 19.4 MMOL/L (ref 24–28)
HCO3 UR-SCNC: 19.5 MMOL/L (ref 24–28)
HCT VFR BLD AUTO: 29.3 % (ref 40–54)
HGB BLD-MCNC: 9.1 G/DL (ref 14–18)
HGB UR QL STRIP: ABNORMAL
HYALINE CASTS UR QL AUTO: 0 /LPF
HYPOCHROMIA BLD QL SMEAR: ABNORMAL
IMM GRANULOCYTES # BLD AUTO: ABNORMAL K/UL (ref 0–0.04)
IMM GRANULOCYTES NFR BLD AUTO: ABNORMAL % (ref 0–0.5)
KETONES UR QL STRIP: ABNORMAL
LDH SERPL L TO P-CCNC: 0.41 MMOL/L (ref 0.36–1.25)
LDH SERPL L TO P-CCNC: 0.59 MMOL/L (ref 0.36–1.25)
LEUKOCYTE ESTERASE UR QL STRIP: NEGATIVE
LYMPHOCYTES NFR BLD: 3 % (ref 18–48)
MAGNESIUM SERPL-MCNC: 2.9 MG/DL (ref 1.6–2.6)
MCH RBC QN AUTO: 24.7 PG (ref 27–31)
MCHC RBC AUTO-ENTMCNC: 31.1 G/DL (ref 32–36)
MCV RBC AUTO: 79 FL (ref 82–98)
MICROSCOPIC COMMENT: ABNORMAL
MIN VOL: 11.2
MIN VOL: 13.2
MIN VOL: 15
MODE: ABNORMAL
MONOCYTES NFR BLD: 13 % (ref 4–15)
NEUTROPHILS NFR BLD: 82 % (ref 38–73)
NITRITE UR QL STRIP: NEGATIVE
NRBC BLD-RTO: 0 /100 WBC
OVALOCYTES BLD QL SMEAR: ABNORMAL
PCO2 BLDA: 29.8 MMHG (ref 35–45)
PCO2 BLDA: 30.8 MMHG (ref 35–45)
PCO2 BLDA: 31.1 MMHG (ref 35–45)
PCO2 BLDA: 39.1 MMHG (ref 35–45)
PCO2 BLDA: 41.5 MMHG (ref 35–45)
PCO2 BLDA: 46.3 MMHG (ref 35–45)
PEEP: 8
PH SMN: 7.22 [PH] (ref 7.35–7.45)
PH SMN: 7.23 [PH] (ref 7.35–7.45)
PH SMN: 7.28 [PH] (ref 7.35–7.45)
PH SMN: 7.37 [PH] (ref 7.35–7.45)
PH SMN: 7.38 [PH] (ref 7.35–7.45)
PH SMN: 7.4 [PH] (ref 7.35–7.45)
PH UR STRIP: 5 [PH] (ref 5–8)
PHOSPHATE SERPL-MCNC: 5.8 MG/DL (ref 2.7–4.5)
PIP: 17
PIP: 19
PIP: 20
PLATELET # BLD AUTO: 398 K/UL (ref 150–350)
PLATELET BLD QL SMEAR: ABNORMAL
PMV BLD AUTO: 9.3 FL (ref 9.2–12.9)
PO2 BLDA: 100 MMHG (ref 80–100)
PO2 BLDA: 63 MMHG (ref 80–100)
PO2 BLDA: 70 MMHG (ref 80–100)
PO2 BLDA: 73 MMHG (ref 80–100)
PO2 BLDA: 78 MMHG (ref 80–100)
PO2 BLDA: 83 MMHG (ref 80–100)
POC BE: -12 MMOL/L
POC BE: -6 MMOL/L
POC BE: -7 MMOL/L
POC BE: -7 MMOL/L
POC BE: -8 MMOL/L
POC BE: -8 MMOL/L
POC SATURATED O2: 92 % (ref 95–100)
POC SATURATED O2: 93 % (ref 95–100)
POC SATURATED O2: 93 % (ref 95–100)
POC SATURATED O2: 94 % (ref 95–100)
POC SATURATED O2: 95 % (ref 95–100)
POC SATURATED O2: 96 % (ref 95–100)
POC TCO2: 17 MMOL/L (ref 23–27)
POC TCO2: 18 MMOL/L (ref 23–27)
POC TCO2: 19 MMOL/L (ref 23–27)
POC TCO2: 20 MMOL/L (ref 23–27)
POC TCO2: 21 MMOL/L (ref 23–27)
POC TCO2: 21 MMOL/L (ref 23–27)
POCT GLUCOSE: 114 MG/DL (ref 70–110)
POCT GLUCOSE: 126 MG/DL (ref 70–110)
POCT GLUCOSE: 131 MG/DL (ref 70–110)
POCT GLUCOSE: 132 MG/DL (ref 70–110)
POCT GLUCOSE: 133 MG/DL (ref 70–110)
POCT GLUCOSE: 141 MG/DL (ref 70–110)
POIKILOCYTOSIS BLD QL SMEAR: SLIGHT
POLYCHROMASIA BLD QL SMEAR: ABNORMAL
POTASSIUM SERPL-SCNC: 4.1 MMOL/L (ref 3.5–5.1)
PROT UR QL STRIP: ABNORMAL
PROT UR-MCNC: 35 MG/DL (ref 0–15)
PROT/CREAT UR: 0.44 MG/G{CREAT} (ref 0–0.2)
RBC # BLD AUTO: 3.69 M/UL (ref 4.6–6.2)
RBC #/AREA URNS AUTO: 26 /HPF (ref 0–4)
SAMPLE: ABNORMAL
SITE: ABNORMAL
SODIUM SERPL-SCNC: 137 MMOL/L (ref 136–145)
SODIUM UR-SCNC: 29 MMOL/L (ref 20–250)
SP GR UR STRIP: 1.02 (ref 1–1.03)
SP02: 91
SP02: 93
SP02: 93
SP02: 95
SP02: 95
SQUAMOUS #/AREA URNS AUTO: 0 /HPF
TOXIC GRANULES BLD QL SMEAR: PRESENT
URN SPEC COLLECT METH UR: ABNORMAL
UUN UR-MCNC: 768 MG/DL (ref 140–1050)
VT: 450
VT: 500
VT: 500
WBC # BLD AUTO: 15.41 K/UL (ref 3.9–12.7)
WBC #/AREA URNS AUTO: 2 /HPF (ref 0–5)

## 2019-10-15 PROCEDURE — 81001 URINALYSIS AUTO W/SCOPE: CPT

## 2019-10-15 PROCEDURE — 84300 ASSAY OF URINE SODIUM: CPT

## 2019-10-15 PROCEDURE — 37799 UNLISTED PX VASCULAR SURGERY: CPT

## 2019-10-15 PROCEDURE — 99223 PR INITIAL HOSPITAL CARE,LEVL III: ICD-10-PCS | Mod: ,,, | Performed by: NURSE PRACTITIONER

## 2019-10-15 PROCEDURE — 63600175 PHARM REV CODE 636 W HCPCS: Performed by: STUDENT IN AN ORGANIZED HEALTH CARE EDUCATION/TRAINING PROGRAM

## 2019-10-15 PROCEDURE — 99900035 HC TECH TIME PER 15 MIN (STAT)

## 2019-10-15 PROCEDURE — 27100171 HC OXYGEN HIGH FLOW UP TO 24 HOURS

## 2019-10-15 PROCEDURE — 84100 ASSAY OF PHOSPHORUS: CPT

## 2019-10-15 PROCEDURE — 25000003 PHARM REV CODE 250: Performed by: SURGERY

## 2019-10-15 PROCEDURE — 82570 ASSAY OF URINE CREATININE: CPT | Mod: 91

## 2019-10-15 PROCEDURE — 25000003 PHARM REV CODE 250: Performed by: STUDENT IN AN ORGANIZED HEALTH CARE EDUCATION/TRAINING PROGRAM

## 2019-10-15 PROCEDURE — 94640 AIRWAY INHALATION TREATMENT: CPT

## 2019-10-15 PROCEDURE — 94003 VENT MGMT INPAT SUBQ DAY: CPT

## 2019-10-15 PROCEDURE — S4991 NICOTINE PATCH NONLEGEND: HCPCS | Performed by: PHYSICIAN ASSISTANT

## 2019-10-15 PROCEDURE — 63600175 PHARM REV CODE 636 W HCPCS: Mod: JG | Performed by: STUDENT IN AN ORGANIZED HEALTH CARE EDUCATION/TRAINING PROGRAM

## 2019-10-15 PROCEDURE — 85007 BL SMEAR W/DIFF WBC COUNT: CPT

## 2019-10-15 PROCEDURE — S0166 INJ OLANZAPINE 2.5MG: HCPCS | Performed by: STUDENT IN AN ORGANIZED HEALTH CARE EDUCATION/TRAINING PROGRAM

## 2019-10-15 PROCEDURE — 83605 ASSAY OF LACTIC ACID: CPT

## 2019-10-15 PROCEDURE — 80048 BASIC METABOLIC PNL TOTAL CA: CPT

## 2019-10-15 PROCEDURE — 99291 PR CRITICAL CARE, E/M 30-74 MINUTES: ICD-10-PCS | Mod: 25,24,, | Performed by: SURGERY

## 2019-10-15 PROCEDURE — 94668 MNPJ CHEST WALL SBSQ: CPT

## 2019-10-15 PROCEDURE — 94002 VENT MGMT INPAT INIT DAY: CPT

## 2019-10-15 PROCEDURE — 31500 PR INSERT, EMERGENCY ENDOTRACH AIRWAY: ICD-10-PCS | Mod: 79,,, | Performed by: SURGERY

## 2019-10-15 PROCEDURE — 63600175 PHARM REV CODE 636 W HCPCS

## 2019-10-15 PROCEDURE — 31500 INSERT EMERGENCY AIRWAY: CPT | Mod: 79,,, | Performed by: SURGERY

## 2019-10-15 PROCEDURE — 97165 OT EVAL LOW COMPLEX 30 MIN: CPT

## 2019-10-15 PROCEDURE — 99900026 HC AIRWAY MAINTENANCE (STAT)

## 2019-10-15 PROCEDURE — 84540 ASSAY OF URINE/UREA-N: CPT

## 2019-10-15 PROCEDURE — 82803 BLOOD GASES ANY COMBINATION: CPT

## 2019-10-15 PROCEDURE — 20000000 HC ICU ROOM

## 2019-10-15 PROCEDURE — 99291 CRITICAL CARE FIRST HOUR: CPT | Mod: 25,24,, | Performed by: SURGERY

## 2019-10-15 PROCEDURE — S0028 INJECTION, FAMOTIDINE, 20 MG: HCPCS | Performed by: SURGERY

## 2019-10-15 PROCEDURE — 99223 1ST HOSP IP/OBS HIGH 75: CPT | Mod: ,,, | Performed by: NURSE PRACTITIONER

## 2019-10-15 PROCEDURE — 63600175 PHARM REV CODE 636 W HCPCS: Performed by: SURGERY

## 2019-10-15 PROCEDURE — 27100092 HC HIGH FLOW DELIVERY CANNULA

## 2019-10-15 PROCEDURE — 36600 WITHDRAWAL OF ARTERIAL BLOOD: CPT

## 2019-10-15 PROCEDURE — 85027 COMPLETE CBC AUTOMATED: CPT

## 2019-10-15 PROCEDURE — 25000003 PHARM REV CODE 250: Performed by: PHYSICIAN ASSISTANT

## 2019-10-15 PROCEDURE — 82570 ASSAY OF URINE CREATININE: CPT

## 2019-10-15 PROCEDURE — 25000003 PHARM REV CODE 250

## 2019-10-15 PROCEDURE — 94761 N-INVAS EAR/PLS OXIMETRY MLT: CPT

## 2019-10-15 PROCEDURE — 27000221 HC OXYGEN, UP TO 24 HOURS

## 2019-10-15 PROCEDURE — 94667 MNPJ CHEST WALL 1ST: CPT

## 2019-10-15 PROCEDURE — S0030 INJECTION, METRONIDAZOLE: HCPCS | Performed by: SURGERY

## 2019-10-15 PROCEDURE — 97802 MEDICAL NUTRITION INDIV IN: CPT

## 2019-10-15 PROCEDURE — P9045 ALBUMIN (HUMAN), 5%, 250 ML: HCPCS | Mod: JG | Performed by: STUDENT IN AN ORGANIZED HEALTH CARE EDUCATION/TRAINING PROGRAM

## 2019-10-15 PROCEDURE — 83735 ASSAY OF MAGNESIUM: CPT

## 2019-10-15 PROCEDURE — 97535 SELF CARE MNGMENT TRAINING: CPT

## 2019-10-15 PROCEDURE — 25000242 PHARM REV CODE 250 ALT 637 W/ HCPCS: Performed by: STUDENT IN AN ORGANIZED HEALTH CARE EDUCATION/TRAINING PROGRAM

## 2019-10-15 RX ORDER — PHENYLEPHRINE HCL IN 0.9% NACL 1 MG/10 ML
SYRINGE (ML) INTRAVENOUS
Status: DISCONTINUED
Start: 2019-10-15 | End: 2019-10-15 | Stop reason: WASHOUT

## 2019-10-15 RX ORDER — ROCURONIUM BROMIDE 10 MG/ML
INJECTION, SOLUTION INTRAVENOUS
Status: COMPLETED
Start: 2019-10-15 | End: 2019-10-15

## 2019-10-15 RX ORDER — HYDRALAZINE HYDROCHLORIDE 20 MG/ML
10 INJECTION INTRAMUSCULAR; INTRAVENOUS EVERY 6 HOURS PRN
Status: DISCONTINUED | OUTPATIENT
Start: 2019-10-15 | End: 2019-10-15

## 2019-10-15 RX ORDER — PROPOFOL 10 MG/ML
INJECTION, EMULSION INTRAVENOUS
Status: COMPLETED
Start: 2019-10-15 | End: 2019-10-15

## 2019-10-15 RX ORDER — PROPOFOL 10 MG/ML
5 INJECTION, EMULSION INTRAVENOUS CONTINUOUS
Status: DISCONTINUED | OUTPATIENT
Start: 2019-10-15 | End: 2019-10-17

## 2019-10-15 RX ORDER — NOREPINEPHRINE BITARTRATE/D5W 4MG/250ML
0.02 PLASTIC BAG, INJECTION (ML) INTRAVENOUS CONTINUOUS
Status: DISCONTINUED | OUTPATIENT
Start: 2019-10-16 | End: 2019-10-17

## 2019-10-15 RX ORDER — FUROSEMIDE 10 MG/ML
120 INJECTION INTRAMUSCULAR; INTRAVENOUS EVERY 8 HOURS
Status: DISCONTINUED | OUTPATIENT
Start: 2019-10-15 | End: 2019-10-16

## 2019-10-15 RX ORDER — ETOMIDATE 2 MG/ML
INJECTION INTRAVENOUS
Status: DISCONTINUED
Start: 2019-10-15 | End: 2019-10-15 | Stop reason: WASHOUT

## 2019-10-15 RX ORDER — HYDRALAZINE HYDROCHLORIDE 20 MG/ML
20 INJECTION INTRAMUSCULAR; INTRAVENOUS EVERY 6 HOURS PRN
Status: DISCONTINUED | OUTPATIENT
Start: 2019-10-15 | End: 2019-10-31 | Stop reason: HOSPADM

## 2019-10-15 RX ORDER — NOREPINEPHRINE BITARTRATE 1 MG/ML
INJECTION, SOLUTION INTRAVENOUS
Status: DISPENSED
Start: 2019-10-15 | End: 2019-10-16

## 2019-10-15 RX ORDER — FUROSEMIDE 10 MG/ML
120 INJECTION INTRAMUSCULAR; INTRAVENOUS ONCE
Status: COMPLETED | OUTPATIENT
Start: 2019-10-15 | End: 2019-10-15

## 2019-10-15 RX ORDER — SUCCINYLCHOLINE CHLORIDE 20 MG/ML
INJECTION INTRAMUSCULAR; INTRAVENOUS
Status: DISCONTINUED
Start: 2019-10-15 | End: 2019-10-15 | Stop reason: WASHOUT

## 2019-10-15 RX ORDER — ALBUMIN HUMAN 50 G/1000ML
12.5 SOLUTION INTRAVENOUS ONCE
Status: COMPLETED | OUTPATIENT
Start: 2019-10-15 | End: 2019-10-15

## 2019-10-15 RX ORDER — ALBUMIN HUMAN 50 G/1000ML
12.5 SOLUTION INTRAVENOUS ONCE
Status: COMPLETED | OUTPATIENT
Start: 2019-10-16 | End: 2019-10-15

## 2019-10-15 RX ORDER — PROPOFOL 10 MG/ML
INJECTION, EMULSION INTRAVENOUS
Status: DISCONTINUED
Start: 2019-10-15 | End: 2019-10-15 | Stop reason: WASHOUT

## 2019-10-15 RX ORDER — NICARDIPINE HYDROCHLORIDE 0.2 MG/ML
INJECTION INTRAVENOUS
Status: DISPENSED
Start: 2019-10-15 | End: 2019-10-15

## 2019-10-15 RX ORDER — NICARDIPINE HYDROCHLORIDE 0.2 MG/ML
2.5 INJECTION INTRAVENOUS CONTINUOUS
Status: DISCONTINUED | OUTPATIENT
Start: 2019-10-15 | End: 2019-10-16

## 2019-10-15 RX ADMIN — IPRATROPIUM BROMIDE AND ALBUTEROL SULFATE 3 ML: .5; 3 SOLUTION RESPIRATORY (INHALATION) at 12:10

## 2019-10-15 RX ADMIN — PROPOFOL 20 MCG/KG/MIN: 10 INJECTION, EMULSION INTRAVENOUS at 01:10

## 2019-10-15 RX ADMIN — Medication 25 MG: at 09:10

## 2019-10-15 RX ADMIN — FUROSEMIDE 120 MG: 10 INJECTION, SOLUTION INTRAMUSCULAR; INTRAVENOUS at 11:10

## 2019-10-15 RX ADMIN — OLANZAPINE 2.5 MG: 10 INJECTION, POWDER, FOR SOLUTION INTRAMUSCULAR at 01:10

## 2019-10-15 RX ADMIN — HYDRALAZINE HYDROCHLORIDE 20 MG: 20 INJECTION INTRAMUSCULAR; INTRAVENOUS at 10:10

## 2019-10-15 RX ADMIN — ACETAMINOPHEN 1000 MG: 10 INJECTION, SOLUTION INTRAVENOUS at 06:10

## 2019-10-15 RX ADMIN — METRONIDAZOLE 500 MG: 500 INJECTION, SOLUTION INTRAVENOUS at 12:10

## 2019-10-15 RX ADMIN — ROCURONIUM BROMIDE 50 MG: 10 INJECTION, SOLUTION INTRAVENOUS at 01:10

## 2019-10-15 RX ADMIN — PROPOFOL 40 MCG/KG/MIN: 10 INJECTION, EMULSION INTRAVENOUS at 11:10

## 2019-10-15 RX ADMIN — CEFEPIME 2 G: 2 INJECTION, POWDER, FOR SOLUTION INTRAVENOUS at 08:10

## 2019-10-15 RX ADMIN — HEPARIN SODIUM 5000 UNITS: 5000 INJECTION, SOLUTION INTRAVENOUS; SUBCUTANEOUS at 02:10

## 2019-10-15 RX ADMIN — METRONIDAZOLE 500 MG: 500 INJECTION, SOLUTION INTRAVENOUS at 08:10

## 2019-10-15 RX ADMIN — PROPOFOL 20 MCG/KG/MIN: 10 INJECTION, EMULSION INTRAVENOUS at 08:10

## 2019-10-15 RX ADMIN — FUROSEMIDE 120 MG: 10 INJECTION, SOLUTION INTRAMUSCULAR; INTRAVENOUS at 06:10

## 2019-10-15 RX ADMIN — FAMOTIDINE 20 MG: 10 INJECTION, SOLUTION INTRAVENOUS at 08:10

## 2019-10-15 RX ADMIN — DEXMEDETOMIDINE HYDROCHLORIDE 1.4 MCG/KG/HR: 4 INJECTION, SOLUTION INTRAVENOUS at 11:10

## 2019-10-15 RX ADMIN — FLUCONAZOLE 200 MG: 2 INJECTION, SOLUTION INTRAVENOUS at 06:10

## 2019-10-15 RX ADMIN — HEPARIN SODIUM 5000 UNITS: 5000 INJECTION, SOLUTION INTRAVENOUS; SUBCUTANEOUS at 06:10

## 2019-10-15 RX ADMIN — NICARDIPINE HYDROCHLORIDE 2.5 MG/HR: 0.2 INJECTION, SOLUTION INTRAVENOUS at 11:10

## 2019-10-15 RX ADMIN — IPRATROPIUM BROMIDE AND ALBUTEROL SULFATE 3 ML: .5; 3 SOLUTION RESPIRATORY (INHALATION) at 03:10

## 2019-10-15 RX ADMIN — CEFEPIME 2 G: 2 INJECTION, POWDER, FOR SOLUTION INTRAVENOUS at 10:10

## 2019-10-15 RX ADMIN — IPRATROPIUM BROMIDE AND ALBUTEROL SULFATE 3 ML: .5; 3 SOLUTION RESPIRATORY (INHALATION) at 11:10

## 2019-10-15 RX ADMIN — ALBUMIN (HUMAN) 12.5 G: 12.5 SOLUTION INTRAVENOUS at 10:10

## 2019-10-15 RX ADMIN — PROPOFOL 120 MG: 10 INJECTION, EMULSION INTRAVENOUS at 01:10

## 2019-10-15 RX ADMIN — ALBUMIN (HUMAN) 12.5 G: 12.5 SOLUTION INTRAVENOUS at 11:10

## 2019-10-15 RX ADMIN — HYDRALAZINE HYDROCHLORIDE 10 MG: 20 INJECTION INTRAMUSCULAR; INTRAVENOUS at 10:10

## 2019-10-15 RX ADMIN — CHLORHEXIDINE GLUCONATE 0.12% ORAL RINSE 15 ML: 1.2 LIQUID ORAL at 08:10

## 2019-10-15 RX ADMIN — IPRATROPIUM BROMIDE AND ALBUTEROL SULFATE 3 ML: .5; 3 SOLUTION RESPIRATORY (INHALATION) at 07:10

## 2019-10-15 RX ADMIN — METRONIDAZOLE 500 MG: 500 INJECTION, SOLUTION INTRAVENOUS at 04:10

## 2019-10-15 RX ADMIN — IPRATROPIUM BROMIDE AND ALBUTEROL SULFATE 3 ML: .5; 3 SOLUTION RESPIRATORY (INHALATION) at 08:10

## 2019-10-15 RX ADMIN — HEPARIN SODIUM 5000 UNITS: 5000 INJECTION, SOLUTION INTRAVENOUS; SUBCUTANEOUS at 10:10

## 2019-10-15 RX ADMIN — NICOTINE 1 PATCH: 21 PATCH, EXTENDED RELEASE TRANSDERMAL at 08:10

## 2019-10-15 NOTE — PLAN OF CARE
Problem: Occupational Therapy Goal  Goal: Occupational Therapy Goal  Description  Pt tolerated evaluation fairly. Pt confused and not answering questions consistently.  He was assisted to sitting EOB for 3-4 minutes with total assistance.  Pt endorsed dizziness, he was returned to supine with total assist x 2 and scooted up with total assist x 3.      Goals to be met by: 10/25/2019    Patient will increase functional independence with ADLs by performing:    Feeding with [place].  UE Dressing with Minimal Assistance.  LE Dressing with Moderate Assistance.  Grooming while standing with Minimal Assistance.  Toileting from bedside commode with Minimal Assistance for hygiene and clothing management.   Supine to sit with Minimal Assistance.  Stand pivot transfers with Moderate Assistance.  Toilet transfer to bedside commode with Moderate Assistance.     Outcome: Ongoing, Progressing      no discrete location documentation necessary

## 2019-10-15 NOTE — CONSULTS
"  [facility]  Adult Nutrition  Consult Note    SUMMARY     Recommendations  Recommendation/Intervention:   As medically able, recommend initiating TF of Peptamen Intense VHP at a goal rate of 65 mL/hr - to provide 1560 kcal/day (1985 kcal w/propofol), 144g protein/day, and 1310mL free fluid/day.   RD to monitor.    Goals: Patient to receive nutrition by RD follow-up  Nutrition Goal Status: new  Communication of RD Recs: discussed on rounds    Reason for Assessment  Reason For Assessment: consult, NPO/clear liquids x 5 days  Diagnosis: surgery/postoperative complications(bowel perforation)  Relevant Medical History: CAD, COPD, DM2, gastric lipoma, tobacco abuse  Interdisciplinary Rounds: attended  General Information Comments: S/p EGD w/lipoma removal 10/9 c/b gastric & esophageal perforations. Now s/p ex lap, gastric perforation repair, and G&J tube placement 10/10. Patient had been having AMS/agitation. Extubated yesterday but required reintubation this afternoon. Plan to start trickle TF. Patient appears nourished with no physical signs of malnutrition and no weight loss PTA.   Nutrition Discharge Planning: Unable to determine at this time.    Nutrition Risk Screen  Nutrition Risk Screen: no indicators present    Nutrition/Diet History  Spiritual, Cultural Beliefs, Congregation Practices, Values that Affect Care: no  Factors Affecting Nutritional Intake: NPO, on mechanical ventilation    Anthropometrics  Temp: 98.6 °F (37 °C)  Height Method: Stated  Height: 6' 2.02" (188 cm)  Height (inches): 74.02 in  Weight Method: Stated  Weight: (!) 148.3 kg (326 lb 15.1 oz)  Weight (lb): (!) 326.95 lb  Ideal Body Weight (IBW), Male: 190.12 lb  % Ideal Body Weight, Male (lb): 155.17 lb  BMI (Calculated): 37.9  BMI Grade: 35 - 39.9 - obesity - grade II  Usual Body Weight (UBW), k.8 kg(admit weight 10/9)  % Usual Body Weight: 111.07  % Weight Change From Usual Weight: 10.84 " %    Lab/Procedures/Meds  Pertinent Labs Reviewed: reviewed  Pertinent Labs Comments: BUN 86, Creat 2.6, Glu 133, POCT Glu 114-126, HgbA1c 6.1, Phos 5.8, Alb 2.1  Pertinent Medications Reviewed: reviewed  Pertinent Medications Comments: famotidine, precedex, propofol    Estimated/Assessed Needs  Weight Used For Calorie Calculations: 133.8 kg (294 lb 15.6 oz)(admit weight)  Energy Calorie Requirements (kcal): 1873 kcal/day  Energy Need Method: Kcal/kg(14)  Protein Requirements: 130-173 g/day(1.5-2.0 g/kg)  Weight Used For Protein Calculations: 86.4 kg (190 lb 7.6 oz)(IBW)  Fluid Requirements (mL): 1 mL/kcal or per MD  Estimated Fluid Requirement Method: RDA Method  RDA Method (mL): 1873    Nutrition Prescription Ordered  Current Diet Order: NPO  Current Nutrition Support Formula Ordered: Impact Peptide 1.5  Current Nutrition Support Rate Ordered: 10 (ml)  Current Nutrition Support Frequency Ordered: mL/hr    Evaluation of Received Nutrient/Fluid Intake  Enteral Calories (kcal): 360  Enteral Protein (gm): 23  Enteral (Free Water) Fluid (mL): 185  Other Calories (kcal): 425(propofol)  Total Calories (kcal): 785  % Kcal Needs: 42%  % Protein Needs: 18%  I/O: -1L x 24hrs, +12.5L since admit  Energy Calories Required: not meeting needs  Protein Required: not meeting needs  Fluid Required: (per MD)  Comments: LBM 10/9  Tolerance: (not started)  % Intake of Estimated Energy Needs: 25 - 50 %  % Meal Intake: NPO    Nutrition Risk  Level of Risk/Frequency of Follow-up: high(2x/week)     Assessment and Plan  Nutrition Problem  Inadequate energy intake    Related to (etiology):   Decreased ability to consume sufficient energy    Signs and Symptoms (as evidenced by):   NPO with no alternative means of nutrition at this time    Interventions (treatment strategy):  Collaboration of nutrition care with other providers    Nutrition Diagnosis Status:   New    Monitor and Evaluation  Food and Nutrient Intake: energy intake, enteral  nutrition intake  Food and Nutrient Adminstration: enteral and parenteral nutrition administration  Anthropometric Measurements: weight, weight change  Biochemical Data, Medical Tests and Procedures: electrolyte and renal panel, gastrointestinal profile, inflammatory profile  Nutrition-Focused Physical Findings: overall appearance     Nutrition Follow-Up  RD Follow-up?: Yes

## 2019-10-15 NOTE — PT/OT/SLP EVAL
Occupational Therapy   Evaluation    Name: Pasha Harmon  MRN: [id number]  Admitting Diagnosis:  Bowel perforation 5 Days Post-Op    Recommendations:     Discharge Recommendations: Skilled Nursing Facility  Discharge Equipment Recommendations:  other (see comments)(TBD)  Barriers to discharge:  None    Assessment:     Pasha Harmon is a 74 y.o. male with a medical diagnosis of Bowel perforation.  He presents with decline in ADLs and functional mobility. Pt required total assistance to get EOB, once EOB he was able to tolerate a few minutes before needing to return to supine due to dizziness. Performance deficits affecting function: weakness, impaired functional mobilty, impaired endurance, impaired self care skills, gait instability, impaired balance, impaired cognition, decreased upper extremity function, decreased lower extremity function, pain, decreased safety awareness, decreased ROM, impaired skin, impaired cardiopulmonary response to activity, orthopedic precautions.      Rehab Prognosis: Good; patient would benefit from acute skilled OT services to address these deficits and reach maximum level of function.       Plan:     Patient to be seen 4 x/week to address the above listed problems via self-care/home management, therapeutic activities, therapeutic exercises, neuromuscular re-education  · Plan of Care Expires:  11/15/2019  · Plan of Care Reviewed with: patient    Subjective     Chief Complaint: Pt wanted water  Patient/Family Comments/goals: Pt agreeable to progressing towards independence with ADLs and functional mobility.     Occupational Profile:  Living Environment: Pt lives with daughter and grand daughter  Previous level of function: independent with self care and functional mobility.  Roles and Routines: , father, grandfather  Equipment Used at Home:  CPAP  Assistance upon Discharge: family can assist, the 7 siblings will rotate as needed      Pain/Comfort:  · Pain Rating 1:  0/10  · Pain Rating Post-Intervention 1: 8/10 with sitting up. Pt resting comfortably at end of session after being well positioned.    Patients cultural, spiritual, Baptism conflicts given the current situation: no    Objective:     Communicated with: RN prior to session.  Patient found HOB elevated with telemetry, blood pressure cuff, central line, chest tube, pulse ox (continuous), shrestha catheter, DENISE drain, oxygen, peripheral IV upon OT entry to room.    General Precautions: Standard, fall   Orthopedic Precautions:N/A   Braces: N/A     Occupational Performance:    Bed Mobility:    · Patient completed Scooting/Bridging with total assistance x 3  · Patient completed Supine to Sit with total assistance x 2  · Patient completed Sit to Supine with 2 persons    Functional Mobility/Transfers:  · Functional Mobility: Pt unable to progress due to dizziness upon sitting that was not improving    Activities of Daily Living:  · Total assist for all ADLS    Cognitive/Visual Perceptual:  Cognitive/Psychosocial Skills:     -       Follows Commands/attention:Inattentive  -       Communication: speech is not clear, pt is not reponding well to questions  Visual/Perceptual:      -unable to assess      Physical Exam:  Balance:    -       total assist for static sitting balance EOB  Upper Extremity Range of Motion:     -       Right Upper Extremity: WFL  -       Left Upper Extremity: WFL  Upper Extremity Strength:    -       Right Upper Extremity: WFL  -       Left Upper Extremity: WFL  Neurological: -       intact    AMPAC 6 Click ADL:  AMPAC Total Score: 14    Treatment & Education:  · Pt educated on role of OT in acute care setting.   · Assisted with ADLs and functional mobility with assist levels noted above  · Discussed d/c recommendations with family   Education:    Patient left HOB elevated with all lines intact and call button in reach    GOALS:   Multidisciplinary Problems     Occupational Therapy Goals        Problem:  Occupational Therapy Goal    Goal Priority Disciplines Outcome Interventions   Occupational Therapy Goal     OT, PT/OT Ongoing, Progressing    Description:  Goals to be met by: 10/25/2019    Patient will increase functional independence with ADLs by performing:    Feeding with [place].  UE Dressing with Minimal Assistance.  LE Dressing with Moderate Assistance.  Grooming while standing with Minimal Assistance.  Toileting from bedside commode with Minimal Assistance for hygiene and clothing management.   Supine to sit with Minimal Assistance.  Stand pivot transfers with Moderate Assistance.  Toilet transfer to bedside commode with Moderate Assistance.                      History:     Past Medical History:   Diagnosis Date    COPD (chronic obstructive pulmonary disease)     Diabetes mellitus     Emphysema of lung     Gastric ulcer     Hypertension     MI (myocardial infarction)        Past Surgical History:   Procedure Laterality Date    APPENDECTOMY      CHOLECYSTECTOMY      ENDOSCOPIC ULTRASOUND OF UPPER GASTROINTESTINAL TRACT Left 6/4/2019    Procedure: ULTRASOUND, UPPER GI TRACT, ENDOSCOPIC;  Surgeon: Lavon Parra MD;  Location: [facility];  Service: Endoscopy;  Laterality: Left;  GIF plus Radial    ESOPHAGOGASTRODUODENOSCOPY N/A 5/25/2019    Procedure: EGD (ESOPHAGOGASTRODUODENOSCOPY);  Surgeon: Mitch Buitrago MD;  Location: [facility];  Service: Endoscopy;  Laterality: N/A;    ESOPHAGOGASTRODUODENOSCOPY N/A 6/4/2019    Procedure: EGD (ESOPHAGOGASTRODUODENOSCOPY);  Surgeon: Lavon Parra MD;  Location: [facility];  Service: Endoscopy;  Laterality: N/A;    ESOPHAGOGASTRODUODENOSCOPY N/A 9/3/2019    Procedure: EGD (ESOPHAGOGASTRODUODENOSCOPY);  Surgeon: Keshav Harris MD;  Location: [facility];  Service: Endoscopy;  Laterality: N/A;    ESOPHAGOGASTRODUODENOSCOPY N/A 10/10/2019    Procedure: EGD (ESOPHAGOGASTRODUODENOSCOPY);  Surgeon: Paulino Parrish MD;  Location: [address]  [facility]R;  Service: General;  Laterality: N/A;    KNEE SURGERY Right     PERCUTANEOUS INSERTION OF GASTROSTOMY TUBE Left 10/10/2019    Procedure: INSERTION, GASTROSTOMY TUBE, PERCUTANEOUS;  Surgeon: Paulino Parrish MD;  Location: [facility] OR 2ND [facility];  Service: General;  Laterality: Left;    PLACEMENT OF JEJUNOSTOMY TUBE Left 10/10/2019    Procedure: INSERTION, JEJUNOSTOMY TUBE;  Surgeon: Paulino Parrish MD;  Location: [facility] OR [facility]LR;  Service: General;  Laterality: Left;    REPAIR OF BOWEL PERFORATION N/A 10/10/2019    Procedure: REPAIR, PERFORATION, GASTRIC;  Surgeon: Paulino Parrish MD;  Location: [facility] OR 2ND FLR;  Service: General;  Laterality: N/A;    TUBE THORACOTOMY Bilateral 10/10/2019    Procedure: INSERTION, CATHETER, INTERCOSTAL, FOR DRAINAGE;  Surgeon: Paulino Parrish MD;  Location: [facility] OR 2ND FLR;  Service: General;  Laterality: Bilateral;       Time Tracking:     OT Date of Treatment: 10/15/19  OT Start Time: 1015  OT Stop Time: 1044  OT Total Time (min): 29 min    Billable Minutes:Evaluation 10  Self Care/Home Management 19    ANTIONETTE Wilson  10/15/2019

## 2019-10-15 NOTE — ANESTHESIA POSTPROCEDURE EVALUATION
Anesthesia Post Evaluation    Patient: Pasha Harmon    Procedure(s) Performed: Procedure(s) (LRB):  DISSECTION, LESION, ESOPHAGUS, STOMACH, OR DUODENUM, SUBMUCOSAL, ENDOSCOPIC (N/A)    Final Anesthesia Type: general  Patient location during evaluation: PACU  Patient participation: Yes- Able to Participate  Level of consciousness: awake and alert and oriented  Pain management: adequate  Airway patency: patent  PONV status at discharge: No PONV  Anesthetic complications: no      Cardiovascular status: blood pressure returned to baseline and hemodynamically stable  Respiratory status: unassisted  Hydration status: euvolemic  Follow-up not needed.          Vitals Value Taken Time   /52 10/15/2019  4:01 PM   Temp 37 °C (98.6 °F) 10/15/2019 11:00 AM   Pulse 104 10/15/2019  4:26 PM   Resp 29 10/15/2019  4:26 PM   SpO2 95 % 10/15/2019  4:26 PM   Vitals shown include unvalidated device data.      Event Time     Out of Recovery 16:02:37          Pain/Anish Score: Pain Rating Prior to Med Admin: 2 (10/15/2019  6:40 AM)

## 2019-10-15 NOTE — PROGRESS NOTES
"[facility]  General Surgery  Progress Note    Subjective:     History of Present Illness:  Patient is a 74 yo M with history of COPD and bleeding gastric mass who underwent EGD with [facility] yesterday where they removed a 7 cm "ulcerated lipoma" from his gastric antrum using submucosal endoscopic dissection. When removing the mass, it became stuck in the distal esophagus. They had to removed it piecemeal, and caused some mucosal esophageal tears. He was admitted for obs overnight, and this morning his WBC was elevated and he was complaining of abd pain. CXR showed free intraperitoneal free air and general surgery was consulted for evaluation.    Post-Op Info:  Procedure(s) (LRB):  LAPAROTOMY, EXPLORATORY (N/A)  EGD (ESOPHAGOGASTRODUODENOSCOPY) (N/A)  INSERTION, CATHETER, INTERCOSTAL, FOR DRAINAGE (Bilateral)  REPAIR, PERFORATION, GASTRIC (N/A)  INSERTION, GASTROSTOMY TUBE, PERCUTANEOUS (Left)  INSERTION, JEJUNOSTOMY TUBE (Left)   5 Days Post-Op     Interval History: No acute events. Continues to be agitated req Precedex gtt. Otherwise no significant clinical changes.     Medications:  Continuous Infusions:   dexmedetomidine (PRECEDEX) infusion 0.7 mcg/kg/hr (10/15/19 0700)     Scheduled Meds:   albuterol-ipratropium  3 mL Nebulization Q4H    ceFEPime (MAXIPIME) IVPB  2 g Intravenous Q12H    chlorhexidine  15 mL Mouth/Throat BID    famotidine (PF)  20 mg Intravenous Daily    fluconazole (DIFLUCAN) IVPB  200 mg Intravenous Q24H    fluticasone furoate-vilanterol  1 puff Inhalation Daily    heparin (porcine)  5,000 Units Subcutaneous Q8H    metronidazole  500 mg Intravenous Q8H    nicotine  1 patch Transdermal Daily     PRN Meds:albuterol-ipratropium, Dextrose 10% Bolus, Dextrose 10% Bolus, glucagon (human recombinant), insulin aspart U-100, ondansetron, sodium chloride 0.9%     Review of patient's allergies indicates:   Allergen Reactions    Meloxicam Other (See Comments)     Ulcer, GI bleed  "     Objective:     Vital Signs (Most Recent):  Temp: 98.7 °F (37.1 °C) (10/15/19 0700)  Pulse: 79 (10/15/19 0700)  Resp: (!) 40 (10/15/19 0700)  BP: (!) 176/79 (10/15/19 0700)  SpO2: (!) 92 % (10/15/19 0700) Vital Signs (24h Range):  Temp:  [98.5 °F (36.9 °C)-100.1 °F (37.8 °C)] 98.7 °F (37.1 °C)  Pulse:  [76-88] 79  Resp:  [18-63] 40  SpO2:  [89 %-100 %] 92 %  BP: (159-189)/() 176/79  Arterial Line BP: (132-191)/() 176/137     Weight: (!) 148.3 kg (326 lb 15.1 oz)  Body mass index is 41.96 kg/m².    Intake/Output - Last 3 Shifts       10/13 0700 - 10/14 0659 10/14 0700 - 10/15 0659 10/15 0700 - 10/16 0659    I.V. (mL/kg) 1971 (13.3) 1397.2 (9.4)     Blood       IV Piggyback  200     Total Intake(mL/kg) 1971 (13.3) 1597.2 (10.8)     Urine (mL/kg/hr) 1410 (0.4) 1945 (0.5) 120 (0.7)    Drains 331 555     Chest Tube 650 470     Total Output 2391 2970 120    Net -420 -1372.8 -120                 Physical Exam   Constitutional: He appears well-developed and well-nourished. No distress.   HENT:   Head: Atraumatic.   NGT with bridle, to low continuous suction   Cardiovascular: Normal rate and regular rhythm.   Pulmonary/Chest: Effort normal. No respiratory distress.   Bilateral chest tubes with serosanguinous drainage, to water seal, no air leaks   Abdominal: Soft. He exhibits no distension. There is tenderness (Appropriate). There is no guarding.   J tube, G tube, DENISE drain with ss fluid  Midline incision CDI, dressing in place   Neurological:   Sedated   Skin: He is not diaphoretic.   Psychiatric: He has a normal mood and affect.   Nursing note reviewed.      Significant Labs:  CBC:   Recent Labs   Lab 10/15/19  0301   WBC 15.41*   RBC 3.69*   HGB 9.1*   HCT 29.3*   *   MCV 79*   MCH 24.7*   MCHC 31.1*     CMP:   Recent Labs   Lab 10/14/19  1500 10/15/19  0301   * 133*   CALCIUM 8.7 8.9   ALBUMIN 2.1*  --    PROT 5.6*  --    * 137   K 4.3 4.1   CO2 17* 18*    106   BUN 79* 86*    CREATININE 2.8* 2.6*   ALKPHOS 67  --    ALT 12  --    AST 19  --    BILITOT 1.0  --      ABGs:   Recent Labs   Lab 10/15/19  0347   PH 7.398   PCO2 30.8*   PO2 63*   HCO3 19.0*   POCSATURATED 92*   BE -6       Significant Diagnostics:  CXR reviewed    Assessment/Plan:       Esophageal tear  - Will remove NGT today.   - Agree with plans for UGI later this week  - J tube feeds per ACS  - CT in place until after UGI, management per ACS  - Please call Thoracic Surgery with any further questions or concerns.         David Liu MD  General Surgery  [facility]

## 2019-10-15 NOTE — PROGRESS NOTES
[facility]  Critical Care - Surgery  Progress Note    Patient Name: Pasha Harmon  MRN: [id number]  Admission Date: 10/9/2019  Hospital Length of Stay: 5 days  Code Status: Full Code  Attending Provider: Paulino Parrish MD  Primary Care Provider: Eze Root MD   Principal Problem: Bowel perforation    Subjective:     Hospital/ICU Course:  10/10: Admitted to SICU. R IJ TLC placed. On minimal pressors.   10/11: Remaining intubated. On minimal pressors.   10/12: Difficulty weaning pressors while on propofol and fentanyl to control resp rate from acidosis. Received 2u PRBCs after 1.25L albumin.   10/13: Pressors weaned, still not following commands after stopping sedation    Interval History/Significant Events: Continued with precedex requirements overnight due to agitation. Zyprexa 2.5 given in PM. This morning, more cooperative but still confused and encephalopathic. NGT removed by primary team. Metoprolol started for continued hypertension. Nephrology consulted for uremia and BHASKAR with metabolic acidosis.    Follow-up For: Procedure(s) (LRB):  LAPAROTOMY, EXPLORATORY (N/A)  EGD (ESOPHAGOGASTRODUODENOSCOPY) (N/A)  INSERTION, CATHETER, INTERCOSTAL, FOR DRAINAGE (Bilateral)  REPAIR, PERFORATION, GASTRIC (N/A)  INSERTION, GASTROSTOMY TUBE, PERCUTANEOUS (Left)  INSERTION, JEJUNOSTOMY TUBE (Left)    Post-Operative Day: 5 Days Post-Op    Objective:     Vital Signs (Most Recent):  Temp: 98.7 °F (37.1 °C) (10/15/19 0700)  Pulse: 75 (10/15/19 0828)  Resp: (!) 24 (10/15/19 0828)  BP: (!) 176/79 (10/15/19 0700)  SpO2: (!) 93 % (10/15/19 0828) Vital Signs (24h Range):  Temp:  [98.5 °F (36.9 °C)-100.1 °F (37.8 °C)] 98.7 °F (37.1 °C)  Pulse:  [75-87] 75  Resp:  [18-63] 24  SpO2:  [90 %-100 %] 93 %  BP: (159-189)/() 176/79  Arterial Line BP: (132-191)/() 176/137     Weight: (!) 148.3 kg (326 lb 15.1 oz)  Body mass index is 41.96 kg/m².      Intake/Output Summary (Last 24 hours) at  10/15/2019 1027  Last data filed at 10/15/2019 0900  Gross per 24 hour   Intake 1597.18 ml   Output 2597 ml   Net -999.82 ml       Physical Exam   Constitutional: He appears well-developed and well-nourished.   HENT:   Head: Normocephalic and atraumatic.   Eyes: Pupils are equal, round, and reactive to light. Conjunctivae are normal.   Neck: Normal range of motion.   Cardiovascular: Normal rate, regular rhythm and normal heart sounds.   Pulmonary/Chest: Breath sounds normal.   Extubated, on comfort flow  Abdominal:   G-tube to gravity with bilious output.  J-tube clamped.    Neurological:   Intermittently follows commands. Agitated. Moves all 4 extremities spontaneously.  Skin: Skin is warm and dry.         Lines/Drains/Airways     Central Venous Catheter Line                 Percutaneous Central Line Insertion/Assessment - triple lumen  10/10/19 1853 right internal jugular 4 days          Drain                 NG/OG Tube 10/10/19 Left nostril 5 days         Chest Tube 10/10/19 1510 1 Right Midaxillary;Fourth intercostal space 28 Fr. 4 days         Chest Tube 10/10/19 1513 Left Midaxillary;Fourth intercostal space 28 Fr. 4 days         Closed/Suction Drain 10/10/19 1349 Right RUQ Bulb 19 Fr. 4 days         Closed/Suction Drain 10/10/19 1408 Left;Superior LUQ Other (Comment) 20 Fr. 4 days         Closed/Suction Drain 10/10/19 1434 Left;Distal LUQ Other (Comment) 14 Fr. 4 days         Urethral Catheter 10/10/19 1530 Straight-tip;Double-lumen;Non-latex 16 Fr. 4 days          Arterial Line                 Arterial Line 10/10/19 1245 Right Radial 4 days          Peripheral Intravenous Line                 Peripheral IV - Single Lumen 10/14/19 0034 18 G Anterior;Right Upper Arm 1 day                Significant Labs:    CBC/Anemia Profile:  Recent Labs   Lab 10/13/19  2107 10/14/19  0400 10/15/19  0301   WBC 15.73* 17.12* 15.41*   HGB 8.8* 8.7* 9.1*   HCT 28.7* 28.2* 29.3*   * 377* 398*   MCV 83 82 79*   RDW 17.6*  17.8* 18.1*        Chemistries:  Recent Labs   Lab 10/13/19  1150 10/13/19  2107  10/14/19  0400 10/14/19  1500 10/15/19  0301   * 132*   < > 131* 134* 137   K 4.4 5.2*   < > 4.5 4.3 4.1    103   < > 102 104 106   CO2 15* 18*   < > 18* 17* 18*   BUN 50* 64*   < > 68* 79* 86*   CREATININE 2.0* 2.7*   < > 2.7* 2.8* 2.6*   CALCIUM 7.4* 8.8   < > 8.5* 8.7 8.9   ALBUMIN 2.1* 2.2*  --   --  2.1*  --    PROT 4.7* 5.5*  --   --  5.6*  --    BILITOT 0.7 0.7  --   --  1.0  --    ALKPHOS 52* 66  --   --  67  --    ALT 9* 12  --   --  12  --    AST 15 16  --   --  19  --    MG 2.2  --   --  2.7* 2.8* 2.9*   PHOS 6.2*  --   --  5.8* 6.0* 5.8*    < > = values in this interval not displayed.       All pertinent labs within the past 24 hours have been reviewed.    Significant Imaging:  I have reviewed all pertinent imaging results/findings within the past 24 hours.    Assessment/Plan:     * Bowel perforation  74M current smoker who underwent excision of bleeding gastric mass, post op course complicated by intraperitoneal free air. Now admitted to SICU following ex-lap 10/10/19 which revealed esophageal and gastric perforations which have been repaired.    Neuro:  - Extubated 10/14  - Encephalopathic, likely multifactorial (sedation, ICU delirium, recent sepsis) but primary component likely uremia.  - PRN pain control    CV  - Fluid resuscitate as necessary  - Off pressors  - Echo 9/12/10 wnl    Pulm  - Extubated 10/14, on comfort flow, titrate for SpO2 >88%  - Uses CPAP at home, NO CPAP due to esophageal rupture  - COPD with Significant history of smoking (3 ppd); continue nicotine patch    GI  - Post-op esophageal and gastric perforations 10/9, now repaired via ex-lap  - Cefepime, flagyl, vanc, diflucan for broad coverage (end date 10/16)  - Monitor drains, care per surgery  - NGT removed 10/15  - Do not use G-tube. Leave to gravity.  - Starting feeding through J tube    Renal  - Nonanion gap metabolic acidosis with  BHASKAR  - Appreciate nephrology recs  - CMP daily  - Electrolyte repletion PRN; hyperphosphatemia, monitor    Heme/ID  - Hgb stable  - CBCs daily  - WBC downtrending  - Cefepime, flagyl, vancomycin, and diflucan for contaminated peritoneum (end 10/16)  - Abdomen cultures growing Klebsiella    Endo  - DM2  - POCT glucose q4h  - SSI    Dispo: Continue ICU care.        Critical care was time spent personally by me on the following activities: development of treatment plan with patient or surrogate and bedside caregivers, discussions with consultants, evaluation of patient's response to treatment, examination of patient, ordering and performing treatments and interventions, ordering and review of laboratory studies, ordering and review of radiographic studies, pulse oximetry, re-evaluation of patient's condition.  This critical care time did not overlap with that of any other provider or involve time for any procedures.     Johan Morataya MD  Critical Care - Surgery  [facility]

## 2019-10-15 NOTE — PROGRESS NOTES
Spoke with SICU resident. Blood pressure still high with systolics 180-190's. 30 mg of hydralazine IV have been given in last hour. MD to place orders for Cardene

## 2019-10-15 NOTE — ASSESSMENT & PLAN NOTE
- Will remove NGT today.   - Agree with plans for UGI later this week  - J tube feeds per ACS  - CT in place until after UGI, management per ACS  - Please call Thoracic Surgery with any further questions or concerns.

## 2019-10-15 NOTE — SUBJECTIVE & OBJECTIVE
Interval History/Significant Events: Continued with precedex requirements overnight due to agitation. Zyprexa 2.5 given in PM. This morning, more cooperative but still confused and encephalopathic. NGT removed by primary team. Metoprolol started for continued hypertension. Nephrology consulted for uremia and BHASKAR with metabolic acidosis.    Follow-up For: Procedure(s) (LRB):  LAPAROTOMY, EXPLORATORY (N/A)  EGD (ESOPHAGOGASTRODUODENOSCOPY) (N/A)  INSERTION, CATHETER, INTERCOSTAL, FOR DRAINAGE (Bilateral)  REPAIR, PERFORATION, GASTRIC (N/A)  INSERTION, GASTROSTOMY TUBE, PERCUTANEOUS (Left)  INSERTION, JEJUNOSTOMY TUBE (Left)    Post-Operative Day: 5 Days Post-Op    Objective:     Vital Signs (Most Recent):  Temp: 98.7 °F (37.1 °C) (10/15/19 0700)  Pulse: 75 (10/15/19 0828)  Resp: (!) 24 (10/15/19 0828)  BP: (!) 176/79 (10/15/19 0700)  SpO2: (!) 93 % (10/15/19 0828) Vital Signs (24h Range):  Temp:  [98.5 °F (36.9 °C)-100.1 °F (37.8 °C)] 98.7 °F (37.1 °C)  Pulse:  [75-87] 75  Resp:  [18-63] 24  SpO2:  [90 %-100 %] 93 %  BP: (159-189)/() 176/79  Arterial Line BP: (132-191)/() 176/137     Weight: (!) 148.3 kg (326 lb 15.1 oz)  Body mass index is 41.96 kg/m².      Intake/Output Summary (Last 24 hours) at 10/15/2019 1027  Last data filed at 10/15/2019 0900  Gross per 24 hour   Intake 1597.18 ml   Output 2597 ml   Net -999.82 ml       Physical Exam   Constitutional: He appears well-developed and well-nourished.   HENT:   Head: Normocephalic and atraumatic.   Eyes: Pupils are equal, round, and reactive to light. Conjunctivae are normal.   Neck: Normal range of motion.   Cardiovascular: Normal rate, regular rhythm and normal heart sounds.   Pulmonary/Chest: Breath sounds normal.   Extubated, on comfort flow  Abdominal:   G-tube to gravity with bilious output.  J-tube clamped.    Neurological:   Intermittently follows commands. Agitated. Moves all 4 extremities spontaneously.  Skin: Skin is warm and  dry.         Lines/Drains/Airways     Central Venous Catheter Line                 Percutaneous Central Line Insertion/Assessment - triple lumen  10/10/19 1853 right internal jugular 4 days          Drain                 NG/OG Tube 10/10/19 Left nostril 5 days         Chest Tube 10/10/19 1510 1 Right Midaxillary;Fourth intercostal space 28 Fr. 4 days         Chest Tube 10/10/19 1513 Left Midaxillary;Fourth intercostal space 28 Fr. 4 days         Closed/Suction Drain 10/10/19 1349 Right RUQ Bulb 19 Fr. 4 days         Closed/Suction Drain 10/10/19 1408 Left;Superior LUQ Other (Comment) 20 Fr. 4 days         Closed/Suction Drain 10/10/19 1434 Left;Distal LUQ Other (Comment) 14 Fr. 4 days         Urethral Catheter 10/10/19 1530 Straight-tip;Double-lumen;Non-latex 16 Fr. 4 days          Arterial Line                 Arterial Line 10/10/19 1245 Right Radial 4 days          Peripheral Intravenous Line                 Peripheral IV - Single Lumen 10/14/19 0034 18 G Anterior;Right Upper Arm 1 day                Significant Labs:    CBC/Anemia Profile:  Recent Labs   Lab 10/13/19  2107 10/14/19  0400 10/15/19  0301   WBC 15.73* 17.12* 15.41*   HGB 8.8* 8.7* 9.1*   HCT 28.7* 28.2* 29.3*   * 377* 398*   MCV 83 82 79*   RDW 17.6* 17.8* 18.1*        Chemistries:  Recent Labs   Lab 10/13/19  1150 10/13/19  2107  10/14/19  0400 10/14/19  1500 10/15/19  0301   * 132*   < > 131* 134* 137   K 4.4 5.2*   < > 4.5 4.3 4.1    103   < > 102 104 106   CO2 15* 18*   < > 18* 17* 18*   BUN 50* 64*   < > 68* 79* 86*   CREATININE 2.0* 2.7*   < > 2.7* 2.8* 2.6*   CALCIUM 7.4* 8.8   < > 8.5* 8.7 8.9   ALBUMIN 2.1* 2.2*  --   --  2.1*  --    PROT 4.7* 5.5*  --   --  5.6*  --    BILITOT 0.7 0.7  --   --  1.0  --    ALKPHOS 52* 66  --   --  67  --    ALT 9* 12  --   --  12  --    AST 15 16  --   --  19  --    MG 2.2  --   --  2.7* 2.8* 2.9*   PHOS 6.2*  --   --  5.8* 6.0* 5.8*    < > = values in this interval not displayed.        All pertinent labs within the past 24 hours have been reviewed.    Significant Imaging:  I have reviewed all pertinent imaging results/findings within the past 24 hours.

## 2019-10-15 NOTE — ASSESSMENT & PLAN NOTE
74M current smoker who underwent excision of bleeding gastric mass, post op course complicated by intraperitoneal free air. Now admitted to SICU following ex-lap 10/10/19 which revealed esophageal and gastric perforations which have been repaired.    Neuro:  - Extubated 10/14  - Encephalopathic, likely multifactorial (sedation, ICU delirium, recent sepsis) but primary component likely uremia.  - PRN pain control    CV  - Fluid resuscitate as necessary  - Off pressors  - Echo 9/12/10 wnl    Pulm  - Extubated 10/14, on comfort flow, titrate for SpO2 >88%  - Uses CPAP at home, NO CPAP due to esophageal rupture  - COPD with Significant history of smoking (3 ppd); continue nicotine patch    GI  - Post-op esophageal and gastric perforations 10/9, now repaired via ex-lap  - Cefepime, flagyl, vanc, diflucan for broad coverage (end date 10/16)  - Monitor drains, care per surgery  - NGT removed 10/15  - Do not use G-tube. Leave to gravity.  - Starting feeding through J tube    Renal  - Nonanion gap metabolic acidosis with BHASKAR  - Appreciate nephrology recs  - CMP daily  - Electrolyte repletion PRN; hyperphosphatemia, monitor    Heme/ID  - Hgb stable  - CBCs daily  - WBC downtrending  - Cefepime, flagyl, vancomycin, and diflucan for contaminated peritoneum (end 10/16)  - Abdomen cultures growing Klebsiella    Endo  - DM2  - POCT glucose q4h  - SSI    Dispo: Continue ICU care.

## 2019-10-15 NOTE — PROCEDURES
"Pasha Harmon is a 74 y.o. male patient.    Temp: 98.7 °F (37.1 °C) (10/15/19 0700)  Pulse: 82 (10/15/19 1212)  Resp: (!) 30 (10/15/19 1212)  BP: (!) 182/93 (10/15/19 1100)  SpO2: (!) 93 % (10/15/19 1212)  Weight: (!) 148.3 kg (326 lb 15.1 oz) (10/13/19 0500)  Height: 6' 2.02" (188 cm) (10/11/19 1129)       Intubation  Date/Time: 10/15/2019 12:55 PM  Location procedure was performed: [facility] CRITICAL CARE SURGERY  Performed by: Johan Morataya MD  Authorized by: Paulino Parrish MD   Pre-operative diagnosis: Bowel perforation  Post-operative diagnosis: Bowel perforation  Consent Done: Emergent Situation  Indications: respiratory distress  Intubation method: video-assisted  Patient status: paralyzed (RSI)  Preoxygenation: nonrebreather mask  Sedatives: propofol  Paralytic: rocuronium  Laryngoscope size: Mac 3  Tube size: 8.0 mm  Tube type: cuffed  Number of attempts: 1  Cricoid pressure: no  Cords visualized: yes  Post-procedure assessment: CO2 detector  Breath sounds: clear  Cuff inflated: yes  ETT to lip: 23 cm  Tube secured with: ETT russ  Chest x-ray interpreted by me.  Chest x-ray findings: endotracheal tube in appropriate position  Patient tolerance: Patient tolerated the procedure well with no immediate complications  Complications: No  Comments: Dry mouth with dried secretions in pharynx. Grade 1 view, no complicating factors.          Johan Morataya  10/15/2019  "

## 2019-10-15 NOTE — PROGRESS NOTES
VSS, afebrile, pt remains disoriented to time but oriented to self and place. Pt agitated and restless, one time dose of zyprexa given, see notes for details. Precedex currently at 0.7 mcg/kg/h to encourage wakefulness during daylight hours. UO  cc/h. Electrolytes and Creatinine continue to trend up, nephrology consulted, possible dialysis. Pt remains on comfort flow 10 L/min and 40% FiO2. Follows commands, strength equal bilaterally. Pt has increased work of breathing this AM and copious serous leaking around DENISE site, MD notified. G tube output overnight 250 mL

## 2019-10-15 NOTE — SUBJECTIVE & OBJECTIVE
Interval History: No acute events. Continues to be agitated req Precedex gtt. Otherwise no significant clinical changes.     Medications:  Continuous Infusions:   dexmedetomidine (PRECEDEX) infusion 0.7 mcg/kg/hr (10/15/19 0700)     Scheduled Meds:   albuterol-ipratropium  3 mL Nebulization Q4H    ceFEPime (MAXIPIME) IVPB  2 g Intravenous Q12H    chlorhexidine  15 mL Mouth/Throat BID    famotidine (PF)  20 mg Intravenous Daily    fluconazole (DIFLUCAN) IVPB  200 mg Intravenous Q24H    fluticasone furoate-vilanterol  1 puff Inhalation Daily    heparin (porcine)  5,000 Units Subcutaneous Q8H    metronidazole  500 mg Intravenous Q8H    nicotine  1 patch Transdermal Daily     PRN Meds:albuterol-ipratropium, Dextrose 10% Bolus, Dextrose 10% Bolus, glucagon (human recombinant), insulin aspart U-100, ondansetron, sodium chloride 0.9%     Review of patient's allergies indicates:   Allergen Reactions    Meloxicam Other (See Comments)     Ulcer, GI bleed      Objective:     Vital Signs (Most Recent):  Temp: 98.7 °F (37.1 °C) (10/15/19 0700)  Pulse: 79 (10/15/19 0700)  Resp: (!) 40 (10/15/19 0700)  BP: (!) 176/79 (10/15/19 0700)  SpO2: (!) 92 % (10/15/19 0700) Vital Signs (24h Range):  Temp:  [98.5 °F (36.9 °C)-100.1 °F (37.8 °C)] 98.7 °F (37.1 °C)  Pulse:  [76-88] 79  Resp:  [18-63] 40  SpO2:  [89 %-100 %] 92 %  BP: (159-189)/() 176/79  Arterial Line BP: (132-191)/() 176/137     Weight: (!) 148.3 kg (326 lb 15.1 oz)  Body mass index is 41.96 kg/m².    Intake/Output - Last 3 Shifts       10/13 0700 - 10/14 0659 10/14 0700 - 10/15 0659 10/15 0700 - 10/16 0659    I.V. (mL/kg) 1971 (13.3) 1397.2 (9.4)     Blood       IV Piggyback  200     Total Intake(mL/kg) 1971 (13.3) 1597.2 (10.8)     Urine (mL/kg/hr) 1410 (0.4) 1945 (0.5) 120 (0.7)    Drains 331 555     Chest Tube 650 470     Total Output 2391 2970 120    Net -420 -1372.8 -120                 Physical Exam   Constitutional: He appears well-developed and  well-nourished. No distress.   HENT:   Head: Atraumatic.   NGT with bridle, to low continuous suction   Cardiovascular: Normal rate and regular rhythm.   Pulmonary/Chest: Effort normal. No respiratory distress.   Bilateral chest tubes with serosanguinous drainage, to water seal, no air leaks   Abdominal: Soft. He exhibits no distension. There is tenderness (Appropriate). There is no guarding.   J tube, G tube, DENISE drain with ss fluid  Midline incision CDI, dressing in place   Neurological:   Sedated   Skin: He is not diaphoretic.   Psychiatric: He has a normal mood and affect.   Nursing note reviewed.      Significant Labs:  CBC:   Recent Labs   Lab 10/15/19  0301   WBC 15.41*   RBC 3.69*   HGB 9.1*   HCT 29.3*   *   MCV 79*   MCH 24.7*   MCHC 31.1*     CMP:   Recent Labs   Lab 10/14/19  1500 10/15/19  0301   * 133*   CALCIUM 8.7 8.9   ALBUMIN 2.1*  --    PROT 5.6*  --    * 137   K 4.3 4.1   CO2 17* 18*    106   BUN 79* 86*   CREATININE 2.8* 2.6*   ALKPHOS 67  --    ALT 12  --    AST 19  --    BILITOT 1.0  --      ABGs:   Recent Labs   Lab 10/15/19  0347   PH 7.398   PCO2 30.8*   PO2 63*   HCO3 19.0*   POCSATURATED 92*   BE -6       Significant Diagnostics:  CXR reviewed

## 2019-10-15 NOTE — HPI
Mr Harmon is a 75 yo male with COPD, CAD, and DMII who recently underwent EGD for resection of a 7cm gastric leiomyoma. There was difficulty retrieving the mass through the distal esophagus. The mass was subsequently removed piecemeal and there was concern for esophageal tears. POD 1, he had increasing leukocytosis and pneumoperitoneum on AXR. ACS took the patient to the OR for emergent exp laparotomy. Thoracic surgery was available during the exp lap and performed an EGD which showed a long segment esophageal tear, but no full thickness tear. He has normal renal function at baseline with a sCr of 0.8. On admission his sCr was 1.4 (10-9/19). His sCr began to improve, but then bumped again to 2.1 on 10/13/19. Of note, his hgb dropped to 6.0 on 10/12/19. His sCr continued to rise and peaked at 2.8 on 10/14/19. Today (10/15/19) his sCr is down to 2.6. Currently, he is not anuric or oliguric. Anesthesia records during the patient's ex lap on 10/10/19, and during the patient's EGD on 10/9/19, both reveal episodes of intraoperative hypotension (80's/40's). Nephrology has been consulted for BHASKAR.

## 2019-10-15 NOTE — PROGRESS NOTES
Pt continues to be agitated and restless on 1.4 mcg/kg/hr of Precedex. MD notified via telephone, will come to bedside to assess.

## 2019-10-15 NOTE — SUBJECTIVE & OBJECTIVE
Past Medical History:   Diagnosis Date    COPD (chronic obstructive pulmonary disease)     Diabetes mellitus     Emphysema of lung     Gastric ulcer     Hypertension     MI (myocardial infarction)        Past Surgical History:   Procedure Laterality Date    APPENDECTOMY      CHOLECYSTECTOMY      ENDOSCOPIC ULTRASOUND OF UPPER GASTROINTESTINAL TRACT Left 6/4/2019    Procedure: ULTRASOUND, UPPER GI TRACT, ENDOSCOPIC;  Surgeon: Lavon Parra MD;  Location: [facility];  Service: Endoscopy;  Laterality: Left;  GIF plus Radial    ESOPHAGOGASTRODUODENOSCOPY N/A 5/25/2019    Procedure: EGD (ESOPHAGOGASTRODUODENOSCOPY);  Surgeon: Mitch Buitrago MD;  Location: [facility];  Service: Endoscopy;  Laterality: N/A;    ESOPHAGOGASTRODUODENOSCOPY N/A 6/4/2019    Procedure: EGD (ESOPHAGOGASTRODUODENOSCOPY);  Surgeon: Lavon Parra MD;  Location: [facility];  Service: Endoscopy;  Laterality: N/A;    ESOPHAGOGASTRODUODENOSCOPY N/A 9/3/2019    Procedure: EGD (ESOPHAGOGASTRODUODENOSCOPY);  Surgeon: Keshav Harris MD;  Location: [facility];  Service: Endoscopy;  Laterality: N/A;    ESOPHAGOGASTRODUODENOSCOPY N/A 10/10/2019    Procedure: EGD (ESOPHAGOGASTRODUODENOSCOPY);  Surgeon: Paulino Parrish MD;  Location: [facility] OR [address] [place] [address];  Service: General;  Laterality: N/A;    KNEE SURGERY Right     PERCUTANEOUS INSERTION OF GASTROSTOMY TUBE Left 10/10/2019    Procedure: INSERTION, GASTROSTOMY TUBE, PERCUTANEOUS;  Surgeon: Paulino Parrish MD;  [facility];  Service: General;  Laterality: Left;    PLACEMENT OF JEJUNOSTOMY TUBE Left 10/10/2019    Procedure: INSERTION, JEJUNOSTOMY TUBE;  Surgeon: Paulino Parrish MD;  [facility];  Service: General;  Laterality: Left;    REPAIR OF BOWEL PERFORATION N/A 10/10/2019    Procedure: REPAIR, PERFORATION, GASTRIC;  Surgeon: Paulino Parrish MD;  [facility];  Service: General;  Laterality: N/A;    TUBE THORACOTOMY Bilateral  10/10/2019    Procedure: INSERTION, CATHETER, INTERCOSTAL, FOR DRAINAGE;  Surgeon: Paulino Parrish MD;  Location: [facility] OR [address] [place] [address];  Service: General;  Laterality: Bilateral;       Review of patient's allergies indicates:   Allergen Reactions    Meloxicam Other (See Comments)     Ulcer, GI bleed      Current Facility-Administered Medications   Medication Frequency    albuterol-ipratropium 2.5 mg-0.5 mg/3 mL nebulizer solution 3 mL Q4H PRN    albuterol-ipratropium 2.5 mg-0.5 mg/3 mL nebulizer solution 3 mL Q4H    ceFEPIme injection 2 g Q12H    dexmedetomidine (PRECEDEX) 400mcg/100mL 0.9% NaCL infusion Continuous    dextrose 10% (D10W) Bolus PRN    dextrose 10% (D10W) Bolus PRN    famotidine (PF) injection 20 mg Daily    fluconazole (DIFLUCAN) IVPB 200 mg 100 mL Q24H    fluticasone furoate-vilanterol 100-25 mcg/dose diskus inhaler 1 puff Daily    glucagon (human recombinant) injection 1 mg PRN    heparin (porcine) injection 5,000 Units Q8H    hydrALAZINE injection 20 mg Q6H PRN    insulin aspart U-100 pen 0-5 Units QID (AC + HS) PRN    metoprolol 12.5mg/1.25mL oral solution 25 mg BID    metronidazole IVPB 500 mg Q8H    niCARdipine (CARDENE) 40 mg/200 mL infusion     niCARdipine 40 mg/200 mL infusion Continuous    nicotine 21 mg/24 hr 1 patch Daily    ondansetron injection 4 mg Q6H PRN    propofol (DIPRIVAN) 10 mg/mL infusion Continuous    sodium chloride 0.9% flush 10 mL PRN     Family History     Problem Relation (Age of Onset)    Cancer Father    Heart disease Mother        Tobacco Use    Smoking status: Current Every Day Smoker     Packs/day: 2.00     Types: Cigarettes    Smokeless tobacco: Never Used   Substance and Sexual Activity    Alcohol use: No    Drug use: No    Sexual activity: Not on file     Review of Systems   Unable to perform ROS: Mental status change     Objective:     Vital Signs (Most Recent):  Temp: 98.7 °F (37.1 °C) (10/15/19 0700)  Pulse: 91 (10/15/19 1312)  Resp: (!)  22 (10/15/19 1312)  BP: (!) 182/93 (10/15/19 1100)  SpO2: (!) 88 % (10/15/19 1312)  O2 Device (Oxygen Therapy): ventilator (10/15/19 1312) Vital Signs (24h Range):  Temp:  [98.5 °F (36.9 °C)-99 °F (37.2 °C)] 98.7 °F (37.1 °C)  Pulse:  [75-91] 91  Resp:  [18-63] 22  SpO2:  [88 %-96 %] 88 %  BP: (159-198)/() 182/93  Arterial Line BP: (137-212)/() 168/82     Weight: (!) 148.3 kg (326 lb 15.1 oz) (10/13/19 0500)  Body mass index is 41.96 kg/m².  Body surface area is 2.78 meters squared.    I/O last 3 completed shifts:  In: 2857.2 [I.V.:2657.2; IV Piggyback:200]  Out: 4349 [Urine:2975; Drains:724; Chest Tube:650]    Physical Exam   Constitutional: He appears well-developed and well-nourished.   Eyes: Pupils are equal, round, and reactive to light. Conjunctivae and EOM are normal.   Neck: Normal range of motion. Neck supple.   Cardiovascular: Normal rate and regular rhythm.   Pulmonary/Chest: Tachypnea noted. He has wheezes. He has rales.   On comfort flow. Bilateral chest tubes    Abdominal: He exhibits no distension and no mass. There is tenderness. There is no guarding. No hernia.   Midline surgical incision to abdomen. Multiple abdominal drains noted.    Musculoskeletal: He exhibits edema (1+ pitting edema to BLE ). He exhibits no tenderness or deformity.   Neurological: He is alert.   Skin: Skin is warm and dry. No rash noted. He is not diaphoretic. No erythema. No pallor.       Significant Labs:  CBC:   Recent Labs   Lab 10/15/19  0301   WBC 15.41*   RBC 3.69*   HGB 9.1*   HCT 29.3*   *   MCV 79*   MCH 24.7*   MCHC 31.1*     CMP:   Recent Labs   Lab 10/14/19  1500 10/15/19  0301   * 133*   CALCIUM 8.7 8.9   ALBUMIN 2.1*  --    PROT 5.6*  --    * 137   K 4.3 4.1   CO2 17* 18*    106   BUN 79* 86*   CREATININE 2.8* 2.6*   ALKPHOS 67  --    ALT 12  --    AST 19  --    BILITOT 1.0  --      All labs within the past 24 hours have been reviewed.

## 2019-10-15 NOTE — SUBJECTIVE & OBJECTIVE
Interval History: Was extubated yesterday to high flow NC, overnight required zyprexa and precedex to keep him calm, groggy this morning, afebrile, HDS off pressors    Medications:  Continuous Infusions:   dexmedetomidine (PRECEDEX) infusion 0.9 mcg/kg/hr (10/15/19 0400)     Scheduled Meds:   acetaminophen  1,000 mg Intravenous Q8H    albuterol-ipratropium  3 mL Nebulization Q4H    ceFEPime (MAXIPIME) IVPB  2 g Intravenous Q12H    chlorhexidine  15 mL Mouth/Throat BID    famotidine (PF)  20 mg Intravenous Daily    fluconazole (DIFLUCAN) IVPB  200 mg Intravenous Q24H    fluticasone furoate-vilanterol  1 puff Inhalation Daily    heparin (porcine)  5,000 Units Subcutaneous Q8H    metronidazole  500 mg Intravenous Q8H    nicotine  1 patch Transdermal Daily     PRN Meds:albuterol-ipratropium, Dextrose 10% Bolus, Dextrose 10% Bolus, glucagon (human recombinant), insulin aspart U-100, ondansetron, sodium chloride 0.9%     Review of patient's allergies indicates:   Allergen Reactions    Meloxicam Other (See Comments)     Ulcer, GI bleed      Objective:     Vital Signs (Most Recent):  Temp: 99 °F (37.2 °C) (10/14/19 2300)  Pulse: 77 (10/15/19 0400)  Resp: (!) 27 (10/15/19 0400)  BP: (!) 182/83 (10/15/19 0400)  SpO2: (!) 93 % (10/15/19 0400) Vital Signs (24h Range):  Temp:  [98.5 °F (36.9 °C)-100.1 °F (37.8 °C)] 99 °F (37.2 °C)  Pulse:  [76-88] 77  Resp:  [18-34] 27  SpO2:  [89 %-100 %] 93 %  BP: (159-188)/(70-93) 182/83  Arterial Line BP: (132-187)/() 180/74     Weight: (!) 148.3 kg (326 lb 15.1 oz)  Body mass index is 41.96 kg/m².    Intake/Output - Last 3 Shifts       10/13 0700 - 10/14 0659 10/14 0700 - 10/15 0659    I.V. (mL/kg) 1971 (13.3) 885 (6)    IV Piggyback  200    Total Intake(mL/kg) 1971 (13.3) 1085 (7.3)    Urine (mL/kg/hr) 1410 (0.4) 1730 (0.5)    Drains 331 555    Chest Tube 650 470    Total Output 0799 3457    Net -420 -7029                Physical Exam   Constitutional: He appears  well-developed and well-nourished. He appears ill. He is sedated and intubated.   HENT:   Bridled NGT in place   Cardiovascular: Normal rate and regular rhythm.   Pulmonary/Chest: Effort normal. He is intubated. No respiratory distress.   Bilateral chest tubes, draining SS, no air leak   Abdominal: Soft. He exhibits no distension. There is no tenderness. There is no guarding.   G tube in place, draining gastric contents  Jtube in place, clamped   Genitourinary:   Genitourinary Comments: Hallman in place   Neurological: He is alert.   Skin: Skin is warm and dry.   Psychiatric: He has a normal mood and affect. His behavior is normal. Judgment and thought content normal.   Vitals reviewed.      Significant Labs:  CBC:   Recent Labs   Lab 10/15/19  0301   WBC 15.41*   RBC 3.69*   HGB 9.1*   HCT 29.3*   *   MCV 79*   MCH 24.7*   MCHC 31.1*     CMP:   Recent Labs   Lab 10/14/19  1500 10/15/19  0301   * 133*   CALCIUM 8.7 8.9   ALBUMIN 2.1*  --    PROT 5.6*  --    * 137   K 4.3 4.1   CO2 17* 18*    106   BUN 79* 86*   CREATININE 2.8* 2.6*   ALKPHOS 67  --    ALT 12  --    AST 19  --    BILITOT 1.0  --      Coagulation:   Recent Labs   Lab 10/12/19  0427   LABPROT 10.2   INR 1.0   APTT 34.5*     ABGs:   Recent Labs   Lab 10/15/19  0347   PH 7.398   PCO2 30.8*   PO2 63*   HCO3 19.0*   POCSATURATED 92*   BE -6       Significant Diagnostics:  I have reviewed all pertinent imaging results/findings within the past 24 hours.

## 2019-10-15 NOTE — ASSESSMENT & PLAN NOTE
Mr Harmon is a 73yo male who is s/p EGD and [place] [facility] [place]. Sustained multiple episodes of intraoperative hypotension on 10/9/19 and on 10/10/19, and had a drop in Hgb to 6.0 on 10/12/19.    -Baseline sCr: 0.8  -sCr on admission:1.4  -sCr on 10/15/19: 2.6  -10/15/19: 1.8L/UOP/past 24hrs   -10/15/19: physical exam reveals signs of volume overload; pt is tachypneic, has bilateral rales, and has pitting edema to BLE      Urine microscopy on 10/15/19 reveals moderate muddy brown granular casts, concerning for ischemic ATN.     BHASKAR likely 2/2 ischemic ATN due to intraoperative hypotension and acute anemia.       Plan/Recommendations:    -120mg Lasix IV x1 for volume management; pt continues to produce urine (1.8L/past 24hrs).   -Ordered UA, renal US,and UPCR  -No indication for RRT at this time  -Avoid nephrotoxic medications  -Renally dose medications  -Avoid contrast studies   -Daily renal function panels   -Strict I/O's   -Will continue to follow closely

## 2019-10-15 NOTE — PLAN OF CARE
Problem: Feeding Intolerance (Enteral Nutrition)  Goal: Feeding Tolerance  Outcome: Ongoing, Not Progressing  Intervention: Prevent and Manage Feeding Intolerance  Flowsheets (Taken 10/15/2019 1410)  Nutrition Support Management: other (see comments)    Recommendations  Recommendation/Intervention:   As medically able, recommend initiating TF of Peptamen Intense VHP at a goal rate of 65 mL/hr - to provide 1560 kcal/day (1985 kcal w/propofol), 144g protein/day, and 1310mL free fluid/day.   RD to monitor.    Goals: Patient to receive nutrition by RD follow-up  Nutrition Goal Status: new    Full assessment completed, see RD Note 10/15/2019.

## 2019-10-15 NOTE — CONSULTS
[facility]  Nephrology  Consult Note    Patient Name: Pasha Harmon  MRN: [id number]  Admission Date: 10/9/2019  Hospital Length of Stay: 5 days  Attending Provider: Paulino Parrish MD   Primary Care Physician: Eze Root MD  Principal Problem:Bowel perforation    Inpatient consult to Nephrology  Consult performed by: Ranjeet Judd NP  Consult ordered by: oJhan Morataya MD  Reason for consult: BHASKAR        Subjective:     HPI: Mr Harmon is a 73 yo male with COPD, CAD, and DMII who recently underwent EGD for resection of a 7cm gastric leiomyoma. There was difficulty retrieving the mass through the distal esophagus. The mass was subsequently removed piecemeal and there was concern for esophageal tears. POD 1, he had increasing leukocytosis and pneumoperitoneum on AXR. ACS took the patient to the OR for emergent exp laparotomy. Thoracic surgery was available during the exp lap and performed an EGD which showed a long segment esophageal tear, but no full thickness tear. He has normal renal function at baseline with a sCr of 0.8. On admission his sCr was 1.4 (10-9/19). His sCr began to improve, but then bumped again to 2.1 on 10/13/19. Of note, his hgb dropped to 6.0 on 10/12/19. His sCr continued to rise and peaked at 2.8 on 10/14/19. Today (10/15/19) his sCr is down to 2.6. Currently, he is not anuric or oliguric. Anesthesia records during the patient's ex lap on 10/10/19, and during the patient's EGD on 10/9/19, both reveal episodes of intraoperative hypotension (80's/40's). Nephrology has been consulted for BHASKAR.     -HPI was obtained from the patient's EHR.     Past Medical History:   Diagnosis Date    COPD (chronic obstructive pulmonary disease)     Diabetes mellitus     Emphysema of lung     Gastric ulcer     Hypertension     MI (myocardial infarction)        Past Surgical History:   Procedure Laterality Date    APPENDECTOMY      CHOLECYSTECTOMY      ENDOSCOPIC  ULTRASOUND OF UPPER GASTROINTESTINAL TRACT Left 6/4/2019    Procedure: ULTRASOUND, UPPER GI TRACT, ENDOSCOPIC;  Surgeon: Lavon Parra MD;  Location: [facility] ENDO;  Service: Endoscopy;  Laterality: Left;  GIF plus Radial    ESOPHAGOGASTRODUODENOSCOPY N/A 5/25/2019    Procedure: EGD (ESOPHAGOGASTRODUODENOSCOPY);  Surgeon: Mitch Buitrago MD;  Location: [facility] ENDO;  Service: Endoscopy;  Laterality: N/A;    ESOPHAGOGASTRODUODENOSCOPY N/A 6/4/2019    Procedure: EGD (ESOPHAGOGASTRODUODENOSCOPY);  Surgeon: Lavon Parra MD;  Location: [facility] ENDO;  Service: Endoscopy;  Laterality: N/A;    ESOPHAGOGASTRODUODENOSCOPY N/A 9/3/2019    Procedure: EGD (ESOPHAGOGASTRODUODENOSCOPY);  Surgeon: Keshav Harris MD;  Location: [facility] ENDO;  Service: Endoscopy;  Laterality: N/A;    ESOPHAGOGASTRODUODENOSCOPY N/A 10/10/2019    Procedure: EGD (ESOPHAGOGASTRODUODENOSCOPY);  Surgeon: Paulino Parrish MD;  [facility];  Service: General;  Laterality: N/A;    KNEE SURGERY Right     PERCUTANEOUS INSERTION OF GASTROSTOMY TUBE Left 10/10/2019    Procedure: INSERTION, GASTROSTOMY TUBE, PERCUTANEOUS;  Surgeon: Paulino Parrish MD;  Location: [facility] OR 2ND FLR;  Service: General;  Laterality: Left;    PLACEMENT OF JEJUNOSTOMY TUBE Left 10/10/2019    Procedure: INSERTION, JEJUNOSTOMY TUBE;  Surgeon: Paulino Parrish MD;  Location: [facility] OR 2ND FLR;  Service: General;  Laterality: Left;    REPAIR OF BOWEL PERFORATION N/A 10/10/2019    Procedure: REPAIR, PERFORATION, GASTRIC;  Surgeon: Paulino Parrish MD;  Location: [facility] OR 2ND FLR;  Service: General;  Laterality: N/A;    TUBE THORACOTOMY Bilateral 10/10/2019    Procedure: INSERTION, CATHETER, INTERCOSTAL, FOR DRAINAGE;  Surgeon: Paulino Parrish MD;  Location: [facility] OR 2ND FLR;  Service: General;  Laterality: Bilateral;       Review of patient's allergies indicates:   Allergen Reactions    Meloxicam Other (See Comments)     Ulcer, GI bleed      Current  Facility-Administered Medications   Medication Frequency    albuterol-ipratropium 2.5 mg-0.5 mg/3 mL nebulizer solution 3 mL Q4H PRN    albuterol-ipratropium 2.5 mg-0.5 mg/3 mL nebulizer solution 3 mL Q4H    ceFEPIme injection 2 g Q12H    dexmedetomidine (PRECEDEX) 400mcg/100mL 0.9% NaCL infusion Continuous    dextrose 10% (D10W) Bolus PRN    dextrose 10% (D10W) Bolus PRN    famotidine (PF) injection 20 mg Daily    fluconazole (DIFLUCAN) IVPB 200 mg 100 mL Q24H    fluticasone furoate-vilanterol 100-25 mcg/dose diskus inhaler 1 puff Daily    glucagon (human recombinant) injection 1 mg PRN    heparin (porcine) injection 5,000 Units Q8H    hydrALAZINE injection 20 mg Q6H PRN    insulin aspart U-100 pen 0-5 Units QID (AC + HS) PRN    metoprolol 12.5mg/1.25mL oral solution 25 mg BID    metronidazole IVPB 500 mg Q8H    niCARdipine (CARDENE) 40 mg/200 mL infusion     niCARdipine 40 mg/200 mL infusion Continuous    nicotine 21 mg/24 hr 1 patch Daily    ondansetron injection 4 mg Q6H PRN    propofol (DIPRIVAN) 10 mg/mL infusion Continuous    sodium chloride 0.9% flush 10 mL PRN     Family History     Problem Relation (Age of Onset)    Cancer Father    Heart disease Mother        Tobacco Use    Smoking status: Current Every Day Smoker     Packs/day: 2.00     Types: Cigarettes    Smokeless tobacco: Never Used   Substance and Sexual Activity    Alcohol use: No    Drug use: No    Sexual activity: Not on file     Review of Systems   Unable to perform ROS: Mental status change     Objective:     Vital Signs (Most Recent):  Temp: 98.7 °F (37.1 °C) (10/15/19 0700)  Pulse: 91 (10/15/19 1312)  Resp: (!) 22 (10/15/19 1312)  BP: (!) 182/93 (10/15/19 1100)  SpO2: (!) 88 % (10/15/19 1312)  O2 Device (Oxygen Therapy): ventilator (10/15/19 1312) Vital Signs (24h Range):  Temp:  [98.5 °F (36.9 °C)-99 °F (37.2 °C)] 98.7 °F (37.1 °C)  Pulse:  [75-91] 91  Resp:  [18-63] 22  SpO2:  [88 %-96 %] 88 %  BP:  (159-198)/() 182/93  Arterial Line BP: (137-212)/() 168/82     Weight: (!) 148.3 kg (326 lb 15.1 oz) (10/13/19 0500)  Body mass index is 41.96 kg/m².  Body surface area is 2.78 meters squared.    I/O last 3 completed shifts:  In: 2857.2 [I.V.:2657.2; IV Piggyback:200]  Out: 4349 [Urine:2975; Drains:724; Chest Tube:650]    Physical Exam   Constitutional: He appears well-developed and well-nourished.   Eyes: Pupils are equal, round, and reactive to light. Conjunctivae and EOM are normal.   Neck: Normal range of motion. Neck supple.   Cardiovascular: Normal rate and regular rhythm.   Pulmonary/Chest: Tachypnea noted. He has wheezes. He has rales.   On comfort flow. Bilateral chest tubes    Abdominal: He exhibits no distension and no mass. There is tenderness. There is no guarding. No hernia.   Midline surgical incision to abdomen. Multiple abdominal drains noted.    Musculoskeletal: He exhibits edema (1+ pitting edema to BLE ). He exhibits no tenderness or deformity.   Neurological: He is alert.   Skin: Skin is warm and dry. No rash noted. He is not diaphoretic. No erythema. No pallor.       Significant Labs:  CBC:   Recent Labs   Lab 10/15/19  0301   WBC 15.41*   RBC 3.69*   HGB 9.1*   HCT 29.3*   *   MCV 79*   MCH 24.7*   MCHC 31.1*     CMP:   Recent Labs   Lab 10/14/19  1500 10/15/19  0301   * 133*   CALCIUM 8.7 8.9   ALBUMIN 2.1*  --    PROT 5.6*  --    * 137   K 4.3 4.1   CO2 17* 18*    106   BUN 79* 86*   CREATININE 2.8* 2.6*   ALKPHOS 67  --    ALT 12  --    AST 19  --    BILITOT 1.0  --      All labs within the past 24 hours have been reviewed.        Assessment/Plan:     * Bowel perforation  Managed by primary team     BHASKAR (acute kidney injury)  Mr Harmon is a 73yo male who is s/p EGD and was admitted to [facility] for concern for esophageal tear. Sustained multiple episodes of intraoperative hypotension on 10/9/19 and on 10/10/19, and had a drop in Hgb to 6.0 on  10/12/19.    -Baseline sCr: 0.8  -sCr on admission:1.4  -sCr on 10/15/19 (day of nephrology consult): 2.6  -10/15/19: 1.8L/UOP/past 24hrs   -10/15/19: physical exam reveals signs of volume overload; pt is tachypneic, has bilateral rales, and has pitting edema to BLE      Urine microscopy on 10/15/19 reveals moderate muddy brown granular casts, concerning for ischemic ATN.     BHASKAR likely 2/2 ischemic ATN due to intraoperative hypotension and acute anemia.       Plan/Recommendations:    -120mg Lasix IV x1 for volume management; pt continues to produce urine (1.8L/past 24hrs).   -Ordered UA, renal US,and UPCR  -No indication for RRT at this time  -Avoid nephrotoxic medications  -Renally dose medications  -Avoid contrast studies   -Daily renal function panels   -Strict I/O's   -Will continue to follow closely            Thank you for your consult. I will follow-up with patient. Please contact us if you have any additional questions.    Ranjeet Judd, NP  Nephrology  [facility]

## 2019-10-15 NOTE — ASSESSMENT & PLAN NOTE
Patient is 73 yo M with 7 cm contained esophageal perforation and gastric perforation after AES procedure who underwent emergent ex lap and EGD on 10/10/19    Keep NGT to low continuous suction - NOT IN STOMACH - do not advance, do not remove  Bilateral chest tubes in place to monitor for esophageal leak - keep them to suction  Continue G tube to gravity for gastric decompression - do not use for meds or feeds  Ok to start jtube feeds today, advance as tolerated to goal    Rest of care per SICU

## 2019-10-15 NOTE — CARE UPDATE
Patient had been displaying Increased WOB and had transitioned from Comfort flow to a Non-Rebreather. Patient was intubated per Miguel Angel LOPEZ order. Patient now resting on documented vent settings. Ambu bag and mask are at the bedside. Will continue to monitor.

## 2019-10-15 NOTE — PROGRESS NOTES
"[facility]  General Surgery  Progress Note    Subjective:     History of Present Illness:  Patient is a 72 yo M with history of COPD and bleeding gastric mass who underwent EGD with AES yesterday where they removed a 7 cm "ulcerated lipoma" from his gastric antrum using submucosal endoscopic dissection. When removing the mass, it became stuck in the distal esophagus. They had to removed it piecemeal, and caused some mucosal esophageal tears. He was admitted for obs overnight, and this morning his WBC was elevated and he was complaining of abd pain. CXR showed free intraperitoneal free air and general surgery was consulted for evaluation.    Post-Op Info:  Procedure(s) (LRB):  LAPAROTOMY, EXPLORATORY (N/A)  EGD (ESOPHAGOGASTRODUODENOSCOPY) (N/A)  INSERTION, CATHETER, INTERCOSTAL, FOR DRAINAGE (Bilateral)  REPAIR, PERFORATION, GASTRIC (N/A)  INSERTION, GASTROSTOMY TUBE, PERCUTANEOUS (Left)  INSERTION, JEJUNOSTOMY TUBE (Left)   5 Days Post-Op     Interval History: Was extubated yesterday to high flow NC, overnight required zyprexa and precedex to keep him calm, groggy this morning, afebrile, HDS off pressors    Medications:  Continuous Infusions:   dexmedetomidine (PRECEDEX) infusion 0.9 mcg/kg/hr (10/15/19 0400)     Scheduled Meds:   acetaminophen  1,000 mg Intravenous Q8H    albuterol-ipratropium  3 mL Nebulization Q4H    ceFEPime (MAXIPIME) IVPB  2 g Intravenous Q12H    chlorhexidine  15 mL Mouth/Throat BID    famotidine (PF)  20 mg Intravenous Daily    fluconazole (DIFLUCAN) IVPB  200 mg Intravenous Q24H    fluticasone furoate-vilanterol  1 puff Inhalation Daily    heparin (porcine)  5,000 Units Subcutaneous Q8H    metronidazole  500 mg Intravenous Q8H    nicotine  1 patch Transdermal Daily     PRN Meds:albuterol-ipratropium, Dextrose 10% Bolus, Dextrose 10% Bolus, glucagon (human recombinant), insulin aspart U-100, ondansetron, sodium chloride 0.9%     Review of patient's allergies " indicates:   Allergen Reactions    Meloxicam Other (See Comments)     Ulcer, GI bleed      Objective:     Vital Signs (Most Recent):  Temp: 99 °F (37.2 °C) (10/14/19 2300)  Pulse: 77 (10/15/19 0400)  Resp: (!) 27 (10/15/19 0400)  BP: (!) 182/83 (10/15/19 0400)  SpO2: (!) 93 % (10/15/19 0400) Vital Signs (24h Range):  Temp:  [98.5 °F (36.9 °C)-100.1 °F (37.8 °C)] 99 °F (37.2 °C)  Pulse:  [76-88] 77  Resp:  [18-34] 27  SpO2:  [89 %-100 %] 93 %  BP: (159-188)/(70-93) 182/83  Arterial Line BP: (132-187)/() 180/74     Weight: (!) 148.3 kg (326 lb 15.1 oz)  Body mass index is 41.96 kg/m².    Intake/Output - Last 3 Shifts       10/13 0700 - 10/14 0659 10/14 0700 - 10/15 0659    I.V. (mL/kg) 1971 (13.3) 885 (6)    IV Piggyback  200    Total Intake(mL/kg) 1971 (13.3) 1085 (7.3)    Urine (mL/kg/hr) 1410 (0.4) 1730 (0.5)    Drains 331 555    Chest Tube 650 470    Total Output 2391 3625    Net -420 -6237                Physical Exam   Constitutional: He appears well-developed and well-nourished. He appears ill. He is sedated and intubated.   HENT:   Bridled NGT in place   Cardiovascular: Normal rate and regular rhythm.   Pulmonary/Chest: Effort normal. He is intubated. No respiratory distress.   Bilateral chest tubes, draining SS, no air leak   Abdominal: Soft. He exhibits no distension. There is no tenderness. There is no guarding.   G tube in place, draining gastric contents  Jtube in place, clamped   Genitourinary:   Genitourinary Comments: Hallman in place   Neurological: He is alert.   Skin: Skin is warm and dry.   Psychiatric: He has a normal mood and affect. His behavior is normal. Judgment and thought content normal.   Vitals reviewed.      Significant Labs:  CBC:   Recent Labs   Lab 10/15/19  0301   WBC 15.41*   RBC 3.69*   HGB 9.1*   HCT 29.3*   *   MCV 79*   MCH 24.7*   MCHC 31.1*     CMP:   Recent Labs   Lab 10/14/19  1500 10/15/19  0301   * 133*   CALCIUM 8.7 8.9   ALBUMIN 2.1*  --    PROT 5.6*   --    * 137   K 4.3 4.1   CO2 17* 18*    106   BUN 79* 86*   CREATININE 2.8* 2.6*   ALKPHOS 67  --    ALT 12  --    AST 19  --    BILITOT 1.0  --      Coagulation:   Recent Labs   Lab 10/12/19  0427   LABPROT 10.2   INR 1.0   APTT 34.5*     ABGs:   Recent Labs   Lab 10/15/19  0347   PH 7.398   PCO2 30.8*   PO2 63*   HCO3 19.0*   POCSATURATED 92*   BE -6       Significant Diagnostics:  I have reviewed all pertinent imaging results/findings within the past 24 hours.    Assessment/Plan:     * Bowel perforation  Patient is 73 yo M with 7 cm contained esophageal perforation and gastric perforation after AES procedure who underwent emergent ex lap and EGD on 10/10/19    Keep NGT to low continuous suction - NOT IN STOMACH - do not advance, do not remove  Bilateral chest tubes in place to monitor for esophageal leak - keep them to suction  Continue G tube to gravity for gastric decompression - do not use for meds or feeds  Ok to start jtube feeds today, advance as tolerated to goal    Rest of care per SICU         Too Solo MD  General Surgery  [facility]

## 2019-10-16 LAB
ALBUMIN SERPL BCP-MCNC: 2 G/DL (ref 3.5–5.2)
ALLENS TEST: ABNORMAL
ANION GAP SERPL CALC-SCNC: 13 MMOL/L (ref 8–16)
ANION GAP SERPL CALC-SCNC: 15 MMOL/L (ref 8–16)
ANISOCYTOSIS BLD QL SMEAR: SLIGHT
BASOPHILS NFR BLD: 0 % (ref 0–1.9)
BUN SERPL-MCNC: 103 MG/DL (ref 8–23)
BUN SERPL-MCNC: 106 MG/DL (ref 8–23)
BURR CELLS BLD QL SMEAR: ABNORMAL
CALCIUM SERPL-MCNC: 8.1 MG/DL (ref 8.7–10.5)
CALCIUM SERPL-MCNC: 8.4 MG/DL (ref 8.7–10.5)
CHLORIDE SERPL-SCNC: 109 MMOL/L (ref 95–110)
CHLORIDE SERPL-SCNC: 109 MMOL/L (ref 95–110)
CO2 SERPL-SCNC: 16 MMOL/L (ref 23–29)
CO2 SERPL-SCNC: 16 MMOL/L (ref 23–29)
CREAT SERPL-MCNC: 3 MG/DL (ref 0.5–1.4)
CREAT SERPL-MCNC: 3.2 MG/DL (ref 0.5–1.4)
DACRYOCYTES BLD QL SMEAR: ABNORMAL
DELSYS: ABNORMAL
DIFFERENTIAL METHOD: ABNORMAL
DOHLE BOD BLD QL SMEAR: PRESENT
EOSINOPHIL NFR BLD: 3 % (ref 0–8)
ERYTHROCYTE [DISTWIDTH] IN BLOOD BY AUTOMATED COUNT: 18.8 % (ref 11.5–14.5)
ERYTHROCYTE [SEDIMENTATION RATE] IN BLOOD BY WESTERGREN METHOD: 26 MM/H
ERYTHROCYTE [SEDIMENTATION RATE] IN BLOOD BY WESTERGREN METHOD: 26 MM/H
ERYTHROCYTE [SEDIMENTATION RATE] IN BLOOD BY WESTERGREN METHOD: 28 MM/H
ERYTHROCYTE [SEDIMENTATION RATE] IN BLOOD BY WESTERGREN METHOD: 33 MM/H
EST. GFR  (AFRICAN AMERICAN): 21.1 ML/MIN/1.73 M^2
EST. GFR  (AFRICAN AMERICAN): 22.8 ML/MIN/1.73 M^2
EST. GFR  (NON AFRICAN AMERICAN): 18.2 ML/MIN/1.73 M^2
EST. GFR  (NON AFRICAN AMERICAN): 19.7 ML/MIN/1.73 M^2
FIO2: 35
FIO2: 50
GLUCOSE SERPL-MCNC: 108 MG/DL (ref 70–110)
GLUCOSE SERPL-MCNC: 119 MG/DL (ref 70–110)
HCO3 UR-SCNC: 15.7 MMOL/L (ref 24–28)
HCO3 UR-SCNC: 16.9 MMOL/L (ref 24–28)
HCO3 UR-SCNC: 17.3 MMOL/L (ref 24–28)
HCO3 UR-SCNC: 17.4 MMOL/L (ref 24–28)
HCO3 UR-SCNC: 18.2 MMOL/L (ref 24–28)
HCO3 UR-SCNC: 19.2 MMOL/L (ref 24–28)
HCT VFR BLD AUTO: 31.2 % (ref 40–54)
HGB BLD-MCNC: 9.2 G/DL (ref 14–18)
HYPOCHROMIA BLD QL SMEAR: ABNORMAL
IMM GRANULOCYTES # BLD AUTO: ABNORMAL K/UL (ref 0–0.04)
IMM GRANULOCYTES NFR BLD AUTO: ABNORMAL % (ref 0–0.5)
LYMPHOCYTES NFR BLD: 2 % (ref 18–48)
MAGNESIUM SERPL-MCNC: 2.8 MG/DL (ref 1.6–2.6)
MAGNESIUM SERPL-MCNC: 2.9 MG/DL (ref 1.6–2.6)
MCH RBC QN AUTO: 24.9 PG (ref 27–31)
MCHC RBC AUTO-ENTMCNC: 29.5 G/DL (ref 32–36)
MCV RBC AUTO: 85 FL (ref 82–98)
METAMYELOCYTES NFR BLD MANUAL: 1 %
MIN VOL: 14
MIN VOL: 16
MODE: ABNORMAL
MONOCYTES NFR BLD: 14 % (ref 4–15)
NEUTROPHILS NFR BLD: 80 % (ref 38–73)
NRBC BLD-RTO: 0 /100 WBC
OVALOCYTES BLD QL SMEAR: ABNORMAL
PCO2 BLDA: 31 MMHG (ref 35–45)
PCO2 BLDA: 32.2 MMHG (ref 35–45)
PCO2 BLDA: 34.5 MMHG (ref 35–45)
PCO2 BLDA: 35.7 MMHG (ref 35–45)
PCO2 BLDA: 42.5 MMHG (ref 35–45)
PCO2 BLDA: 43 MMHG (ref 35–45)
PEEP: 8
PEEP: 8
PH SMN: 7.24 [PH] (ref 7.35–7.45)
PH SMN: 7.26 [PH] (ref 7.35–7.45)
PH SMN: 7.29 [PH] (ref 7.35–7.45)
PH SMN: 7.3 [PH] (ref 7.35–7.45)
PH SMN: 7.31 [PH] (ref 7.35–7.45)
PH SMN: 7.34 [PH] (ref 7.35–7.45)
PHOSPHATE SERPL-MCNC: 7.9 MG/DL (ref 2.7–4.5)
PHOSPHATE SERPL-MCNC: 8 MG/DL (ref 2.7–4.5)
PIP: 20
PIP: 21
PLATELET # BLD AUTO: 387 K/UL (ref 150–350)
PLATELET BLD QL SMEAR: ABNORMAL
PMV BLD AUTO: 9.7 FL (ref 9.2–12.9)
PO2 BLDA: 140 MMHG (ref 80–100)
PO2 BLDA: 82 MMHG (ref 80–100)
PO2 BLDA: 84 MMHG (ref 80–100)
PO2 BLDA: 86 MMHG (ref 80–100)
PO2 BLDA: 90 MMHG (ref 80–100)
PO2 BLDA: 99 MMHG (ref 80–100)
POC BE: -11 MMOL/L
POC BE: -8 MMOL/L
POC BE: -9 MMOL/L
POC SATURATED O2: 94 % (ref 95–100)
POC SATURATED O2: 94 % (ref 95–100)
POC SATURATED O2: 95 % (ref 95–100)
POC SATURATED O2: 97 % (ref 95–100)
POC SATURATED O2: 97 % (ref 95–100)
POC SATURATED O2: 99 % (ref 95–100)
POC TCO2: 17 MMOL/L (ref 23–27)
POC TCO2: 18 MMOL/L (ref 23–27)
POC TCO2: 20 MMOL/L (ref 23–27)
POC TCO2: 20 MMOL/L (ref 23–27)
POCT GLUCOSE: 110 MG/DL (ref 70–110)
POCT GLUCOSE: 111 MG/DL (ref 70–110)
POCT GLUCOSE: 89 MG/DL (ref 70–110)
POIKILOCYTOSIS BLD QL SMEAR: SLIGHT
POLYCHROMASIA BLD QL SMEAR: ABNORMAL
POTASSIUM SERPL-SCNC: 4.5 MMOL/L (ref 3.5–5.1)
POTASSIUM SERPL-SCNC: 4.8 MMOL/L (ref 3.5–5.1)
RBC # BLD AUTO: 3.69 M/UL (ref 4.6–6.2)
SAMPLE: ABNORMAL
SITE: ABNORMAL
SODIUM SERPL-SCNC: 138 MMOL/L (ref 136–145)
SODIUM SERPL-SCNC: 140 MMOL/L (ref 136–145)
SP02: 86
SP02: 93
SP02: 94
SP02: 94
SP02: 96
TOXIC GRANULES BLD QL SMEAR: PRESENT
VT: 500
VT: 500
WBC # BLD AUTO: 21.72 K/UL (ref 3.9–12.7)

## 2019-10-16 PROCEDURE — 82803 BLOOD GASES ANY COMBINATION: CPT

## 2019-10-16 PROCEDURE — 99291 CRITICAL CARE FIRST HOUR: CPT | Mod: 24,25,, | Performed by: SURGERY

## 2019-10-16 PROCEDURE — 83735 ASSAY OF MAGNESIUM: CPT

## 2019-10-16 PROCEDURE — S4991 NICOTINE PATCH NONLEGEND: HCPCS | Performed by: PHYSICIAN ASSISTANT

## 2019-10-16 PROCEDURE — 25000003 PHARM REV CODE 250: Performed by: STUDENT IN AN ORGANIZED HEALTH CARE EDUCATION/TRAINING PROGRAM

## 2019-10-16 PROCEDURE — 36556 INSERT NON-TUNNEL CV CATH: CPT | Mod: LT,,, | Performed by: SURGERY

## 2019-10-16 PROCEDURE — 90945 DIALYSIS ONE EVALUATION: CPT

## 2019-10-16 PROCEDURE — S0028 INJECTION, FAMOTIDINE, 20 MG: HCPCS | Performed by: SURGERY

## 2019-10-16 PROCEDURE — 63600175 PHARM REV CODE 636 W HCPCS: Mod: JG | Performed by: STUDENT IN AN ORGANIZED HEALTH CARE EDUCATION/TRAINING PROGRAM

## 2019-10-16 PROCEDURE — 80048 BASIC METABOLIC PNL TOTAL CA: CPT

## 2019-10-16 PROCEDURE — 25000003 PHARM REV CODE 250: Performed by: SURGERY

## 2019-10-16 PROCEDURE — 25000242 PHARM REV CODE 250 ALT 637 W/ HCPCS: Performed by: STUDENT IN AN ORGANIZED HEALTH CARE EDUCATION/TRAINING PROGRAM

## 2019-10-16 PROCEDURE — 20000000 HC ICU ROOM

## 2019-10-16 PROCEDURE — 99900026 HC AIRWAY MAINTENANCE (STAT)

## 2019-10-16 PROCEDURE — 25500020 PHARM REV CODE 255: Performed by: STUDENT IN AN ORGANIZED HEALTH CARE EDUCATION/TRAINING PROGRAM

## 2019-10-16 PROCEDURE — 99233 PR SUBSEQUENT HOSPITAL CARE,LEVL III: ICD-10-PCS | Mod: ,,, | Performed by: INTERNAL MEDICINE

## 2019-10-16 PROCEDURE — 80069 RENAL FUNCTION PANEL: CPT

## 2019-10-16 PROCEDURE — 94668 MNPJ CHEST WALL SBSQ: CPT

## 2019-10-16 PROCEDURE — 63600175 PHARM REV CODE 636 W HCPCS: Performed by: STUDENT IN AN ORGANIZED HEALTH CARE EDUCATION/TRAINING PROGRAM

## 2019-10-16 PROCEDURE — 63600175 PHARM REV CODE 636 W HCPCS: Performed by: SURGERY

## 2019-10-16 PROCEDURE — 36600 WITHDRAWAL OF ARTERIAL BLOOD: CPT

## 2019-10-16 PROCEDURE — 94003 VENT MGMT INPAT SUBQ DAY: CPT

## 2019-10-16 PROCEDURE — 83735 ASSAY OF MAGNESIUM: CPT | Mod: 91

## 2019-10-16 PROCEDURE — 94640 AIRWAY INHALATION TREATMENT: CPT

## 2019-10-16 PROCEDURE — 36556 PR INSERT NON-TUNNEL CV CATH 5+ YRS OLD: ICD-10-PCS | Mod: LT,,, | Performed by: SURGERY

## 2019-10-16 PROCEDURE — 94761 N-INVAS EAR/PLS OXIMETRY MLT: CPT

## 2019-10-16 PROCEDURE — 84100 ASSAY OF PHOSPHORUS: CPT

## 2019-10-16 PROCEDURE — 99900035 HC TECH TIME PER 15 MIN (STAT)

## 2019-10-16 PROCEDURE — 85007 BL SMEAR W/DIFF WBC COUNT: CPT

## 2019-10-16 PROCEDURE — 25000003 PHARM REV CODE 250: Performed by: PHYSICIAN ASSISTANT

## 2019-10-16 PROCEDURE — 99233 SBSQ HOSP IP/OBS HIGH 50: CPT | Mod: ,,, | Performed by: INTERNAL MEDICINE

## 2019-10-16 PROCEDURE — 99291 PR CRITICAL CARE, E/M 30-74 MINUTES: ICD-10-PCS | Mod: 24,25,, | Performed by: SURGERY

## 2019-10-16 PROCEDURE — S0030 INJECTION, METRONIDAZOLE: HCPCS | Performed by: SURGERY

## 2019-10-16 PROCEDURE — 27000221 HC OXYGEN, UP TO 24 HOURS

## 2019-10-16 PROCEDURE — 85027 COMPLETE CBC AUTOMATED: CPT

## 2019-10-16 PROCEDURE — 37799 UNLISTED PX VASCULAR SURGERY: CPT

## 2019-10-16 PROCEDURE — P9045 ALBUMIN (HUMAN), 5%, 250 ML: HCPCS | Mod: JG | Performed by: STUDENT IN AN ORGANIZED HEALTH CARE EDUCATION/TRAINING PROGRAM

## 2019-10-16 RX ORDER — ALBUMIN HUMAN 50 G/1000ML
12.5 SOLUTION INTRAVENOUS ONCE
Status: DISCONTINUED | OUTPATIENT
Start: 2019-10-16 | End: 2019-10-17

## 2019-10-16 RX ORDER — CHLORHEXIDINE GLUCONATE ORAL RINSE 1.2 MG/ML
15 SOLUTION DENTAL 2 TIMES DAILY
Status: DISCONTINUED | OUTPATIENT
Start: 2019-10-16 | End: 2019-10-31 | Stop reason: HOSPADM

## 2019-10-16 RX ORDER — HYDROCODONE BITARTRATE AND ACETAMINOPHEN 500; 5 MG/1; MG/1
TABLET ORAL CONTINUOUS
Status: DISCONTINUED | OUTPATIENT
Start: 2019-10-16 | End: 2019-10-17

## 2019-10-16 RX ORDER — MAGNESIUM SULFATE HEPTAHYDRATE 40 MG/ML
2 INJECTION, SOLUTION INTRAVENOUS
Status: DISCONTINUED | OUTPATIENT
Start: 2019-10-16 | End: 2019-10-17

## 2019-10-16 RX ORDER — DEXMEDETOMIDINE HYDROCHLORIDE 4 UG/ML
0.2 INJECTION, SOLUTION INTRAVENOUS CONTINUOUS
Status: DISCONTINUED | OUTPATIENT
Start: 2019-10-16 | End: 2019-10-16

## 2019-10-16 RX ORDER — FENTANYL CITRATE 50 UG/ML
25 INJECTION, SOLUTION INTRAMUSCULAR; INTRAVENOUS
Status: DISCONTINUED | OUTPATIENT
Start: 2019-10-16 | End: 2019-10-31 | Stop reason: HOSPADM

## 2019-10-16 RX ORDER — HYDROCODONE BITARTRATE AND ACETAMINOPHEN 500; 5 MG/1; MG/1
TABLET ORAL CONTINUOUS
Status: DISCONTINUED | OUTPATIENT
Start: 2019-10-16 | End: 2019-10-16

## 2019-10-16 RX ADMIN — SODIUM CHLORIDE: 0.9 INJECTION, SOLUTION INTRAVENOUS at 05:10

## 2019-10-16 RX ADMIN — DEXMEDETOMIDINE HYDROCHLORIDE 1.4 MCG/KG/HR: 4 INJECTION, SOLUTION INTRAVENOUS at 05:10

## 2019-10-16 RX ADMIN — SODIUM CHLORIDE 200 ML: 0.9 INJECTION, SOLUTION INTRAVENOUS at 09:10

## 2019-10-16 RX ADMIN — FLUCONAZOLE 200 MG: 2 INJECTION, SOLUTION INTRAVENOUS at 08:10

## 2019-10-16 RX ADMIN — PROPOFOL 35 MCG/KG/MIN: 10 INJECTION, EMULSION INTRAVENOUS at 05:10

## 2019-10-16 RX ADMIN — HEPARIN SODIUM 5000 UNITS: 5000 INJECTION, SOLUTION INTRAVENOUS; SUBCUTANEOUS at 05:10

## 2019-10-16 RX ADMIN — DEXMEDETOMIDINE HYDROCHLORIDE 0.6 MCG/KG/HR: 4 INJECTION, SOLUTION INTRAVENOUS at 03:10

## 2019-10-16 RX ADMIN — HEPARIN SODIUM 5000 UNITS: 5000 INJECTION, SOLUTION INTRAVENOUS; SUBCUTANEOUS at 01:10

## 2019-10-16 RX ADMIN — IOHEXOL 15 ML: 350 INJECTION, SOLUTION INTRAVENOUS at 10:10

## 2019-10-16 RX ADMIN — METRONIDAZOLE 500 MG: 500 INJECTION, SOLUTION INTRAVENOUS at 04:10

## 2019-10-16 RX ADMIN — CHLORHEXIDINE GLUCONATE 0.12% ORAL RINSE 15 ML: 1.2 LIQUID ORAL at 09:10

## 2019-10-16 RX ADMIN — CEFEPIME 2 G: 2 INJECTION, POWDER, FOR SOLUTION INTRAVENOUS at 09:10

## 2019-10-16 RX ADMIN — IPRATROPIUM BROMIDE AND ALBUTEROL SULFATE 3 ML: .5; 3 SOLUTION RESPIRATORY (INHALATION) at 03:10

## 2019-10-16 RX ADMIN — HEPARIN SODIUM 5000 UNITS: 5000 INJECTION, SOLUTION INTRAVENOUS; SUBCUTANEOUS at 10:10

## 2019-10-16 RX ADMIN — FENTANYL CITRATE 25 MCG: 50 INJECTION INTRAMUSCULAR; INTRAVENOUS at 10:10

## 2019-10-16 RX ADMIN — FENTANYL CITRATE 25 MCG: 50 INJECTION INTRAMUSCULAR; INTRAVENOUS at 02:10

## 2019-10-16 RX ADMIN — FAMOTIDINE 20 MG: 10 INJECTION, SOLUTION INTRAVENOUS at 09:10

## 2019-10-16 RX ADMIN — IPRATROPIUM BROMIDE AND ALBUTEROL SULFATE 3 ML: .5; 3 SOLUTION RESPIRATORY (INHALATION) at 11:10

## 2019-10-16 RX ADMIN — CHLORHEXIDINE GLUCONATE 0.12% ORAL RINSE 15 ML: 1.2 LIQUID ORAL at 08:10

## 2019-10-16 RX ADMIN — CEFEPIME 2 G: 2 INJECTION, POWDER, FOR SOLUTION INTRAVENOUS at 08:10

## 2019-10-16 RX ADMIN — IOHEXOL 15 ML: 350 INJECTION, SOLUTION INTRAVENOUS at 11:10

## 2019-10-16 RX ADMIN — PROPOFOL 35 MCG/KG/MIN: 10 INJECTION, EMULSION INTRAVENOUS at 06:10

## 2019-10-16 RX ADMIN — PROPOFOL 35 MCG/KG/MIN: 10 INJECTION, EMULSION INTRAVENOUS at 01:10

## 2019-10-16 RX ADMIN — IPRATROPIUM BROMIDE AND ALBUTEROL SULFATE 3 ML: .5; 3 SOLUTION RESPIRATORY (INHALATION) at 07:10

## 2019-10-16 RX ADMIN — Medication 0.02 MCG/KG/MIN: at 10:10

## 2019-10-16 RX ADMIN — METRONIDAZOLE 500 MG: 500 INJECTION, SOLUTION INTRAVENOUS at 02:10

## 2019-10-16 RX ADMIN — DEXMEDETOMIDINE HYDROCHLORIDE 0.2 MCG/KG/HR: 4 INJECTION, SOLUTION INTRAVENOUS at 08:10

## 2019-10-16 RX ADMIN — METRONIDAZOLE 500 MG: 500 INJECTION, SOLUTION INTRAVENOUS at 09:10

## 2019-10-16 RX ADMIN — NICOTINE 1 PATCH: 21 PATCH, EXTENDED RELEASE TRANSDERMAL at 09:10

## 2019-10-16 NOTE — TREATMENT PLAN
AES Treatment Plan    Pasha Harmon is a 74 y.o. male admitted to hospital 10/9/2019 (Hospital Day: 8) due to Bowel perforation.     Interval History  Patient was extubated on 10/14 and did well that day. He was mentating well and off pressors. The following day, the daughters report that he did well in the morning, but throughout the day got more confused and reported difficulty breathing. He was re-intubated.    Objective  Temp:  [98.6 °F (37 °C)-99.6 °F (37.6 °C)] 99 °F (37.2 °C) (10/16 0700)  Pulse:  [] 102 (10/16 0945)  BP: ()/() 107/51 (10/16 0930)  Resp:  [10-41] 27 (10/16 0715)  SpO2:  [81 %-100 %] 96 % (10/16 0945)  Arterial Line BP: ()/(39-84) 102/49 (10/16 0945)    General: Alert, Oriented x3, no distress  Abdomen: Normoactive bowel sounds. Non-distended. Normal tympany. Soft. No peritoneal signs.    Laboratory    Recent Labs   Lab 10/14/19  0400 10/15/19  0301 10/16/19  0306   HGB 8.7* 9.1* 9.2*       Lab Results   Component Value Date    WBC 21.72 (H) 10/16/2019    HGB 9.2 (L) 10/16/2019    HCT 31.2 (L) 10/16/2019    MCV 85 10/16/2019     (H) 10/16/2019       Lab Results   Component Value Date     10/16/2019    K 4.8 10/16/2019     10/16/2019    CO2 16 (L) 10/16/2019     (H) 10/16/2019    CREATININE 3.0 (H) 10/16/2019    CALCIUM 8.4 (L) 10/16/2019    ANIONGAP 15 10/16/2019    ESTGFRAFRICA 22.8 (A) 10/16/2019    EGFRNONAA 19.7 (A) 10/16/2019       Lab Results   Component Value Date    ALT 12 10/14/2019    AST 19 10/14/2019    ALKPHOS 67 10/14/2019    BILITOT 1.0 10/14/2019       Lab Results   Component Value Date    INR 1.0 10/12/2019    INR 1.2 10/10/2019    INR 1.2 05/24/2019       Plan  - Continue supportive care  - Appreciate nephrology recommendations re RRT  - Plan of care was discussed with daughters at bedside  - We will continue to follow.    Thank you for involving us in the care of Pasha Harmon. Please call with any additional  questions, concerns or changes in the patient's clinical status.    Howard Macedo MD PGY-VI  Gastroenterology Fellow  [facility]

## 2019-10-16 NOTE — SUBJECTIVE & OBJECTIVE
Interval History: Patient seen and examined at bedside. Kidney function continues to worsen. UOP decreasing. Now intubated and sedated.     Review of patient's allergies indicates:   Allergen Reactions    Meloxicam Other (See Comments)     Ulcer, GI bleed      Current Facility-Administered Medications   Medication Frequency    0.9%  NaCl infusion (CRRT USE ONLY) Continuous    albumin human 5% bottle 12.5 g Once    albuterol-ipratropium 2.5 mg-0.5 mg/3 mL nebulizer solution 3 mL Q4H PRN    albuterol-ipratropium 2.5 mg-0.5 mg/3 mL nebulizer solution 3 mL Q4H    ceFEPIme injection 2 g Q12H    chlorhexidine 0.12 % solution 15 mL BID    dexmedetomidine (PRECEDEX) 400mcg/100mL 0.9% NaCL infusion Continuous    dextrose 10% (D10W) Bolus PRN    dextrose 10% (D10W) Bolus PRN    famotidine (PF) injection 20 mg Daily    fentaNYL injection 25 mcg Q1H PRN    fluticasone furoate-vilanterol 100-25 mcg/dose diskus inhaler 1 puff Daily    glucagon (human recombinant) injection 1 mg PRN    heparin (porcine) injection 5,000 Units Q8H    hydrALAZINE injection 20 mg Q6H PRN    insulin aspart U-100 pen 0-5 Units QID (AC + HS) PRN    iohexol (OMNIPAQUE) oral solution 15 mL PRN    magnesium sulfate 2g in water 50mL IVPB (premix) PRN    metronidazole IVPB 500 mg Q8H    nicotine 21 mg/24 hr 1 patch Daily    norepinephrine 4 mg in dextrose 5% 250 mL infusion (premix) (titrating) Continuous    ondansetron injection 4 mg Q6H PRN    propofol (DIPRIVAN) 10 mg/mL infusion Continuous    sodium chloride 0.9% flush 10 mL PRN    sodium phosphate 20.01 mmol in dextrose 5 % 250 mL IVPB PRN    sodium phosphate 30 mmol in dextrose 5 % 250 mL IVPB PRN    sodium phosphate 39.99 mmol in dextrose 5 % 250 mL IVPB PRN     Family History     Problem Relation (Age of Onset)    Cancer Father    Heart disease Mother        Tobacco Use    Smoking status: Current Every Day Smoker     Packs/day: 2.00     Types: Cigarettes    Smokeless  tobacco: Never Used   Substance and Sexual Activity    Alcohol use: No    Drug use: No    Sexual activity: Not on file       Objective:     Vital Signs (Most Recent):  Temp: 100 °F (37.8 °C) (10/16/19 1100)  Pulse: 106 (10/16/19 1300)  Resp: (!) 32 (10/16/19 1125)  BP: (!) 115/56 (10/16/19 1130)  SpO2: 95 % (10/16/19 1300)  O2 Device (Oxygen Therapy): ventilator (10/16/19 1300) Vital Signs (24h Range):  Temp:  [98.7 °F (37.1 °C)-100 °F (37.8 °C)] 100 °F (37.8 °C)  Pulse:  [] 106  Resp:  [20-32] 32  SpO2:  [90 %-100 %] 95 %  BP: ()/(48-68) 115/56  Arterial Line BP: ()/(39-66) 98/51     Weight: (!) 148.3 kg (326 lb 15.1 oz) (10/15/19 1631)  Body mass index is 41.95 kg/m².  Body surface area is 2.78 meters squared.    I/O last 3 completed shifts:  In: 2945.2 [I.V.:2715.2; NG/GT:30; IV Piggyback:200]  Out: 3294 [Urine:2200; Drains:604; Chest Tube:490]    Physical Exam   Constitutional: He appears well-developed and well-nourished.   Eyes: Pupils are equal, round, and reactive to light. Conjunctivae and EOM are normal.   Neck: Normal range of motion. Neck supple.   Cardiovascular: Normal rate and regular rhythm.   Pulmonary/Chest: He has rales.   Intubated and mechanically ventilated    Abdominal: He exhibits no distension and no mass. There is tenderness. There is no guarding. No hernia.   Midline surgical incision to abdomen. Multiple abdominal drains noted.    Musculoskeletal: He exhibits edema (1+ pitting edema to BLE ). He exhibits no tenderness or deformity.   Neurological:   Sedated   Skin: Skin is warm and dry. No rash noted. He is not diaphoretic. No erythema. No pallor.       Significant Labs:  CBC:   Recent Labs   Lab 10/16/19  0306   WBC 21.72*   RBC 3.69*   HGB 9.2*   HCT 31.2*   *   MCV 85   MCH 24.9*   MCHC 29.5*     CMP:   Recent Labs   Lab 10/14/19  1500  10/16/19  0306   *   < > 119*   CALCIUM 8.7   < > 8.4*   ALBUMIN 2.1*  --   --    PROT 5.6*  --   --    *   <  > 140   K 4.3   < > 4.8   CO2 17*   < > 16*      < > 109   BUN 79*   < > 103*   CREATININE 2.8*   < > 3.0*   ALKPHOS 67  --   --    ALT 12  --   --    AST 19  --   --    BILITOT 1.0  --   --     < > = values in this interval not displayed.     All labs within the past 24 hours have been reviewed.

## 2019-10-16 NOTE — EICU
Called to bedside for trialysis line placement.  Consent confirmed with bedside team.  Physician verification completed.  Time out completed using proper pt identifiers.  Trialysis catheter placed to (L) IJ by MD Morataya under direct supervision of MD Zuluaga using u/s guidance.  Manometry performed.  Good blood return noted per all three ports. Lines flush easily.  Pt tolerated procedure well.  PCXR ordered.

## 2019-10-16 NOTE — PLAN OF CARE
"Dx: Bowel perforation    Shift Events: Ct chest/abd/pelvis for elevated WBC negative. Trialysis placed. CRRT to start tonight.     Neuro: Sedated and Moves All Extremities    Vital Signs: BP (!) 118/57 (BP Location: Left arm, Patient Position: Lying)   Pulse 86   Temp 100 °F (37.8 °C) (Oral)   Resp (!) 33   Ht 6' 2.02" (1.88 m)   Wt (!) 148.3 kg (326 lb 15.1 oz)   SpO2 (!) 94%   BMI 41.95 kg/m²     Diet: Tube Feeds @ 20 cc/h. Advance to goal of 65 cc/h    Gtts: Precedex    Urine Output: Urinary Catheter 270 cc/shift    Drains: DENISE Drain, total output 50 cc / shift. L CT with 100 serous drainage. R CT 0 output       Labs/Accuchecks: q4h accuchecks  q8h renal panels    Skin: intact. Turn and reposition q2h heel boots in place       "

## 2019-10-16 NOTE — PROGRESS NOTES
Pt continues to be tachypneic over ventilator, ABG consistent with previous result. Dr. Malone notified, order for PRN Fentanyl pushes received.

## 2019-10-16 NOTE — ASSESSMENT & PLAN NOTE
74M current smoker who underwent excision of bleeding gastric mass, post op course complicated by intraperitoneal free air. Now admitted to SICU following ex-lap 10/10/19 which revealed esophageal and gastric perforations which have been repaired.    Neuro:  - Sedated on propofol  - Encephalopathic, likely multifactorial (sedation, ICU delirium, recent sepsis) but primary component likely uremia.  - PRN pain control    CV  - Fluid resuscitate as necessary  - Off pressors  - Echo 9/12/10 wnl    Pulm  - Extubated 10/14, reintubated 10/15 for encephalopathy with respiratory distress and increased WOB  - Continue PAV+ as he appears more comfortable on it  - Respiratory compensation for metabolic acidosis with high rate and low CO2 on abg on spont  - Uses CPAP at home, NO CPAP due to esophageal rupture  - COPD with Significant history of smoking (3 ppd); continue nicotine patch    GI  - Post-op esophageal and gastric perforations 10/9, now repaired via ex-lap  - Cefepime, flagyl, vanc, diflucan for broad coverage (end date 10/16)  - Monitor drains, care per surgery  - NGT removed 10/15  - Do not use G-tube. Leave to gravity.  - Trickle feeds through J tube  - Abdominal CT with G tube contrast scheduled    Renal  - Nonanion gap metabolic acidosis with BHASKAR continues to worsen  - BUN now >100, P and K increasing  - Appreciate nephrology recs  - CMP daily  - Electrolyte repletion PRN; hyperphosphatemia, monitor    Heme/ID  - WBC high; will scan abdomen for potential source of infection  - Hgb stable  - CBCs daily  - Cefepime, flagyl, vancomycin, and diflucan for contaminated peritoneum (end 10/16)  - Abdomen cultures growing Klebsiella    Endo  - DM2  - POCT glucose q4h  - SSI    Dispo: Continue ICU care.

## 2019-10-16 NOTE — PROGRESS NOTES
Patient seen and examined, s/p CT scan of chest/abdomen/pelvis with G tube contrast.  Remains currently on spontaneous mode of ventilation.  Temporary dialysis line placed, awaiting CXR prior to use.  Nephrology assistance appreciated, plan for RRT today.  CT with no drainable fluid collection, no obvious extravasation of contrast.  CT images reviewed.  Continue J tube feeds, continue to advance.  Will change sedation from propofol to precedex as the patient is mildly hypotensive.  Possible SBT with extubation tomorrow if remains stable.  However, he is at a high likelihood to fail and require re-intubation.  I have discussed possible tracheostomy rather than re-attempting extubation with the family.  We will re-evaluate in the AM to make this determination.  Family is aware of plan.  I have addressed and answered their questions to their satisfaction.      I have reviewed the residents, history, physical and examination.  I have examined the patient at bedside and reviewed all pertinent laboratory values, radiologic images, and other results.  I concur with the Resident's  findings and the proposed plan.  I have provided this patient with 30 minutes of critical care time.

## 2019-10-16 NOTE — ASSESSMENT & PLAN NOTE
- Agree with plans for UGI later this week  - J tube feeds per ACS  - CT in place until after UGI, management per ACS  - Please call Thoracic Surgery with any further questions or concerns.

## 2019-10-16 NOTE — PROGRESS NOTES
"[facility]  General Surgery  Progress Note    Subjective:     History of Present Illness:  Patient is a 72 yo M with history of COPD and bleeding gastric mass who underwent EGD with AES yesterday where they removed a 7 cm "ulcerated lipoma" from his gastric antrum using submucosal endoscopic dissection. When removing the mass, it became stuck in the distal esophagus. They had to removed it piecemeal, and caused some mucosal esophageal tears. He was admitted for obs overnight, and this morning his WBC was elevated and he was complaining of abd pain. CXR showed free intraperitoneal free air and general surgery was consulted for evaluation.    Post-Op Info:  Procedure(s) (LRB):  LAPAROTOMY, EXPLORATORY (N/A)  EGD (ESOPHAGOGASTRODUODENOSCOPY) (N/A)  INSERTION, CATHETER, INTERCOSTAL, FOR DRAINAGE (Bilateral)  REPAIR, PERFORATION, GASTRIC (N/A)  INSERTION, GASTROSTOMY TUBE, PERCUTANEOUS (Left)  INSERTION, JEJUNOSTOMY TUBE (Left)   6 Days Post-Op     Interval History: Reintubated yesterday. Low UOP overnight.     Medications:  Continuous Infusions:   dexmedetomidine (PRECEDEX) infusion Stopped (10/15/19 2000)    niCARdipine Stopped (10/15/19 1200)    norepinephrine bitartrate-D5W Stopped (10/16/19 0000)    propofol 35 mcg/kg/min (10/16/19 0509)     Scheduled Meds:   albumin human 5%  12.5 g Intravenous Once    albuterol-ipratropium  3 mL Nebulization Q4H    ceFEPime (MAXIPIME) IVPB  2 g Intravenous Q12H    famotidine (PF)  20 mg Intravenous Daily    fluconazole (DIFLUCAN) IVPB  200 mg Intravenous Q24H    fluticasone furoate-vilanterol  1 puff Inhalation Daily    furosemide  120 mg Intravenous Q8H    heparin (porcine)  5,000 Units Subcutaneous Q8H    metoprolol  25 mg Per J Tube BID    metronidazole  500 mg Intravenous Q8H    nicotine  1 patch Transdermal Daily    norepinephrine         PRN Meds:albuterol-ipratropium, Dextrose 10% Bolus, Dextrose 10% Bolus, fentaNYL, glucagon (human " recombinant), hydrALAZINE, insulin aspart U-100, ondansetron, sodium chloride 0.9%     Review of patient's allergies indicates:   Allergen Reactions    Meloxicam Other (See Comments)     Ulcer, GI bleed      Objective:     Vital Signs (Most Recent):  Temp: 99.6 °F (37.6 °C) (10/16/19 0300)  Pulse: 105 (10/16/19 0539)  Resp: (!) 28 (10/16/19 0517)  BP: (!) 91/48 (10/16/19 0539)  SpO2: 96 % (10/16/19 0539) Vital Signs (24h Range):  Temp:  [98.6 °F (37 °C)-99.6 °F (37.6 °C)] 99.6 °F (37.6 °C)  Pulse:  [] 105  Resp:  [10-63] 28  SpO2:  [81 %-100 %] 96 %  BP: ()/() 91/48  Arterial Line BP: ()/() 100/49     Weight: (!) 148.3 kg (326 lb 15.1 oz)  Body mass index is 41.95 kg/m².    Intake/Output - Last 3 Shifts       10/14 0700 - 10/15 0659 10/15 0700 - 10/16 0659    I.V. (mL/kg) 1397.2 (9.4) 633 (4.3)    NG/GT  20    IV Piggyback 200     Total Intake(mL/kg) 1597.2 (10.8) 653 (4.4)    Urine (mL/kg/hr) 1945 (0.5) 1250 (0.4)    Drains 555 263    Chest Tube 470 290    Total Output 2970 1803    Net -1372.8 -1150                Physical Exam   Constitutional: He appears well-developed and well-nourished. He is sedated and intubated.   HENT:   Bridled NGT in place   Cardiovascular: Normal rate and regular rhythm.   Pulmonary/Chest: Effort normal. He is intubated. No respiratory distress.   Bilateral chest tubes, draining SS, no air leak   Abdominal: Soft. He exhibits no distension. There is no tenderness. There is no guarding.   G tube in place, draining gastric contents  Jtube in place with trickle tube feeds   Genitourinary:   Genitourinary Comments: Hallman in place   Skin: Skin is warm and dry.   Vitals reviewed.      Significant Labs:  CBC:   Recent Labs   Lab 10/16/19  0306   WBC 21.72*   RBC 3.69*   HGB 9.2*   HCT 31.2*   *   MCV 85   MCH 24.9*   MCHC 29.5*     CMP:   Recent Labs   Lab 10/14/19  1500  10/16/19  0306   *   < > 119*   CALCIUM 8.7   < > 8.4*   ALBUMIN 2.1*  --   --     PROT 5.6*  --   --    *   < > 140   K 4.3   < > 4.8   CO2 17*   < > 16*      < > 109   BUN 79*   < > 103*   CREATININE 2.8*   < > 3.0*   ALKPHOS 67  --   --    ALT 12  --   --    AST 19  --   --    BILITOT 1.0  --   --     < > = values in this interval not displayed.     Coagulation:   Recent Labs   Lab 10/12/19  0427   LABPROT 10.2   INR 1.0   APTT 34.5*     ABGs:   Recent Labs   Lab 10/16/19  0517   PH 7.236*   PCO2 43.0   PO2 86   HCO3 18.2*   POCSATURATED 94*   BE -9       Significant Diagnostics:  I have reviewed all pertinent imaging results/findings within the past 24 hours.    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         Nu Schulte MD  General Surgery  [facility]

## 2019-10-16 NOTE — PROCEDURES
"Pasha Harmon is a 74 y.o. male patient.    Temp: 100 °F (37.8 °C) (10/16/19 1100)  Pulse: 101 (10/16/19 1345)  Resp: (!) 32 (10/16/19 1125)  BP: (!) 115/56 (10/16/19 1130)  SpO2: 96 % (10/16/19 1345)  Weight: (!) 148.3 kg (326 lb 15.1 oz) (10/15/19 1631)  Height: 6' 2.02" (188 cm) (10/15/19 1631)       Central Line  Date/Time: 10/16/2019 1:00 PM  Location procedure was performed: [facility] CRITICAL CARE SURGERY  Performed by: Johan Morataya MD  Pre-operative Diagnosis: Bowel perforation  Post-operative diagnosis: Bowel perforation  Consent Done: Yes  Time out: Immediately prior to procedure a "time out" was called to verify the correct patient, procedure, equipment, support staff and site/side marked as required.  Indications: hemodialysis  Anesthesia: see MAR for details  Preparation: skin prepped with ChloraPrep  Skin prep agent dried: skin prep agent completely dried prior to procedure  Sterile barriers: all five maximum sterile barriers used - cap, mask, sterile gown, sterile gloves, and large sterile sheet  Hand hygiene: hand hygiene performed prior to central venous catheter insertion  Location details: left internal jugular  Catheter type: triple lumen  Catheter size: 12 Fr  Catheter Length: 20cm    Ultrasound guidance: yes  Vessel Caliber: large, patent, compressibility normal  Needle advanced into vessel with real time Ultrasound guidance.  Guidewire confirmed in vessel.  Manometry: Yes  Number of attempts: 1  Complications: none  Post-procedure: line sutured,  chlorhexidine patch,  sterile dressing applied and blood return through all ports  Complications: No          Johan Morataya  10/16/2019  "

## 2019-10-16 NOTE — PT/OT/SLP PROGRESS
Physical Therapy      Patient Name:  Pasha Harmon   MRN:  [id number]    Patient not seen today. Pt re-intubated 10/15 and not yet medically appropriate for therapy on this date. Will follow-up when appropriate.    Nadine Nobles, PT, DPT  10/16/2019  [phone]

## 2019-10-16 NOTE — ASSESSMENT & PLAN NOTE
Patient is 73 yo M with 7 cm contained esophageal perforation and gastric perforation after AES procedure who underwent emergent ex lap and EGD on 10/10/19    NG removed by thoracic  Bilateral chest tubes in place to monitor for esophageal leak - keep them to suction  Continue G tube to gravity for gastric decompression - do not use for meds or feeds  Continue tube feeds  Consider CT scan today  Nephrology consult for worsening renal function    Rest of care per SICU

## 2019-10-16 NOTE — PROGRESS NOTES
Pt continues to be tachypnic and breathe over the ventilator, using abdominal muscles to breathe while on 50 mcg/kg/h of Propofol. MD notified, Dr. Bautista at bedside. RR increased to 26 bpm as pt continues to be acidotic. Albumin ordered related to hypotension and decreased urine output.

## 2019-10-16 NOTE — ASSESSMENT & PLAN NOTE
Mr Harmon is a 75yo male who is s/p EGD and was admitted to [facility] for concern for esophageal tear. Sustained multiple episodes of intraoperative hypotension on 10/9/19 and on 10/10/19, and had a drop in Hgb to 6.0 on 10/12/19.    -Baseline sCr: 0.8  -sCr on admission:1.4  -sCr on 10/15/19: 2.6  -10/15/19: 1.8L/UOP/past 24hrs   -10/15/19: physical exam reveals signs of volume overload; pt is tachypneic, has bilateral rales, and has pitting edema to BLE      Urine microscopy on 10/15/19 reveals moderate muddy brown granular casts, concerning for ischemic ATN.     BHASKAR likely 2/2 ischemic ATN due to intraoperative hypotension and acute anemia.       Plan/Recommendations:    -Will initiate SLED for clearance of metabolic toxins, and for volume management.   --350cc/hr   -No indication for RRT at this time  -Avoid nephrotoxic medications  -Renally dose medications  -Avoid contrast studies   -Daily renal function panels   -Strict I/O's   -Will continue to follow closely

## 2019-10-16 NOTE — PROGRESS NOTES
[facility]  Nephrology  Progress Note    Patient Name: Pasha Harmon  MRN: [id number]  Admission Date: 10/9/2019  Hospital Length of Stay: 6 days  Attending Provider: Paulino Parrish MD   Primary Care Physician: Eze Root MD  Principal Problem:Bowel perforation      Interval History: Patient seen and examined at bedside. Kidney function continues to worsen. UOP decreasing. Now intubated and sedated.     Review of patient's allergies indicates:   Allergen Reactions    Meloxicam Other (See Comments)     Ulcer, GI bleed      Current Facility-Administered Medications   Medication Frequency    0.9%  NaCl infusion (CRRT USE ONLY) Continuous    albumin human 5% bottle 12.5 g Once    albuterol-ipratropium 2.5 mg-0.5 mg/3 mL nebulizer solution 3 mL Q4H PRN    albuterol-ipratropium 2.5 mg-0.5 mg/3 mL nebulizer solution 3 mL Q4H    ceFEPIme injection 2 g Q12H    chlorhexidine 0.12 % solution 15 mL BID    dexmedetomidine (PRECEDEX) 400mcg/100mL 0.9% NaCL infusion Continuous    dextrose 10% (D10W) Bolus PRN    dextrose 10% (D10W) Bolus PRN    famotidine (PF) injection 20 mg Daily    fentaNYL injection 25 mcg Q1H PRN    fluticasone furoate-vilanterol 100-25 mcg/dose diskus inhaler 1 puff Daily    glucagon (human recombinant) injection 1 mg PRN    heparin (porcine) injection 5,000 Units Q8H    hydrALAZINE injection 20 mg Q6H PRN    insulin aspart U-100 pen 0-5 Units QID (AC + HS) PRN    iohexol (OMNIPAQUE) oral solution 15 mL PRN    magnesium sulfate 2g in water 50mL IVPB (premix) PRN    metronidazole IVPB 500 mg Q8H    nicotine 21 mg/24 hr 1 patch Daily    norepinephrine 4 mg in dextrose 5% 250 mL infusion (premix) (titrating) Continuous    ondansetron injection 4 mg Q6H PRN    propofol (DIPRIVAN) 10 mg/mL infusion Continuous    sodium chloride 0.9% flush 10 mL PRN    sodium phosphate 20.01 mmol in dextrose 5 % 250 mL IVPB PRN    sodium phosphate 30 mmol in dextrose 5 %  250 mL IVPB PRN    sodium phosphate 39.99 mmol in dextrose 5 % 250 mL IVPB PRN     Family History     Problem Relation (Age of Onset)    Cancer Father    Heart disease Mother        Tobacco Use    Smoking status: Current Every Day Smoker     Packs/day: 2.00     Types: Cigarettes    Smokeless tobacco: Never Used   Substance and Sexual Activity    Alcohol use: No    Drug use: No    Sexual activity: Not on file       Objective:     Vital Signs (Most Recent):  Temp: 100 °F (37.8 °C) (10/16/19 1100)  Pulse: 106 (10/16/19 1300)  Resp: (!) 32 (10/16/19 1125)  BP: (!) 115/56 (10/16/19 1130)  SpO2: 95 % (10/16/19 1300)  O2 Device (Oxygen Therapy): ventilator (10/16/19 1300) Vital Signs (24h Range):  Temp:  [98.7 °F (37.1 °C)-100 °F (37.8 °C)] 100 °F (37.8 °C)  Pulse:  [] 106  Resp:  [20-32] 32  SpO2:  [90 %-100 %] 95 %  BP: ()/(48-68) 115/56  Arterial Line BP: ()/(39-66) 98/51     Weight: (!) 148.3 kg (326 lb 15.1 oz) (10/15/19 1631)  Body mass index is 41.95 kg/m².  Body surface area is 2.78 meters squared.    I/O last 3 completed shifts:  In: 2945.2 [I.V.:2715.2; NG/GT:30; IV Piggyback:200]  Out: 3294 [Urine:2200; Drains:604; Chest Tube:490]    Physical Exam   Constitutional: He appears well-developed and well-nourished.   Eyes: Pupils are equal, round, and reactive to light. Conjunctivae and EOM are normal.   Neck: Normal range of motion. Neck supple.   Cardiovascular: Normal rate and regular rhythm.   Pulmonary/Chest: He has rales.   Intubated and mechanically ventilated    Abdominal: He exhibits no distension and no mass. There is tenderness. There is no guarding. No hernia.   Midline surgical incision to abdomen. Multiple abdominal drains noted.    Musculoskeletal: He exhibits edema (1+ pitting edema to BLE ). He exhibits no tenderness or deformity.   Neurological:   Sedated   Skin: Skin is warm and dry. No rash noted. He is not diaphoretic. No erythema. No pallor.       Significant Labs:  CBC:    Recent Labs   Lab 10/16/19  0306   WBC 21.72*   RBC 3.69*   HGB 9.2*   HCT 31.2*   *   MCV 85   MCH 24.9*   MCHC 29.5*     CMP:   Recent Labs   Lab 10/14/19  1500  10/16/19  0306   *   < > 119*   CALCIUM 8.7   < > 8.4*   ALBUMIN 2.1*  --   --    PROT 5.6*  --   --    *   < > 140   K 4.3   < > 4.8   CO2 17*   < > 16*      < > 109   BUN 79*   < > 103*   CREATININE 2.8*   < > 3.0*   ALKPHOS 67  --   --    ALT 12  --   --    AST 19  --   --    BILITOT 1.0  --   --     < > = values in this interval not displayed.     All labs within the past 24 hours have been reviewed.        Assessment/Plan:     BHASKAR (acute kidney injury)  Mr Harmon is a 75yo male who is s/p EGD and was admitted to [facility] for concern for esophageal tear. Sustained multiple episodes of intraoperative hypotension on 10/9/19 and on 10/10/19, and had a drop in Hgb to 6.0 on 10/12/19.    -Baseline sCr: 0.8  -sCr on admission:1.4  -sCr on 10/15/19: 2.6  -10/15/19: 1.8L/UOP/past 24hrs   -10/15/19: physical exam reveals signs of volume overload; pt is tachypneic, has bilateral rales, and has pitting edema to BLE      Urine microscopy on 10/15/19 reveals moderate muddy brown granular casts, concerning for ischemic ATN.     BHASKAR likely 2/2 ischemic ATN due to intraoperative hypotension and acute anemia.       Plan/Recommendations:    -Will initiate SLED for clearance of metabolic toxins, and for volume management.   --350cc/hr   -Avoid nephrotoxic medications  -Renally dose medications  -Avoid contrast studies   -Daily renal function panels   -Strict I/O's   -Will continue to follow closely            Thank you for your consult. I will follow-up with patient. Please contact us if you have any additional questions.    Ranjeet Judd NP  Nephrology  [facility]

## 2019-10-16 NOTE — SUBJECTIVE & OBJECTIVE
Interval History/Significant Events: Reintubated for respiratory distress and increased work of breathing. BUN, P continue to rise, bicarb falling. Difficult to maintain sedation.    Follow-up For: Procedure(s) (LRB):  LAPAROTOMY, EXPLORATORY (N/A)  EGD (ESOPHAGOGASTRODUODENOSCOPY) (N/A)  INSERTION, CATHETER, INTERCOSTAL, FOR DRAINAGE (Bilateral)  REPAIR, PERFORATION, GASTRIC (N/A)  INSERTION, GASTROSTOMY TUBE, PERCUTANEOUS (Left)  INSERTION, JEJUNOSTOMY TUBE (Left)    Post-Operative Day: 6 Days Post-Op    Objective:     Vital Signs (Most Recent):  Temp: 99 °F (37.2 °C) (10/16/19 0700)  Pulse: 104 (10/16/19 0830)  Resp: (!) 27 (10/16/19 0715)  BP: (!) 97/51 (10/16/19 0830)  SpO2: 96 % (10/16/19 0830) Vital Signs (24h Range):  Temp:  [98.6 °F (37 °C)-99.6 °F (37.6 °C)] 99 °F (37.2 °C)  Pulse:  [] 104  Resp:  [10-41] 27  SpO2:  [81 %-100 %] 96 %  BP: ()/() 97/51  Arterial Line BP: ()/(39-84) 98/48     Weight: (!) 148.3 kg (326 lb 15.1 oz)  Body mass index is 41.95 kg/m².      Intake/Output Summary (Last 24 hours) at 10/16/2019 0853  Last data filed at 10/16/2019 0800  Gross per 24 hour   Intake 2243 ml   Output 2175 ml   Net 68 ml       Physical Exam   Constitutional: He appears well-developed and well-nourished.   HENT:   Head: Normocephalic and atraumatic.   Eyes: Pupils are equal, round, and reactive to light. Conjunctivae are normal.   Neck: Normal range of motion.   Cardiovascular: Normal rate, regular rhythm and normal heart sounds.   Pulmonary/Chest: Breath sounds normal.   Intubated, on PAV+  Abdominal:   G-tube to gravity with bilious output.  J-tube present  Neurological:   Sedated  Skin: Skin is warm and dry.     Vents:  Vent Mode: PAV+ (10/16/19 0718)  Ventilator Initiated: Yes (10/10/19 1621)  Set Rate: 26 bmp (10/16/19 0517)  Vt Set: 500 mL (10/16/19 0517)  PEEP/CPAP: 8 cmH20 (10/16/19 0718)  Oxygen Concentration (%): 35 (10/16/19 0830)  Peak Airway Pressure: 29 cmH2O (10/16/19  0718)  Plateau Pressure: 23 cmH20 (10/16/19 0718)  Total Ve: 18.1 mL (10/16/19 0718)  F/VT Ratio<105 (RSBI): (!) 54.9 (10/16/19 0517)    Lines/Drains/Airways     Central Venous Catheter Line                 Percutaneous Central Line Insertion/Assessment - triple lumen  10/10/19 1853 right internal jugular 5 days          Drain                 Chest Tube 10/10/19 1510 1 Right Midaxillary;Fourth intercostal space 28 Fr. 5 days         Chest Tube 10/10/19 1513 Left Midaxillary;Fourth intercostal space 28 Fr. 5 days         Closed/Suction Drain 10/10/19 1349 Right RUQ Bulb 19 Fr. 5 days         Gastrostomy/Enterostomy 10/10/19 1400 Jejunostomy tube 5 days         Gastrostomy/Enterostomy 10/10/19 1400 LUQ 5 days         Urethral Catheter 10/10/19 1530 Straight-tip;Double-lumen;Non-latex 16 Fr. 5 days          Airway                 Airway - Non-Surgical 10/15/19 1257 Endotracheal Tube less than 1 day          Arterial Line                 Arterial Line 10/10/19 1245 Right Radial 5 days          Peripheral Intravenous Line                 Peripheral IV - Single Lumen 10/14/19 0034 18 G Anterior;Right Upper Arm 2 days                Significant Labs:    CBC/Anemia Profile:  Recent Labs   Lab 10/15/19  0301 10/16/19  0306   WBC 15.41* 21.72*   HGB 9.1* 9.2*   HCT 29.3* 31.2*   * 387*   MCV 79* 85   RDW 18.1* 18.8*        Chemistries:  Recent Labs   Lab 10/14/19  1500 10/15/19  0301 10/16/19  0306   * 137 140   K 4.3 4.1 4.8    106 109   CO2 17* 18* 16*   BUN 79* 86* 103*   CREATININE 2.8* 2.6* 3.0*   CALCIUM 8.7 8.9 8.4*   ALBUMIN 2.1*  --   --    PROT 5.6*  --   --    BILITOT 1.0  --   --    ALKPHOS 67  --   --    ALT 12  --   --    AST 19  --   --    MG 2.8* 2.9* 2.9*   PHOS 6.0* 5.8* 8.0*       All pertinent labs within the past 24 hours have been reviewed.    Significant Imaging:  I have reviewed all pertinent imaging results/findings within the past 24 hours.

## 2019-10-16 NOTE — PROGRESS NOTES
[facility]  Critical Care - Surgery  Progress Note    Patient Name: Pasha Harmon  MRN: [id number]  Admission Date: 10/9/2019  Hospital Length of Stay: 6 days  Code Status: Full Code  Attending Provider: Paulino Parrish MD  Primary Care Provider: Eze Root MD   Principal Problem: Bowel perforation    Subjective:     Hospital/ICU Course:  10/10: Admitted to SICU. R IJ TLC placed. On minimal pressors.   10/11: Remaining intubated. On minimal pressors.   10/12: Difficulty weaning pressors while on propofol and fentanyl to control resp rate from acidosis. Received 2u PRBCs after 1.25L albumin.   10/13: Pressors weaned, still not following commands after stopping sedation  10/14: NGT removed by primary team. Metoprolol started for HTN    Interval History/Significant Events: Reintubated for respiratory distress and increased work of breathing. BUN, P continue to rise, bicarb falling. Difficult to maintain sedation.    Follow-up For: Procedure(s) (LRB):  LAPAROTOMY, EXPLORATORY (N/A)  EGD (ESOPHAGOGASTRODUODENOSCOPY) (N/A)  INSERTION, CATHETER, INTERCOSTAL, FOR DRAINAGE (Bilateral)  REPAIR, PERFORATION, GASTRIC (N/A)  INSERTION, GASTROSTOMY TUBE, PERCUTANEOUS (Left)  INSERTION, JEJUNOSTOMY TUBE (Left)    Post-Operative Day: 6 Days Post-Op    Objective:     Vital Signs (Most Recent):  Temp: 99 °F (37.2 °C) (10/16/19 0700)  Pulse: 104 (10/16/19 0830)  Resp: (!) 27 (10/16/19 0715)  BP: (!) 97/51 (10/16/19 0830)  SpO2: 96 % (10/16/19 0830) Vital Signs (24h Range):  Temp:  [98.6 °F (37 °C)-99.6 °F (37.6 °C)] 99 °F (37.2 °C)  Pulse:  [] 104  Resp:  [10-41] 27  SpO2:  [81 %-100 %] 96 %  BP: ()/() 97/51  Arterial Line BP: ()/(39-84) 98/48     Weight: (!) 148.3 kg (326 lb 15.1 oz)  Body mass index is 41.95 kg/m².      Intake/Output Summary (Last 24 hours) at 10/16/2019 0853  Last data filed at 10/16/2019 0800  Gross per 24 hour   Intake 2243 ml   Output 2175 ml   Net 68 ml        Physical Exam   Constitutional: He appears well-developed and well-nourished.   HENT:   Head: Normocephalic and atraumatic.   Eyes: Pupils are equal, round, and reactive to light. Conjunctivae are normal.   Neck: Normal range of motion.   Cardiovascular: Normal rate, regular rhythm and normal heart sounds.   Pulmonary/Chest: Breath sounds normal.   Intubated, on PAV+  Abdominal:   G-tube to gravity with bilious output.  J-tube present  Neurological:   Sedated  Skin: Skin is warm and dry.     Vents:  Vent Mode: PAV+ (10/16/19 0718)  Ventilator Initiated: Yes (10/10/19 1621)  Set Rate: 26 bmp (10/16/19 0517)  Vt Set: 500 mL (10/16/19 0517)  PEEP/CPAP: 8 cmH20 (10/16/19 0718)  Oxygen Concentration (%): 35 (10/16/19 0830)  Peak Airway Pressure: 29 cmH2O (10/16/19 0718)  Plateau Pressure: 23 cmH20 (10/16/19 0718)  Total Ve: 18.1 mL (10/16/19 0718)  F/VT Ratio<105 (RSBI): (!) 54.9 (10/16/19 0517)    Lines/Drains/Airways     Central Venous Catheter Line                 Percutaneous Central Line Insertion/Assessment - triple lumen  10/10/19 1853 right internal jugular 5 days          Drain                 Chest Tube 10/10/19 1510 1 Right Midaxillary;Fourth intercostal space 28 Fr. 5 days         Chest Tube 10/10/19 1513 Left Midaxillary;Fourth intercostal space 28 Fr. 5 days         Closed/Suction Drain 10/10/19 1349 Right RUQ Bulb 19 Fr. 5 days         Gastrostomy/Enterostomy 10/10/19 1400 Jejunostomy tube 5 days         Gastrostomy/Enterostomy 10/10/19 1400 LUQ 5 days         Urethral Catheter 10/10/19 1530 Straight-tip;Double-lumen;Non-latex 16 Fr. 5 days          Airway                 Airway - Non-Surgical 10/15/19 1257 Endotracheal Tube less than 1 day          Arterial Line                 Arterial Line 10/10/19 1245 Right Radial 5 days          Peripheral Intravenous Line                 Peripheral IV - Single Lumen 10/14/19 0034 18 G Anterior;Right Upper Arm 2 days                Significant  Labs:    CBC/Anemia Profile:  Recent Labs   Lab 10/15/19  0301 10/16/19  0306   WBC 15.41* 21.72*   HGB 9.1* 9.2*   HCT 29.3* 31.2*   * 387*   MCV 79* 85   RDW 18.1* 18.8*        Chemistries:  Recent Labs   Lab 10/14/19  1500 10/15/19  0301 10/16/19  0306   * 137 140   K 4.3 4.1 4.8    106 109   CO2 17* 18* 16*   BUN 79* 86* 103*   CREATININE 2.8* 2.6* 3.0*   CALCIUM 8.7 8.9 8.4*   ALBUMIN 2.1*  --   --    PROT 5.6*  --   --    BILITOT 1.0  --   --    ALKPHOS 67  --   --    ALT 12  --   --    AST 19  --   --    MG 2.8* 2.9* 2.9*   PHOS 6.0* 5.8* 8.0*       All pertinent labs within the past 24 hours have been reviewed.    Significant Imaging:  I have reviewed all pertinent imaging results/findings within the past 24 hours.    Assessment/Plan:     * Bowel perforation  74M current smoker who underwent excision of bleeding gastric mass, post op course complicated by intraperitoneal free air. Now admitted to SICU following ex-lap 10/10/19 which revealed esophageal and gastric perforations which have been repaired.    Neuro:  - Sedated on propofol  - Encephalopathic, likely multifactorial (sedation, ICU delirium, recent sepsis) but primary component likely uremia.  - PRN pain control    CV  - Fluid resuscitate as necessary  - Off pressors  - Echo 9/12/10 wnl    Pulm  - Extubated 10/14, reintubated 10/15 for encephalopathy with respiratory distress and increased WOB  - Continue PAV+ as he appears more comfortable on it  - Respiratory compensation for metabolic acidosis with high rate and low CO2 on abg on spont  - Uses CPAP at home, NO CPAP due to esophageal rupture  - COPD with Significant history of smoking (3 ppd); continue nicotine patch    GI  - Post-op esophageal and gastric perforations 10/9, now repaired via ex-lap  - Cefepime, flagyl, vanc, diflucan for broad coverage (end date 10/16)  - Monitor drains, care per surgery  - NGT removed 10/15  - Do not use G-tube. Leave to gravity.  - Trickle  feeds through J tube  - Abdominal CT with G tube contrast scheduled    Renal  - Nonanion gap metabolic acidosis with BHASKAR continues to worsen  - BUN now >100, P and K increasing  - Appreciate nephrology recs  - CMP daily  - Electrolyte repletion PRN; hyperphosphatemia, monitor    Heme/ID  - WBC high; will scan abdomen for potential source of infection  - Hgb stable  - CBCs daily  - Cefepime, flagyl, vancomycin, and diflucan for contaminated peritoneum (end 10/16)  - Abdomen cultures growing Klebsiella    Endo  - DM2  - POCT glucose q4h  - SSI    Dispo: Continue ICU care.        Critical care was time spent personally by me on the following activities: development of treatment plan with patient or surrogate and bedside caregivers, discussions with consultants, evaluation of patient's response to treatment, examination of patient, ordering and performing treatments and interventions, ordering and review of laboratory studies, ordering and review of radiographic studies, pulse oximetry, re-evaluation of patient's condition.  This critical care time did not overlap with that of any other provider or involve time for any procedures.     Johan Morataya MD  Critical Care - Surgery  [facility]

## 2019-10-16 NOTE — SUBJECTIVE & OBJECTIVE
Interval History: Reintubated yesterday. Low UOP overnight.     Medications:  Continuous Infusions:   dexmedetomidine (PRECEDEX) infusion Stopped (10/15/19 2000)    niCARdipine Stopped (10/15/19 1200)    norepinephrine bitartrate-D5W Stopped (10/16/19 0000)    propofol 35 mcg/kg/min (10/16/19 0509)     Scheduled Meds:   albumin human 5%  12.5 g Intravenous Once    albuterol-ipratropium  3 mL Nebulization Q4H    ceFEPime (MAXIPIME) IVPB  2 g Intravenous Q12H    famotidine (PF)  20 mg Intravenous Daily    fluconazole (DIFLUCAN) IVPB  200 mg Intravenous Q24H    fluticasone furoate-vilanterol  1 puff Inhalation Daily    furosemide  120 mg Intravenous Q8H    heparin (porcine)  5,000 Units Subcutaneous Q8H    metoprolol  25 mg Per J Tube BID    metronidazole  500 mg Intravenous Q8H    nicotine  1 patch Transdermal Daily    norepinephrine         PRN Meds:albuterol-ipratropium, Dextrose 10% Bolus, Dextrose 10% Bolus, fentaNYL, glucagon (human recombinant), hydrALAZINE, insulin aspart U-100, ondansetron, sodium chloride 0.9%     Review of patient's allergies indicates:   Allergen Reactions    Meloxicam Other (See Comments)     Ulcer, GI bleed      Objective:     Vital Signs (Most Recent):  Temp: 99.6 °F (37.6 °C) (10/16/19 0300)  Pulse: 105 (10/16/19 0539)  Resp: (!) 28 (10/16/19 0517)  BP: (!) 91/48 (10/16/19 0539)  SpO2: 96 % (10/16/19 0539) Vital Signs (24h Range):  Temp:  [98.6 °F (37 °C)-99.6 °F (37.6 °C)] 99.6 °F (37.6 °C)  Pulse:  [] 105  Resp:  [10-63] 28  SpO2:  [81 %-100 %] 96 %  BP: ()/() 91/48  Arterial Line BP: ()/() 100/49     Weight: (!) 148.3 kg (326 lb 15.1 oz)  Body mass index is 41.95 kg/m².    Intake/Output - Last 3 Shifts       10/14 0700 - 10/15 0659 10/15 0700 - 10/16 0659    I.V. (mL/kg) 1397.2 (9.4) 633 (4.3)    NG/GT  20    IV Piggyback 200     Total Intake(mL/kg) 1597.2 (10.8) 653 (4.4)    Urine (mL/kg/hr) 1945 (0.5) 1250 (0.4)    Drains 555 828     Chest Tube 470 290    Total Output 2970 1803    Net -1372.8 -1150                Physical Exam   Constitutional: He appears well-developed and well-nourished. He is sedated and intubated.   HENT:   Bridled NGT in place   Cardiovascular: Normal rate and regular rhythm.   Pulmonary/Chest: Effort normal. He is intubated. No respiratory distress.   Bilateral chest tubes, draining SS, no air leak   Abdominal: Soft. He exhibits no distension. There is no tenderness. There is no guarding.   G tube in place, draining gastric contents  Jtube in place with trickle tube feeds   Genitourinary:   Genitourinary Comments: Hallman in place   Skin: Skin is warm and dry.   Vitals reviewed.      Significant Labs:  CBC:   Recent Labs   Lab 10/16/19  0306   WBC 21.72*   RBC 3.69*   HGB 9.2*   HCT 31.2*   *   MCV 85   MCH 24.9*   MCHC 29.5*     CMP:   Recent Labs   Lab 10/14/19  1500  10/16/19  0306   *   < > 119*   CALCIUM 8.7   < > 8.4*   ALBUMIN 2.1*  --   --    PROT 5.6*  --   --    *   < > 140   K 4.3   < > 4.8   CO2 17*   < > 16*      < > 109   BUN 79*   < > 103*   CREATININE 2.8*   < > 3.0*   ALKPHOS 67  --   --    ALT 12  --   --    AST 19  --   --    BILITOT 1.0  --   --     < > = values in this interval not displayed.     Coagulation:   Recent Labs   Lab 10/12/19  0427   LABPROT 10.2   INR 1.0   APTT 34.5*     ABGs:   Recent Labs   Lab 10/16/19  0517   PH 7.236*   PCO2 43.0   PO2 86   HCO3 18.2*   POCSATURATED 94*   BE -9       Significant Diagnostics:  I have reviewed all pertinent imaging results/findings within the past 24 hours.

## 2019-10-16 NOTE — PLAN OF CARE
Shift Events: Pt continued to be tachypneic with abdominal muscle use for respiration despite Propofol and Fentanyl administration, see notes for details. Switched from AC/VC to PAV+ in AM to promote comfort and decrease respiratory effort. Team aware that pt is on Propofol and spontaneous setting, orders to continue propofol sedation, pt has history of propofol sedation on PAV+ for comfort. UO continued to decrease throughout shift, 500 of albumin given r/t episode of hypotension following CVP measurement. MAPs > 60 maintained without use of pressors. DENISE drain continues to leak around site, dressing reinforced, team aware, no plans to add additional suture at this time.     VS: HR 90s-110s; BP 90s/50s; MAP 60s; SpO2 94-95% on FiO2 35%, PEEP 8, RR 26; Tmax 99.6 oral; CVP 4 HOB flat    Oxygen: Spontaneous vent    Gtts: Propofol @ 35 mcg/kg/h    UO: 15-60 cc/h     Diet: NPO; trickle feeds to J tube    Labs/Accuchecks: Accucheck q4 h; daily labs    Plan: Decrease work of respiration with adequate sedation and ventilator settings. Possible bone marrow biopsy r/t genetic test results. Possible dialysis. No NG/OG insertion r/t esophageal sutures.

## 2019-10-17 ENCOUNTER — ANESTHESIA (OUTPATIENT)
Dept: SURGERY | Facility: HOSPITAL | Age: 74
DRG: 003 | End: 2019-10-17
Payer: MEDICARE

## 2019-10-17 ENCOUNTER — ANESTHESIA EVENT (OUTPATIENT)
Dept: SURGERY | Facility: HOSPITAL | Age: 74
DRG: 003 | End: 2019-10-17
Payer: MEDICARE

## 2019-10-17 LAB
ABO + RH BLD: NORMAL
ALBUMIN SERPL BCP-MCNC: 1.9 G/DL (ref 3.5–5.2)
ALBUMIN SERPL BCP-MCNC: 2 G/DL (ref 3.5–5.2)
ALLENS TEST: ABNORMAL
ANION GAP SERPL CALC-SCNC: 12 MMOL/L (ref 8–16)
ANION GAP SERPL CALC-SCNC: 13 MMOL/L (ref 8–16)
ANION GAP SERPL CALC-SCNC: 14 MMOL/L (ref 8–16)
ANISOCYTOSIS BLD QL SMEAR: SLIGHT
BASOPHILS NFR BLD: 0 % (ref 0–1.9)
BLD GP AB SCN CELLS X3 SERPL QL: NORMAL
BUN SERPL-MCNC: 102 MG/DL (ref 8–23)
BUN SERPL-MCNC: 87 MG/DL (ref 8–23)
BUN SERPL-MCNC: 87 MG/DL (ref 8–23)
BUN SERPL-MCNC: 90 MG/DL (ref 8–23)
BUN SERPL-MCNC: 98 MG/DL (ref 8–23)
BURR CELLS BLD QL SMEAR: ABNORMAL
CA-I BLDV-SCNC: 1.11 MMOL/L (ref 1.06–1.42)
CALCIUM SERPL-MCNC: 8.2 MG/DL (ref 8.7–10.5)
CALCIUM SERPL-MCNC: 8.3 MG/DL (ref 8.7–10.5)
CALCIUM SERPL-MCNC: 8.5 MG/DL (ref 8.7–10.5)
CHLORIDE SERPL-SCNC: 106 MMOL/L (ref 95–110)
CHLORIDE SERPL-SCNC: 108 MMOL/L (ref 95–110)
CHLORIDE SERPL-SCNC: 108 MMOL/L (ref 95–110)
CHLORIDE SERPL-SCNC: 111 MMOL/L (ref 95–110)
CHLORIDE SERPL-SCNC: 111 MMOL/L (ref 95–110)
CO2 SERPL-SCNC: 18 MMOL/L (ref 23–29)
CO2 SERPL-SCNC: 19 MMOL/L (ref 23–29)
CO2 SERPL-SCNC: 19 MMOL/L (ref 23–29)
CREAT SERPL-MCNC: 2.8 MG/DL (ref 0.5–1.4)
CREAT SERPL-MCNC: 3.1 MG/DL (ref 0.5–1.4)
CREAT SERPL-MCNC: 3.3 MG/DL (ref 0.5–1.4)
DACRYOCYTES BLD QL SMEAR: ABNORMAL
DELSYS: ABNORMAL
DIFFERENTIAL METHOD: ABNORMAL
EOSINOPHIL NFR BLD: 5 % (ref 0–8)
ERYTHROCYTE [DISTWIDTH] IN BLOOD BY AUTOMATED COUNT: 19.3 % (ref 11.5–14.5)
ERYTHROCYTE [SEDIMENTATION RATE] IN BLOOD BY WESTERGREN METHOD: 26 MM/H
EST. GFR  (AFRICAN AMERICAN): 20.3 ML/MIN/1.73 M^2
EST. GFR  (AFRICAN AMERICAN): 21.9 ML/MIN/1.73 M^2
EST. GFR  (AFRICAN AMERICAN): 24.7 ML/MIN/1.73 M^2
EST. GFR  (NON AFRICAN AMERICAN): 17.6 ML/MIN/1.73 M^2
EST. GFR  (NON AFRICAN AMERICAN): 18.9 ML/MIN/1.73 M^2
EST. GFR  (NON AFRICAN AMERICAN): 21.4 ML/MIN/1.73 M^2
FIO2: 35
GLUCOSE SERPL-MCNC: 128 MG/DL (ref 70–110)
GLUCOSE SERPL-MCNC: 135 MG/DL (ref 70–110)
GLUCOSE SERPL-MCNC: 145 MG/DL (ref 70–110)
GLUCOSE SERPL-MCNC: 145 MG/DL (ref 70–110)
GLUCOSE SERPL-MCNC: 148 MG/DL (ref 70–110)
HCO3 UR-SCNC: 17.7 MMOL/L (ref 24–28)
HCO3 UR-SCNC: 18.8 MMOL/L (ref 24–28)
HCO3 UR-SCNC: 20 MMOL/L (ref 24–28)
HCO3 UR-SCNC: 20.3 MMOL/L (ref 24–28)
HCT VFR BLD AUTO: 27.2 % (ref 40–54)
HGB BLD-MCNC: 8.4 G/DL (ref 14–18)
IMM GRANULOCYTES # BLD AUTO: ABNORMAL K/UL (ref 0–0.04)
IMM GRANULOCYTES NFR BLD AUTO: ABNORMAL % (ref 0–0.5)
LYMPHOCYTES NFR BLD: 7 % (ref 18–48)
MAGNESIUM SERPL-MCNC: 2.5 MG/DL (ref 1.6–2.6)
MAGNESIUM SERPL-MCNC: 2.5 MG/DL (ref 1.6–2.6)
MAGNESIUM SERPL-MCNC: 2.6 MG/DL (ref 1.6–2.6)
MAGNESIUM SERPL-MCNC: 2.8 MG/DL (ref 1.6–2.6)
MAGNESIUM SERPL-MCNC: 2.8 MG/DL (ref 1.6–2.6)
MCH RBC QN AUTO: 25.1 PG (ref 27–31)
MCHC RBC AUTO-ENTMCNC: 30.9 G/DL (ref 32–36)
MCV RBC AUTO: 81 FL (ref 82–98)
METAMYELOCYTES NFR BLD MANUAL: 3 %
MODE: ABNORMAL
MONOCYTES NFR BLD: 11 % (ref 4–15)
MYELOCYTES NFR BLD MANUAL: 2 %
NEUTROPHILS NFR BLD: 71 % (ref 38–73)
NEUTS BAND NFR BLD MANUAL: 1 %
NRBC BLD-RTO: 0 /100 WBC
OVALOCYTES BLD QL SMEAR: ABNORMAL
PCO2 BLDA: 28.7 MMHG (ref 35–45)
PCO2 BLDA: 31.6 MMHG (ref 35–45)
PCO2 BLDA: 40.3 MMHG (ref 35–45)
PCO2 BLDA: 46.5 MMHG (ref 35–45)
PEEP: 8
PH SMN: 7.25 [PH] (ref 7.35–7.45)
PH SMN: 7.3 [PH] (ref 7.35–7.45)
PH SMN: 7.38 [PH] (ref 7.35–7.45)
PH SMN: 7.4 [PH] (ref 7.35–7.45)
PHOSPHATE SERPL-MCNC: 6.4 MG/DL (ref 2.7–4.5)
PHOSPHATE SERPL-MCNC: 6.4 MG/DL (ref 2.7–4.5)
PHOSPHATE SERPL-MCNC: 6.5 MG/DL (ref 2.7–4.5)
PHOSPHATE SERPL-MCNC: 6.8 MG/DL (ref 2.7–4.5)
PHOSPHATE SERPL-MCNC: 8.3 MG/DL (ref 2.7–4.5)
PLATELET # BLD AUTO: 337 K/UL (ref 150–350)
PLATELET BLD QL SMEAR: ABNORMAL
PMV BLD AUTO: 10.1 FL (ref 9.2–12.9)
PO2 BLDA: 71 MMHG (ref 80–100)
PO2 BLDA: 75 MMHG (ref 80–100)
PO2 BLDA: 84 MMHG (ref 80–100)
PO2 BLDA: 91 MMHG (ref 80–100)
POC BE: -6 MMOL/L
POC BE: -6 MMOL/L
POC BE: -7 MMOL/L
POC BE: -7 MMOL/L
POC SATURATED O2: 91 % (ref 95–100)
POC SATURATED O2: 95 % (ref 95–100)
POC SATURATED O2: 95 % (ref 95–100)
POC SATURATED O2: 97 % (ref 95–100)
POC TCO2: 19 MMOL/L (ref 23–27)
POC TCO2: 20 MMOL/L (ref 23–27)
POC TCO2: 21 MMOL/L (ref 23–27)
POC TCO2: 22 MMOL/L (ref 23–27)
POCT GLUCOSE: 119 MG/DL (ref 70–110)
POCT GLUCOSE: 128 MG/DL (ref 70–110)
POCT GLUCOSE: 129 MG/DL (ref 70–110)
POCT GLUCOSE: 131 MG/DL (ref 70–110)
POCT GLUCOSE: 137 MG/DL (ref 70–110)
POCT GLUCOSE: 142 MG/DL (ref 70–110)
POCT GLUCOSE: 142 MG/DL (ref 70–110)
POCT GLUCOSE: 146 MG/DL (ref 70–110)
POIKILOCYTOSIS BLD QL SMEAR: SLIGHT
POTASSIUM SERPL-SCNC: 4.4 MMOL/L (ref 3.5–5.1)
POTASSIUM SERPL-SCNC: 4.5 MMOL/L (ref 3.5–5.1)
POTASSIUM SERPL-SCNC: 4.9 MMOL/L (ref 3.5–5.1)
RBC # BLD AUTO: 3.34 M/UL (ref 4.6–6.2)
SAMPLE: ABNORMAL
SCHISTOCYTES BLD QL SMEAR: ABNORMAL
SCHISTOCYTES BLD QL SMEAR: PRESENT
SITE: ABNORMAL
SODIUM SERPL-SCNC: 136 MMOL/L (ref 136–145)
SODIUM SERPL-SCNC: 139 MMOL/L (ref 136–145)
SODIUM SERPL-SCNC: 139 MMOL/L (ref 136–145)
SODIUM SERPL-SCNC: 142 MMOL/L (ref 136–145)
SODIUM SERPL-SCNC: 143 MMOL/L (ref 136–145)
SP02: 90
SP02: 95
SP02: 96
SPONT RATE: 26
VT: 550
VT: 600
WBC # BLD AUTO: 18.48 K/UL (ref 3.9–12.7)

## 2019-10-17 PROCEDURE — 83735 ASSAY OF MAGNESIUM: CPT | Mod: 91

## 2019-10-17 PROCEDURE — 94640 AIRWAY INHALATION TREATMENT: CPT

## 2019-10-17 PROCEDURE — 86901 BLOOD TYPING SEROLOGIC RH(D): CPT

## 2019-10-17 PROCEDURE — 25000003 PHARM REV CODE 250: Performed by: NURSE PRACTITIONER

## 2019-10-17 PROCEDURE — 99900026 HC AIRWAY MAINTENANCE (STAT)

## 2019-10-17 PROCEDURE — 83735 ASSAY OF MAGNESIUM: CPT

## 2019-10-17 PROCEDURE — 36000706: Performed by: SURGERY

## 2019-10-17 PROCEDURE — 80069 RENAL FUNCTION PANEL: CPT | Mod: 91

## 2019-10-17 PROCEDURE — 82330 ASSAY OF CALCIUM: CPT | Mod: 91

## 2019-10-17 PROCEDURE — 31600 PR TRACHEOSTOMY, PLANNED: ICD-10-PCS | Mod: 79,,, | Performed by: SURGERY

## 2019-10-17 PROCEDURE — 25000003 PHARM REV CODE 250: Performed by: NURSE ANESTHETIST, CERTIFIED REGISTERED

## 2019-10-17 PROCEDURE — 25000003 PHARM REV CODE 250: Performed by: STUDENT IN AN ORGANIZED HEALTH CARE EDUCATION/TRAINING PROGRAM

## 2019-10-17 PROCEDURE — 63600175 PHARM REV CODE 636 W HCPCS: Performed by: STUDENT IN AN ORGANIZED HEALTH CARE EDUCATION/TRAINING PROGRAM

## 2019-10-17 PROCEDURE — 94003 VENT MGMT INPAT SUBQ DAY: CPT

## 2019-10-17 PROCEDURE — S0028 INJECTION, FAMOTIDINE, 20 MG: HCPCS | Performed by: SURGERY

## 2019-10-17 PROCEDURE — 99900035 HC TECH TIME PER 15 MIN (STAT)

## 2019-10-17 PROCEDURE — 37000008 HC ANESTHESIA 1ST 15 MINUTES: Performed by: SURGERY

## 2019-10-17 PROCEDURE — 27201423 OPTIME MED/SURG SUP & DEVICES STERILE SUPPLY: Performed by: SURGERY

## 2019-10-17 PROCEDURE — 99291 CRITICAL CARE FIRST HOUR: CPT | Mod: 24,,, | Performed by: SURGERY

## 2019-10-17 PROCEDURE — 94668 MNPJ CHEST WALL SBSQ: CPT

## 2019-10-17 PROCEDURE — 82803 BLOOD GASES ANY COMBINATION: CPT

## 2019-10-17 PROCEDURE — 80069 RENAL FUNCTION PANEL: CPT

## 2019-10-17 PROCEDURE — 63600175 PHARM REV CODE 636 W HCPCS: Performed by: SURGERY

## 2019-10-17 PROCEDURE — 37799 UNLISTED PX VASCULAR SURGERY: CPT

## 2019-10-17 PROCEDURE — 27000221 HC OXYGEN, UP TO 24 HOURS

## 2019-10-17 PROCEDURE — 63600175 PHARM REV CODE 636 W HCPCS: Performed by: NURSE ANESTHETIST, CERTIFIED REGISTERED

## 2019-10-17 PROCEDURE — 90945 DIALYSIS ONE EVALUATION: CPT

## 2019-10-17 PROCEDURE — 36000707: Performed by: SURGERY

## 2019-10-17 PROCEDURE — 36556 INSERT NON-TUNNEL CV CATH: CPT

## 2019-10-17 PROCEDURE — 20000000 HC ICU ROOM

## 2019-10-17 PROCEDURE — D9220A PRA ANESTHESIA: Mod: CRNA,,, | Performed by: NURSE ANESTHETIST, CERTIFIED REGISTERED

## 2019-10-17 PROCEDURE — 99233 SBSQ HOSP IP/OBS HIGH 50: CPT | Mod: ,,, | Performed by: INTERNAL MEDICINE

## 2019-10-17 PROCEDURE — D9220A PRA ANESTHESIA: Mod: ANES,,, | Performed by: ANESTHESIOLOGY

## 2019-10-17 PROCEDURE — 37000009 HC ANESTHESIA EA ADD 15 MINS: Performed by: SURGERY

## 2019-10-17 PROCEDURE — 25000242 PHARM REV CODE 250 ALT 637 W/ HCPCS: Performed by: STUDENT IN AN ORGANIZED HEALTH CARE EDUCATION/TRAINING PROGRAM

## 2019-10-17 PROCEDURE — 63600175 PHARM REV CODE 636 W HCPCS: Mod: JG | Performed by: STUDENT IN AN ORGANIZED HEALTH CARE EDUCATION/TRAINING PROGRAM

## 2019-10-17 PROCEDURE — 99291 PR CRITICAL CARE, E/M 30-74 MINUTES: ICD-10-PCS | Mod: 24,,, | Performed by: SURGERY

## 2019-10-17 PROCEDURE — 63600175 PHARM REV CODE 636 W HCPCS: Performed by: NURSE PRACTITIONER

## 2019-10-17 PROCEDURE — D9220A PRA ANESTHESIA: ICD-10-PCS | Mod: ANES,,, | Performed by: ANESTHESIOLOGY

## 2019-10-17 PROCEDURE — 94761 N-INVAS EAR/PLS OXIMETRY MLT: CPT

## 2019-10-17 PROCEDURE — D9220A PRA ANESTHESIA: ICD-10-PCS | Mod: CRNA,,, | Performed by: NURSE ANESTHETIST, CERTIFIED REGISTERED

## 2019-10-17 PROCEDURE — 25000003 PHARM REV CODE 250: Performed by: SURGERY

## 2019-10-17 PROCEDURE — 31600 PLANNED TRACHEOSTOMY: CPT | Mod: 79,,, | Performed by: SURGERY

## 2019-10-17 PROCEDURE — 85007 BL SMEAR W/DIFF WBC COUNT: CPT

## 2019-10-17 PROCEDURE — S4991 NICOTINE PATCH NONLEGEND: HCPCS | Performed by: PHYSICIAN ASSISTANT

## 2019-10-17 PROCEDURE — 25000003 PHARM REV CODE 250: Performed by: PHYSICIAN ASSISTANT

## 2019-10-17 PROCEDURE — 99233 PR SUBSEQUENT HOSPITAL CARE,LEVL III: ICD-10-PCS | Mod: ,,, | Performed by: INTERNAL MEDICINE

## 2019-10-17 PROCEDURE — 85027 COMPLETE CBC AUTOMATED: CPT

## 2019-10-17 RX ORDER — NOREPINEPHRINE BITARTRATE/D5W 4MG/250ML
0.02 PLASTIC BAG, INJECTION (ML) INTRAVENOUS CONTINUOUS
Status: DISCONTINUED | OUTPATIENT
Start: 2019-10-17 | End: 2019-10-20

## 2019-10-17 RX ORDER — PROPOFOL 10 MG/ML
5 INJECTION, EMULSION INTRAVENOUS CONTINUOUS
Status: DISCONTINUED | OUTPATIENT
Start: 2019-10-17 | End: 2019-10-18

## 2019-10-17 RX ORDER — FAMOTIDINE 40 MG/5ML
20 POWDER, FOR SUSPENSION ORAL DAILY
Status: DISCONTINUED | OUTPATIENT
Start: 2019-10-18 | End: 2019-10-31

## 2019-10-17 RX ORDER — MIDAZOLAM HYDROCHLORIDE 1 MG/ML
INJECTION, SOLUTION INTRAMUSCULAR; INTRAVENOUS
Status: DISCONTINUED | OUTPATIENT
Start: 2019-10-17 | End: 2019-10-17

## 2019-10-17 RX ORDER — MAGNESIUM SULFATE HEPTAHYDRATE 40 MG/ML
2 INJECTION, SOLUTION INTRAVENOUS
Status: DISCONTINUED | OUTPATIENT
Start: 2019-10-17 | End: 2019-10-18

## 2019-10-17 RX ORDER — FLUCONAZOLE 2 MG/ML
400 INJECTION, SOLUTION INTRAVENOUS
Status: DISCONTINUED | OUTPATIENT
Start: 2019-10-17 | End: 2019-10-21

## 2019-10-17 RX ORDER — ROCURONIUM BROMIDE 10 MG/ML
INJECTION, SOLUTION INTRAVENOUS
Status: DISCONTINUED | OUTPATIENT
Start: 2019-10-17 | End: 2019-10-17

## 2019-10-17 RX ORDER — FENTANYL CITRATE 50 UG/ML
INJECTION, SOLUTION INTRAMUSCULAR; INTRAVENOUS
Status: DISCONTINUED | OUTPATIENT
Start: 2019-10-17 | End: 2019-10-17

## 2019-10-17 RX ADMIN — IPRATROPIUM BROMIDE AND ALBUTEROL SULFATE 3 ML: .5; 3 SOLUTION RESPIRATORY (INHALATION) at 07:10

## 2019-10-17 RX ADMIN — CHLORHEXIDINE GLUCONATE 0.12% ORAL RINSE 15 ML: 1.2 LIQUID ORAL at 08:10

## 2019-10-17 RX ADMIN — CHLORHEXIDINE GLUCONATE 0.12% ORAL RINSE 15 ML: 1.2 LIQUID ORAL at 09:10

## 2019-10-17 RX ADMIN — DEXMEDETOMIDINE HYDROCHLORIDE 0.6 MCG/KG/HR: 4 INJECTION, SOLUTION INTRAVENOUS at 10:10

## 2019-10-17 RX ADMIN — NICOTINE 1 PATCH: 21 PATCH, EXTENDED RELEASE TRANSDERMAL at 08:10

## 2019-10-17 RX ADMIN — FENTANYL CITRATE 25 MCG: 50 INJECTION INTRAMUSCULAR; INTRAVENOUS at 09:10

## 2019-10-17 RX ADMIN — ALTEPLASE 2 MG: 2.2 INJECTION, POWDER, LYOPHILIZED, FOR SOLUTION INTRAVENOUS at 01:10

## 2019-10-17 RX ADMIN — HEPARIN SODIUM 5000 UNITS: 5000 INJECTION, SOLUTION INTRAVENOUS; SUBCUTANEOUS at 09:10

## 2019-10-17 RX ADMIN — FENTANYL CITRATE 50 MCG: 50 INJECTION, SOLUTION INTRAMUSCULAR; INTRAVENOUS at 03:10

## 2019-10-17 RX ADMIN — IPRATROPIUM BROMIDE AND ALBUTEROL SULFATE 3 ML: .5; 3 SOLUTION RESPIRATORY (INHALATION) at 11:10

## 2019-10-17 RX ADMIN — ROCURONIUM BROMIDE 50 MG: 10 INJECTION, SOLUTION INTRAVENOUS at 03:10

## 2019-10-17 RX ADMIN — HEPARIN SODIUM 5000 UNITS: 5000 INJECTION, SOLUTION INTRAVENOUS; SUBCUTANEOUS at 05:10

## 2019-10-17 RX ADMIN — IPRATROPIUM BROMIDE AND ALBUTEROL SULFATE 3 ML: .5; 3 SOLUTION RESPIRATORY (INHALATION) at 04:10

## 2019-10-17 RX ADMIN — DEXMEDETOMIDINE HYDROCHLORIDE 0.7 MCG/KG/HR: 4 INJECTION, SOLUTION INTRAVENOUS at 01:10

## 2019-10-17 RX ADMIN — DEXMEDETOMIDINE HYDROCHLORIDE 1 MCG/KG/HR: 4 INJECTION, SOLUTION INTRAVENOUS at 04:10

## 2019-10-17 RX ADMIN — PROPOFOL 10 MCG/KG/MIN: 10 INJECTION, EMULSION INTRAVENOUS at 02:10

## 2019-10-17 RX ADMIN — DEXTROSE MONOHYDRATE, SODIUM CITRATE, AND CITRIC ACID MONOHYDRATE: 2.45; 2.2; .8 INJECTION, SOLUTION INTRAVENOUS at 10:10

## 2019-10-17 RX ADMIN — FAMOTIDINE 20 MG: 10 INJECTION, SOLUTION INTRAVENOUS at 08:10

## 2019-10-17 RX ADMIN — Medication 0.02 MCG/KG/MIN: at 10:10

## 2019-10-17 RX ADMIN — SODIUM CHLORIDE, SODIUM GLUCONATE, SODIUM ACETATE, POTASSIUM CHLORIDE, MAGNESIUM CHLORIDE, SODIUM PHOSPHATE, DIBASIC, AND POTASSIUM PHOSPHATE: .53; .5; .37; .037; .03; .012; .00082 INJECTION, SOLUTION INTRAVENOUS at 03:10

## 2019-10-17 RX ADMIN — FLUCONAZOLE 400 MG: 2 INJECTION, SOLUTION INTRAVENOUS at 10:10

## 2019-10-17 RX ADMIN — FENTANYL CITRATE 25 MCG: 50 INJECTION INTRAMUSCULAR; INTRAVENOUS at 01:10

## 2019-10-17 RX ADMIN — DEXMEDETOMIDINE HYDROCHLORIDE 0.2 MCG/KG/HR: 4 INJECTION, SOLUTION INTRAVENOUS at 09:10

## 2019-10-17 RX ADMIN — DEXMEDETOMIDINE HYDROCHLORIDE 1.4 MCG/KG/HR: 4 INJECTION, SOLUTION INTRAVENOUS at 01:10

## 2019-10-17 RX ADMIN — CALCIUM GLUCONATE 3000 MG: 98 INJECTION, SOLUTION INTRAVENOUS at 10:10

## 2019-10-17 RX ADMIN — MIDAZOLAM HYDROCHLORIDE 2 MG: 1 INJECTION, SOLUTION INTRAMUSCULAR; INTRAVENOUS at 03:10

## 2019-10-17 NOTE — PT/OT/SLP PROGRESS
Occupational Therapy      Patient Name:  Pasha Harmon   MRN:  [id number]    Patient not seen today secondary to on hold per RN 2* reintubated and labile pressure per RN  . Will follow-up 10/18/19.    ANTIONETTE Ochoa  10/17/2019

## 2019-10-17 NOTE — TRANSFER OF CARE
"Anesthesia Transfer of Care Note    Patient: Pasha Harmon    Procedure(s) Performed: Procedure(s) (LRB):  CREATION, TRACHEOSTOMY (N/A)    Patient location: ICU    Anesthesia Type: general    Transport from OR: Transported from OR intubated on 100% O2 by AMBU with adequate controlled ventilation. Upon arrival to PACU/ICU, patient attached to ventilator and auscultated to confirm bilateral breath sounds and adequate TV. Continuous CVP monitoring in transport. Continuos invasive BP monitoring in transport. Continuous SpO2 monitoring in transport. Continuous ECG monitoring in transport    Post pain: adequate analgesia    Post assessment: no apparent anesthetic complications and tolerated procedure well    Post vital signs: stable    Level of consciousness: sedated    Nausea/Vomiting: no nausea/vomiting    Complications: none    Transfer of care protocol was followedComments: Patient transported via trach on 100% by AMBU bag.      Last vitals:   Visit Vitals  BP (!) 149/70 (BP Location: Left arm, Patient Position: Lying)   Pulse 75   Temp 37.6 °C (99.7 °F) (Axillary)   Resp (!) 24   Ht 6' 2.02" (1.88 m)   Wt (!) 150.9 kg (332 lb 10.8 oz)   SpO2 96%   BMI 42.69 kg/m²     "

## 2019-10-17 NOTE — BRIEF OP NOTE
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    Findings/Key Components: Placement confirmed by ETCO2    Estimated Blood Loss: 10 mL         Specimens:   Specimen (12h ago, onward)    None          Dispo: Patient in critical but stable condition, returned to SICU

## 2019-10-17 NOTE — PLAN OF CARE
"Dx: Bowel perforation    Shift Events: TF held from 0830 to 1645 for trach placement. G tube now to gravity bag.   Pt left floor @ 1515 and returned @ 1620. Vent was on Spont PAV 35%/8 all day, after trach placement was placed back on AC 35%/8. All gtts were weaned off after procedure (levo, prop, precedex).    Neuro: Sedated and Arouses to Voice    Vital Signs: BP (!) 149/70 (BP Location: Left arm, Patient Position: Lying)   Pulse 100   Temp 99.7 °F (37.6 °C) (Axillary)   Resp (!) 24   Ht 6' 2.02" (1.88 m)   Wt (!) 150.9 kg (332 lb 10.8 oz)   SpO2 (!) 89%   BMI 42.69 kg/m²     Diet: NPO and Tube Feeds    Gtts: Propfol, Precedex and Norepinephrine-- weaned off around 1700.    Urine Output: Urinary Catheter 350 cc/shift    Drains: DENISE Drain, total output 120 cc / shift, Chest Tube, total output 170 cc /  shift and G-tube/J-tube, total output 172 cc /  shift    Restraints maintained & reordered this shift. Franklyn soft wrist restraints, wrists WDL.     CRRT will run on CRRT tonight for 12hrs.      Labs/Accuchecks: accuchecks Q4; mg, phos bmp Q8    Skin: skin intact other than incisions. No skin breakdown. Turn Q2 and reposition.        "

## 2019-10-17 NOTE — TREATMENT PLAN
AES Treatment Plan    Pasha Harmon is a 74 y.o. male admitted to hospital 10/9/2019 (Hospital Day: 9) due to Bowel perforation.     Interval History  CT AP done yesterday with no new acute findings  Tolerating J tube feedings  CRRT started last night, required levophed   Plan for OR this afternoon for tracheostomy    Objective  Temp:  [98.5 °F (36.9 °C)-100.5 °F (38.1 °C)] 100.1 °F (37.8 °C) (10/17 1100)  Pulse:  [] 72 (10/17 1145)  BP: (101-131)/(52-70) 119/57 (10/17 1100)  Resp:  [22-33] 24 (10/17 1118)  SpO2:  [89 %-100 %] 95 % (10/17 1145)  Arterial Line BP: ()/(40-74) 104/47 (10/17 1145)    General: Alert, Oriented x3, no distress  Abdomen: soft, tubes in place. bowel sounds. Non-distended. Normal tympany. No peritoneal signs.    Laboratory    Recent Labs   Lab 10/15/19  0301 10/16/19  0306 10/17/19  0300   HGB 9.1* 9.2* 8.4*       Lab Results   Component Value Date    WBC 18.48 (H) 10/17/2019    HGB 8.4 (L) 10/17/2019    HCT 27.2 (L) 10/17/2019    MCV 81 (L) 10/17/2019     10/17/2019       Lab Results   Component Value Date     10/17/2019     10/17/2019    K 4.5 10/17/2019    K 4.5 10/17/2019     10/17/2019     10/17/2019    CO2 19 (L) 10/17/2019    CO2 19 (L) 10/17/2019    BUN 87 (H) 10/17/2019    BUN 87 (H) 10/17/2019    CREATININE 2.8 (H) 10/17/2019    CREATININE 2.8 (H) 10/17/2019    CALCIUM 8.2 (L) 10/17/2019    CALCIUM 8.2 (L) 10/17/2019    ANIONGAP 12 10/17/2019    ANIONGAP 12 10/17/2019    ESTGFRAFRICA 24.7 (A) 10/17/2019    ESTGFRAFRICA 24.7 (A) 10/17/2019    EGFRNONAA 21.4 (A) 10/17/2019    EGFRNONAA 21.4 (A) 10/17/2019       Lab Results   Component Value Date    ALT 12 10/14/2019    AST 19 10/14/2019    ALKPHOS 67 10/14/2019    BILITOT 1.0 10/14/2019       Lab Results   Component Value Date    INR 1.0 10/12/2019    INR 1.2 10/10/2019    INR 1.2 05/24/2019       Plan  - CRRT per nephrology  - Supportive care per SICU team  - We will continue to  follow.    Thank you for involving us in the care of Pasha Harmon. Please call with any additional questions, concerns or changes in the patient's clinical status.    Howard Macedo MD  AES Fellow

## 2019-10-17 NOTE — PROGRESS NOTES
[facility]  Critical Care - Surgery  Progress Note    Patient Name: Pasha Harmon  MRN: [id number]  Admission Date: 10/9/2019  Hospital Length of Stay: 7 days  Code Status: Full Code  Attending Provider: Paulino Parrish MD  Primary Care Provider: Eze Root MD   Principal Problem: Bowel perforation    Subjective:     Hospital/ICU Course:  10/10: Admitted to SICU. R IJ TLC placed. On minimal pressors.   10/11: Remaining intubated. On minimal pressors.   10/12: Difficulty weaning pressors while on propofol and fentanyl to control resp rate from acidosis. Received 2u PRBCs after 1.25L albumin.   10/13: Pressors weaned, still not following commands after stopping sedation  10/14: NGT removed by primary team. Metoprolol started for HTN    Interval History/Significant Events:   Remains intubated and sedated on precedex drip. Will plan for trach placement as soon able as patient high risk for reintubation if extubated.   Underwent 4 hours of SLED, planned for 6 hours but clotted off and was not restarted overnight. Levo initiated during SLED for hypotension, weaning off this morning. Plan for additional dialysis tonight.  Uremia persistent, Cr slightly improved.  Tolerated tube feeds at 50 overnight.     Follow-up For: Procedure(s) (LRB):  LAPAROTOMY, EXPLORATORY (N/A)  EGD (ESOPHAGOGASTRODUODENOSCOPY) (N/A)  INSERTION, CATHETER, INTERCOSTAL, FOR DRAINAGE (Bilateral)  REPAIR, PERFORATION, GASTRIC (N/A)  INSERTION, GASTROSTOMY TUBE, PERCUTANEOUS (Left)  INSERTION, JEJUNOSTOMY TUBE (Left)    Post-Operative Day: 6 Days Post-Op    Objective:     Vital Signs (Most Recent):  Temp: 98.5 °F (36.9 °C) (10/17/19 0700)  Pulse: 72 (10/17/19 0935)  Resp: (!) 26 (10/17/19 0735)  BP: (!) 101/55 (10/17/19 0900)  SpO2: 97 % (10/17/19 0935) Vital Signs (24h Range):  Temp:  [98.5 °F (36.9 °C)-100.5 °F (38.1 °C)] 98.5 °F (36.9 °C)  Pulse:  [] 72  Resp:  [22-33] 26  SpO2:  [89 %-100 %] 97 %  BP:  (101-131)/(52-70) 101/55  Arterial Line BP: ()/(40-74) 119/52     Weight: (!) 150.9 kg (332 lb 10.8 oz)  Body mass index is 42.69 kg/m².      Intake/Output Summary (Last 24 hours) at 10/17/2019 0952  Last data filed at 10/17/2019 0900  Gross per 24 hour   Intake 3635 ml   Output 2739 ml   Net 896 ml       Physical Exam   Constitutional: He appears well-developed and well-nourished.   HENT:   Head: Normocephalic and atraumatic.   Eyes: Pupils are equal, round, and reactive to light. Conjunctivae are normal.   Neck: Normal range of motion.   Cardiovascular: Normal rate, regular rhythm and normal heart sounds.   Pulmonary/Chest: Breath sounds normal.   Intubated, on spontaneous.  Right and left CT with serous output.  Abdominal:   G-tube to gravity with bilious output.  J-tube present  Neurological:   Sedated, GCS10T but inconsistently.  Skin: Skin is warm and dry.     Vents:  Vent Mode: Spont (10/17/19 0935)  Ventilator Initiated: Yes (10/10/19 1621)  Set Rate: 26 bmp (10/16/19 0517)  Vt Set: 500 mL (10/16/19 0517)  PEEP/CPAP: 8 cmH20 (10/17/19 0935)  Oxygen Concentration (%): 35 (10/17/19 0935)  Peak Airway Pressure: 30 cmH2O (10/17/19 0935)  Plateau Pressure: 23 cmH20 (10/17/19 0935)  Total Ve: 20.2 mL (10/17/19 0935)  F/VT Ratio<105 (RSBI): (!) 34.44 (10/17/19 0735)    Lines/Drains/Airways     Central Venous Catheter Line                 Percutaneous Central Line Insertion/Assessment - triple lumen  10/10/19 1853 right internal jugular 6 days         Trialysis (Dialysis) Catheter 10/16/19 1329 left internal jugular less than 1 day          Drain                 Chest Tube 10/10/19 1510 1 Right Midaxillary;Fourth intercostal space 28 Fr. 6 days         Chest Tube 10/10/19 1513 Left Midaxillary;Fourth intercostal space 28 Fr. 6 days         Closed/Suction Drain 10/10/19 1349 Right RUQ Bulb 19 Fr. 6 days         Gastrostomy/Enterostomy 10/10/19 1400 Jejunostomy tube 6 days         Gastrostomy/Enterostomy  10/10/19 1400 LUQ 6 days         Urethral Catheter 10/10/19 1530 Straight-tip;Double-lumen;Non-latex 16 Fr. 6 days          Airway                 Airway - Non-Surgical 10/15/19 1257 Endotracheal Tube 1 day          Arterial Line                 Arterial Line 10/10/19 1245 Right Radial 6 days                Significant Labs:    CBC/Anemia Profile:  Recent Labs   Lab 10/16/19  0306 10/17/19  0300   WBC 21.72* 18.48*   HGB 9.2* 8.4*   HCT 31.2* 27.2*   * 337   MCV 85 81*   RDW 18.8* 19.3*        Chemistries:  Recent Labs   Lab 10/16/19  1311 10/16/19  2230 10/17/19  0300    136 139  139   K 4.5 4.5 4.5  4.5    106 108  108   CO2 16* 18* 19*  19*   * 90* 87*  87*   CREATININE 3.2* 2.8* 2.8*  2.8*   CALCIUM 8.1* 8.5* 8.2*  8.2*   ALBUMIN 2.0* 2.0* 1.9*   MG 2.8* 2.6 2.5  2.5   PHOS 7.9* 6.5* 6.4*  6.4*       All pertinent labs within the past 24 hours have been reviewed.    Significant Imaging:  I have reviewed all pertinent imaging results/findings within the past 24 hours.    Assessment/Plan:     * Bowel perforation  74M current smoker who underwent excision of bleeding gastric mass, post op course complicated by intraperitoneal free air. Now admitted to SICU following ex-lap 10/10/19 which revealed esophageal and gastric perforations which have been repaired.    Neuro:  - Sedated on propofol  - Encephalopathic, likely multifactorial (sedation, ICU delirium, recent sepsis) but primary component likely uremia.  - PRN pain control    CV  - Fluid resuscitate as necessary  - Wean Levo as able  - Echo 9/12/10 wnl    Pulm  - Extubated 10/14, reintubated 10/15 for encephalopathy with respiratory distress and increased WOB, consent obtained by primary team for tracheostomy placement   - Continue PAV+ as he appears more comfortable on it  - Respiratory compensation for metabolic acidosis with high rate and low CO2 on abg on spont  - Uses CPAP at home, NO CPAP due to esophageal rupture  - COPD  with Significant history of smoking (3 ppd); continue nicotine patch    GI  - Post-op esophageal and gastric perforations 10/9, now repaired via ex-lap  - Cefepime, flagyl, vanc, diflucan for broad coverage (end date 10/16)  - Monitor drains, care per surgery  - NGT removed 10/15  - Do not use G-tube. Leave to gravity.  - Hold tube feeds in anticipation of procedure    Renal  - Nonanion gap metabolic acidosis with BHASKAR continues to worsen  - BUN downtrended to 83 s/p SLED  - Continued Hyperphosphatemia but down trending s/p SLED  - Appreciate nephrology recs  - CMP daily  - Electrolyte repletion PRN; hyperphosphatemia, monitor  - Plan for additional CRRT to begin tonight    Heme/ID  - WBC elevated but down trending   - Abdominal culture resulted with Yeast (awaiting speciation), Fluconazole restarted with end date of 10/24  - Hgb stable  - CBCs daily  - Completed course of Cefepime, flagyl, vancomycin, and diflucan for contaminated peritoneum on 10/16.      Endo  - DM2  - POCT glucose q4h  - SSI    Dispo: Continue ICU care.       Critical care was time spent personally by me on the following activities: development of treatment plan with patient or surrogate and bedside caregivers, discussions with consultants, evaluation of patient's response to treatment, examination of patient, ordering and performing treatments and interventions, ordering and review of laboratory studies, ordering and review of radiographic studies, pulse oximetry, re-evaluation of patient's condition.  This critical care time did not overlap with that of any other provider or involve time for any procedures.     Lalita Matias MD  Critical Care - Surgery  [facility]

## 2019-10-17 NOTE — PROGRESS NOTES
[facility]  Nephrology  Progress Note    Patient Name: Pasha Harmon  MRN: [id number]  Admission Date: 10/9/2019  Hospital Length of Stay: 7 days  Attending Provider: Paulino Parrish MD   Primary Care Physician: Eze Root MD  Principal Problem:Bowel perforation      Interval History: Patient seen and examined at bedside. Received about 4hrs of SLED before CRRT clotted. Appropriate change seen in BUN, but still elevated (86 today, from 109 yesterday). 805mL UOP/past 24hrs. Net positive 0.9L/past 24hrs.      Review of patient's allergies indicates:   Allergen Reactions    Meloxicam Other (See Comments)     Ulcer, GI bleed      Current Facility-Administered Medications   Medication Frequency    0.9%  NaCl infusion (CRRT USE ONLY) Continuous    albuterol-ipratropium 2.5 mg-0.5 mg/3 mL nebulizer solution 3 mL Q4H PRN    albuterol-ipratropium 2.5 mg-0.5 mg/3 mL nebulizer solution 3 mL Q4H    chlorhexidine 0.12 % solution 15 mL BID    dexmedetomidine (PRECEDEX) 400mcg/100mL 0.9% NaCL infusion Continuous    dextrose 10% (D10W) Bolus PRN    dextrose 10% (D10W) Bolus PRN    [START ON 10/18/2019] famotidine 40 mg/5 mL (8 mg/mL) suspension 20 mg Daily    fentaNYL injection 25 mcg Q1H PRN    fluconazole (DIFLUCAN) IVPB 400 mg 200 mL Q24H    fluticasone furoate-vilanterol 100-25 mcg/dose diskus inhaler 1 puff Daily    glucagon (human recombinant) injection 1 mg PRN    heparin (porcine) injection 5,000 Units Q8H    hydrALAZINE injection 20 mg Q6H PRN    insulin aspart U-100 pen 0-5 Units QID (AC + HS) PRN    iohexol (OMNIPAQUE) oral solution 15 mL PRN    magnesium sulfate 2g in water 50mL IVPB (premix) PRN    nicotine 21 mg/24 hr 1 patch Daily    norepinephrine 4 mg in dextrose 5% 250 mL infusion (premix) (titrating) Continuous    ondansetron injection 4 mg Q6H PRN    sodium chloride 0.9% flush 10 mL PRN    sodium phosphate 20.01 mmol in dextrose 5 % 250 mL IVPB PRN     sodium phosphate 30 mmol in dextrose 5 % 250 mL IVPB PRN    sodium phosphate 39.99 mmol in dextrose 5 % 250 mL IVPB PRN     Family History     Problem Relation (Age of Onset)    Cancer Father    Heart disease Mother        Tobacco Use    Smoking status: Current Every Day Smoker     Packs/day: 2.00     Types: Cigarettes    Smokeless tobacco: Never Used   Substance and Sexual Activity    Alcohol use: No    Drug use: No    Sexual activity: Not on file       Objective:     Vital Signs (Most Recent):  Temp: 98.5 °F (36.9 °C) (10/17/19 0700)  Pulse: 77 (10/17/19 0830)  Resp: (!) 26 (10/17/19 0735)  BP: (!) 114/57 (10/17/19 0800)  SpO2: (!) 92 % (10/17/19 0830)  O2 Device (Oxygen Therapy): ventilator (10/17/19 0800) Vital Signs (24h Range):  Temp:  [98.5 °F (36.9 °C)-100.5 °F (38.1 °C)] 98.5 °F (36.9 °C)  Pulse:  [] 77  Resp:  [22-33] 26  SpO2:  [89 %-100 %] 92 %  BP: (103-131)/(51-70) 114/57  Arterial Line BP: ()/(40-74) 123/53     Weight: (!) 150.9 kg (332 lb 10.8 oz) (10/17/19 0400)  Body mass index is 42.69 kg/m².  Body surface area is 2.81 meters squared.    I/O last 3 completed shifts:  In: 5175 [I.V.:3765; NG/GT:1410]  Out: 3467 [Urine:1215; Drains:510; Other:1452; Chest Tube:290]    Physical Exam   Constitutional: He appears well-developed and well-nourished.   Eyes: Pupils are equal, round, and reactive to light. Conjunctivae and EOM are normal.   Neck: Normal range of motion. Neck supple.   Cardiovascular: Normal rate and regular rhythm.   Pulmonary/Chest: He has rales.   Intubated and mechanically ventilated    Abdominal: He exhibits no distension and no mass. There is tenderness. There is no guarding. No hernia.   Midline surgical incision to abdomen. Multiple abdominal drains noted.    Musculoskeletal: He exhibits edema (1+ pitting edema to BLE ). He exhibits no tenderness or deformity.   Neurological:   Sedated   Skin: Skin is warm and dry. No rash noted. He is not diaphoretic. No erythema.  No pallor.       Significant Labs:  CBC:   Recent Labs   Lab 10/17/19  0300   WBC 18.48*   RBC 3.34*   HGB 8.4*   HCT 27.2*      MCV 81*   MCH 25.1*   MCHC 30.9*     CMP:   Recent Labs   Lab 10/14/19  1500  10/17/19  0300   *   < > 145*  145*   CALCIUM 8.7   < > 8.2*  8.2*   ALBUMIN 2.1*   < > 1.9*   PROT 5.6*  --   --    *   < > 139  139   K 4.3   < > 4.5  4.5   CO2 17*   < > 19*  19*      < > 108  108   BUN 79*   < > 87*  87*   CREATININE 2.8*   < > 2.8*  2.8*   ALKPHOS 67  --   --    ALT 12  --   --    AST 19  --   --    BILITOT 1.0  --   --     < > = values in this interval not displayed.     All labs within the past 24 hours have been reviewed.        Assessment/Plan:     BHASKAR (acute kidney injury)  Mr Harmon is a 75yo male who is s/p EGD and was admitted to [facility] for concern for esophageal tear. Sustained multiple episodes of intraoperative hypotension on 10/9/19 and on 10/10/19, and had a drop in Hgb to 6.0 on 10/12/19.    -Baseline sCr: 0.8  -sCr on admission:1.4  -sCr on 10/15/19: 2.6  -10/15/19: 1.8L/UOP/past 24hrs   -10/15/19: physical exam reveals signs of volume overload; pt is tachypneic, has bilateral rales, and has pitting edema to BLE      Urine microscopy on 10/15/19 reveals moderate muddy brown granular casts, concerning for ischemic ATN.     BHASKAR likely 2/2 ischemic ATN due to intraoperative hypotension and acute anemia.       Plan/Recommendations:    -Will plan for 10hr SLED treatment today for further metabolic clearance and volume management.   -UFG 350cc/hr   -Avoid nephrotoxic medications  -Renally dose medications  -Avoid contrast studies   -Daily renal function panels   -Strict I/O's   -Will continue to follow closely            Thank you for your consult. I will follow-up with patient. Please contact us if you have any additional questions.    Ranjeet Judd NP  Nephrology  [facility]

## 2019-10-17 NOTE — ASSESSMENT & PLAN NOTE
Patient is 73 yo M with 7 cm contained esophageal perforation and gastric perforation after AES procedure who underwent emergent ex lap and EGD on 10/10/19    Bilateral chest tubes in place to monitor for esophageal leak - keep them to suction  Continue G tube to gravity for gastric decompression - do not use for meds or feeds  Continue tube feeds  Wean vent as tolerated    Rest of care per SICU

## 2019-10-17 NOTE — ASSESSMENT & PLAN NOTE
74M current smoker who underwent excision of bleeding gastric mass, post op course complicated by intraperitoneal free air. Now admitted to SICU following ex-lap 10/10/19 which revealed esophageal and gastric perforations which have been repaired.    Neuro:  - Sedated on propofol  - Encephalopathic, likely multifactorial (sedation, ICU delirium, recent sepsis) but primary component likely uremia.  - PRN pain control    CV  - Fluid resuscitate as necessary  - Wean Levo as able  - Echo 9/12/10 wnl    Pulm  - Extubated 10/14, reintubated 10/15 for encephalopathy with respiratory distress and increased WOB, consent obtained by primary team for tracheostomy placement   - Continue PAV+ as he appears more comfortable on it  - Respiratory compensation for metabolic acidosis with high rate and low CO2 on abg on spont  - Uses CPAP at home, NO CPAP due to esophageal rupture  - COPD with Significant history of smoking (3 ppd); continue nicotine patch    GI  - Post-op esophageal and gastric perforations 10/9, now repaired via ex-lap  - Cefepime, flagyl, vanc, diflucan for broad coverage (end date 10/16)  - Monitor drains, care per surgery  - NGT removed 10/15  - Do not use G-tube. Leave to gravity.  - Hold tube feeds in anticipation of procedure    Renal  - Nonanion gap metabolic acidosis with BHASKAR continues to worsen  - BUN downtrended to 83 s/p SLED  - Continued Hyperphosphatemia but down trending s/p SLED  - Appreciate nephrology recs  - CMP daily  - Electrolyte repletion PRN; hyperphosphatemia, monitor  - Plan for additional CRRT to begin tonight    Heme/ID  - WBC elevated but down trending   - Abdominal culture resulted with Yeast (awaiting speciation), Fluconazole restarted with end date of 10/24  - Hgb stable  - CBCs daily  - Completed course of Cefepime, flagyl, vancomycin, and diflucan for contaminated peritoneum on 10/16.      Endo  - DM2  - POCT glucose q4h  - SSI    Dispo: Continue ICU care.

## 2019-10-17 NOTE — ANESTHESIA POSTPROCEDURE EVALUATION
Anesthesia Post Evaluation    Patient: Pasha Harmon    Procedure(s) Performed: Procedure(s) (LRB):  CREATION, TRACHEOSTOMY (N/A)    Final Anesthesia Type: general  Patient location during evaluation: ICU  Patient participation: No - Unable to Participate, Sedation (fresh tracheostomy )  Level of consciousness: sedated  Post-procedure vital signs: reviewed and stable  Pain management: adequate  Airway patency: patent  PONV status at discharge: No PONV  Anesthetic complications: no      Cardiovascular status: blood pressure returned to baseline  Respiratory status: T-piece  Hydration status: euvolemic  Follow-up not needed.          Vitals Value Taken Time   /70 10/17/2019  3:01 PM   Temp 37.6 °C (99.7 °F) 10/17/2019  3:00 PM   Pulse 83 10/17/2019  4:33 PM   Resp 24 10/17/2019 11:18 AM   SpO2 92 % 10/17/2019  4:33 PM   Vitals shown include unvalidated device data.      No case tracking events are documented in the log.      Pain/Anish Score: Pain Rating Prior to Med Admin: 4 (10/16/2019 10:48 PM)  Pain Rating Post Med Admin: 0 (10/17/2019  2:29 PM)

## 2019-10-17 NOTE — NURSING
Pt off floor to OR for trach placement via bed with cardiac monitoring, propofol, precedex, and levo infusing. Anaesthesia and OR staff present. 100% fi02 via ambubag. VSS. NAD. Daughter at bedside aware.

## 2019-10-17 NOTE — CARE UPDATE
Received pt from OR post tracheostomy, Shiley 8.0 cuffed. See vent flowsheet for settings. Supplies placed at bedside, will continue to monitor.

## 2019-10-17 NOTE — SUBJECTIVE & OBJECTIVE
Interval History/Significant Events:   Remains intubated and sedated on precedex drip. Will plan for trach placement as soon able as patient high risk for reintubation if extubated.   Underwent 4 hours of SLED, planned for 6 hours but clotted off and was not restarted overnight. Levo initiated during SLED for hypotension, weaning off this morning. Plan for additional dialysis tonight.  Uremia persistent, Cr slightly improved.  Tolerated tube feeds at 50 overnight.     Follow-up For: Procedure(s) (LRB):  LAPAROTOMY, EXPLORATORY (N/A)  EGD (ESOPHAGOGASTRODUODENOSCOPY) (N/A)  INSERTION, CATHETER, INTERCOSTAL, FOR DRAINAGE (Bilateral)  REPAIR, PERFORATION, GASTRIC (N/A)  INSERTION, GASTROSTOMY TUBE, PERCUTANEOUS (Left)  INSERTION, JEJUNOSTOMY TUBE (Left)    Post-Operative Day: 6 Days Post-Op    Objective:     Vital Signs (Most Recent):  Temp: 98.5 °F (36.9 °C) (10/17/19 0700)  Pulse: 72 (10/17/19 0935)  Resp: (!) 26 (10/17/19 0735)  BP: (!) 101/55 (10/17/19 0900)  SpO2: 97 % (10/17/19 0935) Vital Signs (24h Range):  Temp:  [98.5 °F (36.9 °C)-100.5 °F (38.1 °C)] 98.5 °F (36.9 °C)  Pulse:  [] 72  Resp:  [22-33] 26  SpO2:  [89 %-100 %] 97 %  BP: (101-131)/(52-70) 101/55  Arterial Line BP: ()/(40-74) 119/52     Weight: (!) 150.9 kg (332 lb 10.8 oz)  Body mass index is 42.69 kg/m².      Intake/Output Summary (Last 24 hours) at 10/17/2019 0952  Last data filed at 10/17/2019 0900  Gross per 24 hour   Intake 3635 ml   Output 2739 ml   Net 896 ml       Physical Exam   Constitutional: He appears well-developed and well-nourished.   HENT:   Head: Normocephalic and atraumatic.   Eyes: Pupils are equal, round, and reactive to light. Conjunctivae are normal.   Neck: Normal range of motion.   Cardiovascular: Normal rate, regular rhythm and normal heart sounds.   Pulmonary/Chest: Breath sounds normal.   Intubated, on spontaneous.  Right and left CT with serous output.  Abdominal:   G-tube to gravity with bilious  output.  J-tube present  Neurological:   Sedated, GCS10T but inconsistently.  Skin: Skin is warm and dry.     Vents:  Vent Mode: Spont (10/17/19 0935)  Ventilator Initiated: Yes (10/10/19 1621)  Set Rate: 26 bmp (10/16/19 0517)  Vt Set: 500 mL (10/16/19 0517)  PEEP/CPAP: 8 cmH20 (10/17/19 0935)  Oxygen Concentration (%): 35 (10/17/19 0935)  Peak Airway Pressure: 30 cmH2O (10/17/19 0935)  Plateau Pressure: 23 cmH20 (10/17/19 0935)  Total Ve: 20.2 mL (10/17/19 0935)  F/VT Ratio<105 (RSBI): (!) 34.44 (10/17/19 0735)    Lines/Drains/Airways     Central Venous Catheter Line                 Percutaneous Central Line Insertion/Assessment - triple lumen  10/10/19 1853 right internal jugular 6 days         Trialysis (Dialysis) Catheter 10/16/19 1329 left internal jugular less than 1 day          Drain                 Chest Tube 10/10/19 1510 1 Right Midaxillary;Fourth intercostal space 28 Fr. 6 days         Chest Tube 10/10/19 1513 Left Midaxillary;Fourth intercostal space 28 Fr. 6 days         Closed/Suction Drain 10/10/19 1349 Right RUQ Bulb 19 Fr. 6 days         Gastrostomy/Enterostomy 10/10/19 1400 Jejunostomy tube 6 days         Gastrostomy/Enterostomy 10/10/19 1400 LUQ 6 days         Urethral Catheter 10/10/19 1530 Straight-tip;Double-lumen;Non-latex 16 Fr. 6 days          Airway                 Airway - Non-Surgical 10/15/19 1257 Endotracheal Tube 1 day          Arterial Line                 Arterial Line 10/10/19 1245 Right Radial 6 days                Significant Labs:    CBC/Anemia Profile:  Recent Labs   Lab 10/16/19  0306 10/17/19  0300   WBC 21.72* 18.48*   HGB 9.2* 8.4*   HCT 31.2* 27.2*   * 337   MCV 85 81*   RDW 18.8* 19.3*        Chemistries:  Recent Labs   Lab 10/16/19  1311 10/16/19  2230 10/17/19  0300    136 139  139   K 4.5 4.5 4.5  4.5    106 108  108   CO2 16* 18* 19*  19*   * 90* 87*  87*   CREATININE 3.2* 2.8* 2.8*  2.8*   CALCIUM 8.1* 8.5* 8.2*  8.2*   ALBUMIN 2.0*  2.0* 1.9*   MG 2.8* 2.6 2.5  2.5   PHOS 7.9* 6.5* 6.4*  6.4*       All pertinent labs within the past 24 hours have been reviewed.    Significant Imaging:  I have reviewed all pertinent imaging results/findings within the past 24 hours.

## 2019-10-17 NOTE — PT/OT/SLP PROGRESS
Physical Therapy      Patient Name:  Pasha Harmon   MRN:  [id number]    Patient not seen today secondary to hold per RN 2/2 pt not medically stable with fluctuating blood pressure, not appropriate for participation with therapy this date. Will follow-up at next possible date pending pt medical status.    Janel Mera, PT

## 2019-10-17 NOTE — PLAN OF CARE
Pt remained intubated overnight, Spontaneous PAV +, 35%, 8 - peep.  O2: > 92%.   NSR, HR 70's-80's.  MAP maintained > 65.    CVP: 10-7-9.    Temp max: 100.5   Gtt: Levo and Precedex  UOP: 30-55 cc/hr.  R. CT - 50 cc serous output.    L. CT - 0.    DENISE - 120 cc serous drainage.   CRRT overnight.   TF tolerated overnight.   Pt free of falls and skin breakdown at this time, pt turned Q2hrs.   Plan of care reviewed with pt and family, all questions answered.

## 2019-10-17 NOTE — ANESTHESIA PREPROCEDURE EVALUATION
[facility]  Anesthesia Pre-Operative Evaluation         Patient Name: Pasha Harmon  YOB: 1945  MRN: [id number]    SUBJECTIVE:     Pre-operative evaluation for Procedure(s) (LRB):  CREATION, TRACHEOSTOMY (N/A)     10/17/2019    Pasha Harmon is a 74 y.o. male w/ a significant PMHx of COPD (current smoker), DMII, CAD, gastric lipoma admitted to SICU following discovery of intestinal and esophageal perforations on EGD (now s/p repair). Currently intubated, sedated.     Vent Mode: Spont  Oxygen Concentration (%):  [] 35  Resp Rate Total:  [20 br/min-36 br/min] 25 br/min  PEEP/CPAP:  [8 cmH20] 8 cmH20  Mean Airway Pressure:  [12 cmH20-15 cmH20] 13 cmH20    Patient now presents for the above procedure(s).      LDA:        Percutaneous Central Line Insertion/Assessment - triple lumen  10/10/19 1853 right internal jugular (Active)   Dressing biopatch in place 10/17/2019  7:20 AM   Securement secured w/ sutures 10/17/2019  7:20 AM   Additional Site Signs no erythema;no warmth;no edema;no pain;no palpable cord;no streak formation;no drainage 10/17/2019  7:20 AM   Distal Patency/Care flushed w/o difficulty;blood return present;infusing 10/17/2019  7:20 AM   Medial Patency/Care flushed w/o difficulty;normal saline locked 10/17/2019  7:20 AM   Proximal Patency/Care infusing 10/17/2019  7:20 AM   Waveform normal 10/17/2019  7:20 AM   Line Interventions line leveled/zeroed 10/17/2019  7:20 AM   Dressing Change Due 10/22/19 10/17/2019  7:20 AM   Daily Line Review Performed 10/17/2019  7:20 AM   Number of days: 6            Trialysis (Dialysis) Catheter 10/16/19 1329 left internal jugular (Active)   IV Device Securement sutures 10/17/2019  7:20 AM   Additional Site Signs no erythema;no warmth;no edema;no pain;no palpable cord;no streak formation;no drainage 10/17/2019  7:20 AM   Patency/Care flushed w/o difficulty;normal saline locked 10/17/2019  7:20 AM   Site Assessment  Clean;Dry;Intact 10/17/2019  7:20 AM   Status Clamped 10/17/2019  7:20 AM   Dressing Intervention New dressing 10/16/2019  3:00 PM   Dressing Status Biopatch in place;Clean;Dry;Intact 10/17/2019  7:20 AM   Dressing Change Due 10/23/19 10/17/2019  7:20 AM   Verification by X-ray Yes 10/16/2019  3:00 PM   Site Condition No complications 10/17/2019  7:20 AM   Dressing Occlusive 10/17/2019  7:20 AM   Daily Line Review Performed 10/17/2019  7:20 AM   Number of days: 0            Arterial Line 10/10/19 1245 Right Radial (Active)   Site Assessment Clean;Dry;Intact 10/17/2019  7:20 AM   Line Status Pulsatile blood flow 10/17/2019  7:20 AM   Art Line Waveform Appropriate;Square wave test performed 10/17/2019  7:20 AM   Arterial Line Interventions Zeroed and calibrated;Leveled;Connections checked and tightened;Flushed per protocol;Line pulled back 10/17/2019  7:20 AM   Color/Movement/Sensation Capillary refill less than 3 sec 10/17/2019  7:20 AM   Dressing Type Transparent 10/17/2019  7:20 AM   Dressing Status Clean;Dry;Intact 10/17/2019  7:20 AM   Dressing Intervention Other (Comment) 10/17/2019  3:00 AM   Dressing Change Due 10/20/19 10/17/2019  7:20 AM   Number of days: 6            Chest Tube 10/10/19 1513 Left Midaxillary;Fourth intercostal space 28 Fr. (Active)   Chest Tube WDL WDL 10/17/2019  7:20 AM   Function -20 cm H2O 10/17/2019  7:20 AM   Air Leak/Fluctuation air leak not present;fluctuation not present 10/17/2019  7:20 AM   Safety all tubing connections taped;2 rubber-tipped hemostats w/ patient;all connections secured;suction checked 10/17/2019  7:20 AM   Securement tubing secured to body distal to insertion site w/ tape 10/17/2019  7:20 AM   Patency Intervention Tip/tilt 10/17/2019  7:20 AM   Drainage Description Serous 10/17/2019  7:20 AM   Dressing Appearance occlusive gauze dressing intact;clean and dry 10/17/2019  7:20 AM   Dressing Care dressing changed 10/16/2019  7:00 AM   Left Subcutaneous Emphysema  none present 10/17/2019  7:20 AM   Right Subcutaneous Emphysema none present 10/17/2019  7:20 AM   Site Assessment Clean;Dry;Intact 10/17/2019  7:20 AM   Surrounding Skin Dry;Intact 10/17/2019  7:20 AM   Output (mL) 0 mL 10/17/2019  9:00 AM   Number of days: 6            Chest Tube 10/10/19 1510 1 Right Midaxillary;Fourth intercostal space 28 Fr. (Active)   Chest Tube WDL WDL 10/17/2019  7:20 AM   Function -20 cm H2O 10/17/2019  7:20 AM   Air Leak/Fluctuation air leak not present;fluctuation not present 10/17/2019  7:20 AM   Safety all tubing connections taped;2 rubber-tipped hemostats w/ patient;all connections secured;suction checked 10/17/2019  7:20 AM   Securement tubing secured to body distal to insertion site w/ tape 10/17/2019  7:20 AM   Patency Intervention Tip/tilt 10/17/2019  7:20 AM   Drainage Description Serous 10/17/2019  7:20 AM   Dressing Appearance occlusive gauze dressing intact;clean and dry 10/17/2019  7:20 AM   Dressing Care dressing reinforced 10/17/2019  3:00 AM   Left Subcutaneous Emphysema none present 10/17/2019  7:20 AM   Right Subcutaneous Emphysema none present 10/17/2019  7:20 AM   Site Assessment Clean;Dry;Intact 10/17/2019  7:20 AM   Surrounding Skin Dry;Intact 10/17/2019  7:20 AM   Output (mL) 0 mL 10/17/2019  9:00 AM   Number of days: 6            Closed/Suction Drain 10/10/19 1349 Right RUQ Bulb 19 Fr. (Active)   Site Description Leaking at site 10/17/2019  3:00 AM   Dressing Type Gauze 10/17/2019  3:00 AM   Dressing Status Intact;New drainage 10/17/2019  3:00 AM   Dressing Intervention Dressing reinforced 10/17/2019  3:00 AM   Drainage Serous 10/17/2019  3:00 AM   Status To bulb suction 10/17/2019  3:00 AM   Output (mL) 0 mL 10/17/2019  9:00 AM   Number of days: 6            Gastrostomy/Enterostomy 10/10/19 1400 LUQ (Active)   Securement secured to abdomen 10/17/2019  7:20 AM   Interventions Prior to Feeding patency checked 10/17/2019  7:20 AM   Suction Setting/Drainage Method  "dependent drainage 10/17/2019  7:20 AM   Drainage green 10/17/2019  7:20 AM   Clamp Status/Tolerance other (see comments) 10/17/2019  7:20 AM   Dressing no dressing 10/17/2019  7:20 AM   Insertion Site dry 10/17/2019  7:20 AM   Site Care site cleansed w/ soap and water 10/16/2019  3:00 PM   Flush/Irrigation flushed w/;water;no resistance met 10/17/2019  7:20 AM   Intake (mL) 450 mL 10/16/2019 11:40 AM   Tube Output(mL)(Include Discarded Residual) 27 mL 10/17/2019  9:00 AM   Tube Feeding Intake (mL) 0 10/15/2019  3:00 PM   Number of days: 6            Gastrostomy/Enterostomy 10/10/19 1400 Jejunostomy tube (Active)   Securement secured to abdomen 10/17/2019  7:20 AM   Interventions Prior to Feeding patency checked 10/17/2019  7:20 AM   Feeding Type by pump 10/17/2019  7:20 AM   Clamp Status/Tolerance unclamped 10/17/2019  7:20 AM   Feeding Action feeding continued 10/17/2019  7:20 AM   Dressing no dressing 10/17/2019  7:20 AM   Current Rate (mL/hr) 40 mL/hr 10/17/2019  3:00 AM   Goal Rate (mL/hr) 65 mL/hr 10/17/2019  3:00 AM   Tube Feeding Intake (mL) 50 10/17/2019  8:00 AM   Number of days: 6            Urethral Catheter 10/10/19 1530 Straight-tip;Double-lumen;Non-latex 16 Fr. (Active)   Site Assessment Clean;Intact 10/17/2019  3:00 AM   Collection Container Urimeter 10/17/2019  3:00 AM   Securement Method secured to top of thigh w/ adhesive device 10/17/2019  3:00 AM   Catheter Care Performed yes 10/17/2019  3:00 AM   Reason for Continuing Urinary Catheterization Post operative 10/17/2019  3:00 AM   CAUTI Prevention Bundle StatLock in place w 1" slack;Intact seal between catheter & drainage tubing;Drainage bag/urimeter off the floor;Green sheeting clip in use;No dependent loops or kinks;Drainage bag/urimeter not overfilled (<2/3 full);Drainage bag/urimeter below bladder 10/17/2019  3:00 AM   Output (mL) 25 mL 10/17/2019 10:00 AM   Number of days: 6       Prev airway: Present Prior to Hospital Arrival?: No; Placement " Date: 10/10/19; Placement Time: 1234; Method of Intubation: [place], Rapid Sequence Induction; Inserted by: CRNA; Airway Device: Endotracheal Tube; Mask Ventilation: Not Attempted; Intubated: Postinduction; Blade: [place] #4; Airway Device Size: 7.5; Style: Cuffed; Cuff Inflation: Minimal occlusive pressure; Inflation Amount: 3; Placement Verified By: Auscultation, Capnometry, ETT Condensation; Grade: Grade I; Complicating Factors: Obesity, Oropharyngeal edema or fat    Drips:    calcium gluconate IVPB      dexmedetomidine (PRECEDEX) infusion 0.6 mcg/kg/hr (10/17/19 1040)    dextrose-sod citrate-citric ac      norepinephrine bitartrate-D5W 0.02 mcg/kg/min (10/17/19 1000)       Patient Active Problem List   Diagnosis    COPD (chronic obstructive pulmonary disease)    Abscess of gluteal cleft    Type 2 diabetes mellitus without complication, without long-term current use of insulin    Hyperlipidemia    Essential hypertension    Hyponatremia    Depression    Anemia    Gastric tumor    Peptic ulcer disease with hemorrhage    Leiomyoma of stomach    Stromal tumor of digestive system    History of anemia    Atherosclerosis of abdominal aorta    Obesity (BMI 35.0-39.9 without comorbidity)    Cigarette smoker    Benign prostatic hyperplasia with nocturia    MIGUELINA (obstructive sleep apnea)    Upper GI bleed    Chronic blood loss anemia    Gastrointestinal hemorrhage with melena    Syncope    Leukocytosis    Thrombocytosis    Iron deficiency anemia, unspecified    Subepithelial gastric lesion    Insomnia    Bowel perforation    Perforated stomach, acute    Esophageal tear    Acute respiratory failure with hypoxia and hypercarbia    BHASKAR (acute kidney injury)    Esophageal perforation    Metabolic acidosis    ATN (acute tubular necrosis)       Review of patient's allergies indicates:   Allergen Reactions    Meloxicam Other (See Comments)     Ulcer, GI bleed        Current Inpatient  Medications:   albuterol-ipratropium  3 mL Nebulization Q4H    chlorhexidine  15 mL Mouth/Throat BID    [START ON 10/18/2019] famotidine  20 mg Per J Tube Daily    fluconazole (DIFLUCAN) IVPB  400 mg Intravenous Q24H    fluticasone furoate-vilanterol  1 puff Inhalation Daily    heparin (porcine)  5,000 Units Subcutaneous Q8H    nicotine  1 patch Transdermal Daily       No current facility-administered medications on file prior to encounter.      Current Outpatient Medications on File Prior to Encounter   Medication Sig Dispense Refill    acetaminophen (TYLENOL) 650 MG TbSR Take 1,300 mg by mouth 2 (two) times daily.       budesonide-formoterol 160-4.5 mcg (SYMBICORT) 160-4.5 mcg/actuation HFAA Inhale 2 puffs into the lungs every 12 (twelve) hours. 1 Inhaler 6    cholecalciferol, vitamin D3, (VITAMIN D3) 5,000 unit Tab Take 5,000 Units by mouth once daily.      citalopram (CELEXA) 20 MG tablet Take 1 tablet (20 mg total) by mouth nightly. 90 tablet 3    diphenhydrAMINE (BENADRYL) 25 mg capsule Take 25 mg by mouth every evening. 1 tab po am, 3 tabs po HS       lisinopril (PRINIVIL,ZESTRIL) 20 MG tablet Take 1 tablet (20 mg total) by mouth once daily. 90 tablet 3    metFORMIN (GLUCOPHAGE) 500 MG tablet Take 1 tablet (500 mg total) by mouth daily with breakfast. 90 tablet 3    multivit with min-FA-lycopene (ONE-A-DAY MEN'S MULTIVITAMIN) 400-300 mcg Tab Take 1 tablet by mouth nightly.      pantoprazole (PROTONIX) 40 MG tablet Take 1 tablet (40 mg total) by mouth 2 (two) times daily. 180 tablet 3    simvastatin (ZOCOR) 40 MG tablet Take 1 tablet (40 mg total) by mouth every evening. 90 tablet 3    sucralfate (CARAFATE) 1 gram tablet Take 1 tablet (1 g total) by mouth 4 (four) times daily. 120 tablet 0    albuterol (ACCUNEB) 1.25 mg/3 mL Nebu Take 3 mLs (1.25 mg total) by nebulization every 6 (six) hours as needed. Rescue 3 Box 3    albuterol-ipratropium (DUO-NEB) 2.5 mg-0.5 mg/3 mL nebulizer solution  Take 3 mLs by nebulization every 6 (six) hours as needed for Wheezing. Rescue      nicotine (NICODERM CQ) 21 mg/24 hr Place 1 patch onto the skin once daily.  0       Past Surgical History:   Procedure Laterality Date    APPENDECTOMY      CHOLECYSTECTOMY      ENDOSCOPIC ULTRASOUND OF UPPER GASTROINTESTINAL TRACT Left 6/4/2019    Procedure: ULTRASOUND, UPPER GI TRACT, ENDOSCOPIC;  Surgeon: Lavon Parra MD;  Location: [facility];  Service: Endoscopy;  Laterality: Left;  GIF plus Radial    ESOPHAGOGASTRODUODENOSCOPY N/A 5/25/2019    Procedure: EGD (ESOPHAGOGASTRODUODENOSCOPY);  Surgeon: Mitch Buitrago MD;  Location: [facility];  Service: Endoscopy;  Laterality: N/A;    ESOPHAGOGASTRODUODENOSCOPY N/A 6/4/2019    Procedure: EGD (ESOPHAGOGASTRODUODENOSCOPY);  Surgeon: Lavon Parra MD;  Location: [facility];  Service: Endoscopy;  Laterality: N/A;    ESOPHAGOGASTRODUODENOSCOPY N/A 9/3/2019    Procedure: EGD (ESOPHAGOGASTRODUODENOSCOPY);  Surgeon: Keshav Harris MD;  Location: [facility];  Service: Endoscopy;  Laterality: N/A;    ESOPHAGOGASTRODUODENOSCOPY N/A 10/10/2019    Procedure: EGD (ESOPHAGOGASTRODUODENOSCOPY);  Surgeon: Paulino Parrish MD;  Location: [address];  Service: General;  Laterality: N/A;    KNEE SURGERY Right     PERCUTANEOUS INSERTION OF GASTROSTOMY TUBE Left 10/10/2019    Procedure: INSERTION, GASTROSTOMY TUBE, PERCUTANEOUS;  Surgeon: Paulino Parrish MD;  Location: [address];  Service: General;  Laterality: Left;    PLACEMENT OF JEJUNOSTOMY TUBE Left 10/10/2019    Procedure: INSERTION, JEJUNOSTOMY TUBE;  Surgeon: Paulino Parrish MD;  Location: [facility] OR [address] [place] [address];  Service: General;  Laterality: Left;    REPAIR OF BOWEL PERFORATION N/A 10/10/2019    Procedure: REPAIR, PERFORATION, GASTRIC;  Surgeon: Paulino Parrish MD;  Location: [facility] OR [address] [place] [address];  Service: General;  Laterality: N/A;    TUBE THORACOTOMY Bilateral 10/10/2019    Procedure: INSERTION, CATHETER,  INTERCOSTAL, FOR DRAINAGE;  Surgeon: Paulino Parrish MD;  Location: [facility] OR 2ND FLR;  Service: General;  Laterality: Bilateral;       Social History     Socioeconomic History    Marital status: Single     Spouse name: Not on file    Number of children: Not on file    Years of education: Not on file    Highest education level: Not on file   Occupational History    Not on file   Social Needs    Financial resource strain: Not on file    Food insecurity:     Worry: Not on file     Inability: Not on file    Transportation needs:     Medical: Not on file     Non-medical: Not on file   Tobacco Use    Smoking status: Current Every Day Smoker     Packs/day: 2.00     Types: Cigarettes    Smokeless tobacco: Never Used   Substance and Sexual Activity    Alcohol use: No    Drug use: No    Sexual activity: Not on file   Lifestyle    Physical activity:     Days per week: Not on file     Minutes per session: Not on file    Stress: Not on file   Relationships    Social connections:     Talks on phone: Not on file     Gets together: Not on file     Attends Jainism service: Not on file     Active member of club or organization: Not on file     Attends meetings of clubs or organizations: Not on file     Relationship status: Not on file   Other Topics Concern    Not on file   Social History Narrative    Not on file       OBJECTIVE:     Vital Signs Range (Last 24H):  Temp:  [36.9 °C (98.5 °F)-38.1 °C (100.5 °F)]   Pulse:  []   Resp:  [22-33]   BP: (101-131)/(52-70)   SpO2:  [89 %-100 %]   Arterial Line BP: ()/(40-74)       Significant Labs:  Lab Results   Component Value Date    WBC 18.48 (H) 10/17/2019    HGB 8.4 (L) 10/17/2019    HCT 27.2 (L) 10/17/2019     10/17/2019    CHOL 136 09/19/2019    TRIG 113 09/19/2019    HDL 44 09/19/2019    ALT 12 10/14/2019    AST 19 10/14/2019     10/17/2019     10/17/2019    K 4.5 10/17/2019    K 4.5 10/17/2019     10/17/2019      10/17/2019    CREATININE 2.8 (H) 10/17/2019    CREATININE 2.8 (H) 10/17/2019    BUN 87 (H) 10/17/2019    BUN 87 (H) 10/17/2019    CO2 19 (L) 10/17/2019    CO2 19 (L) 10/17/2019    PSA 0.30 09/19/2019    INR 1.0 10/12/2019    HGBA1C 6.1 (H) 09/19/2019       Diagnostic Studies: No relevant studies.    EKG:   Results for orders placed or performed during the hospital encounter of 10/09/19   EKG 12-lead    Collection Time: 10/12/19  9:25 AM    Narrative    Test Reason : Z98.890,    Vent. Rate : 092 BPM     Atrial Rate : 092 BPM     P-R Int : 160 ms          QRS Dur : 086 ms      QT Int : 366 ms       P-R-T Axes : 059 039 055 degrees     QTc Int : 452 ms    Normal sinus rhythm  Normal ECG  No previous ECGs available  Confirmed by Junie Fitzgerald MD ([id number]) on 10/13/2019 1:40:01 PM    Referred By: JACQUE SIMS           Confirmed By:Junie Fitzgerald MD       2D ECHO:  TTE:  Results for orders placed or performed during the hospital encounter of 09/12/19   Echo   Result Value Ref Range    LVIDS 3.09 2.1 - 4.0 cm    STJ 2.89 cm    AV mean gradient 6 mmHg    Ao peak dejan 1.81 m/s    Ao VTI 36.7 cm    IVS 1.22 (A) 0.6 - 1.1 cm    LA size 4.4 cm    LVIDD 4.65 3.5 - 6.0 cm    LVOT diameter 2.2 cm    LVOT peak VTI 36.40 cm    PW 1.08 0.6 - 1.1 cm    MV Peak A Dejan 1.31 m/s    E wave decelartion time 382 msec    RA Major Axis 4.09 cm    RA Width 3.67 cm    LA WIDTH 4.01 cm    LVOT peak dejan 159.00 m/s    BSA 2.68 m2    TDI SEPTAL 0.09 m/s    LV LATERAL E/E' RATIO 11.09 m/s    LV SEPTAL E/E' RATIO 13.56 m/s    TDI LATERAL 0.11 m/s    FS 34 28 - 44 %    LA volume 81.51 cm3    LV mass 196.25 g    Left Ventricle Relative Wall Thickness 0.46 cm    AV valve area 3.77 cm2    AV Velocity Ratio 87.85     AV index (prosthetic) 0.99     E/A ratio 0.93     Mean e' 0.10 m/s    LVOT area 3.8 cm2    LVOT stroke volume 138.30 cm3    AV peak gradient 13 mmHg    E/E' ratio 12.20 m/s    MV Peak E Dejan 1.22 m/s    LA Volume Index 31.4 mL/m2    LV Mass Index  76 g/m2    Left Atrium Minor Axis 5.47 cm    Left Atrium Major Axis 5.40 cm    Right Atrial Pressure (from IVC) 3 mmHg    Narrative    · Normal left ventricular systolic function. The estimated ejection   fraction is 60%  · Mild concentric left ventricular hypertrophy.          NELI:  No results found for this or any previous visit.    ASSESSMENT/PLAN:       Anesthesia Evaluation    I have reviewed the Patient Summary Reports.    I have reviewed the Nursing Notes.   I have reviewed the Medications.     Review of Systems  Anesthesia Hx:  No problems with previous Anesthesia  History of prior surgery of interest to airway management or planning: Denies Family Hx of Anesthesia complications.   Denies Personal Hx of Anesthesia complications.   Hematology/Oncology:         -- Anemia:   Cardiovascular:   Hypertension CAD      Pulmonary:   COPD    Renal/:   Chronic Renal Disease    Hepatic/GI:   PUD,    Endocrine:   Diabetes        Physical Exam  General:  Obesity    Airway/Jaw/Neck:  Airway Findings: Pre-Existing Airway Tube(s): Oral Endotracheal tube Jaw/Neck Findings:  Neck Findings:  L CVC            Mental Status:  Sedated       Anesthesia Plan  Type of Anesthesia, risks & benefits discussed:  Anesthesia Type:  general  Patient's Preference:   Intra-op Monitoring Plan: arterial line and standard ASA monitors  Intra-op Monitoring Plan Comments:   Post Op Pain Control Plan: multimodal analgesia, IV/PO Opioids PRN and per primary service following discharge from PACU  Post Op Pain Control Plan Comments:   Induction:   IV  Beta Blocker:         Informed Consent: Patient representative understands risks and agrees with Anesthesia plan.  Questions answered. Anesthesia consent signed with patient representative.  ASA Score: 4     Day of Surgery Review of History & Physical:    H&P update referred to the surgeon.         Ready For Surgery From Anesthesia Perspective.

## 2019-10-17 NOTE — PROGRESS NOTES
"[facility]  General Surgery  Progress Note    Subjective:     History of Present Illness:  Patient is a 74 yo M with history of COPD and bleeding gastric mass who underwent EGD with AES yesterday where they removed a 7 cm "ulcerated lipoma" from his gastric antrum using submucosal endoscopic dissection. When removing the mass, it became stuck in the distal esophagus. They had to removed it piecemeal, and caused some mucosal esophageal tears. He was admitted for obs overnight, and this morning his WBC was elevated and he was complaining of abd pain. CXR showed free intraperitoneal free air and general surgery was consulted for evaluation.    Post-Op Info:  Procedure(s) (LRB):  LAPAROTOMY, EXPLORATORY (N/A)  EGD (ESOPHAGOGASTRODUODENOSCOPY) (N/A)  INSERTION, CATHETER, INTERCOSTAL, FOR DRAINAGE (Bilateral)  REPAIR, PERFORATION, GASTRIC (N/A)  INSERTION, GASTROSTOMY TUBE, PERCUTANEOUS (Left)  INSERTION, JEJUNOSTOMY TUBE (Left)   7 Days Post-Op     Interval History: No acute events overnight. On low dose levophed for hypotension.    Medications:  Continuous Infusions:   sodium chloride 0.9% 200 mL/hr at 10/17/19 0200    dexmedetomidine (PRECEDEX) infusion 0.8 mcg/kg/hr (10/17/19 0600)    norepinephrine bitartrate-D5W 0.02 mcg/kg/min (10/17/19 0600)    propofol Stopped (10/16/19 1700)     Scheduled Meds:   albumin human 5%  12.5 g Intravenous Once    albuterol-ipratropium  3 mL Nebulization Q4H    chlorhexidine  15 mL Mouth/Throat BID    famotidine (PF)  20 mg Intravenous Daily    fluticasone furoate-vilanterol  1 puff Inhalation Daily    heparin (porcine)  5,000 Units Subcutaneous Q8H    nicotine  1 patch Transdermal Daily     PRN Meds:albuterol-ipratropium, Dextrose 10% Bolus, Dextrose 10% Bolus, fentaNYL, glucagon (human recombinant), hydrALAZINE, insulin aspart U-100, iohexol, magnesium sulfate IVPB, ondansetron, sodium chloride 0.9%, sodium phosphate IVPB, sodium phosphate IVPB, sodium " phosphate IVPB     Review of patient's allergies indicates:   Allergen Reactions    Meloxicam Other (See Comments)     Ulcer, GI bleed      Objective:     Vital Signs (Most Recent):  Temp: 99.8 °F (37.7 °C) (10/17/19 0300)  Pulse: 73 (10/17/19 0600)  Resp: (!) 29 (10/17/19 0537)  BP: (!) 126/59 (10/17/19 0600)  SpO2: (!) 90 % (10/17/19 0600) Vital Signs (24h Range):  Temp:  [99 °F (37.2 °C)-100.5 °F (38.1 °C)] 99.8 °F (37.7 °C)  Pulse:  [] 73  Resp:  [22-33] 29  SpO2:  [89 %-100 %] 90 %  BP: ()/(50-70) 126/59  Arterial Line BP: ()/(40-74) 142/61     Weight: (!) 150.9 kg (332 lb 10.8 oz)  Body mass index is 42.69 kg/m².    Intake/Output - Last 3 Shifts       10/15 0700 - 10/16 0659 10/16 0700 - 10/17 0659    I.V. (mL/kg) 2203 (14.9) 2195 (14.5)    NG/GT 20 1360    Total Intake(mL/kg) 2223 (15) 3555 (23.6)    Urine (mL/kg/hr) 1355 (0.4) 790 (0.2)    Drains 485 220    Other  1452    Chest Tube 390 150    Total Output 2230 2612    Net -7 +943                Physical Exam   Constitutional: He appears well-developed and well-nourished. He is sedated and intubated.   HENT:   Bridled NGT in place   Cardiovascular: Normal rate and regular rhythm.   Pulmonary/Chest: Effort normal. He is intubated. No respiratory distress.   Bilateral chest tubes, draining SS, no air leak   Abdominal: Soft. He exhibits no distension. There is no tenderness. There is no guarding.   G tube in place, draining gastric contents  Jtube in place with trickle tube feeds   Genitourinary:   Genitourinary Comments: Hallman in place   Skin: Skin is warm and dry.   Vitals reviewed.      Significant Labs:  CBC:   Recent Labs   Lab 10/17/19  0300   WBC 18.48*   RBC 3.34*   HGB 8.4*   HCT 27.2*      MCV 81*   MCH 25.1*   MCHC 30.9*     CMP:   Recent Labs   Lab 10/14/19  1500  10/17/19  0300   *   < > 145*  145*   CALCIUM 8.7   < > 8.2*  8.2*   ALBUMIN 2.1*   < > 1.9*   PROT 5.6*  --   --    *   < > 139  139   K 4.3   <  > 4.5  4.5   CO2 17*   < > 19*  19*      < > 108  108   BUN 79*   < > 87*  87*   CREATININE 2.8*   < > 2.8*  2.8*   ALKPHOS 67  --   --    ALT 12  --   --    AST 19  --   --    BILITOT 1.0  --   --     < > = values in this interval not displayed.     Coagulation:   Recent Labs   Lab 10/12/19  0427   LABPROT 10.2   INR 1.0   APTT 34.5*     ABGs:   Recent Labs   Lab 10/17/19  0222   PH 7.382   PCO2 31.6*   PO2 91   HCO3 18.8*   POCSATURATED 97   BE -6       Significant Diagnostics:  I have reviewed all pertinent imaging results/findings within the past 24 hours.    Assessment/Plan:     * Bowel perforation  Patient is 73 yo M with 7 cm contained esophageal perforation and gastric perforation after AES procedure who underwent emergent ex lap and EGD on 10/10/19    Bilateral chest tubes in place to monitor for esophageal leak - keep them to suction  Continue G tube to gravity for gastric decompression - do not use for meds or feeds  Continue tube feeds  Wean vent as tolerated    Rest of care per SICU     Esophageal tear     - Agree with plans for UGI later this week  - J tube feeds per ACS  - CT in place until after UGI, management per ACS  - Please call Thoracic Surgery with any further questions or concerns.         Nu Schulte MD  General Surgery  [facility]

## 2019-10-17 NOTE — ASSESSMENT & PLAN NOTE
Mr Harmon is a 75yo male who is s/p EGD and was admitted to [facility] for concern for esophageal tear. Sustained multiple episodes of intraoperative hypotension on 10/9/19 and on 10/10/19, and had a drop in Hgb to 6.0 on 10/12/19.    -Baseline sCr: 0.8  -sCr on admission:1.4  -sCr on 10/15/19: 2.6  -10/15/19: 1.8L/UOP/past 24hrs   -10/15/19: physical exam reveals signs of volume overload; pt is tachypneic, has bilateral rales, and has pitting edema to BLE      Urine microscopy on 10/15/19 reveals moderate muddy brown granular casts, concerning for ischemic ATN.     BHASKAR likely 2/2 ischemic ATN due to intraoperative hypotension and acute anemia.       Plan/Recommendations:    -Will plan for 8hr SLED treatment today for further metabolic clearance and volume management.   -UFG 350cc/hr   -Avoid nephrotoxic medications  -Renally dose medications  -Avoid contrast studies   -Daily renal function panels   -Strict I/O's   -Will continue to follow closely

## 2019-10-17 NOTE — SUBJECTIVE & OBJECTIVE
Interval History: No acute events overnight. On low dose levophed for hypotension.    Medications:  Continuous Infusions:   sodium chloride 0.9% 200 mL/hr at 10/17/19 0200    dexmedetomidine (PRECEDEX) infusion 0.8 mcg/kg/hr (10/17/19 0600)    norepinephrine bitartrate-D5W 0.02 mcg/kg/min (10/17/19 0600)    propofol Stopped (10/16/19 1700)     Scheduled Meds:   albumin human 5%  12.5 g Intravenous Once    albuterol-ipratropium  3 mL Nebulization Q4H    chlorhexidine  15 mL Mouth/Throat BID    famotidine (PF)  20 mg Intravenous Daily    fluticasone furoate-vilanterol  1 puff Inhalation Daily    heparin (porcine)  5,000 Units Subcutaneous Q8H    nicotine  1 patch Transdermal Daily     PRN Meds:albuterol-ipratropium, Dextrose 10% Bolus, Dextrose 10% Bolus, fentaNYL, glucagon (human recombinant), hydrALAZINE, insulin aspart U-100, iohexol, magnesium sulfate IVPB, ondansetron, sodium chloride 0.9%, sodium phosphate IVPB, sodium phosphate IVPB, sodium phosphate IVPB     Review of patient's allergies indicates:   Allergen Reactions    Meloxicam Other (See Comments)     Ulcer, GI bleed      Objective:     Vital Signs (Most Recent):  Temp: 99.8 °F (37.7 °C) (10/17/19 0300)  Pulse: 73 (10/17/19 0600)  Resp: (!) 29 (10/17/19 0537)  BP: (!) 126/59 (10/17/19 0600)  SpO2: (!) 90 % (10/17/19 0600) Vital Signs (24h Range):  Temp:  [99 °F (37.2 °C)-100.5 °F (38.1 °C)] 99.8 °F (37.7 °C)  Pulse:  [] 73  Resp:  [22-33] 29  SpO2:  [89 %-100 %] 90 %  BP: ()/(50-70) 126/59  Arterial Line BP: ()/(40-74) 142/61     Weight: (!) 150.9 kg (332 lb 10.8 oz)  Body mass index is 42.69 kg/m².    Intake/Output - Last 3 Shifts       10/15 0700 - 10/16 0659 10/16 0700 - 10/17 0659    I.V. (mL/kg) 2203 (14.9) 2195 (14.5)    NG/GT 20 1360    Total Intake(mL/kg) 2223 (15) 3555 (23.6)    Urine (mL/kg/hr) 1355 (0.4) 790 (0.2)    Drains 485 220    Other  1452    Chest Tube 390 150    Total Output 2230 2612    Net -7 +943                 Physical Exam   Constitutional: He appears well-developed and well-nourished. He is sedated and intubated.   HENT:   Bridled NGT in place   Cardiovascular: Normal rate and regular rhythm.   Pulmonary/Chest: Effort normal. He is intubated. No respiratory distress.   Bilateral chest tubes, draining SS, no air leak   Abdominal: Soft. He exhibits no distension. There is no tenderness. There is no guarding.   G tube in place, draining gastric contents  Jtube in place with trickle tube feeds   Genitourinary:   Genitourinary Comments: Hallman in place   Skin: Skin is warm and dry.   Vitals reviewed.      Significant Labs:  CBC:   Recent Labs   Lab 10/17/19  0300   WBC 18.48*   RBC 3.34*   HGB 8.4*   HCT 27.2*      MCV 81*   MCH 25.1*   MCHC 30.9*     CMP:   Recent Labs   Lab 10/14/19  1500  10/17/19  0300   *   < > 145*  145*   CALCIUM 8.7   < > 8.2*  8.2*   ALBUMIN 2.1*   < > 1.9*   PROT 5.6*  --   --    *   < > 139  139   K 4.3   < > 4.5  4.5   CO2 17*   < > 19*  19*      < > 108  108   BUN 79*   < > 87*  87*   CREATININE 2.8*   < > 2.8*  2.8*   ALKPHOS 67  --   --    ALT 12  --   --    AST 19  --   --    BILITOT 1.0  --   --     < > = values in this interval not displayed.     Coagulation:   Recent Labs   Lab 10/12/19  0427   LABPROT 10.2   INR 1.0   APTT 34.5*     ABGs:   Recent Labs   Lab 10/17/19  0222   PH 7.382   PCO2 31.6*   PO2 91   HCO3 18.8*   POCSATURATED 97   BE -6       Significant Diagnostics:  I have reviewed all pertinent imaging results/findings within the past 24 hours.

## 2019-10-17 NOTE — NURSING
Pt back from OR. Trach site WDL, some bleeding at site, dressing reinforced. MD aware. VSS. NAD. Turned off all drips (Levo, prop, precedex). Respiratory at bedside, reset vent setting to AC while pt sedated. Restarted TF @ 10ml/hr per order.

## 2019-10-18 LAB
ALBUMIN SERPL BCP-MCNC: 1.9 G/DL (ref 3.5–5.2)
ALBUMIN SERPL BCP-MCNC: 1.9 G/DL (ref 3.5–5.2)
ALLENS TEST: ABNORMAL
ANION GAP SERPL CALC-SCNC: 12 MMOL/L (ref 8–16)
ANISOCYTOSIS BLD QL SMEAR: SLIGHT
BASOPHILS # BLD AUTO: ABNORMAL K/UL (ref 0–0.2)
BASOPHILS NFR BLD: 0 % (ref 0–1.9)
BUN SERPL-MCNC: 42 MG/DL (ref 8–23)
BUN SERPL-MCNC: 59 MG/DL (ref 8–23)
BUN SERPL-MCNC: 59 MG/DL (ref 8–23)
CA-I BLDV-SCNC: 1.11 MMOL/L (ref 1.06–1.42)
CA-I BLDV-SCNC: 1.14 MMOL/L (ref 1.06–1.42)
CA-I BLDV-SCNC: 1.14 MMOL/L (ref 1.06–1.42)
CA-I BLDV-SCNC: 1.15 MMOL/L (ref 1.06–1.42)
CALCIUM SERPL-MCNC: 8.8 MG/DL (ref 8.7–10.5)
CALCIUM SERPL-MCNC: 8.9 MG/DL (ref 8.7–10.5)
CALCIUM SERPL-MCNC: 8.9 MG/DL (ref 8.7–10.5)
CHLORIDE SERPL-SCNC: 108 MMOL/L (ref 95–110)
CO2 SERPL-SCNC: 23 MMOL/L (ref 23–29)
CREAT SERPL-MCNC: 2.1 MG/DL (ref 0.5–1.4)
DACRYOCYTES BLD QL SMEAR: ABNORMAL
DELSYS: ABNORMAL
DIFFERENTIAL METHOD: ABNORMAL
EOSINOPHIL # BLD AUTO: ABNORMAL K/UL (ref 0–0.5)
EOSINOPHIL NFR BLD: 2 % (ref 0–8)
ERYTHROCYTE [DISTWIDTH] IN BLOOD BY AUTOMATED COUNT: 19.4 % (ref 11.5–14.5)
EST. GFR  (AFRICAN AMERICAN): 35 ML/MIN/1.73 M^2
EST. GFR  (NON AFRICAN AMERICAN): 30.3 ML/MIN/1.73 M^2
FIO2: 35
GLUCOSE SERPL-MCNC: 119 MG/DL (ref 70–110)
GLUCOSE SERPL-MCNC: 131 MG/DL (ref 70–110)
GLUCOSE SERPL-MCNC: 131 MG/DL (ref 70–110)
HCO3 UR-SCNC: 19 MMOL/L (ref 24–28)
HCO3 UR-SCNC: 23.5 MMOL/L (ref 24–28)
HCO3 UR-SCNC: 23.7 MMOL/L (ref 24–28)
HCO3 UR-SCNC: 25.4 MMOL/L (ref 24–28)
HCT VFR BLD AUTO: 27.7 % (ref 40–54)
HGB BLD-MCNC: 8.5 G/DL (ref 14–18)
HYPOCHROMIA BLD QL SMEAR: ABNORMAL
IMM GRANULOCYTES # BLD AUTO: ABNORMAL K/UL (ref 0–0.04)
IMM GRANULOCYTES NFR BLD AUTO: ABNORMAL % (ref 0–0.5)
LYMPHOCYTES # BLD AUTO: ABNORMAL K/UL (ref 1–4.8)
LYMPHOCYTES NFR BLD: 5 % (ref 18–48)
MAGNESIUM SERPL-MCNC: 2.4 MG/DL (ref 1.6–2.6)
MAGNESIUM SERPL-MCNC: 2.7 MG/DL (ref 1.6–2.6)
MAP: 82
MCH RBC QN AUTO: 25.4 PG (ref 27–31)
MCHC RBC AUTO-ENTMCNC: 30.7 G/DL (ref 32–36)
MCV RBC AUTO: 83 FL (ref 82–98)
METAMYELOCYTES NFR BLD MANUAL: 3 %
MIN VOL: 18.2
MIN VOL: 20.9
MODE: ABNORMAL
MONOCYTES # BLD AUTO: ABNORMAL K/UL (ref 0.3–1)
MONOCYTES NFR BLD: 10 % (ref 4–15)
MYELOCYTES NFR BLD MANUAL: 4 %
NEUTROPHILS NFR BLD: 73 % (ref 38–73)
NEUTS BAND NFR BLD MANUAL: 3 %
NRBC BLD-RTO: 0 /100 WBC
OVALOCYTES BLD QL SMEAR: ABNORMAL
PCO2 BLDA: 31.9 MMHG (ref 35–45)
PCO2 BLDA: 32.4 MMHG (ref 35–45)
PCO2 BLDA: 34.8 MMHG (ref 35–45)
PCO2 BLDA: 35 MMHG (ref 35–45)
PEEP: 8
PH SMN: 7.38 [PH] (ref 7.35–7.45)
PH SMN: 7.44 [PH] (ref 7.35–7.45)
PH SMN: 7.47 [PH] (ref 7.35–7.45)
PH SMN: 7.47 [PH] (ref 7.35–7.45)
PHOSPHATE SERPL-MCNC: 3.2 MG/DL (ref 2.7–4.5)
PHOSPHATE SERPL-MCNC: 4.5 MG/DL (ref 2.7–4.5)
PHOSPHATE SERPL-MCNC: 4.5 MG/DL (ref 2.7–4.5)
PLATELET # BLD AUTO: 353 K/UL (ref 150–350)
PLATELET BLD QL SMEAR: ABNORMAL
PMV BLD AUTO: 10 FL (ref 9.2–12.9)
PO2 BLDA: 74 MMHG (ref 80–100)
PO2 BLDA: 78 MMHG (ref 80–100)
PO2 BLDA: 81 MMHG (ref 80–100)
PO2 BLDA: 82 MMHG (ref 80–100)
POC BE: -6 MMOL/L
POC BE: 0 MMOL/L
POC BE: 0 MMOL/L
POC BE: 2 MMOL/L
POC SATURATED O2: 95 % (ref 95–100)
POC SATURATED O2: 96 % (ref 95–100)
POC SATURATED O2: 96 % (ref 95–100)
POC SATURATED O2: 97 % (ref 95–100)
POC TCO2: 20 MMOL/L (ref 23–27)
POC TCO2: 24 MMOL/L (ref 23–27)
POC TCO2: 25 MMOL/L (ref 23–27)
POC TCO2: 26 MMOL/L (ref 23–27)
POCT GLUCOSE: 118 MG/DL (ref 70–110)
POCT GLUCOSE: 120 MG/DL (ref 70–110)
POCT GLUCOSE: 130 MG/DL (ref 70–110)
POCT GLUCOSE: 133 MG/DL (ref 70–110)
POCT GLUCOSE: 134 MG/DL (ref 70–110)
POCT GLUCOSE: 137 MG/DL (ref 70–110)
POIKILOCYTOSIS BLD QL SMEAR: SLIGHT
POLYCHROMASIA BLD QL SMEAR: ABNORMAL
POTASSIUM SERPL-SCNC: 4.4 MMOL/L (ref 3.5–5.1)
RBC # BLD AUTO: 3.35 M/UL (ref 4.6–6.2)
SAMPLE: ABNORMAL
SITE: ABNORMAL
SODIUM SERPL-SCNC: 143 MMOL/L (ref 136–145)
SP02: 97
SP02: 99
SPONT RATE: 23
SPONT RATE: 25
VOL: 849
VOL: 927
WBC # BLD AUTO: 20.33 K/UL (ref 3.9–12.7)

## 2019-10-18 PROCEDURE — 97803 MED NUTRITION INDIV SUBSEQ: CPT

## 2019-10-18 PROCEDURE — 99233 SBSQ HOSP IP/OBS HIGH 50: CPT | Mod: 24,,, | Performed by: SURGERY

## 2019-10-18 PROCEDURE — 80069 RENAL FUNCTION PANEL: CPT | Mod: 91

## 2019-10-18 PROCEDURE — 63600175 PHARM REV CODE 636 W HCPCS: Performed by: STUDENT IN AN ORGANIZED HEALTH CARE EDUCATION/TRAINING PROGRAM

## 2019-10-18 PROCEDURE — S4991 NICOTINE PATCH NONLEGEND: HCPCS | Performed by: PHYSICIAN ASSISTANT

## 2019-10-18 PROCEDURE — 99233 PR SUBSEQUENT HOSPITAL CARE,LEVL III: ICD-10-PCS | Mod: 24,,, | Performed by: SURGERY

## 2019-10-18 PROCEDURE — 37799 UNLISTED PX VASCULAR SURGERY: CPT

## 2019-10-18 PROCEDURE — 85027 COMPLETE CBC AUTOMATED: CPT

## 2019-10-18 PROCEDURE — 27200966 HC CLOSED SUCTION SYSTEM

## 2019-10-18 PROCEDURE — 63600175 PHARM REV CODE 636 W HCPCS: Performed by: SURGERY

## 2019-10-18 PROCEDURE — 25000003 PHARM REV CODE 250: Performed by: STUDENT IN AN ORGANIZED HEALTH CARE EDUCATION/TRAINING PROGRAM

## 2019-10-18 PROCEDURE — 80069 RENAL FUNCTION PANEL: CPT

## 2019-10-18 PROCEDURE — 82330 ASSAY OF CALCIUM: CPT

## 2019-10-18 PROCEDURE — 97110 THERAPEUTIC EXERCISES: CPT

## 2019-10-18 PROCEDURE — 25000242 PHARM REV CODE 250 ALT 637 W/ HCPCS: Performed by: STUDENT IN AN ORGANIZED HEALTH CARE EDUCATION/TRAINING PROGRAM

## 2019-10-18 PROCEDURE — 82330 ASSAY OF CALCIUM: CPT | Mod: 91

## 2019-10-18 PROCEDURE — 94761 N-INVAS EAR/PLS OXIMETRY MLT: CPT

## 2019-10-18 PROCEDURE — 20000000 HC ICU ROOM

## 2019-10-18 PROCEDURE — 27000221 HC OXYGEN, UP TO 24 HOURS

## 2019-10-18 PROCEDURE — 80100008 HC CRRT DAILY MAINTENANCE

## 2019-10-18 PROCEDURE — 97530 THERAPEUTIC ACTIVITIES: CPT

## 2019-10-18 PROCEDURE — 94668 MNPJ CHEST WALL SBSQ: CPT

## 2019-10-18 PROCEDURE — 90945 DIALYSIS ONE EVALUATION: CPT

## 2019-10-18 PROCEDURE — 85007 BL SMEAR W/DIFF WBC COUNT: CPT

## 2019-10-18 PROCEDURE — 94640 AIRWAY INHALATION TREATMENT: CPT

## 2019-10-18 PROCEDURE — 82803 BLOOD GASES ANY COMBINATION: CPT

## 2019-10-18 PROCEDURE — 90945 PR DIALYSIS, NOT HEMO, 1 EVAL: ICD-10-PCS | Mod: ,,, | Performed by: NURSE PRACTITIONER

## 2019-10-18 PROCEDURE — 90945 DIALYSIS ONE EVALUATION: CPT | Mod: ,,, | Performed by: NURSE PRACTITIONER

## 2019-10-18 PROCEDURE — 83735 ASSAY OF MAGNESIUM: CPT | Mod: 91

## 2019-10-18 PROCEDURE — 99900026 HC AIRWAY MAINTENANCE (STAT)

## 2019-10-18 PROCEDURE — 99900035 HC TECH TIME PER 15 MIN (STAT)

## 2019-10-18 PROCEDURE — 83735 ASSAY OF MAGNESIUM: CPT

## 2019-10-18 PROCEDURE — 97162 PT EVAL MOD COMPLEX 30 MIN: CPT

## 2019-10-18 PROCEDURE — 94003 VENT MGMT INPAT SUBQ DAY: CPT

## 2019-10-18 PROCEDURE — 25000003 PHARM REV CODE 250: Performed by: PHYSICIAN ASSISTANT

## 2019-10-18 RX ORDER — MAGNESIUM SULFATE HEPTAHYDRATE 40 MG/ML
2 INJECTION, SOLUTION INTRAVENOUS
Status: ACTIVE | OUTPATIENT
Start: 2019-10-18 | End: 2019-10-19

## 2019-10-18 RX ORDER — BISACODYL 10 MG
10 SUPPOSITORY, RECTAL RECTAL ONCE
Status: DISCONTINUED | OUTPATIENT
Start: 2019-10-18 | End: 2019-10-18

## 2019-10-18 RX ORDER — HEPARIN SODIUM 5000 [USP'U]/ML
7500 INJECTION, SOLUTION INTRAVENOUS; SUBCUTANEOUS EVERY 8 HOURS
Status: DISCONTINUED | OUTPATIENT
Start: 2019-10-18 | End: 2019-10-31 | Stop reason: HOSPADM

## 2019-10-18 RX ORDER — HYDROCODONE BITARTRATE AND ACETAMINOPHEN 500; 5 MG/1; MG/1
TABLET ORAL CONTINUOUS
Status: ACTIVE | OUTPATIENT
Start: 2019-10-18 | End: 2019-10-19

## 2019-10-18 RX ADMIN — CHLORHEXIDINE GLUCONATE 0.12% ORAL RINSE 15 ML: 1.2 LIQUID ORAL at 08:10

## 2019-10-18 RX ADMIN — FAMOTIDINE 20 MG: 40 POWDER, FOR SUSPENSION ORAL at 09:10

## 2019-10-18 RX ADMIN — HEPARIN SODIUM 7500 UNITS: 5000 INJECTION, SOLUTION INTRAVENOUS; SUBCUTANEOUS at 03:10

## 2019-10-18 RX ADMIN — ONDANSETRON 4 MG: 2 INJECTION INTRAMUSCULAR; INTRAVENOUS at 06:10

## 2019-10-18 RX ADMIN — SODIUM CHLORIDE: 0.9 INJECTION, SOLUTION INTRAVENOUS at 09:10

## 2019-10-18 RX ADMIN — IPRATROPIUM BROMIDE AND ALBUTEROL SULFATE 3 ML: .5; 3 SOLUTION RESPIRATORY (INHALATION) at 03:10

## 2019-10-18 RX ADMIN — CALCIUM GLUCONATE 3000 MG: 94 INJECTION, SOLUTION INTRAVENOUS at 11:10

## 2019-10-18 RX ADMIN — IPRATROPIUM BROMIDE AND ALBUTEROL SULFATE 3 ML: .5; 3 SOLUTION RESPIRATORY (INHALATION) at 07:10

## 2019-10-18 RX ADMIN — NICOTINE 1 PATCH: 21 PATCH, EXTENDED RELEASE TRANSDERMAL at 09:10

## 2019-10-18 RX ADMIN — IPRATROPIUM BROMIDE AND ALBUTEROL SULFATE 3 ML: .5; 3 SOLUTION RESPIRATORY (INHALATION) at 11:10

## 2019-10-18 RX ADMIN — HEPARIN SODIUM 7500 UNITS: 5000 INJECTION, SOLUTION INTRAVENOUS; SUBCUTANEOUS at 09:10

## 2019-10-18 RX ADMIN — CHLORHEXIDINE GLUCONATE 0.12% ORAL RINSE 15 ML: 1.2 LIQUID ORAL at 09:10

## 2019-10-18 RX ADMIN — FLUCONAZOLE 400 MG: 2 INJECTION, SOLUTION INTRAVENOUS at 10:10

## 2019-10-18 RX ADMIN — HEPARIN SODIUM 5000 UNITS: 5000 INJECTION, SOLUTION INTRAVENOUS; SUBCUTANEOUS at 06:10

## 2019-10-18 RX ADMIN — SODIUM CHLORIDE 500 ML: 0.9 INJECTION, SOLUTION INTRAVENOUS at 12:10

## 2019-10-18 NOTE — PROGRESS NOTES
[facility]  Nephrology  Progress Note    Patient Name: Pasha Harmon  MRN: [id number]  Admission Date: 10/9/2019  Hospital Length of Stay: 8 days  Attending Provider: Paulino Parrish MD   Primary Care Physician: zEe Root MD  Principal Problem:Bowel perforation      Interval History: Patient seen and examined at bedside, while on SLED. Continues to make some urine; 0.7L/past 24hrs. Now trached. Remains mechanically ventilated. Off pressors this morning.     Review of patient's allergies indicates:   Allergen Reactions    Meloxicam Other (See Comments)     Ulcer, GI bleed      Current Facility-Administered Medications   Medication Frequency    albuterol-ipratropium 2.5 mg-0.5 mg/3 mL nebulizer solution 3 mL Q4H PRN    albuterol-ipratropium 2.5 mg-0.5 mg/3 mL nebulizer solution 3 mL Q4H    calcium gluconate 3,000 mg in dextrose 5 % 100 mL IVPB Continuous    chlorhexidine 0.12 % solution 15 mL BID    dexmedetomidine (PRECEDEX) 400mcg/100mL 0.9% NaCL infusion Continuous    dextrose 10% (D10W) Bolus PRN    dextrose 10% (D10W) Bolus PRN    dextrose-sod citrate-citric ac 2.45-2.2 gram- 800 mg/100 mL Soln Continuous    famotidine 40 mg/5 mL (8 mg/mL) suspension 20 mg Daily    fentaNYL injection 25 mcg Q1H PRN    fluconazole (DIFLUCAN) IVPB 400 mg 200 mL Q24H    fluticasone furoate-vilanterol 100-25 mcg/dose diskus inhaler 1 puff Daily    glucagon (human recombinant) injection 1 mg PRN    heparin (porcine) injection 7,500 Units Q8H    hydrALAZINE injection 20 mg Q6H PRN    insulin aspart U-100 pen 0-5 Units QID (AC + HS) PRN    iohexol (OMNIPAQUE) oral solution 15 mL PRN    magnesium sulfate 2g in water 50mL IVPB (premix) PRN    nicotine 21 mg/24 hr 1 patch Daily    norepinephrine 4 mg in dextrose 5% 250 mL infusion (premix) (titrating) Continuous    ondansetron injection 4 mg Q6H PRN    sodium chloride 0.9% flush 10 mL PRN    sodium phosphate 20.01 mmol in dextrose 5  % 250 mL IVPB PRN    sodium phosphate 30 mmol in dextrose 5 % 250 mL IVPB PRN    sodium phosphate 39.99 mmol in dextrose 5 % 250 mL IVPB PRN     Family History     Problem Relation (Age of Onset)    Cancer Father    Heart disease Mother        Tobacco Use    Smoking status: Current Every Day Smoker     Packs/day: 2.00     Types: Cigarettes    Smokeless tobacco: Never Used   Substance and Sexual Activity    Alcohol use: No    Drug use: No    Sexual activity: Not on file       Objective:     Vital Signs (Most Recent):  Temp: 98.5 °F (36.9 °C) (10/18/19 0705)  Pulse: (!) 112 (10/18/19 0908)  Resp: (!) 23 (10/18/19 0900)  BP: (!) 120/56 (10/18/19 0900)  SpO2: 98 % (10/18/19 0908)  O2 Device (Oxygen Therapy): ventilator (10/18/19 0908) Vital Signs (24h Range):  Temp:  [98.5 °F (36.9 °C)-100.1 °F (37.8 °C)] 98.5 °F (36.9 °C)  Pulse:  [] 112  Resp:  [20-29] 23  SpO2:  [89 %-100 %] 98 %  BP: (101-163)/() 120/56  Arterial Line BP: ()/(46-66) 106/52     Weight: (!) 150 kg (330 lb 11 oz) (10/18/19 0500)  Body mass index is 42.44 kg/m².  Body surface area is 2.8 meters squared.    I/O last 3 completed shifts:  In: 5720.6 [I.V.:4700.6; NG/GT:820; IV Piggyback:200]  Out: 6579 [Urine:1268; Drains:877; Other:4194; Chest Tube:240]    Physical Exam   Constitutional: He appears well-developed and well-nourished.   Eyes: Pupils are equal, round, and reactive to light. Conjunctivae and EOM are normal.   Neck: Normal range of motion. Neck supple.   Cardiovascular: Normal rate and regular rhythm.   Pulmonary/Chest: He has rales.   Intubated and mechanically ventilated    Abdominal: He exhibits no distension and no mass. There is tenderness. There is no guarding. No hernia.   Midline surgical incision to abdomen. Multiple abdominal drains noted.    Musculoskeletal: He exhibits edema (1+ pitting edema to BLE ). He exhibits no tenderness or deformity.   Neurological:   Sedated   Skin: Skin is warm and dry. No rash  noted. He is not diaphoretic. No erythema. No pallor.       Significant Labs:  CBC:   Recent Labs   Lab 10/18/19  0400   WBC 20.33*   RBC 3.35*   HGB 8.5*   HCT 27.7*   *   MCV 83   MCH 25.4*   MCHC 30.7*     CMP:   Recent Labs   Lab 10/14/19  1500  10/18/19  0400   *   < > 131*  131*   CALCIUM 8.7   < > 8.9  8.9   ALBUMIN 2.1*   < > 1.9*   PROT 5.6*  --   --    *   < > 143  143   K 4.3   < > 4.4  4.4   CO2 17*   < > 23  23      < > 108  108   BUN 79*   < > 59*  59*   CREATININE 2.8*   < > 2.1*  2.1*   ALKPHOS 67  --   --    ALT 12  --   --    AST 19  --   --    BILITOT 1.0  --   --     < > = values in this interval not displayed.     All labs within the past 24 hours have been reviewed.        Assessment/Plan:     BHASKAR (acute kidney injury)  Mr Harmon is a 73yo male who is s/p EGD and was admitted to [facility] for concern for esophageal tear. Sustained multiple episodes of intraoperative hypotension on 10/9/19 and on 10/10/19, and had a drop in Hgb to 6.0 on 10/12/19.    -Baseline sCr: 0.8  -sCr on admission:1.4  -sCr on 10/15/19: 2.6  -10/15/19: 1.8L/UOP/past 24hrs   -10/15/19: physical exam reveals signs of volume overload; pt is tachypneic, has bilateral rales, and has pitting edema to BLE      Urine microscopy on 10/15/19 reveals moderate muddy brown granular casts, concerning for ischemic ATN.     BHASKAR likely 2/2 ischemic ATN due to intraoperative hypotension and acute anemia.       Plan/Recommendations:    -Will plan for 10hr nocturnal SLED treatment for metabolic clearance and volume management.   --400cc/hr   -Avoid nephrotoxic medications  -Renally dose medications  -Avoid contrast studies   -Daily renal function panels   -Strict I/O's   -Will continue to follow closely            Thank you for your consult. I will follow-up with patient. Please contact us if you have any additional questions.    Ranjeet Judd NP  Nephrology  [facility]

## 2019-10-18 NOTE — OP NOTE
DATE OF PROCEDURE: 10/17/19    PREOPERATIVE DIAGNOSES:   1. COPD  2. MIGUELINA  3. Respiratory failure.       POSTOPERATIVE DIAGNOSES:   1. COPD  2. MIGUELINA  3. Respiratory failure.       PROCEDURES PERFORMED:   1. Open tracheostomy.     ATTENDING SURGEON: Paulino Parrish M.D.     HOUSESTAFF SURGEON: Too Solo MD    ANESTHESIA: General endotracheal.     ESTIMATED BLOOD LOSS: 10 mL     FINDINGS: A #8 Shiley tracheostomy and 20-Burkinan percutaneous gastrostomy   placed without apparent complication.     SPECIMEN: None.     DRAINS: None.     COMPLICATIONS: None.     INDICATIONS: Pasha Harmon is a 74 y.o.male admitted to [facility] with history of COPD and MIGUELINA with home CPAP use after EGD removal of gastric mass caused perforations in stomach and contained perforation in esophagus. He was taken to SICU intubated after ex lap was performed to control the above injuries. He was weaned on the ventilator and extubated, but was reintubated shortly thereafter. The decision was made to perform tracheostomy due to difficulty with extubation success since he is unable to have mask CPAP with his esophageal and gastric injuries. The risks and benefits were explained, and his daughter agreed to proceed. Consent was obtained.    OPERATIVE PROCEDURE: The patient was brought down to the operating room. He was identified and monitored throughout. Appropriate position with neck in extension; anterior neck was prepped and draped. We identified  our tracheal landmarks, made a 4 cm vertical cervical incision. Subcutaneous tissue was carefully dissected, and appropriate position for the tracheostomy tube was found between the 2nd and 3rd tracheal rings. Anesthesia was notified we were preparing to make our tracheotomy and they were given time to pre-oxygenate. Once prepared, we made a 2 cm horizontal tracheotomy with an 11 scalpel. Two 2-0 prolene stay sutures were placed in the tracheal rings above and below the  tracheotomy. The ETT was withdrawn to above the level of tracheostomy. The trachea was then cannulated with the 8-0 Azeri shiley cuffed tracheostomy tube and hooked up to ventilation tubing. ETCO2 confirmed good position. The ETT was completely removed. The flange of the tracheostomy tube was secured in place using Velcro tracheostomy tape and two 2-0 silk sutures. The cuff of the tracheostomy tube was inflated.    The patient remained in critical but stable condition in the ICU at the end of the case. I was present and either scrubbed for or directed the entire procedure.

## 2019-10-18 NOTE — PT/OT/SLP EVAL
Physical Therapy Evaluation    Patient Name:  Pasha Harmon   MRN:  [id number]  Admit Date: 10/9/2019  Admitting Diagnosis:  Bowel perforation   Length of Stay: 8 days  Recent Surgery: Procedure(s) (LRB):  CREATION, TRACHEOSTOMY (N/A) 1 Day Post-Op    Recommendations:     Discharge Recommendations:  nursing facility, skilled   Discharge Equipment Recommendations: other (see comments)(tbd)   Barriers to discharge: None    Assessment:     Pasha Harmon is a 74 y.o. male admitted with a medical diagnosis of Bowel perforation.  He presents with the following impairments/functional limitations: weakness, impaired endurance, impaired self care skills, gait instability, impaired functional mobilty, impaired balance, decreased lower extremity function, decreased upper extremity function, impaired cardiopulmonary response to activity, edema, impaired skin, impaired fine motor. Pt tolerated initial evaluation fair today. Pt with poor overall endurance and muscle activation throughout functional mobility and EOB activities on this date but pt showing active participation attempts throughout session. Pt was able to tolerate EOB activities for 5 minutes today which included static balance and seated therex of BLE. However, pt with large amount of secretions and endorsing lightheadedness so pt was returned to supine (with HOB elevated) to recover. Pt's vital signs monitored and remained stable throughout EOB activity. Pt is motivated to improve and willing to participate in rehab sessions. Due to this, pt would benefit from a -SNF after discharge to continue to progress mobility wise so he can return home safely.     Rehab Prognosis: Good and Fair; patient would benefit from acute skilled PT services to address these deficits and reach maximum level of function.      Plan:     During this hospitalization, patient to be seen 4 x/week to address the identified rehab impairments via therapeutic activities, therapeutic  exercises, neuromuscular re-education, wheelchair management/training and progress towards the established goals.    · Plan of Care Expires:  11/15/19    Subjective     RN notified prior to session. Daughter present upon PT entrance into room.    Chief Complaint: Pt did not endorse any pain. Pt agreeable to PT/OT but shaked head no when asked if he was ready to sit edge of bed.  Patient/Family Comments/goals: Family and pt want pt to return home  Pain/Comfort:  · Pain Rating 1: 0/10  · Pain Rating Post-Intervention 1: 0/10    Living Environment:  Patient lives with wife and daughter  Prior Level of Function: Patient reports being independent with mobility & with ADLs. Patient uses DME as follows: CPAP. DME owned (not currently used): none.  Roles/Repsonsibilities: Pt is a /grandfather/father    Patient reports they will have assistance from family (wife, siblings, children) upon discharge.    Objective:     Additional staff present: OT for co-treatment; Respiratory therapistFlako, present for vent and secretion management; RN present for vital sign mgmt throughout    Patient found HOB elevated with: arterial line, blood pressure cuff, central line, chest tube, telemetry, tracheostomy, ventilator, pulse ox (continuous), shrestha catheter, PEG Tube(IJ trialysis cath)     General Precautions: Standard, Cardiac fall   Orthopedic Precautions:N/A   Braces: N/A   Body mass index is 42.44 kg/m².  Oxygen Device:   Vent Settings: Vent Mode: Spont  Oxygen Concentration (%):  [35-98] 35  Resp Rate Total:  [19 br/min-36 br/min] 21 br/min  Vt Set:  [550 mL-600 mL] 550 mL  PEEP/CPAP:  [8 cmH20] 8 cmH20  Mean Airway Pressure:  [8.2 zlD40-01 cmH20] 14 cmH20    Exams:  · Mental Status: Patient is AxOx4 and follows all single-step verbal commands. Pt is Alert and Cooperative during session.  · Skin Integrity: Visible skin intact and Dry  · Edema: Mild BLE  ·   · Range of Motion:  · RUE: WFL  · LUE: WFL  · RLE: WFL  · LLE:  WFL  · Strength Exam:  · Lower Extremity Strength  Right LE  Left LE    Knee extension: 3-/5 Knee extension: 3-/5   Knee flexion: 2-/5 Knee flexion: 2-/5   Hip flexion: trace Hip flexion: trace   Ankle dorsiflexion:   trace Ankle dorsiflexion:   trace   Ankle plantarflexion: 2-/5 Ankle plantarflexion: 2-/5       Outcome Measures:  AM-PAC 6 CLICK MOBILITY  Turning over in bed (including adjusting bedclothes, sheets and blankets)?: 2  Sitting down on and standing up from a chair with arms (e.g., wheelchair, bedside commode, etc.): 1  Moving from lying on back to sitting on the side of the bed?: 1  Moving to and from a bed to a chair (including a wheelchair)?: 1  Need to walk in hospital room?: 1  Climbing 3-5 steps with a railing?: 1  Basic Mobility Total Score: 7     Functional Mobility:    Bed Mobility:   · Rolling/Turning to Left: total assistance and 2 persons  · Rolling/Turning to Right: total assistance and 2 persons  · Scooting to HOB via supine bridge: dependent and 4 persons  · Supine to Sit: total assistance and 2 persons; from Rt side of bed  · Scooting anteriorly to EOB to have both feet planted on floor: total assistance and 2 persons  · Sit to Supine: total assistance and 2 persons; to Rt side of bed    Sitting Balance EOB:   · Time: 5 minutes  · Assistance level: total assistance and of 2 persons  · Postural Assessment: slouched posture, rounded shoulders, poor midline awareness, increased kyphosis, posterior pelvic tilt and increased cervical flexion  · Comments: Pt with no core activation throughout sitting balance; Pt with increasing O2 demands during sitting, adjusted by respiratory therapist, due to increasing secretions. Since no abdominal/spine extensors activation noted pt with tendency to have Posterior Lean.    Therapeutic Exercises:    Supine: ankle ROM 1 x 10 repetitions in all planes through available ROM. Exercises performed to develop and maintain pt's  strength and ROM.    Seated LAQ,  ankle pumps: Pt performed 1x 10 repetitions with facilitation for correct performance and sequencing. Exercises performed to develop and maintain pt's  strength and ROM.     Education:   All questions answered within PT scope of practice   PT role in POC   Pt educated on proper body mechanics, safety techniques, and energy conservation with PT facilitation and cueing throughout session    Patient left HOB elevated with all lines intact, call button in reach and RN and daughter present.    GOALS:   Multidisciplinary Problems     Physical Therapy Goals        Problem: Physical Therapy Goal    Goal Priority Disciplines Outcome Goal Variances Interventions   Physical Therapy Goal     PT, PT/OT Ongoing, Progressing     Description:  Goals to be met by: 19     Patient will increase functional independence with mobility by performin. Supine to sit with Maximum Assistance  2. Sit to supine with Maximum Assistance  3. Rolling to Left and Right with Maximum Assistance and use of bedrail as needed.  4. Sitting at edge of bed x10 minutes with Maximum Assistance  5. Lower extremity exercise program x10 reps per handout, with assistance as needed                      History:     Past Medical History:   Diagnosis Date    COPD (chronic obstructive pulmonary disease)     Diabetes mellitus     Emphysema of lung     Gastric ulcer     Hypertension     MI (myocardial infarction)        Past Surgical History:   Procedure Laterality Date    APPENDECTOMY      CHOLECYSTECTOMY      ENDOSCOPIC ULTRASOUND OF UPPER GASTROINTESTINAL TRACT Left 2019    Procedure: ULTRASOUND, UPPER GI TRACT, ENDOSCOPIC;  Surgeon: Lavon Parra MD;  Location: [facility];  Service: Endoscopy;  Laterality: Left;  GIF plus Radial    ESOPHAGOGASTRODUODENOSCOPY N/A 2019    Procedure: EGD (ESOPHAGOGASTRODUODENOSCOPY);  Surgeon: Mtich Buitrago MD;  Location: [facility];  Service: Endoscopy;  Laterality: N/A;     ESOPHAGOGASTRODUODENOSCOPY N/A 6/4/2019    Procedure: EGD (ESOPHAGOGASTRODUODENOSCOPY);  Surgeon: Lavon Parra MD;  Location: [facility] ENDO;  Service: Endoscopy;  Laterality: N/A;    ESOPHAGOGASTRODUODENOSCOPY N/A 9/3/2019    Procedure: EGD (ESOPHAGOGASTRODUODENOSCOPY);  Surgeon: Keshav Harris MD;  Location: [facility] ENDO;  Service: Endoscopy;  Laterality: N/A;    ESOPHAGOGASTRODUODENOSCOPY N/A 10/10/2019    Procedure: EGD (ESOPHAGOGASTRODUODENOSCOPY);  Surgeon: Paulino Parrish MD;  [facility];  Service: General;  Laterality: N/A;    KNEE SURGERY Right     PERCUTANEOUS INSERTION OF GASTROSTOMY TUBE Left 10/10/2019    Procedure: INSERTION, GASTROSTOMY TUBE, PERCUTANEOUS;  Surgeon: Paulino Parrish MD;  [facility]: [facility];  Service: General;  Laterality: Left;    PLACEMENT OF JEJUNOSTOMY TUBE Left 10/10/2019    Procedure: INSERTION, JEJUNOSTOMY TUBE;  Surgeon: Paulino Parrish MD;  [facility]: [facility];  Service: General;  Laterality: Left;    REPAIR OF BOWEL PERFORATION N/A 10/10/2019    Procedure: REPAIR, PERFORATION, GASTRIC;  Surgeon: Paulino Parrish MD;  Location: [facility] OR 2ND FLR;  Service: General;  Laterality: N/A;    TRACHEOSTOMY N/A 10/17/2019    Procedure: CREATION, TRACHEOSTOMY;  Surgeon: Paulino Parrish MD;  [facility]: [facility];  Service: General;  Laterality: N/A;    TUBE THORACOTOMY Bilateral 10/10/2019    Procedure: INSERTION, CATHETER, INTERCOSTAL, FOR DRAINAGE;  Surgeon: Paulino Parrish MD;  [facility]: [facility];  Service: General;  Laterality: Bilateral;       Time Tracking:     PT Received On: 10/18/19  PT Start Time: 0935     PT Stop Time: 1005  PT Total Time (min): 30 min     Billable Minutes: Evaluation 20 and Therapeutic Exercise 10    Juliet Dean, PT, DPT  10/18/2019  Pager: [phone]

## 2019-10-18 NOTE — PT/OT/SLP PROGRESS
Occupational Therapy   Treatment    Name: Pasha Harmon  MRN: [id number]  Admitting Diagnosis:  Bowel perforation  1 Day Post-Op s/p PEG/trach    Recommendations:     Discharge Recommendations: rehabilitation facility  Discharge Equipment Recommendations:  (TBD)  Barriers to discharge:  None    Assessment:     Pasha Harmon is a 74 y.o. male with a medical diagnosis of Bowel perforation.  He presents on vent s/p trach/PEG. Performance deficits affecting function are weakness, gait instability, decreased lower extremity function, impaired balance, impaired endurance, impaired cardiopulmonary response to activity, impaired self care skills, impaired functional mobilty, edema, impaired skin, impaired fine motor.     Rehab Prognosis:  Good; patient would benefit from acute skilled OT services to address these deficits and reach maximum level of function.       Plan:     Patient to be seen 4 x/week to address the above listed problems via self-care/home management, therapeutic activities, therapeutic exercises, neuromuscular re-education  · Plan of Care Expires:    · Plan of Care Reviewed with: patient, daughter    Subjective     Pain/Comfort:  · Pain Rating 1: 0/10  · Pain Rating Post-Intervention 1: 0/10    Objective:     Communicated with: RN prior to session.  Patient found supine with blood pressure cuff, telemetry, ventilator, tracheostomy, central line, chest tube, arterial line, PEG Tube(IJ trialysis cath) upon OT entry to room.    General Precautions: Standard, fall   Orthopedic Precautions:N/A   Braces:       Occupational Performance:     Bed Mobility:    · Patient completed Rolling/Turning to Left with  total assistance x 2  · Patient completed Rolling/Turning to Right with total assistance x 2  · Patient completed Scooting/Bridging with total assistance x 2  · Patient completed Supine to Sit with total assistance x 2  · Patient completed Sit to Supine with total assistance x 2    Functional  Mobility/Transfers:  · NT  · Functional Mobility: NT    Activities of Daily Living:  · Grooming: total assistance with [place] A for washing face      AMPAC 6 Click ADL: 6    Treatment & Education:  Pt ed on OT POC  Pt on vent via trach; RT present for session  CRRT rinsed back prior to session  Pt lethargic, but with eyes open and following one step commands x 50%  Daily orientation provided  Pt completed 8 reps of  BUE AAROM ex's for hand squeezes and elbow flexion/extension; PROM for B shoulder flexion/abduction  Pt sat EOB x 5 min with total A for static sitting; pt able to hold head in neutral alignment with CGA<>minimal A    Patient left supine with all lines intact, call button in reach, RN notified and dtr presentEducation:      GOALS:   Multidisciplinary Problems     Occupational Therapy Goals        Problem: Occupational Therapy Goal    Goal Priority Disciplines Outcome Interventions   Occupational Therapy Goal     OT, PT/OT Ongoing, Progressing    Description:  Goals to be met by: 10/25/2019    Patient will increase functional independence with ADLs by performing:    Feeding with [place].  UE Dressing with Minimal Assistance.  LE Dressing with Moderate Assistance.  Grooming while standing with Minimal Assistance.  Toileting from bedside commode with Minimal Assistance for hygiene and clothing management.   Supine to sit with Minimal Assistance.  Stand pivot transfers with Moderate Assistance.  Toilet transfer to bedside commode with Moderate Assistance.                      Time Tracking:     OT Date of Treatment: 10/17/19  OT Start Time: 0935  OT Stop Time: 1004  OT Total Time (min): 29 min    Billable Minutes:Therapeutic Activity 29    ANTIONETTE Ochoa  10/18/2019

## 2019-10-18 NOTE — ASSESSMENT & PLAN NOTE
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 Trach placed on 10/17/19.    Bilateral chest tubes in place to monitor for esophageal leak - keep them to suction  Continue G tube to gravity for gastric decompression - do not use for meds or feeds  Continue tube feeds  Wean vent as tolerated to TC as tolerated    Rest of care per SICU

## 2019-10-18 NOTE — PLAN OF CARE
Pt on Spontaneous PAV +, 35%, 8 - peep to trach.  O2: > 95%.   NSR, 's-130's.  MAP maintained > 65.    CVP: 7-6.    Temp max: 99.4.   Gtt: Levo and Precedex  UOP: 20-50 cc/hr.  Ct's: 20 cc total.    DENISE - 120 cc serous drainage.  G-tube: 340 cc drainage.   CRRT overnight.   TF tolerated overnight.   500cc Bolus administered this shift. Pt alert, follows commands, and moves all extremities purposefully.   Pt free of falls and skin breakdown at this time, pt turned Q2hrs.   Plan of care reviewed with pt and family, all questions answered.

## 2019-10-18 NOTE — ASSESSMENT & PLAN NOTE
74M current smoker who underwent excision of bleeding gastric mass, post op course complicated by intraperitoneal free air. Now admitted to SICU following ex-lap 10/10/19 which revealed esophageal and gastric perforations which have been repaired. Now s/p tracheostomy placement on 10/17 for difficulty with extubating patient.     Neuro:  - Off of sedation, following commands and alert: GCS 11T  - PRN pain control    CV  - Fluid resuscitate as necessary  - Levo off since 3 am, may add on as needed during SLED  - Echo 9/12/10 wnl    Pulm  - Extubated 10/14, reintubated 10/15 for encephalopathy with respiratory distress and increased WOB, subsequently underwent tracheostomy placement on 10/17.  - Continue PAV+ as he appears more comfortable on it, trach collar as able.  - ABG's prn  - Uses CPAP at home, NO CPAP due to esophageal rupture  - COPD with Significant history of smoking (3 ppd); continue nicotine patch    GI  - Post-op esophageal and gastric perforations 10/9, now repaired via ex-lap  - Monitor drains, care per surgery  - Do not use G-tube. Leave to gravity.  - Tube feeds per J tube, increase to goal as tolerated.    Renal  - Nephrology following and performing daily SLED for electrolyte derangements and acidosis, taking off 1L with UF of 350.  - Additional SLED planned for tonight  - Uremia improving, down trended to 59  - BHASKAR resolving with SLED, Cr down trended to 2.1 with basseline Cr of 1  - CMP daily  - Electrolyte repletion PRN      Heme/ID  - Trend WBC, remains elevated   - Abdominal culture (10/10) resulted with Yeast (awaiting speciation), Fluconazole restarted with end date of 10/24.  - Hgb stable  - CBCs daily  - Completed course of Cefepime, flagyl, vancomycin, and diflucan for contaminated peritoneum on 10/16.      Endo  - DM2  - POCT glucose q4h  - SSI     Dispo: Continue ICU care, wean to trach collar, increase tube feeds to goal.

## 2019-10-18 NOTE — PROGRESS NOTES
"[facility]  General Surgery  Progress Note    Subjective:     History of Present Illness:  Patient is a 74 yo M with history of COPD and bleeding gastric mass who underwent EGD with AES yesterday where they removed a 7 cm "ulcerated lipoma" from his gastric antrum using submucosal endoscopic dissection. When removing the mass, it became stuck in the distal esophagus. They had to removed it piecemeal, and caused some mucosal esophageal tears. He was admitted for obs overnight, and this morning his WBC was elevated and he was complaining of abd pain. CXR showed free intraperitoneal free air and general surgery was consulted for evaluation.    Post-Op Info:  Procedure(s) (LRB):  CREATION, TRACHEOSTOMY (N/A)   1 Day Post-Op     Interval History: No acute events overnight. Off all pressors, on minimal sedation. CRRT overnight; system clotted off and then restarted. Trach placed yesterday; tolerated well. On PEEP 8, O2 35%.     Medications:  Continuous Infusions:   calcium gluconate IVPB 3,000 mg (10/17/19 2227)    dexmedetomidine (PRECEDEX) infusion Stopped (10/18/19 0600)    dextrose-sod citrate-citric ac 200 mL/hr at 10/17/19 2227    norepinephrine bitartrate-D5W Stopped (10/18/19 0300)    propofol Stopped (10/17/19 1654)     Scheduled Meds:   albuterol-ipratropium  3 mL Nebulization Q4H    chlorhexidine  15 mL Mouth/Throat BID    famotidine  20 mg Per J Tube Daily    fluconazole (DIFLUCAN) IVPB  400 mg Intravenous Q24H    fluticasone furoate-vilanterol  1 puff Inhalation Daily    heparin (porcine)  5,000 Units Subcutaneous Q8H    nicotine  1 patch Transdermal Daily     PRN Meds:albuterol-ipratropium, Dextrose 10% Bolus, Dextrose 10% Bolus, fentaNYL, glucagon (human recombinant), hydrALAZINE, insulin aspart U-100, iohexol, magnesium sulfate IVPB, ondansetron, sodium chloride 0.9%, sodium phosphate IVPB, sodium phosphate IVPB, sodium phosphate IVPB     Review of patient's allergies " indicates:   Allergen Reactions    Meloxicam Other (See Comments)     Ulcer, GI bleed      Objective:     Vital Signs (Most Recent):  Temp: 99.4 °F (37.4 °C) (10/18/19 0300)  Pulse: 100 (10/18/19 0700)  Resp: (!) 23 (10/18/19 0700)  BP: (!) 129/59 (10/18/19 0600)  SpO2: 100 % (10/18/19 0700) Vital Signs (24h Range):  Temp:  [99.2 °F (37.3 °C)-100.1 °F (37.8 °C)] 99.4 °F (37.4 °C)  Pulse:  [] 100  Resp:  [21-29] 23  SpO2:  [89 %-100 %] 100 %  BP: (101-163)/() 129/59  Arterial Line BP: ()/(43-66) 116/53     Weight: (!) 150 kg (330 lb 11 oz)  Body mass index is 42.44 kg/m².    Intake/Output - Last 3 Shifts       10/16 0700 - 10/17 0659 10/17 0700 - 10/18 0659 10/18 0700 - 10/19 0659    I.V. (mL/kg) 2195 (14.5) 3016.6 (20.1)     NG/GT 1360 410     IV Piggyback  200     Total Intake(mL/kg) 3555 (23.6) 3626.6 (24.2)     Urine (mL/kg/hr) 790 (0.2) 783 (0.2)     Drains 220 807     Other 1452 2351 391    Chest Tube 150 190     Total Output 2612 4131 391    Net +943 -504.5 -391                 Physical Exam   Constitutional: He appears well-developed and well-nourished. He is sedated and intubated (Trach in place).   HENT:   Bridled NGT in place   Cardiovascular: Normal rate and regular rhythm.   Pulmonary/Chest: Effort normal. He is intubated (Trach in place). No respiratory distress.   Bilateral chest tubes, draining SS, no air leak   Abdominal: Soft. He exhibits no distension. There is no tenderness. There is no guarding.   G tube in place, draining gastric contents  Jtube in place with trickle tube feeds   Genitourinary:   Genitourinary Comments: [place] in place   Skin: Skin is warm and dry.   Vitals reviewed.      Significant Labs:  CBC:   Recent Labs   Lab 10/18/19  0400   WBC 20.33*   RBC 3.35*   HGB 8.5*   HCT 27.7*   *   MCV 83   MCH 25.4*   MCHC 30.7*     CMP:   Recent Labs   Lab 10/14/19  1500  10/18/19  0400   *   < > 131*  131*   CALCIUM 8.7   < > 8.9  8.9   ALBUMIN 2.1*   < >  1.9*   PROT 5.6*  --   --    *   < > 143  143   K 4.3   < > 4.4  4.4   CO2 17*   < > 23  23      < > 108  108   BUN 79*   < > 59*  59*   CREATININE 2.8*   < > 2.1*  2.1*   ALKPHOS 67  --   --    ALT 12  --   --    AST 19  --   --    BILITOT 1.0  --   --     < > = values in this interval not displayed.     Coagulation:   Recent Labs   Lab 10/12/19  0427   LABPROT 10.2   INR 1.0   APTT 34.5*     ABGs:   Recent Labs   Lab 10/18/19  0131   PH 7.384   PCO2 31.9*   PO2 81   HCO3 19.0*   POCSATURATED 96   BE -6       Significant Diagnostics:  I have reviewed all pertinent imaging results/findings within the past 24 hours.    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 Trach placed on 10/17/19.    Bilateral chest tubes in place to monitor for esophageal leak - keep them to suction  Continue G tube to gravity for gastric decompression - do not use for meds or feeds  Continue tube feeds  Wean vent as tolerated to TC as tolerated    Rest of care per SICU     Esophageal tear     - Agree with plans for UGI later this week  - J tube feeds per ACS  - CT in place until after UGI, management per ACS  - Please call Thoracic Surgery with any further questions or concerns.         Cynthia Bautista MD  General Surgery  [facility]

## 2019-10-18 NOTE — SUBJECTIVE & OBJECTIVE
Interval History: Patient seen and examined at bedside, while on SLED. Continues to make some urine; 0.7L/past 24hrs. Now trached. Remains mechanically ventilated. Off pressors this morning.     Review of patient's allergies indicates:   Allergen Reactions    Meloxicam Other (See Comments)     Ulcer, GI bleed      Current Facility-Administered Medications   Medication Frequency    albuterol-ipratropium 2.5 mg-0.5 mg/3 mL nebulizer solution 3 mL Q4H PRN    albuterol-ipratropium 2.5 mg-0.5 mg/3 mL nebulizer solution 3 mL Q4H    calcium gluconate 3,000 mg in dextrose 5 % 100 mL IVPB Continuous    chlorhexidine 0.12 % solution 15 mL BID    dexmedetomidine (PRECEDEX) 400mcg/100mL 0.9% NaCL infusion Continuous    dextrose 10% (D10W) Bolus PRN    dextrose 10% (D10W) Bolus PRN    dextrose-sod citrate-citric ac 2.45-2.2 gram- 800 mg/100 mL Soln Continuous    famotidine 40 mg/5 mL (8 mg/mL) suspension 20 mg Daily    fentaNYL injection 25 mcg Q1H PRN    fluconazole (DIFLUCAN) IVPB 400 mg 200 mL Q24H    fluticasone furoate-vilanterol 100-25 mcg/dose diskus inhaler 1 puff Daily    glucagon (human recombinant) injection 1 mg PRN    heparin (porcine) injection 7,500 Units Q8H    hydrALAZINE injection 20 mg Q6H PRN    insulin aspart U-100 pen 0-5 Units QID (AC + HS) PRN    iohexol (OMNIPAQUE) oral solution 15 mL PRN    magnesium sulfate 2g in water 50mL IVPB (premix) PRN    nicotine 21 mg/24 hr 1 patch Daily    norepinephrine 4 mg in dextrose 5% 250 mL infusion (premix) (titrating) Continuous    ondansetron injection 4 mg Q6H PRN    sodium chloride 0.9% flush 10 mL PRN    sodium phosphate 20.01 mmol in dextrose 5 % 250 mL IVPB PRN    sodium phosphate 30 mmol in dextrose 5 % 250 mL IVPB PRN    sodium phosphate 39.99 mmol in dextrose 5 % 250 mL IVPB PRN     Family History     Problem Relation (Age of Onset)    Cancer Father    Heart disease Mother        Tobacco Use    Smoking status: Current Every Day  Smoker     Packs/day: 2.00     Types: Cigarettes    Smokeless tobacco: Never Used   Substance and Sexual Activity    Alcohol use: No    Drug use: No    Sexual activity: Not on file       Objective:     Vital Signs (Most Recent):  Temp: 98.5 °F (36.9 °C) (10/18/19 0705)  Pulse: (!) 112 (10/18/19 0908)  Resp: (!) 23 (10/18/19 0900)  BP: (!) 120/56 (10/18/19 0900)  SpO2: 98 % (10/18/19 0908)  O2 Device (Oxygen Therapy): ventilator (10/18/19 0908) Vital Signs (24h Range):  Temp:  [98.5 °F (36.9 °C)-100.1 °F (37.8 °C)] 98.5 °F (36.9 °C)  Pulse:  [] 112  Resp:  [20-29] 23  SpO2:  [89 %-100 %] 98 %  BP: (101-163)/() 120/56  Arterial Line BP: ()/(46-66) 106/52     Weight: (!) 150 kg (330 lb 11 oz) (10/18/19 0500)  Body mass index is 42.44 kg/m².  Body surface area is 2.8 meters squared.    I/O last 3 completed shifts:  In: 5720.6 [I.V.:4700.6; NG/GT:820; IV Piggyback:200]  Out: 6579 [Urine:1268; Drains:877; Other:4194; Chest Tube:240]    Physical Exam   Constitutional: He appears well-developed and well-nourished.   Eyes: Pupils are equal, round, and reactive to light. Conjunctivae and EOM are normal.   Neck: Normal range of motion. Neck supple.   Cardiovascular: Normal rate and regular rhythm.   Pulmonary/Chest: He has rales.   Intubated and mechanically ventilated    Abdominal: He exhibits no distension and no mass. There is tenderness. There is no guarding. No hernia.   Midline surgical incision to abdomen. Multiple abdominal drains noted.    Musculoskeletal: He exhibits edema (1+ pitting edema to BLE ). He exhibits no tenderness or deformity.   Neurological:   Sedated   Skin: Skin is warm and dry. No rash noted. He is not diaphoretic. No erythema. No pallor.       Significant Labs:  CBC:   Recent Labs   Lab 10/18/19  0400   WBC 20.33*   RBC 3.35*   HGB 8.5*   HCT 27.7*   *   MCV 83   MCH 25.4*   MCHC 30.7*     CMP:   Recent Labs   Lab 10/14/19  1500  10/18/19  0400   *   < > 131*   131*   CALCIUM 8.7   < > 8.9  8.9   ALBUMIN 2.1*   < > 1.9*   PROT 5.6*  --   --    *   < > 143  143   K 4.3   < > 4.4  4.4   CO2 17*   < > 23  23      < > 108  108   BUN 79*   < > 59*  59*   CREATININE 2.8*   < > 2.1*  2.1*   ALKPHOS 67  --   --    ALT 12  --   --    AST 19  --   --    BILITOT 1.0  --   --     < > = values in this interval not displayed.     All labs within the past 24 hours have been reviewed.

## 2019-10-18 NOTE — PLAN OF CARE
Discharge Recommendation: SS-SNF.    Eval completed today. PT goals appropriate.    Problem: Physical Therapy Goal  Goal: Physical Therapy Goal  Description  Goals to be met by: 19     Patient will increase functional independence with mobility by performin. Supine to sit with Maximum Assistance  2. Sit to supine with Maximum Assistance  3. Rolling to Left and Right with Maximum Assistance and use of bedrail as needed.  4. Sitting at edge of bed x10 minutes with Maximum Assistance  5. Lower extremity exercise program x10 reps per handout, with assistance as needed     Outcome: Ongoing, Progressing    Juliet Dean PT, DPT  10/18/2019  Pager: [phone]

## 2019-10-18 NOTE — PROGRESS NOTES
10hr CRRT SLED started via left IJ trialysis. Unable to aspitate on arterial red port. Lines reversed for better flows. Citrate pre filter @ 200ml/hr and Ca Gluc post filter @ 10ml/hr. Alarms set and all lines secured. See flowsheet for details.

## 2019-10-18 NOTE — TREATMENT PLAN
GI Treatment Plan    Pasha Harmon is a 74 y.o. male admitted to hospital 10/9/2019 (Hospital Day: 10) due to Bowel perforation.     Interval History  - patient trached yesterday  - remains on ventilator  - CRRT last night, required pressors but now off pressors  - sedation stopped  - patient opening eyes and can follow some simple commands  - worked with OT this morning    Objective  Temp:  [98.5 °F (36.9 °C)-99.7 °F (37.6 °C)] 98.5 °F (36.9 °C) (10/18 0705)  Pulse:  [] 108 (10/18 1305)  BP: (101-163)/() 145/60 (10/18 1300)  Resp:  [20-29] 20 (10/18 1300)  SpO2:  [89 %-100 %] 98 % (10/18 1305)  Arterial Line BP: ()/(46-66) 124/54 (10/18 1300)    General: more alert today, opening eyes, following simple commands. Still lethargic.  Abdomen: soft, J tube in place. nondistended    Laboratory    Recent Labs   Lab 10/16/19  0306 10/17/19  0300 10/18/19  0400   HGB 9.2* 8.4* 8.5*       Lab Results   Component Value Date    WBC 20.33 (H) 10/18/2019    HGB 8.5 (L) 10/18/2019    HCT 27.7 (L) 10/18/2019    MCV 83 10/18/2019     (H) 10/18/2019       Lab Results   Component Value Date     10/18/2019     10/18/2019    K 4.4 10/18/2019    K 4.4 10/18/2019     10/18/2019     10/18/2019    CO2 23 10/18/2019    CO2 23 10/18/2019    BUN 59 (H) 10/18/2019    BUN 59 (H) 10/18/2019    CREATININE 2.1 (H) 10/18/2019    CREATININE 2.1 (H) 10/18/2019    CALCIUM 8.9 10/18/2019    CALCIUM 8.9 10/18/2019    ANIONGAP 12 10/18/2019    ANIONGAP 12 10/18/2019    ESTGFRAFRICA 35.0 (A) 10/18/2019    ESTGFRAFRICA 35.0 (A) 10/18/2019    EGFRNONAA 30.3 (A) 10/18/2019    EGFRNONAA 30.3 (A) 10/18/2019       Lab Results   Component Value Date    ALT 12 10/14/2019    AST 19 10/14/2019    ALKPHOS 67 10/14/2019    BILITOT 1.0 10/14/2019       Lab Results   Component Value Date    INR 1.0 10/12/2019    INR 1.2 10/10/2019    INR 1.2 05/24/2019       Plan  - Continue supportive care per SICU  - Appreciate  nephrology assistance  - Nutrition  - PT/OT as appropriate  - We will continue to follow.    Thank you for involving us in the care of Pasha Harmon. Please call with any additional questions, concerns or changes in the patient's clinical status.    Howard Macedo MD  Gastroenterology Fellow

## 2019-10-18 NOTE — PROGRESS NOTES
"[facility]  Adult Nutrition  Progress Note    SUMMARY       Recommendations  Recommendation/Intervention:   Now that off propofol, recommend advancing TF to Peptamen Intense VHP at a goal rate of 70 mL/hr - to provide 1680 kcal/day, 155g protein/day, and 1411mL free fluid/day.   RD to monitor.    Goals: Patient to receive nutrition by RD follow-up  Nutrition Goal Status: goal met  Communication of RD Recs: discussed on rounds    Reason for Assessment  Reason For Assessment: RD follow-up  Diagnosis: surgery/postoperative complications(bowel perforation)  Relevant Medical History: CAD, COPD, DM2, gastric lipoma, tobacco abuse  Interdisciplinary Rounds: attended  General Information Comments: S/p trach yesterday. Trach/vent. On CRRT. TF restarted, currently at 35 mL/hr. Patient appears nourished with no physical signs of malnutrition and no weight loss PTA.   Nutrition Discharge Planning: Adequate nutrition via TF.    Nutrition Risk Screen  Nutrition Risk Screen: tube feeding or parenteral nutrition    Nutrition/Diet History  Spiritual, Cultural Beliefs, Worship Practices, Values that Affect Care: no  Factors Affecting Nutritional Intake: NPO, on mechanical ventilation    Anthropometrics  Temp: 98.5 °F (36.9 °C)  Height Method: Stated  Height: 6' 2.02" (188 cm)  Height (inches): 74.02 in  Weight Method: Bed Scale  Weight: (!) 150 kg (330 lb 11 oz)  Weight (lb): (!) 330.69 lb  Ideal Body Weight (IBW), Male: 190.12 lb  % Ideal Body Weight, Male (lb): 171.97 lb  BMI (Calculated): 42  BMI Grade: greater than 40 - morbid obesity  Usual Body Weight (UBW), k.8 kg(admit weight 10)  % Usual Body Weight: 111.07  % Weight Change From Usual Weight: 10.84 %    Lab/Procedures/Meds  Pertinent Labs Reviewed: reviewed  Pertinent Labs Comments: BUN 59, Creat 2.1, Glu 131, Alb 1.9  Pertinent Medications Reviewed: reviewed  Pertinent Medications Comments: famotidine, precedex, levophed    Estimated/Assessed " Needs  Weight Used For Calorie Calculations: 133.8 kg (294 lb 15.6 oz)(admit weight)  Energy Calorie Requirements (kcal): 1873 kcal/day  Energy Need Method: Kcal/kg(14)  Protein Requirements: 130-173 g/day(1.5-2.0 g/kg)  Weight Used For Protein Calculations: 86.4 kg (190 lb 7.6 oz)(IBW)  Fluid Requirements (mL): 1 mL/kcal or per MD  Estimated Fluid Requirement Method: RDA Method  RDA Method (mL): 1873    Nutrition Prescription Ordered  Current Diet Order: NPO  Current Nutrition Support Formula Ordered: Peptamen Intense VHP  Current Nutrition Support Rate Ordered: 65 (ml)  Current Nutrition Support Frequency Ordered: mL/hr    Evaluation of Received Nutrient/Fluid Intake  Enteral Calories (kcal): 1560  Enteral Protein (gm): 144  Enteral (Free Water) Fluid (mL): 1310  % Kcal Needs: 83%  % Protein Needs: 100%  I/O: +12.6L since admit  Energy Calories Required: not meeting needs  Protein Required: meeting needs  Fluid Required: (per MD)  Comments: LBM 10/9  Tolerance: tolerating(at 35 mL/hr)  % Intake of Estimated Energy Needs: 75 - 100 %  % Meal Intake: NPO    Nutrition Risk  Level of Risk/Frequency of Follow-up: high(2x/week)     Assessment and Plan  Nutrition Problem  Inadequate energy intake     Related to (etiology):   Decreased ability to consume sufficient energy     Signs and Symptoms (as evidenced by):   NPO with no alternative means of nutrition at this time     Interventions (treatment strategy):  Collaboration of nutrition care with other providers     Nutrition Diagnosis Status:   Improving - increasing TF to goal rate    Monitor and Evaluation  Food and Nutrient Intake: energy intake, enteral nutrition intake  Food and Nutrient Adminstration: enteral and parenteral nutrition administration  Anthropometric Measurements: weight, weight change  Biochemical Data, Medical Tests and Procedures: electrolyte and renal panel, gastrointestinal profile, inflammatory profile  Nutrition-Focused Physical Findings:  overall appearance     Nutrition Follow-Up  RD Follow-up?: Yes

## 2019-10-18 NOTE — SUBJECTIVE & OBJECTIVE
Interval History: No acute events overnight. Off all pressors, on minimal sedation. CRRT overnight; system clotted off and then restarted. Trach placed yesterday; tolerated well. On PEEP 8, O2 35%.     Medications:  Continuous Infusions:   calcium gluconate IVPB 3,000 mg (10/17/19 2227)    dexmedetomidine (PRECEDEX) infusion Stopped (10/18/19 0600)    dextrose-sod citrate-citric ac 200 mL/hr at 10/17/19 2227    norepinephrine bitartrate-D5W Stopped (10/18/19 0300)    propofol Stopped (10/17/19 1654)     Scheduled Meds:   albuterol-ipratropium  3 mL Nebulization Q4H    chlorhexidine  15 mL Mouth/Throat BID    famotidine  20 mg Per J Tube Daily    fluconazole (DIFLUCAN) IVPB  400 mg Intravenous Q24H    fluticasone furoate-vilanterol  1 puff Inhalation Daily    heparin (porcine)  5,000 Units Subcutaneous Q8H    nicotine  1 patch Transdermal Daily     PRN Meds:albuterol-ipratropium, Dextrose 10% Bolus, Dextrose 10% Bolus, fentaNYL, glucagon (human recombinant), hydrALAZINE, insulin aspart U-100, iohexol, magnesium sulfate IVPB, ondansetron, sodium chloride 0.9%, sodium phosphate IVPB, sodium phosphate IVPB, sodium phosphate IVPB     Review of patient's allergies indicates:   Allergen Reactions    Meloxicam Other (See Comments)     Ulcer, GI bleed      Objective:     Vital Signs (Most Recent):  Temp: 99.4 °F (37.4 °C) (10/18/19 0300)  Pulse: 100 (10/18/19 0700)  Resp: (!) 23 (10/18/19 0700)  BP: (!) 129/59 (10/18/19 0600)  SpO2: 100 % (10/18/19 0700) Vital Signs (24h Range):  Temp:  [99.2 °F (37.3 °C)-100.1 °F (37.8 °C)] 99.4 °F (37.4 °C)  Pulse:  [] 100  Resp:  [21-29] 23  SpO2:  [89 %-100 %] 100 %  BP: (101-163)/() 129/59  Arterial Line BP: ()/(43-66) 116/53     Weight: (!) 150 kg (330 lb 11 oz)  Body mass index is 42.44 kg/m².    Intake/Output - Last 3 Shifts       10/16 0700 - 10/17 0659 10/17 0700 - 10/18 0659 10/18 0700 - 10/19 0659    I.V. (mL/kg) 2195 (14.5) 3016.6 (20.1)     NG/GT  1360 410     IV Piggyback  200     Total Intake(mL/kg) 3555 (23.6) 3626.6 (24.2)     Urine (mL/kg/hr) 790 (0.2) 783 (0.2)     Drains 220 807     Other 1452 2351 391    Chest Tube 150 190     Total Output 2612 4131 391    Net +943 -504.5 -391                 Physical Exam   Constitutional: He appears well-developed and well-nourished. He is sedated and intubated (Trach in place).   HENT:   Bridled NGT in place   Cardiovascular: Normal rate and regular rhythm.   Pulmonary/Chest: Effort normal. He is intubated (Trach in place). No respiratory distress.   Bilateral chest tubes, draining SS, no air leak   Abdominal: Soft. He exhibits no distension. There is no tenderness. There is no guarding.   G tube in place, draining gastric contents  Jtube in place with trickle tube feeds   Genitourinary:   Genitourinary Comments: Hallman in place   Skin: Skin is warm and dry.   Vitals reviewed.      Significant Labs:  CBC:   Recent Labs   Lab 10/18/19  0400   WBC 20.33*   RBC 3.35*   HGB 8.5*   HCT 27.7*   *   MCV 83   MCH 25.4*   MCHC 30.7*     CMP:   Recent Labs   Lab 10/14/19  1500  10/18/19  0400   *   < > 131*  131*   CALCIUM 8.7   < > 8.9  8.9   ALBUMIN 2.1*   < > 1.9*   PROT 5.6*  --   --    *   < > 143  143   K 4.3   < > 4.4  4.4   CO2 17*   < > 23  23      < > 108  108   BUN 79*   < > 59*  59*   CREATININE 2.8*   < > 2.1*  2.1*   ALKPHOS 67  --   --    ALT 12  --   --    AST 19  --   --    BILITOT 1.0  --   --     < > = values in this interval not displayed.     Coagulation:   Recent Labs   Lab 10/12/19  0427   LABPROT 10.2   INR 1.0   APTT 34.5*     ABGs:   Recent Labs   Lab 10/18/19  0131   PH 7.384   PCO2 31.9*   PO2 81   HCO3 19.0*   POCSATURATED 96   BE -6       Significant Diagnostics:  I have reviewed all pertinent imaging results/findings within the past 24 hours.

## 2019-10-18 NOTE — ASSESSMENT & PLAN NOTE
Mr Harmon is a 75yo male who is s/p EGD and was admitted to [facility] for concern for esophageal tear. Sustained multiple episodes of intraoperative hypotension on 10/9/19 and on 10/10/19, and had a drop in Hgb to 6.0 on 10/12/19.    -Baseline sCr: 0.8  -sCr on admission:1.4  -sCr on 10/15/19: 2.6  -10/15/19: 1.8L/UOP/past 24hrs   -10/15/19: physical exam reveals signs of volume overload; pt is tachypneic, has bilateral rales, and has pitting edema to BLE      Urine microscopy on 10/15/19 reveals moderate muddy brown granular casts, concerning for ischemic ATN.     BHASKAR likely 2/2 ischemic ATN due to intraoperative hypotension and acute anemia.       Plan/Recommendations:    -Will plan for 10hr nocturnal SLED treatment for metabolic clearance and volume management.   --400cc/hr   -Avoid nephrotoxic medications  -Renally dose medications  -Avoid contrast studies   -Daily renal function panels   -Strict I/O's   -Will continue to follow closely

## 2019-10-18 NOTE — PROGRESS NOTES
[facility]  Critical Care - Surgery  Progress Note    Patient Name: Pasha Harmon  MRN: [id number]  Admission Date: 10/9/2019  Hospital Length of Stay: 8 days  Code Status: Full Code  Attending Provider: Paulino Parrish MD  Primary Care Provider: Eze Root MD   Principal Problem: Bowel perforation    Subjective:     Hospital/ICU Course:  10/10: Admitted to SICU. R IJ TLC placed. On minimal pressors.   10/11: Remaining intubated. On minimal pressors.   10/12: Difficulty weaning pressors while on propofol and fentanyl to control resp rate from acidosis. Received 2u PRBCs after 1.25L albumin.   10/13: Pressors weaned, still not following commands after stopping sedation  10/14: NGT removed by primary team. Metoprolol started for HTN    Interval History/Significant Events:   Underwent tracheostomy placement 10/17, did not tolerate AC vent mode but now on PAV and tolerating well.  SLED overnight with 1L off. Required Levo to maintain MAP's but able to wean off quickly. Improving uremia, creatinine. Making 15-20 cc urine/hr while on dialysis.   Precedex off since this morning, more alert and following commands.  TF restarted post-operatively increasing to goal as tolerated.     Follow-up For: Procedure(s) (LRB):  CREATION, TRACHEOSTOMY (N/A)    Post-Operative Day: 1 Day Post-Op    Objective:     Vital Signs (Most Recent):  Temp: 98.5 °F (36.9 °C) (10/18/19 0705)  Pulse: (!) 112 (10/18/19 0908)  Resp: (!) 23 (10/18/19 0900)  BP: (!) 120/56 (10/18/19 0900)  SpO2: 98 % (10/18/19 0908) Vital Signs (24h Range):  Temp:  [98.5 °F (36.9 °C)-100.1 °F (37.8 °C)] 98.5 °F (36.9 °C)  Pulse:  [] 112  Resp:  [20-29] 23  SpO2:  [89 %-100 %] 98 %  BP: (101-163)/() 120/56  Arterial Line BP: ()/(46-66) 106/52     Weight: (!) 150 kg (330 lb 11 oz)  Body mass index is 42.44 kg/m².      Intake/Output Summary (Last 24 hours) at 10/18/2019 0958  Last data filed at 10/18/2019 0945  Gross per 24  hour   Intake 4587.55 ml   Output 5225 ml   Net -637.45 ml       Physical Exam   Constitutional: He appears well-developed and well-nourished. No distress.   HENT:   Head: Normocephalic and atraumatic.   Trach in place.   Eyes: Pupils are equal, round, and reactive to light. Conjunctivae are normal.   Neck: Normal range of motion.   Cardiovascular: Normal rate, regular rhythm and intact distal pulses.   Pulmonary/Chest: Effort normal. No respiratory distress.   Trach on PAV.   Abdominal: Soft. He exhibits no distension.   G-tube to gravity with bilious output.  J-tube with tube feeds infusing.   Tube sites clean, no surrounding erythema.  Incision c/d/i, no erythema or drainage.   Musculoskeletal: Normal range of motion.   Neurological: He is alert.   GCS 11T.   Skin: Skin is warm and dry.       Vents:  Vent Mode: Spont (10/18/19 0908)  Ventilator Initiated: Yes (10/10/19 1621)  Set Rate: 26 bmp (10/17/19 2321)  Vt Set: 550 mL (10/17/19 2321)  PEEP/CPAP: 8 cmH20 (10/18/19 0908)  Oxygen Concentration (%): 35 (10/18/19 0908)  Peak Airway Pressure: 26 cmH2O (10/18/19 0908)  Plateau Pressure: 23 cmH20 (10/18/19 0908)  Total Ve: 19.4 mL (10/18/19 0908)  F/VT Ratio<105 (RSBI): (!) 33.24 (10/18/19 0700)    Lines/Drains/Airways     Central Venous Catheter Line                 Percutaneous Central Line Insertion/Assessment - triple lumen  10/10/19 1853 right internal jugular 7 days         Trialysis (Dialysis) Catheter 10/16/19 1329 left internal jugular 1 day          Drain                 Chest Tube 10/10/19 1510 1 Right Midaxillary;Fourth intercostal space 28 Fr. 7 days         Chest Tube 10/10/19 1513 Left Midaxillary;Fourth intercostal space 28 Fr. 7 days         Closed/Suction Drain 10/10/19 1349 Right RUQ Bulb 19 Fr. 7 days         Gastrostomy/Enterostomy 10/10/19 1400 Jejunostomy tube 7 days         Gastrostomy/Enterostomy 10/10/19 1400 LUQ 7 days         Urethral Catheter 10/10/19 1530  Straight-tip;Double-lumen;Non-latex 16 Fr. 7 days          Airway                 Surgical Airway 10/17/19 1603 Shiley Cuffed less than 1 day          Arterial Line                 Arterial Line 10/10/19 1245 Right Radial 7 days                Significant Labs:    CBC/Anemia Profile:  Recent Labs   Lab 10/17/19  0300 10/18/19  0400   WBC 18.48* 20.33*   HGB 8.4* 8.5*   HCT 27.2* 27.7*    353*   MCV 81* 83   RDW 19.3* 19.4*        Chemistries:  Recent Labs   Lab 10/17/19  1236 10/17/19  2153 10/18/19  0400    143 143  143   K 4.4 4.9 4.4  4.4   * 111* 108  108   CO2 18* 18* 23  23   BUN 98* 102* 59*  59*   CREATININE 3.1* 3.3* 2.1*  2.1*   CALCIUM 8.3* 8.2* 8.9  8.9   ALBUMIN 1.9* 1.9* 1.9*   MG 2.8* 2.8* 2.7*   PHOS 6.8* 8.3* 4.5  4.5       ABGs:   Recent Labs   Lab 10/18/19  0840   PH 7.439   PCO2 35.0   HCO3 23.7*   POCSATURATED 95   BE 0     Coagulation: No results for input(s): PT, INR, APTT in the last 48 hours.  Lactic Acid: No results for input(s): LACTATE in the last 48 hours.    Significant Imaging:  I have reviewed all pertinent imaging results/findings within the past 24 hours.    Assessment/Plan:     * Bowel perforation  74M current smoker who underwent excision of bleeding gastric mass, post op course complicated by intraperitoneal free air. Now admitted to SICU following ex-lap 10/10/19 which revealed esophageal and gastric perforations which have been repaired. Now s/p tracheostomy placement on 10/17 for difficulty with extubating patient.     Neuro:  - Off of sedation, following commands and alert: GCS 11T  - PRN pain control    CV  - Fluid resuscitate as necessary  - Levo off since 3 am, may add on as needed during SLED  - Echo 9/12/10 wnl    Pulm  - Extubated 10/14, reintubated 10/15 for encephalopathy with respiratory distress and increased WOB, subsequently underwent tracheostomy placement on 10/17.  - Continue PAV+ as he appears more comfortable on it, trach collar as  able.  - ABG's prn  - Uses CPAP at home, NO CPAP due to esophageal rupture  - COPD with Significant history of smoking (3 ppd); continue nicotine patch    GI  - Post-op esophageal and gastric perforations 10/9, now repaired via ex-lap  - Monitor drains, care per surgery  - Do not use G-tube. Leave to gravity.  - Tube feeds per J tube, increase to goal as tolerated.    Renal  - Nephrology following and performing daily SLED for electrolyte derangements and acidosis, taking off 1L with UF of 350.  - Additional SLED planned for tonight  - Uremia improving, down trended to 59  - BHASKAR resolving with SLED, Cr down trended to 2.1 with basseline Cr of 1  - CMP daily  - Electrolyte repletion PRN      Heme/ID  - Trend WBC, remains elevated   - Abdominal culture (10/10) resulted with Yeast (awaiting speciation), Fluconazole restarted with end date of 10/24.  - Hgb stable  - CBCs daily  - Completed course of Cefepime, flagyl, vancomycin, and diflucan for contaminated peritoneum on 10/16.      Endo  - DM2  - POCT glucose q4h  - SSI     Dispo: Continue ICU care, wean to trach collar, increase tube feeds to goal.       Critical care was time spent personally by me on the following activities: development of treatment plan with patient or surrogate and bedside caregivers, discussions with consultants, evaluation of patient's response to treatment, examination of patient, ordering and performing treatments and interventions, ordering and review of laboratory studies, ordering and review of radiographic studies, pulse oximetry, re-evaluation of patient's condition.  This critical care time did not overlap with that of any other provider or involve time for any procedures.     Lalita Matias MD  Critical Care - Surgery  [facility]

## 2019-10-18 NOTE — SUBJECTIVE & OBJECTIVE
Interval History/Significant Events:   Underwent tracheostomy placement 10/17, did not tolerate AC vent mode but now on PAV and tolerating well.  SLED overnight with 1L off. Required Levo to maintain MAP's but able to wean off quickly. Improving uremia, creatinine. Making 15-20 cc urine/hr while on dialysis.   Precedex off since this morning, more alert and following commands.  TF restarted post-operatively increasing to goal as tolerated.     Follow-up For: Procedure(s) (LRB):  CREATION, TRACHEOSTOMY (N/A)    Post-Operative Day: 1 Day Post-Op    Objective:     Vital Signs (Most Recent):  Temp: 98.5 °F (36.9 °C) (10/18/19 0705)  Pulse: (!) 112 (10/18/19 0908)  Resp: (!) 23 (10/18/19 0900)  BP: (!) 120/56 (10/18/19 0900)  SpO2: 98 % (10/18/19 0908) Vital Signs (24h Range):  Temp:  [98.5 °F (36.9 °C)-100.1 °F (37.8 °C)] 98.5 °F (36.9 °C)  Pulse:  [] 112  Resp:  [20-29] 23  SpO2:  [89 %-100 %] 98 %  BP: (101-163)/() 120/56  Arterial Line BP: ()/(46-66) 106/52     Weight: (!) 150 kg (330 lb 11 oz)  Body mass index is 42.44 kg/m².      Intake/Output Summary (Last 24 hours) at 10/18/2019 0958  Last data filed at 10/18/2019 0945  Gross per 24 hour   Intake 4587.55 ml   Output 5225 ml   Net -637.45 ml       Physical Exam   Constitutional: He appears well-developed and well-nourished. No distress.   HENT:   Head: Normocephalic and atraumatic.   Trach in place.   Eyes: Pupils are equal, round, and reactive to light. Conjunctivae are normal.   Neck: Normal range of motion.   Cardiovascular: Normal rate, regular rhythm and intact distal pulses.   Pulmonary/Chest: Effort normal. No respiratory distress.   Trach on PAV.   Abdominal: Soft. He exhibits no distension.   G-tube to gravity with bilious output.  J-tube with tube feeds infusing.   Tube sites clean, no surrounding erythema.  Incision c/d/i, no erythema or drainage.   Musculoskeletal: Normal range of motion.   Neurological: He is alert.   GCS 11T.    Skin: Skin is warm and dry.       Vents:  Vent Mode: Spont (10/18/19 0908)  Ventilator Initiated: Yes (10/10/19 1621)  Set Rate: 26 bmp (10/17/19 2321)  Vt Set: 550 mL (10/17/19 2321)  PEEP/CPAP: 8 cmH20 (10/18/19 0908)  Oxygen Concentration (%): 35 (10/18/19 0908)  Peak Airway Pressure: 26 cmH2O (10/18/19 0908)  Plateau Pressure: 23 cmH20 (10/18/19 0908)  Total Ve: 19.4 mL (10/18/19 0908)  F/VT Ratio<105 (RSBI): (!) 33.24 (10/18/19 0700)    Lines/Drains/Airways     Central Venous Catheter Line                 Percutaneous Central Line Insertion/Assessment - triple lumen  10/10/19 1853 right internal jugular 7 days         Trialysis (Dialysis) Catheter 10/16/19 1329 left internal jugular 1 day          Drain                 Chest Tube 10/10/19 1510 1 Right Midaxillary;Fourth intercostal space 28 Fr. 7 days         Chest Tube 10/10/19 1513 Left Midaxillary;Fourth intercostal space 28 Fr. 7 days         Closed/Suction Drain 10/10/19 1349 Right RUQ Bulb 19 Fr. 7 days         Gastrostomy/Enterostomy 10/10/19 1400 Jejunostomy tube 7 days         Gastrostomy/Enterostomy 10/10/19 1400 LUQ 7 days         Urethral Catheter 10/10/19 1530 Straight-tip;Double-lumen;Non-latex 16 Fr. 7 days          Airway                 Surgical Airway 10/17/19 1603 Shiley Cuffed less than 1 day          Arterial Line                 Arterial Line 10/10/19 1245 Right Radial 7 days                Significant Labs:    CBC/Anemia Profile:  Recent Labs   Lab 10/17/19  0300 10/18/19  0400   WBC 18.48* 20.33*   HGB 8.4* 8.5*   HCT 27.2* 27.7*    353*   MCV 81* 83   RDW 19.3* 19.4*        Chemistries:  Recent Labs   Lab 10/17/19  1236 10/17/19  2153 10/18/19  0400    143 143  143   K 4.4 4.9 4.4  4.4   * 111* 108  108   CO2 18* 18* 23  23   BUN 98* 102* 59*  59*   CREATININE 3.1* 3.3* 2.1*  2.1*   CALCIUM 8.3* 8.2* 8.9  8.9   ALBUMIN 1.9* 1.9* 1.9*   MG 2.8* 2.8* 2.7*   PHOS 6.8* 8.3* 4.5  4.5       ABGs:   Recent Labs    Lab 10/18/19  0840   PH 7.439   PCO2 35.0   HCO3 23.7*   POCSATURATED 95   BE 0     Coagulation: No results for input(s): PT, INR, APTT in the last 48 hours.  Lactic Acid: No results for input(s): LACTATE in the last 48 hours.    Significant Imaging:  I have reviewed all pertinent imaging results/findings within the past 24 hours.

## 2019-10-18 NOTE — PLAN OF CARE
Problem: Occupational Therapy Goal  Goal: Occupational Therapy Goal  Description  Goals to be met by: 10/25/2019    Patient will increase functional independence with ADLs by performing:    Feeding with Jack.  UE Dressing with Minimal Assistance.  LE Dressing with Moderate Assistance.  Grooming while standing with Minimal Assistance.  Toileting from bedside commode with Minimal Assistance for hygiene and clothing management.   Supine to sit with Minimal Assistance.  Stand pivot transfers with Moderate Assistance.  Toilet transfer to bedside commode with Moderate Assistance.     Outcome: Ongoing, Progressing   The above goals remain appropriate. ANTIONETTE Ochoa  10/18/2019

## 2019-10-19 LAB
ALBUMIN SERPL BCP-MCNC: 1.7 G/DL (ref 3.5–5.2)
ALBUMIN SERPL BCP-MCNC: 1.7 G/DL (ref 3.5–5.2)
ALLENS TEST: ABNORMAL
ALLENS TEST: NORMAL
ANION GAP SERPL CALC-SCNC: 10 MMOL/L (ref 8–16)
ANION GAP SERPL CALC-SCNC: 11 MMOL/L (ref 8–16)
ANISOCYTOSIS BLD QL SMEAR: SLIGHT
BASOPHILS NFR BLD: 1 % (ref 0–1.9)
BUN SERPL-MCNC: 48 MG/DL (ref 8–23)
BUN SERPL-MCNC: 55 MG/DL (ref 8–23)
CA-I BLDV-SCNC: 1.14 MMOL/L (ref 1.06–1.42)
CA-I BLDV-SCNC: 1.18 MMOL/L (ref 1.06–1.42)
CALCIUM SERPL-MCNC: 8.2 MG/DL (ref 8.7–10.5)
CALCIUM SERPL-MCNC: 8.4 MG/DL (ref 8.7–10.5)
CHLORIDE SERPL-SCNC: 110 MMOL/L (ref 95–110)
CHLORIDE SERPL-SCNC: 111 MMOL/L (ref 95–110)
CO2 SERPL-SCNC: 23 MMOL/L (ref 23–29)
CO2 SERPL-SCNC: 23 MMOL/L (ref 23–29)
CREAT SERPL-MCNC: 2.5 MG/DL (ref 0.5–1.4)
CREAT SERPL-MCNC: 2.9 MG/DL (ref 0.5–1.4)
DELSYS: ABNORMAL
DELSYS: NORMAL
DIFFERENTIAL METHOD: ABNORMAL
EOSINOPHIL NFR BLD: 1 % (ref 0–8)
ERYTHROCYTE [DISTWIDTH] IN BLOOD BY AUTOMATED COUNT: 19 % (ref 11.5–14.5)
EST. GFR  (AFRICAN AMERICAN): 23.7 ML/MIN/1.73 M^2
EST. GFR  (AFRICAN AMERICAN): 28.4 ML/MIN/1.73 M^2
EST. GFR  (NON AFRICAN AMERICAN): 20.5 ML/MIN/1.73 M^2
EST. GFR  (NON AFRICAN AMERICAN): 24.6 ML/MIN/1.73 M^2
FIO2: 35
GIANT PLATELETS BLD QL SMEAR: PRESENT
GLUCOSE SERPL-MCNC: 135 MG/DL (ref 70–110)
GLUCOSE SERPL-MCNC: 144 MG/DL (ref 70–110)
HCO3 UR-SCNC: 22.9 MMOL/L (ref 24–28)
HCO3 UR-SCNC: 23.7 MMOL/L (ref 24–28)
HCO3 UR-SCNC: 24.2 MMOL/L (ref 24–28)
HCO3 UR-SCNC: 25.8 MMOL/L (ref 24–28)
HCT VFR BLD AUTO: 24.6 % (ref 40–54)
HGB BLD-MCNC: 7.5 G/DL (ref 14–18)
HYPOCHROMIA BLD QL SMEAR: ABNORMAL
IMM GRANULOCYTES # BLD AUTO: ABNORMAL K/UL (ref 0–0.04)
IMM GRANULOCYTES NFR BLD AUTO: ABNORMAL % (ref 0–0.5)
LYMPHOCYTES NFR BLD: 1 % (ref 18–48)
MAGNESIUM SERPL-MCNC: 2.3 MG/DL (ref 1.6–2.6)
MAGNESIUM SERPL-MCNC: 2.4 MG/DL (ref 1.6–2.6)
MCH RBC QN AUTO: 25.2 PG (ref 27–31)
MCHC RBC AUTO-ENTMCNC: 30.5 G/DL (ref 32–36)
MCV RBC AUTO: 83 FL (ref 82–98)
MODE: ABNORMAL
MODE: NORMAL
MONOCYTES NFR BLD: 5 % (ref 4–15)
NEUTROPHILS NFR BLD: 92 % (ref 38–73)
NRBC BLD-RTO: 0 /100 WBC
OVALOCYTES BLD QL SMEAR: ABNORMAL
PCO2 BLDA: 32.4 MMHG (ref 35–45)
PCO2 BLDA: 34.4 MMHG (ref 35–45)
PCO2 BLDA: 34.6 MMHG (ref 35–45)
PCO2 BLDA: 36.5 MMHG (ref 35–45)
PEEP: 8
PEEP: 8
PH SMN: 7.43 [PH] (ref 7.35–7.45)
PH SMN: 7.45 [PH] (ref 7.35–7.45)
PH SMN: 7.46 [PH] (ref 7.35–7.45)
PH SMN: 7.48 [PH] (ref 7.35–7.45)
PHOSPHATE SERPL-MCNC: 3.6 MG/DL (ref 2.7–4.5)
PHOSPHATE SERPL-MCNC: 3.6 MG/DL (ref 2.7–4.5)
PHOSPHATE SERPL-MCNC: 4.6 MG/DL (ref 2.7–4.5)
PLATELET # BLD AUTO: 312 K/UL (ref 150–350)
PLATELET BLD QL SMEAR: ABNORMAL
PMV BLD AUTO: 9.8 FL (ref 9.2–12.9)
PO2 BLDA: 107 MMHG (ref 80–100)
PO2 BLDA: 83 MMHG (ref 80–100)
PO2 BLDA: 83 MMHG (ref 80–100)
PO2 BLDA: 87 MMHG (ref 80–100)
POC BE: -1 MMOL/L
POC BE: 0 MMOL/L
POC BE: 0 MMOL/L
POC BE: 2 MMOL/L
POC SATURATED O2: 96 % (ref 95–100)
POC SATURATED O2: 97 % (ref 95–100)
POC SATURATED O2: 97 % (ref 95–100)
POC SATURATED O2: 99 % (ref 95–100)
POC TCO2: 24 MMOL/L (ref 23–27)
POC TCO2: 25 MMOL/L (ref 23–27)
POC TCO2: 25 MMOL/L (ref 23–27)
POC TCO2: 27 MMOL/L (ref 23–27)
POCT GLUCOSE: 121 MG/DL (ref 70–110)
POCT GLUCOSE: 146 MG/DL (ref 70–110)
POCT GLUCOSE: 146 MG/DL (ref 70–110)
POCT GLUCOSE: 149 MG/DL (ref 70–110)
POIKILOCYTOSIS BLD QL SMEAR: SLIGHT
POLYCHROMASIA BLD QL SMEAR: ABNORMAL
POTASSIUM SERPL-SCNC: 4.2 MMOL/L (ref 3.5–5.1)
POTASSIUM SERPL-SCNC: 4.3 MMOL/L (ref 3.5–5.1)
PS: 50
PS: 65
RBC # BLD AUTO: 2.98 M/UL (ref 4.6–6.2)
SAMPLE: ABNORMAL
SAMPLE: NORMAL
SCHISTOCYTES BLD QL SMEAR: ABNORMAL
SCHISTOCYTES BLD QL SMEAR: PRESENT
SITE: ABNORMAL
SITE: NORMAL
SODIUM SERPL-SCNC: 144 MMOL/L (ref 136–145)
SODIUM SERPL-SCNC: 144 MMOL/L (ref 136–145)
WBC # BLD AUTO: 20.26 K/UL (ref 3.9–12.7)

## 2019-10-19 PROCEDURE — 80100008 HC CRRT DAILY MAINTENANCE

## 2019-10-19 PROCEDURE — 25000003 PHARM REV CODE 250: Performed by: STUDENT IN AN ORGANIZED HEALTH CARE EDUCATION/TRAINING PROGRAM

## 2019-10-19 PROCEDURE — 99233 SBSQ HOSP IP/OBS HIGH 50: CPT | Mod: ,,, | Performed by: INTERNAL MEDICINE

## 2019-10-19 PROCEDURE — 63600175 PHARM REV CODE 636 W HCPCS: Performed by: STUDENT IN AN ORGANIZED HEALTH CARE EDUCATION/TRAINING PROGRAM

## 2019-10-19 PROCEDURE — 94640 AIRWAY INHALATION TREATMENT: CPT

## 2019-10-19 PROCEDURE — 63600175 PHARM REV CODE 636 W HCPCS: Mod: JG | Performed by: SURGERY

## 2019-10-19 PROCEDURE — 99233 PR SUBSEQUENT HOSPITAL CARE,LEVL III: ICD-10-PCS | Mod: 24,,, | Performed by: SURGERY

## 2019-10-19 PROCEDURE — 99900026 HC AIRWAY MAINTENANCE (STAT)

## 2019-10-19 PROCEDURE — 94003 VENT MGMT INPAT SUBQ DAY: CPT

## 2019-10-19 PROCEDURE — 37799 UNLISTED PX VASCULAR SURGERY: CPT

## 2019-10-19 PROCEDURE — 27100171 HC OXYGEN HIGH FLOW UP TO 24 HOURS

## 2019-10-19 PROCEDURE — 85027 COMPLETE CBC AUTOMATED: CPT

## 2019-10-19 PROCEDURE — 25000003 PHARM REV CODE 250: Performed by: PHYSICIAN ASSISTANT

## 2019-10-19 PROCEDURE — 27200966 HC CLOSED SUCTION SYSTEM

## 2019-10-19 PROCEDURE — 27000221 HC OXYGEN, UP TO 24 HOURS

## 2019-10-19 PROCEDURE — 94668 MNPJ CHEST WALL SBSQ: CPT

## 2019-10-19 PROCEDURE — 90945 DIALYSIS ONE EVALUATION: CPT

## 2019-10-19 PROCEDURE — 99233 SBSQ HOSP IP/OBS HIGH 50: CPT | Mod: 24,,, | Performed by: SURGERY

## 2019-10-19 PROCEDURE — S4991 NICOTINE PATCH NONLEGEND: HCPCS | Performed by: PHYSICIAN ASSISTANT

## 2019-10-19 PROCEDURE — 83735 ASSAY OF MAGNESIUM: CPT

## 2019-10-19 PROCEDURE — 85007 BL SMEAR W/DIFF WBC COUNT: CPT

## 2019-10-19 PROCEDURE — 94761 N-INVAS EAR/PLS OXIMETRY MLT: CPT

## 2019-10-19 PROCEDURE — 27100092 HC HIGH FLOW DELIVERY CANNULA

## 2019-10-19 PROCEDURE — 25000003 PHARM REV CODE 250: Performed by: SURGERY

## 2019-10-19 PROCEDURE — 20000000 HC ICU ROOM

## 2019-10-19 PROCEDURE — 80069 RENAL FUNCTION PANEL: CPT

## 2019-10-19 PROCEDURE — 25000242 PHARM REV CODE 250 ALT 637 W/ HCPCS: Performed by: STUDENT IN AN ORGANIZED HEALTH CARE EDUCATION/TRAINING PROGRAM

## 2019-10-19 PROCEDURE — 99233 PR SUBSEQUENT HOSPITAL CARE,LEVL III: ICD-10-PCS | Mod: ,,, | Performed by: INTERNAL MEDICINE

## 2019-10-19 PROCEDURE — 99900035 HC TECH TIME PER 15 MIN (STAT)

## 2019-10-19 PROCEDURE — 82330 ASSAY OF CALCIUM: CPT

## 2019-10-19 PROCEDURE — 82803 BLOOD GASES ANY COMBINATION: CPT

## 2019-10-19 RX ORDER — FUROSEMIDE 10 MG/ML
120 INJECTION INTRAMUSCULAR; INTRAVENOUS ONCE
Status: COMPLETED | OUTPATIENT
Start: 2019-10-19 | End: 2019-10-19

## 2019-10-19 RX ADMIN — IPRATROPIUM BROMIDE AND ALBUTEROL SULFATE 3 ML: .5; 3 SOLUTION RESPIRATORY (INHALATION) at 11:10

## 2019-10-19 RX ADMIN — ALTEPLASE 4 MG: 2.2 INJECTION, POWDER, LYOPHILIZED, FOR SOLUTION INTRAVENOUS at 08:10

## 2019-10-19 RX ADMIN — FUROSEMIDE 120 MG: 10 INJECTION, SOLUTION INTRAMUSCULAR; INTRAVENOUS at 06:10

## 2019-10-19 RX ADMIN — HEPARIN SODIUM 7500 UNITS: 5000 INJECTION, SOLUTION INTRAVENOUS; SUBCUTANEOUS at 02:10

## 2019-10-19 RX ADMIN — FLUCONAZOLE 400 MG: 2 INJECTION, SOLUTION INTRAVENOUS at 09:10

## 2019-10-19 RX ADMIN — IPRATROPIUM BROMIDE AND ALBUTEROL SULFATE 3 ML: .5; 3 SOLUTION RESPIRATORY (INHALATION) at 07:10

## 2019-10-19 RX ADMIN — DEXTROSE MONOHYDRATE, SODIUM CITRATE, AND CITRIC ACID MONOHYDRATE: 2.45; 2.2; .8 INJECTION, SOLUTION INTRAVENOUS at 03:10

## 2019-10-19 RX ADMIN — HEPARIN SODIUM 7500 UNITS: 5000 INJECTION, SOLUTION INTRAVENOUS; SUBCUTANEOUS at 05:10

## 2019-10-19 RX ADMIN — HEPARIN SODIUM 7500 UNITS: 5000 INJECTION, SOLUTION INTRAVENOUS; SUBCUTANEOUS at 09:10

## 2019-10-19 RX ADMIN — NICOTINE 1 PATCH: 21 PATCH, EXTENDED RELEASE TRANSDERMAL at 09:10

## 2019-10-19 RX ADMIN — CHLORHEXIDINE GLUCONATE 0.12% ORAL RINSE 15 ML: 1.2 LIQUID ORAL at 09:10

## 2019-10-19 RX ADMIN — CHLORHEXIDINE GLUCONATE 0.12% ORAL RINSE 15 ML: 1.2 LIQUID ORAL at 08:10

## 2019-10-19 RX ADMIN — ALTEPLASE 2 MG: 2.2 INJECTION, POWDER, LYOPHILIZED, FOR SOLUTION INTRAVENOUS at 10:10

## 2019-10-19 RX ADMIN — IPRATROPIUM BROMIDE AND ALBUTEROL SULFATE 3 ML: .5; 3 SOLUTION RESPIRATORY (INHALATION) at 03:10

## 2019-10-19 RX ADMIN — FAMOTIDINE 20 MG: 40 POWDER, FOR SUSPENSION ORAL at 08:10

## 2019-10-19 NOTE — PROGRESS NOTES
CRRT:    Treatment ran for only 1 hour 16 mins until system clotted off again.    SLED restarted. Remaining time: 5 hours 30 mins.

## 2019-10-19 NOTE — PROGRESS NOTES
"[facility]  General Surgery  Progress Note    Subjective:     History of Present Illness:  Patient is a 72 yo M with history of COPD and bleeding gastric mass who underwent EGD with AES yesterday where they removed a 7 cm "ulcerated lipoma" from his gastric antrum using submucosal endoscopic dissection. When removing the mass, it became stuck in the distal esophagus. They had to removed it piecemeal, and caused some mucosal esophageal tears. He was admitted for obs overnight, and this morning his WBC was elevated and he was complaining of abd pain. CXR showed free intraperitoneal free air and general surgery was consulted for evaluation.    Post-Op Info:  Procedure(s) (LRB):  CREATION, TRACHEOSTOMY (N/A)   2 Days Post-Op     No new subjective & objective note has been filed under this hospital service since the last note was generated.    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 Trach placed on 10/17/19.    Bilateral chest tubes in place to monitor for esophageal leak - keep them to suction  Continue G tube to gravity for gastric decompression - do not use for meds or feeds  Continue tube feeds  Wean vent as tolerated to TC, recommend CPAP at night    Rest of care per SICU     Esophageal tear     - Agree with plans for UGI later this week  - J tube feeds per ACS  - CT in place until after UGI, management per ACS  - Please call Thoracic Surgery with any further questions or concerns.         Too Solo MD  General Surgery  [facility]  "

## 2019-10-19 NOTE — PROGRESS NOTES
[facility]  Nephrology  Progress Note    Patient Name: Pasha Harmon  MRN: [id number]  Admission Date: 10/9/2019  Hospital Length of Stay: 9 days  Attending Provider: Paulino Parrish MD   Primary Care Physician: Eze Root MD  Principal Problem:Bowel perforation    Subjective:     HPI: Mr Harmon is a 75 yo male with COPD, CAD, and DMII who recently underwent EGD for resection of a 7cm gastric leiomyoma. There was difficulty retrieving the mass through the distal esophagus. The mass was subsequently removed piecemeal and there was concern for esophageal tears. POD 1, he had increasing leukocytosis and pneumoperitoneum on AXR. ACS took the patient to the OR for emergent exp laparotomy. Thoracic surgery was available during the exp lap and performed an EGD which showed a long segment esophageal tear, but no full thickness tear. He has normal renal function at baseline with a sCr of 0.8. On admission his sCr was 1.4 (10-9/19). His sCr began to improve, but then bumped again to 2.1 on 10/13/19. Of note, his hgb dropped to 6.0 on 10/12/19. His sCr continued to rise and peaked at 2.8 on 10/14/19. Today (10/15/19) his sCr is down to 2.6. Currently, he is not anuric or oliguric. Anesthesia records during the patient's ex lap on 10/10/19, and during the patient's EGD on 10/9/19, both reveal episodes of intraoperative hypotension (80's/40's). Nephrology has been consulted for BHASKAR.          Interval History: no events over night, patient had some hours of SLED (interrupted), LIJ catheter is not working despite TPA.  1 liter volume negative last 24 hours.     Review of patient's allergies indicates:   Allergen Reactions    Meloxicam Other (See Comments)     Ulcer, GI bleed      Current Facility-Administered Medications   Medication Frequency    0.9%  NaCl infusion (CRRT USE ONLY) Continuous    albuterol-ipratropium 2.5 mg-0.5 mg/3 mL nebulizer solution 3 mL Q4H PRN    albuterol-ipratropium 2.5  mg-0.5 mg/3 mL nebulizer solution 3 mL Q4H    calcium gluconate 3,000 mg in dextrose 5 % 100 mL IVPB Continuous    chlorhexidine 0.12 % solution 15 mL BID    dexmedetomidine (PRECEDEX) 400mcg/100mL 0.9% NaCL infusion Continuous    dextrose 10% (D10W) Bolus PRN    dextrose 10% (D10W) Bolus PRN    dextrose-sod citrate-citric ac 2.45-2.2 gram- 800 mg/100 mL Soln Continuous    famotidine 40 mg/5 mL (8 mg/mL) suspension 20 mg Daily    fentaNYL injection 25 mcg Q1H PRN    fluconazole (DIFLUCAN) IVPB 400 mg 200 mL Q24H    fluticasone furoate-vilanterol 100-25 mcg/dose diskus inhaler 1 puff Daily    glucagon (human recombinant) injection 1 mg PRN    heparin (porcine) injection 7,500 Units Q8H    hydrALAZINE injection 20 mg Q6H PRN    insulin aspart U-100 pen 0-5 Units QID (AC + HS) PRN    nicotine 21 mg/24 hr 1 patch Daily    norepinephrine 4 mg in dextrose 5% 250 mL infusion (premix) (titrating) Continuous    ondansetron injection 4 mg Q6H PRN    sodium chloride 0.9% flush 10 mL PRN       Objective:     Vital Signs (Most Recent):  Temp: 98.9 °F (37.2 °C) (10/19/19 1100)  Pulse: 95 (10/19/19 1215)  Resp: (!) 24 (10/19/19 1140)  BP: (!) 119/56 (10/19/19 1200)  SpO2: 97 % (10/19/19 1215)  O2 Device (Oxygen Therapy): ventilator (10/19/19 1140) Vital Signs (24h Range):  Temp:  [98.7 °F (37.1 °C)-100.4 °F (38 °C)] 98.9 °F (37.2 °C)  Pulse:  [] 95  Resp:  [20-25] 24  SpO2:  [90 %-100 %] 97 %  BP: (111-154)/(54-74) 119/56  Arterial Line BP: (101-155)/(52-87) 126/54     Weight: (!) 150 kg (330 lb 11 oz) (10/18/19 0500)  Body mass index is 42.44 kg/m².  Body surface area is 2.8 meters squared.    I/O last 3 completed shifts:  In: 4887 [I.V.:2681; NG/GT:1105; IV Piggyback:1101]  Out: 7225 [Urine:590; Drains:1205; Other:5110; Chest Tube:320]    Physical Exam   Constitutional: He is oriented to person, place, and time. He appears well-nourished.   HENT:   Head: Normocephalic.   Eyes: EOM are normal.   Neck: Neck  supple.   Cardiovascular: Normal rate.   Pulmonary/Chest: Effort normal.   Abdominal: Soft. Bowel sounds are normal.   G-tube to gravity with bilious output.  J-tube with tube feeds infusing.    Musculoskeletal: Normal range of motion. He exhibits edema.   Neurological: He is oriented to person, place, and time.   Skin: Skin is warm and dry.   Psychiatric: He has a normal mood and affect.       Significant Labs:  All labs within the past 24 hours have been reviewed.     Significant Imaging:  X-Ray: Reviewed  improved    Assessment/Plan:     BHASKAR (acute kidney injury)  BHASKAR 2/2 ischemic ATN due to intraoperative hypotension and acute anemia.- muddy brown casts (Baseline sCr: 0.8), patient received SLED the last days.   - issues with catheter over night, only 1 1/2 hours of SLED, TPA did not work.    Plan/Recommendations:    - Would give diuretics today 120 mg furosemide bid. Does not need SLED today  - will need new dialysis access for tomorrow if no recovery of renal function. Will attempt HD (if staff available).    -Avoid nephrotoxic medications  -Renally dose medications  -Avoid contrast studies   -Daily renal function panels   -Strict I/O's   -Will continue to follow closely            Thank you for your consult. I will follow-up with patient. Please contact us if you have any additional questions.    Wilfredo Kilgore MD  Nephrology  [facility]

## 2019-10-19 NOTE — ASSESSMENT & PLAN NOTE
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 Trach placed on 10/17/19.    Bilateral chest tubes in place to monitor for esophageal leak - keep them to suction. Will remove once patient passes esophagram demonstrating no further leak  Continue G tube to gravity for gastric decompression - do not use for meds or feeds  Continue tube feeds  Wean vent as tolerated to TC, recommend CPAP at night    Rest of care per SICU

## 2019-10-19 NOTE — PROGRESS NOTES
"[facility]  General Surgery  Progress Note    Subjective:     History of Present Illness:  Patient is a 72 yo M with history of COPD and bleeding gastric mass who underwent EGD with AES yesterday where they removed a 7 cm "ulcerated lipoma" from his gastric antrum using submucosal endoscopic dissection. When removing the mass, it became stuck in the distal esophagus. They had to removed it piecemeal, and caused some mucosal esophageal tears. He was admitted for obs overnight, and this morning his WBC was elevated and he was complaining of abd pain. CXR showed free intraperitoneal free air and general surgery was consulted for evaluation.    Post-Op Info:  Procedure(s) (LRB):  CREATION, TRACHEOSTOMY (N/A)   2 Days Post-Op     Interval History: No acute events overnight. More alert this morning. Communicating with hand gestures.     Medications:  Continuous Infusions:   sodium chloride 0.9% 200 mL/hr at 10/19/19 0100    calcium gluconate IVPB 3,000 mg (10/18/19 2300)    dexmedetomidine (PRECEDEX) infusion Stopped (10/18/19 0600)    dextrose-sod citrate-citric ac 200 mL/hr at 10/19/19 0320    norepinephrine bitartrate-D5W Stopped (10/18/19 0300)     Scheduled Meds:   albuterol-ipratropium  3 mL Nebulization Q4H    chlorhexidine  15 mL Mouth/Throat BID    famotidine  20 mg Per J Tube Daily    fluconazole (DIFLUCAN) IVPB  400 mg Intravenous Q24H    fluticasone furoate-vilanterol  1 puff Inhalation Daily    heparin (porcine)  7,500 Units Subcutaneous Q8H    nicotine  1 patch Transdermal Daily     PRN Meds:albuterol-ipratropium, Dextrose 10% Bolus, Dextrose 10% Bolus, fentaNYL, glucagon (human recombinant), hydrALAZINE, insulin aspart U-100, ondansetron, sodium chloride 0.9%     Review of patient's allergies indicates:   Allergen Reactions    Meloxicam Other (See Comments)     Ulcer, GI bleed      Objective:     Vital Signs (Most Recent):  Temp: 99.1 °F (37.3 °C) (10/19/19 0700)  Pulse: 98 " (10/19/19 1045)  Resp: (!) 22 (10/19/19 0711)  BP: (!) 122/59 (10/19/19 1000)  SpO2: 97 % (10/19/19 1045) Vital Signs (24h Range):  Temp:  [98.7 °F (37.1 °C)-100.4 °F (38 °C)] 99.1 °F (37.3 °C)  Pulse:  [] 98  Resp:  [20-25] 22  SpO2:  [90 %-100 %] 97 %  BP: (111-158)/(54-74) 122/59  Arterial Line BP: (101-155)/(52-87) 139/59     Weight: (!) 150 kg (330 lb 11 oz)  Body mass index is 42.44 kg/m².    Intake/Output - Last 3 Shifts       10/17 0700 - 10/18 0659 10/18 0700 - 10/19 0659 10/19 0700 - 10/20 0659    I.V. (mL/kg) 3016.6 (20.1) 660 (4.4)     NG/ 775 160    IV Piggyback 200 1101     Total Intake(mL/kg) 3626.6 (24.2) 2536 (16.9) 160 (1.1)    Urine (mL/kg/hr) 783 (0.2) 230 (0.1) 25 (0)    Drains 807 745     Other 2351 2759     Stool  0     Chest Tube 190 300 90    Total Output 4131 4034 115    Net -504.5 -1498 +45           Stool Occurrence  1 x           Physical Exam   Constitutional: He appears well-developed and well-nourished. He is sedated and intubated (Trach in place).   Cardiovascular: Normal rate and regular rhythm.   Pulmonary/Chest: Effort normal. He is intubated (Trach in place). No respiratory distress.   Bilateral chest tubes, draining SS, no air leak   Abdominal: Soft. He exhibits no distension. There is no tenderness. There is no guarding.   G tube in place, draining gastric contents  Jtube in place    Genitourinary:   Genitourinary Comments: Hallman in place   Neurological: He is alert.   Skin: Skin is warm and dry.   Vitals reviewed.      Significant Labs:  CBC:   Recent Labs   Lab 10/19/19  0335   WBC 20.26*   RBC 2.98*   HGB 7.5*   HCT 24.6*      MCV 83   MCH 25.2*   MCHC 30.5*     CMP:   Recent Labs   Lab 10/14/19  1500  10/19/19  0335   *   < > 135*   CALCIUM 8.7   < > 8.4*   ALBUMIN 2.1*   < > 1.7*   PROT 5.6*  --   --    *   < > 144   K 4.3   < > 4.2   CO2 17*   < > 23      < > 110   BUN 79*   < > 48*   CREATININE 2.8*   < > 2.5*   ALKPHOS 67  --   --     ALT 12  --   --    AST 19  --   --    BILITOT 1.0  --   --     < > = values in this interval not displayed.     Coagulation:   No results for input(s): LABPROT, INR, APTT in the last 168 hours.  ABGs:   Recent Labs   Lab 10/19/19  0915   PH 7.445   PCO2 34.6*   PO2 83   HCO3 23.7*   POCSATURATED 97   BE 0       Significant Diagnostics:  I have reviewed all pertinent imaging results/findings within the past 24 hours.    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 Trach placed on 10/17/19.    Bilateral chest tubes in place to monitor for esophageal leak - keep them to suction. Will remove once patient passes esophagram demonstrating no further leak  Continue G tube to gravity for gastric decompression - do not use for meds or feeds  Continue tube feeds  Wean vent as tolerated to TC, recommend CPAP at night    Rest of care per SICU     Esophageal tear     - Agree with plans for UGI later this week  - J tube feeds per ACS  - CT in place until after UGI, management per ACS  - Please call Thoracic Surgery with any further questions or concerns.         Nu Schulte MD  General Surgery  [facility]

## 2019-10-19 NOTE — PROGRESS NOTES
[facility]  Critical Care - Surgery  Progress Note    Patient Name: Pasha Harmon  MRN: [id number]  Admission Date: 10/9/2019  Hospital Length of Stay: 9 days  Code Status: Full Code  Attending Provider: Paulino Parrish MD  Primary Care Provider: Eze Root MD   Principal Problem: Bowel perforation    Subjective:     Hospital/ICU Course:  10/10: Admitted to SICU. R IJ TLC placed. On minimal pressors.   10/11: Remaining intubated. On minimal pressors.   10/12: Difficulty weaning pressors while on propofol and fentanyl to control resp rate from acidosis. Received 2u PRBCs after 1.25L albumin.   10/13: Pressors weaned, still not following commands after stopping sedation  10/14: NGT removed by primary team. Metoprolol started for HTN    Interval History/Significant Events:   Underwent tracheostomy placement 10/17, did not tolerate AC vent mode but now on PAV and tolerating well.  SLED overnight with 1.5L off. Improving uremia, creatinine. Making 20-50 cc urine/hr.  Remains off sedation.  TF at 40, increasing to goal as tolerated.    Follow-up For: Procedure(s) (LRB):  CREATION, TRACHEOSTOMY (N/A)    Post-Operative Day: 1 Day Post-Op    Objective:     Vital Signs (Most Recent):  Temp: 98.9 °F (37.2 °C) (10/19/19 1100)  Pulse: 92 (10/19/19 1140)  Resp: (!) 24 (10/19/19 1140)  BP: 128/60 (10/19/19 1100)  SpO2: 97 % (10/19/19 1140) Vital Signs (24h Range):  Temp:  [98.7 °F (37.1 °C)-100.4 °F (38 °C)] 98.9 °F (37.2 °C)  Pulse:  [] 92  Resp:  [20-25] 24  SpO2:  [90 %-100 %] 97 %  BP: (111-158)/(54-74) 128/60  Arterial Line BP: (101-155)/(52-87) 120/52     Weight: (!) 150 kg (330 lb 11 oz)  Body mass index is 42.44 kg/m².      Intake/Output Summary (Last 24 hours) at 10/19/2019 1157  Last data filed at 10/19/2019 1100  Gross per 24 hour   Intake 1660 ml   Output 2368 ml   Net -708 ml       Physical Exam   Constitutional: He appears well-developed and well-nourished. No distress.   HENT:    Head: Normocephalic and atraumatic.   Trach in place.   Eyes: Pupils are equal, round, and reactive to light. Conjunctivae are normal.   Neck: Normal range of motion.   Cardiovascular: Normal rate, regular rhythm and intact distal pulses.   Pulmonary/Chest: Effort normal. No respiratory distress.   Trach on PAV.   Abdominal: Soft. He exhibits no distension.   G-tube to gravity with bilious output.  J-tube with tube feeds infusing.   Tube sites clean, no surrounding erythema.  Incision c/d/i, no erythema or drainage.   Musculoskeletal: Normal range of motion.   Neurological: He is alert.   GCS 11T.   Skin: Skin is warm and dry.       Vents:  Vent Mode: Spont (10/19/19 1140)  Ventilator Initiated: Yes (10/10/19 1621)  Set Rate: 26 bmp (10/17/19 2321)  Vt Set: 550 mL (10/17/19 2321)  PEEP/CPAP: 8 cmH20 (10/19/19 1140)  Oxygen Concentration (%): 35 (10/19/19 1140)  Peak Airway Pressure: 24 cmH2O (10/19/19 1140)  Plateau Pressure: 23 cmH20 (10/19/19 1140)  Total Ve: 15.3 mL (10/19/19 1140)  F/VT Ratio<105 (RSBI): (!) 37.33 (10/19/19 1140)    Lines/Drains/Airways     Central Venous Catheter Line                 Percutaneous Central Line Insertion/Assessment - triple lumen  10/10/19 1853 right internal jugular 8 days         Trialysis (Dialysis) Catheter 10/16/19 1329 left internal jugular 2 days          Drain                 Chest Tube 10/10/19 1510 1 Right Midaxillary;Fourth intercostal space 28 Fr. 8 days         Chest Tube 10/10/19 1513 Left Midaxillary;Fourth intercostal space 28 Fr. 8 days         Closed/Suction Drain 10/10/19 1349 Right RUQ Bulb 19 Fr. 8 days         Gastrostomy/Enterostomy 10/10/19 1400 Jejunostomy tube 8 days         Gastrostomy/Enterostomy 10/10/19 1400 LUQ 8 days         Urethral Catheter 10/10/19 1530 Straight-tip;Double-lumen;Non-latex 16 Fr. 8 days          Airway                 Surgical Airway 10/17/19 1603 Shiley Cuffed 1 day          Arterial Line                 Arterial Line 10/10/19  1245 Right Radial 8 days                Significant Labs:    CBC/Anemia Profile:  Recent Labs   Lab 10/18/19  0400 10/19/19  0335   WBC 20.33* 20.26*   HGB 8.5* 7.5*   HCT 27.7* 24.6*   * 312   MCV 83 83   RDW 19.4* 19.0*        Chemistries:  Recent Labs   Lab 10/18/19  0400 10/18/19  2300 10/19/19  0335     143 143 144   K 4.4  4.4 4.4 4.2     108 108 110   CO2 23  23 23 23   BUN 59*  59* 42* 48*   CREATININE 2.1*  2.1* 2.1* 2.5*   CALCIUM 8.9  8.9 8.8 8.4*   ALBUMIN 1.9* 1.9* 1.7*   MG 2.7* 2.4 2.3   PHOS 4.5  4.5 3.2 3.6  3.6       ABGs:   Recent Labs   Lab 10/19/19  0915   PH 7.445   PCO2 34.6*   HCO3 23.7*   POCSATURATED 97   BE 0     Coagulation: No results for input(s): PT, INR, APTT in the last 48 hours.  Lactic Acid: No results for input(s): LACTATE in the last 48 hours.    Significant Imaging:  I have reviewed all pertinent imaging results/findings within the past 24 hours.    Assessment/Plan:     * Bowel perforation  74M current smoker who underwent excision of bleeding gastric mass, post op course complicated by intraperitoneal free air. Now admitted to SICU following ex-lap 10/10/19 which revealed esophageal and gastric perforations which have been repaired. Now s/p tracheostomy placement on 10/17 for difficulty with extubating patient.     Neuro:  - Off of sedation, following commands and alert: GCS 11T  - PRN pain control    CV  - Fluid resuscitate as necessary  - Off of pressors   - Echo 9/12/10 wnl    Pulm  - Extubated 10/14, reintubated 10/15 for encephalopathy with respiratory distress and increased WOB, subsequently underwent tracheostomy placement on 10/17.  - Continue PAV+ as he appears more comfortable on it, trach collar trial today.  - ABG's prn  - Uses CPAP at home, NO CPAP due to esophageal rupture  - COPD with Significant history of smoking (3 ppd); continue nicotine patch    GI  - Post-op esophageal and gastric perforations 10/9, now repaired via ex-lap  -  Monitor drains, care per surgery  - Do not use G-tube. Leave to gravity.  - Tube feeds per J tube, increase to goal as tolerated.    Renal  - Nephrology following and performing daily SLED for electrolyte derangements and acidosis, taking off ~1L with UF of 350.  - Additional SLED planned for tonight  - Uremia improving, down trended to 48, mental status improving.  - BHASKAR but improving, Cr of 2.5 today, baseline Cr of 1  - CMP daily  - Electrolyte repletion PRN      Heme/ID  - Trend WBC, remains elevated   - Abdominal culture (10/10) resulted with Yeast (awaiting speciation), Fluconazole restarted with end date of 10/24.  - Hgb stable  - CBCs daily  - Completed course of Cefepime, flagyl, vancomycin, and diflucan for contaminated peritoneum on 10/16.    Endo  - DM2  - POCT glucose q4h  - SSI     Dispo: Continue ICU care, wean to trach collar, increase tube feeds to goal.      Critical care was time spent personally by me on the following activities: development of treatment plan with patient or surrogate and bedside caregivers, discussions with consultants, evaluation of patient's response to treatment, examination of patient, ordering and performing treatments and interventions, ordering and review of laboratory studies, ordering and review of radiographic studies, pulse oximetry, re-evaluation of patient's condition.  This critical care time did not overlap with that of any other provider or involve time for any procedures.     Lalita Matias MD  Critical Care - Surgery  [facility]

## 2019-10-19 NOTE — PROGRESS NOTES
Restarted on SLED 10 hrs. Daily maintenance.    Citrate 200cc/hr and Calcium 10cc/hr    UF rate at 400cc/hr    Remaining time: 7hrs     Time off: 1030H

## 2019-10-19 NOTE — PROGRESS NOTES
Dialysis   Cath Flow aspirated form left IJ dialysis catheter. Blue port aspirates and flushes freely. Red port will aspirate with much difficuly and flushes with much resistance.  The catheter still not suitable for RRT. Renal informed

## 2019-10-19 NOTE — ASSESSMENT & PLAN NOTE
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 Trach placed on 10/17/19.    Bilateral chest tubes in place to monitor for esophageal leak - keep them to suction  Continue G tube to gravity for gastric decompression - do not use for meds or feeds  Continue tube feeds  Wean vent as tolerated to TC, recommend CPAP at night    Rest of care per SICU

## 2019-10-19 NOTE — SUBJECTIVE & OBJECTIVE
Interval History: No acute events overnight. More alert this morning. Communicating with hand gestures.     Medications:  Continuous Infusions:   sodium chloride 0.9% 200 mL/hr at 10/19/19 0100    calcium gluconate IVPB 3,000 mg (10/18/19 2300)    dexmedetomidine (PRECEDEX) infusion Stopped (10/18/19 0600)    dextrose-sod citrate-citric ac 200 mL/hr at 10/19/19 0320    norepinephrine bitartrate-D5W Stopped (10/18/19 0300)     Scheduled Meds:   albuterol-ipratropium  3 mL Nebulization Q4H    chlorhexidine  15 mL Mouth/Throat BID    famotidine  20 mg Per J Tube Daily    fluconazole (DIFLUCAN) IVPB  400 mg Intravenous Q24H    fluticasone furoate-vilanterol  1 puff Inhalation Daily    heparin (porcine)  7,500 Units Subcutaneous Q8H    nicotine  1 patch Transdermal Daily     PRN Meds:albuterol-ipratropium, Dextrose 10% Bolus, Dextrose 10% Bolus, fentaNYL, glucagon (human recombinant), hydrALAZINE, insulin aspart U-100, ondansetron, sodium chloride 0.9%     Review of patient's allergies indicates:   Allergen Reactions    Meloxicam Other (See Comments)     Ulcer, GI bleed      Objective:     Vital Signs (Most Recent):  Temp: 99.1 °F (37.3 °C) (10/19/19 0700)  Pulse: 98 (10/19/19 1045)  Resp: (!) 22 (10/19/19 0711)  BP: (!) 122/59 (10/19/19 1000)  SpO2: 97 % (10/19/19 1045) Vital Signs (24h Range):  Temp:  [98.7 °F (37.1 °C)-100.4 °F (38 °C)] 99.1 °F (37.3 °C)  Pulse:  [] 98  Resp:  [20-25] 22  SpO2:  [90 %-100 %] 97 %  BP: (111-158)/(54-74) 122/59  Arterial Line BP: (101-155)/(52-87) 139/59     Weight: (!) 150 kg (330 lb 11 oz)  Body mass index is 42.44 kg/m².    Intake/Output - Last 3 Shifts       10/17 0700 - 10/18 0659 10/18 0700 - 10/19 0659 10/19 0700 - 10/20 0659    I.V. (mL/kg) 3016.6 (20.1) 660 (4.4)     NG/ 775 160    IV Piggyback 200 1101     Total Intake(mL/kg) 3626.6 (24.2) 2536 (16.9) 160 (1.1)    Urine (mL/kg/hr) 783 (0.2) 230 (0.1) 25 (0)    Drains 807 995     Other 2428 1503      Stool  0     Chest Tube 190 300 90    Total Output 4131 4034 115    Net -504.5 -1498 +45           Stool Occurrence  1 x           Physical Exam   Constitutional: He appears well-developed and well-nourished. He is sedated and intubated (Trach in place).   Cardiovascular: Normal rate and regular rhythm.   Pulmonary/Chest: Effort normal. He is intubated (Trach in place). No respiratory distress.   Bilateral chest tubes, draining SS, no air leak   Abdominal: Soft. He exhibits no distension. There is no tenderness. There is no guarding.   G tube in place, draining gastric contents  Jtube in place    Genitourinary:   Genitourinary Comments: Hallman in place   Neurological: He is alert.   Skin: Skin is warm and dry.   Vitals reviewed.      Significant Labs:  CBC:   Recent Labs   Lab 10/19/19  0335   WBC 20.26*   RBC 2.98*   HGB 7.5*   HCT 24.6*      MCV 83   MCH 25.2*   MCHC 30.5*     CMP:   Recent Labs   Lab 10/14/19  1500  10/19/19  0335   *   < > 135*   CALCIUM 8.7   < > 8.4*   ALBUMIN 2.1*   < > 1.7*   PROT 5.6*  --   --    *   < > 144   K 4.3   < > 4.2   CO2 17*   < > 23      < > 110   BUN 79*   < > 48*   CREATININE 2.8*   < > 2.5*   ALKPHOS 67  --   --    ALT 12  --   --    AST 19  --   --    BILITOT 1.0  --   --     < > = values in this interval not displayed.     Coagulation:   No results for input(s): LABPROT, INR, APTT in the last 168 hours.  ABGs:   Recent Labs   Lab 10/19/19  0915   PH 7.445   PCO2 34.6*   PO2 83   HCO3 23.7*   POCSATURATED 97   BE 0       Significant Diagnostics:  I have reviewed all pertinent imaging results/findings within the past 24 hours.

## 2019-10-19 NOTE — PLAN OF CARE
"Dx: Bowel perforation    Shift Events: VSS; pt on PAV+, FiO2 35%, PEEP 5 with sats remaining >95%. No acute events. CRRT restarted, pt clotted off x2.     Neuro: Arouses to Voice, Follows Commands and Moves All Extremities    Vital Signs: BP (!) 143/59 (BP Location: Left arm, Patient Position: Lying)   Pulse (!) 113   Temp 98.7 °F (37.1 °C) (Oral)   Resp (!) 22   Ht 6' 2.02" (1.88 m)   Wt (!) 150 kg (330 lb 11 oz)   SpO2 95%   BMI 42.44 kg/m²     Diet: NPO and Tube Feeds    Urine Output: Urinary Catheter 5-15 cc/hour    Drains: CXT 105cc/ shift, DENISE drain 50cc/ shift, G tube 175cc/ shift    CRRT SLED,       Labs/Accuchecks: Accu Q4, Renal/Mag Q8    Skin: CDI; heels, sacrum and elbows without breakdown. Turned Q2 hours to prevent breakdown       "

## 2019-10-19 NOTE — ASSESSMENT & PLAN NOTE
74M current smoker who underwent excision of bleeding gastric mass, post op course complicated by intraperitoneal free air. Now admitted to SICU following ex-lap 10/10/19 which revealed esophageal and gastric perforations which have been repaired. Now s/p tracheostomy placement on 10/17 for difficulty with extubating patient.     Neuro:  - Off of sedation, following commands and alert: GCS 11T  - PRN pain control    CV  - Fluid resuscitate as necessary  - Off of pressors   - Echo 9/12/10 wnl    Pulm  - Extubated 10/14, reintubated 10/15 for encephalopathy with respiratory distress and increased WOB, subsequently underwent tracheostomy placement on 10/17.  - Continue PAV+ as he appears more comfortable on it, trach collar trial today.  - ABG's prn  - Uses CPAP at home, NO CPAP due to esophageal rupture  - COPD with Significant history of smoking (3 ppd); continue nicotine patch    GI  - Post-op esophageal and gastric perforations 10/9, now repaired via ex-lap  - Monitor drains, care per surgery  - Do not use G-tube. Leave to gravity.  - Tube feeds per J tube, increase to goal as tolerated.    Renal  - Nephrology following and performing daily SLED for electrolyte derangements and acidosis, taking off ~1L with UF of 350.  - Additional SLED planned for tonight  - Uremia improving, down trended to 48, mental status improving.  - BHASKRA but improving, Cr of 2.5 today, baseline Cr of 1  - CMP daily  - Electrolyte repletion PRN      Heme/ID  - Trend WBC, remains elevated   - Abdominal culture (10/10) resulted with Yeast (awaiting speciation), Fluconazole restarted with end date of 10/24.  - Hgb stable  - CBCs daily  - Completed course of Cefepime, flagyl, vancomycin, and diflucan for contaminated peritoneum on 10/16.    Endo  - DM2  - POCT glucose q4h  - SSI     Dispo: Continue ICU care, wean to trach collar, increase tube feeds to goal.

## 2019-10-19 NOTE — TREATMENT PLAN
AES Treatment Plan    Pasha Harmon is a 74 y.o. male admitted to hospital 10/9/2019 (Hospital Day: 11) due to Bowel perforation.     Interval History  On ventilator  Opens eyes to voice, more drowsy and not following commands this morning.  On dialysis    Objective  Temp:  [98.7 °F (37.1 °C)-100.4 °F (38 °C)] 98.9 °F (37.2 °C) (10/19 1100)  Pulse:  [] 92 (10/19 1140)  BP: (111-158)/(54-74) 128/60 (10/19 1100)  Resp:  [20-25] 24 (10/19 1140)  SpO2:  [90 %-100 %] 97 % (10/19 1140)  Arterial Line BP: (101-155)/(52-87) 120/52 (10/19 1115)    General: Lethargic, opens eyes to command  Abdomen: Normoactive bowel sounds. Non-distended. Normal tympany. Soft.    Laboratory    Recent Labs   Lab 10/17/19  0300 10/18/19  0400 10/19/19  0335   HGB 8.4* 8.5* 7.5*       Lab Results   Component Value Date    WBC 20.26 (H) 10/19/2019    HGB 7.5 (L) 10/19/2019    HCT 24.6 (L) 10/19/2019    MCV 83 10/19/2019     10/19/2019       Lab Results   Component Value Date     10/19/2019    K 4.2 10/19/2019     10/19/2019    CO2 23 10/19/2019    BUN 48 (H) 10/19/2019    CREATININE 2.5 (H) 10/19/2019    CALCIUM 8.4 (L) 10/19/2019    ANIONGAP 11 10/19/2019    ESTGFRAFRICA 28.4 (A) 10/19/2019    EGFRNONAA 24.6 (A) 10/19/2019       Lab Results   Component Value Date    ALT 12 10/14/2019    AST 19 10/14/2019    ALKPHOS 67 10/14/2019    BILITOT 1.0 10/14/2019       Lab Results   Component Value Date    INR 1.0 10/12/2019    INR 1.2 10/10/2019    INR 1.2 05/24/2019       Plan  - Continue supportive care per SICU  - Appreciate nephrology assistance  - Nutrition  - We will continue to follow    Thank you for involving us in the care of Pasha Harmon. Please call with any additional questions, concerns or changes in the patient's clinical status.    Darwin Haynes MD  Gastroenterology Fellow, PGY IV

## 2019-10-19 NOTE — ASSESSMENT & PLAN NOTE
BHASKAR 2/2 ischemic ATN due to intraoperative hypotension and acute anemia.- muddy brown casts (Baseline sCr: 0.8), patient received SLED the last days.   - issues with catheter over night, only 1 1/2 hours of SLED, TPA did not work.    Plan/Recommendations:    - Would give diuretics today 120 mg furosemide bid. Does not need SLED today  - will need new dialysis access for tomorrow if no recovery of renal function. Will attempt HD (if staff available).    -Avoid nephrotoxic medications  -Renally dose medications  -Avoid contrast studies   -Daily renal function panels   -Strict I/O's   -Will continue to follow closely

## 2019-10-19 NOTE — SUBJECTIVE & OBJECTIVE
Interval History/Significant Events:   Underwent tracheostomy placement 10/17, did not tolerate AC vent mode but now on PAV and tolerating well.  SLED overnight with 1.5L off. Improving uremia, creatinine. Making 20-50 cc urine/hr.  Remains off sedation.  TF at 40, increasing to goal as tolerated.    Follow-up For: Procedure(s) (LRB):  CREATION, TRACHEOSTOMY (N/A)    Post-Operative Day: 1 Day Post-Op    Objective:     Vital Signs (Most Recent):  Temp: 98.9 °F (37.2 °C) (10/19/19 1100)  Pulse: 92 (10/19/19 1140)  Resp: (!) 24 (10/19/19 1140)  BP: 128/60 (10/19/19 1100)  SpO2: 97 % (10/19/19 1140) Vital Signs (24h Range):  Temp:  [98.7 °F (37.1 °C)-100.4 °F (38 °C)] 98.9 °F (37.2 °C)  Pulse:  [] 92  Resp:  [20-25] 24  SpO2:  [90 %-100 %] 97 %  BP: (111-158)/(54-74) 128/60  Arterial Line BP: (101-155)/(52-87) 120/52     Weight: (!) 150 kg (330 lb 11 oz)  Body mass index is 42.44 kg/m².      Intake/Output Summary (Last 24 hours) at 10/19/2019 1157  Last data filed at 10/19/2019 1100  Gross per 24 hour   Intake 1660 ml   Output 2368 ml   Net -708 ml       Physical Exam   Constitutional: He appears well-developed and well-nourished. No distress.   HENT:   Head: Normocephalic and atraumatic.   Trach in place.   Eyes: Pupils are equal, round, and reactive to light. Conjunctivae are normal.   Neck: Normal range of motion.   Cardiovascular: Normal rate, regular rhythm and intact distal pulses.   Pulmonary/Chest: Effort normal. No respiratory distress.   Trach on PAV.   Abdominal: Soft. He exhibits no distension.   G-tube to gravity with bilious output.  J-tube with tube feeds infusing.   Tube sites clean, no surrounding erythema.  Incision c/d/i, no erythema or drainage.   Musculoskeletal: Normal range of motion.   Neurological: He is alert.   GCS 11T.   Skin: Skin is warm and dry.       Vents:  Vent Mode: Spont (10/19/19 1140)  Ventilator Initiated: Yes (10/10/19 1621)  Set Rate: 26 bmp (10/17/19 2321)  Vt Set: 550 mL  (10/17/19 2321)  PEEP/CPAP: 8 cmH20 (10/19/19 1140)  Oxygen Concentration (%): 35 (10/19/19 1140)  Peak Airway Pressure: 24 cmH2O (10/19/19 1140)  Plateau Pressure: 23 cmH20 (10/19/19 1140)  Total Ve: 15.3 mL (10/19/19 1140)  F/VT Ratio<105 (RSBI): (!) 37.33 (10/19/19 1140)    Lines/Drains/Airways     Central Venous Catheter Line                 Percutaneous Central Line Insertion/Assessment - triple lumen  10/10/19 1853 right internal jugular 8 days         Trialysis (Dialysis) Catheter 10/16/19 1329 left internal jugular 2 days          Drain                 Chest Tube 10/10/19 1510 1 Right Midaxillary;Fourth intercostal space 28 Fr. 8 days         Chest Tube 10/10/19 1513 Left Midaxillary;Fourth intercostal space 28 Fr. 8 days         Closed/Suction Drain 10/10/19 1349 Right RUQ Bulb 19 Fr. 8 days         Gastrostomy/Enterostomy 10/10/19 1400 Jejunostomy tube 8 days         Gastrostomy/Enterostomy 10/10/19 1400 LUQ 8 days         Urethral Catheter 10/10/19 1530 Straight-tip;Double-lumen;Non-latex 16 Fr. 8 days          Airway                 Surgical Airway 10/17/19 1603 Shiley Cuffed 1 day          Arterial Line                 Arterial Line 10/10/19 1245 Right Radial 8 days                Significant Labs:    CBC/Anemia Profile:  Recent Labs   Lab 10/18/19  0400 10/19/19  0335   WBC 20.33* 20.26*   HGB 8.5* 7.5*   HCT 27.7* 24.6*   * 312   MCV 83 83   RDW 19.4* 19.0*        Chemistries:  Recent Labs   Lab 10/18/19  0400 10/18/19  2300 10/19/19  0335     143 143 144   K 4.4  4.4 4.4 4.2     108 108 110   CO2 23  23 23 23   BUN 59*  59* 42* 48*   CREATININE 2.1*  2.1* 2.1* 2.5*   CALCIUM 8.9  8.9 8.8 8.4*   ALBUMIN 1.9* 1.9* 1.7*   MG 2.7* 2.4 2.3   PHOS 4.5  4.5 3.2 3.6  3.6       ABGs:   Recent Labs   Lab 10/19/19  0915   PH 7.445   PCO2 34.6*   HCO3 23.7*   POCSATURATED 97   BE 0     Coagulation: No results for input(s): PT, INR, APTT in the last 48 hours.  Lactic Acid: No results  for input(s): LACTATE in the last 48 hours.    Significant Imaging:  I have reviewed all pertinent imaging results/findings within the past 24 hours.

## 2019-10-19 NOTE — SUBJECTIVE & OBJECTIVE
Interval History: no events over night, patient had some hours of SLED (interrupted), LIJ catheter is not working despite TPA.  1 liter volume negative last 24 hours.     Review of patient's allergies indicates:   Allergen Reactions    Meloxicam Other (See Comments)     Ulcer, GI bleed      Current Facility-Administered Medications   Medication Frequency    0.9%  NaCl infusion (CRRT USE ONLY) Continuous    albuterol-ipratropium 2.5 mg-0.5 mg/3 mL nebulizer solution 3 mL Q4H PRN    albuterol-ipratropium 2.5 mg-0.5 mg/3 mL nebulizer solution 3 mL Q4H    calcium gluconate 3,000 mg in dextrose 5 % 100 mL IVPB Continuous    chlorhexidine 0.12 % solution 15 mL BID    dexmedetomidine (PRECEDEX) 400mcg/100mL 0.9% NaCL infusion Continuous    dextrose 10% (D10W) Bolus PRN    dextrose 10% (D10W) Bolus PRN    dextrose-sod citrate-citric ac 2.45-2.2 gram- 800 mg/100 mL Soln Continuous    famotidine 40 mg/5 mL (8 mg/mL) suspension 20 mg Daily    fentaNYL injection 25 mcg Q1H PRN    fluconazole (DIFLUCAN) IVPB 400 mg 200 mL Q24H    fluticasone furoate-vilanterol 100-25 mcg/dose diskus inhaler 1 puff Daily    glucagon (human recombinant) injection 1 mg PRN    heparin (porcine) injection 7,500 Units Q8H    hydrALAZINE injection 20 mg Q6H PRN    insulin aspart U-100 pen 0-5 Units QID (AC + HS) PRN    nicotine 21 mg/24 hr 1 patch Daily    norepinephrine 4 mg in dextrose 5% 250 mL infusion (premix) (titrating) Continuous    ondansetron injection 4 mg Q6H PRN    sodium chloride 0.9% flush 10 mL PRN       Objective:     Vital Signs (Most Recent):  Temp: 98.9 °F (37.2 °C) (10/19/19 1100)  Pulse: 95 (10/19/19 1215)  Resp: (!) 24 (10/19/19 1140)  BP: (!) 119/56 (10/19/19 1200)  SpO2: 97 % (10/19/19 1215)  O2 Device (Oxygen Therapy): ventilator (10/19/19 1140) Vital Signs (24h Range):  Temp:  [98.7 °F (37.1 °C)-100.4 °F (38 °C)] 98.9 °F (37.2 °C)  Pulse:  [] 95  Resp:  [20-25] 24  SpO2:  [90 %-100 %] 97 %  BP:  (111-154)/(54-74) 119/56  Arterial Line BP: (101-155)/(52-87) 126/54     Weight: (!) 150 kg (330 lb 11 oz) (10/18/19 0500)  Body mass index is 42.44 kg/m².  Body surface area is 2.8 meters squared.    I/O last 3 completed shifts:  In: 4887 [I.V.:2681; NG/GT:1105; IV Piggyback:1101]  Out: 7225 [Urine:590; Drains:1205; Other:5110; Chest Tube:320]    Physical Exam   Constitutional: He is oriented to person, place, and time. He appears well-nourished.   HENT:   Head: Normocephalic.   Eyes: EOM are normal.   Neck: Neck supple.   Cardiovascular: Normal rate.   Pulmonary/Chest: Effort normal.   Abdominal: Soft. Bowel sounds are normal.   G-tube to gravity with bilious output.  J-tube with tube feeds infusing.    Musculoskeletal: Normal range of motion. He exhibits edema.   Neurological: He is oriented to person, place, and time.   Skin: Skin is warm and dry.   Psychiatric: He has a normal mood and affect.       Significant Labs:  All labs within the past 24 hours have been reviewed.     Significant Imaging:  X-Ray: Reviewed  improved

## 2019-10-19 NOTE — PLAN OF CARE
No acute events today. Trach remains connected to ventilator, PAV+ 35% FiO2 5 PEEP, support 70%. Bilateral chest tubes in place with serous output. Patient more alert as the day progressed, follows commands well. Worked well with PT/OT this morning, sat up on side of the bed. Sinus tach on bedside monitor. Tmax 100.4. BP stable, MAP remained > 65. CVP 7-9. UOP 15-20 mL/hr. CRRT to be restarted tonight. Progressing toward tube feeding goal, currently @ 40 mL/hr to J-tube. G-tube to gravity with green output. 1 large BM this morning. Repositioned frequently. Bed in low position and brake set. Patient's daughter at bedside. Updated on plan of care. Dr. Parrish at bedside this evening to see patient, plan for trach collar trial tomorrow. Continuing to monitor.

## 2019-10-19 NOTE — PROGRESS NOTES
Needs to TPA red port of the catheter due to venous high pressure alarms even with low Blood flows    Primary nurse informed.

## 2019-10-20 LAB
ALLENS TEST: ABNORMAL
ANION GAP SERPL CALC-SCNC: 13 MMOL/L (ref 8–16)
ANISOCYTOSIS BLD QL SMEAR: SLIGHT
BASOPHILS NFR BLD: 1 % (ref 0–1.9)
BUN SERPL-MCNC: 67 MG/DL (ref 8–23)
CA-I BLDV-SCNC: 1.15 MMOL/L (ref 1.06–1.42)
CA-I BLDV-SCNC: 1.17 MMOL/L (ref 1.06–1.42)
CALCIUM SERPL-MCNC: 8.3 MG/DL (ref 8.7–10.5)
CHLORIDE SERPL-SCNC: 108 MMOL/L (ref 95–110)
CO2 SERPL-SCNC: 22 MMOL/L (ref 23–29)
CREAT SERPL-MCNC: 3.9 MG/DL (ref 0.5–1.4)
DELSYS: ABNORMAL
DIFFERENTIAL METHOD: ABNORMAL
EOSINOPHIL NFR BLD: 1 % (ref 0–8)
ERYTHROCYTE [DISTWIDTH] IN BLOOD BY AUTOMATED COUNT: 19.2 % (ref 11.5–14.5)
EST. GFR  (AFRICAN AMERICAN): 16.6 ML/MIN/1.73 M^2
EST. GFR  (NON AFRICAN AMERICAN): 14.3 ML/MIN/1.73 M^2
FIO2: 35
GLUCOSE SERPL-MCNC: 124 MG/DL (ref 70–110)
HCO3 UR-SCNC: 22.5 MMOL/L (ref 24–28)
HCT VFR BLD AUTO: 24.5 % (ref 40–54)
HGB BLD-MCNC: 7.2 G/DL (ref 14–18)
HYPOCHROMIA BLD QL SMEAR: ABNORMAL
IMM GRANULOCYTES # BLD AUTO: ABNORMAL K/UL (ref 0–0.04)
IMM GRANULOCYTES NFR BLD AUTO: ABNORMAL % (ref 0–0.5)
LYMPHOCYTES NFR BLD: 3 % (ref 18–48)
MAGNESIUM SERPL-MCNC: 2.6 MG/DL (ref 1.6–2.6)
MCH RBC QN AUTO: 24.7 PG (ref 27–31)
MCHC RBC AUTO-ENTMCNC: 29.4 G/DL (ref 32–36)
MCV RBC AUTO: 84 FL (ref 82–98)
METAMYELOCYTES NFR BLD MANUAL: 2 %
MODE: ABNORMAL
MONOCYTES NFR BLD: 7 % (ref 4–15)
MYELOCYTES NFR BLD MANUAL: 1 %
NEUTROPHILS NFR BLD: 84 % (ref 38–73)
NEUTS BAND NFR BLD MANUAL: 1 %
NRBC BLD-RTO: 0 /100 WBC
OVALOCYTES BLD QL SMEAR: ABNORMAL
PCO2 BLDA: 34.1 MMHG (ref 35–45)
PH SMN: 7.43 [PH] (ref 7.35–7.45)
PHOSPHATE SERPL-MCNC: 5 MG/DL (ref 2.7–4.5)
PLATELET # BLD AUTO: 370 K/UL (ref 150–350)
PLATELET BLD QL SMEAR: ABNORMAL
PMV BLD AUTO: 10.5 FL (ref 9.2–12.9)
PO2 BLDA: 104 MMHG (ref 80–100)
POC BE: -2 MMOL/L
POC SATURATED O2: 98 % (ref 95–100)
POC TCO2: 24 MMOL/L (ref 23–27)
POCT GLUCOSE: 126 MG/DL (ref 70–110)
POCT GLUCOSE: 130 MG/DL (ref 70–110)
POCT GLUCOSE: 140 MG/DL (ref 70–110)
POIKILOCYTOSIS BLD QL SMEAR: SLIGHT
POLYCHROMASIA BLD QL SMEAR: ABNORMAL
POTASSIUM SERPL-SCNC: 4.5 MMOL/L (ref 3.5–5.1)
RBC # BLD AUTO: 2.91 M/UL (ref 4.6–6.2)
SAMPLE: ABNORMAL
SITE: ABNORMAL
SODIUM SERPL-SCNC: 143 MMOL/L (ref 136–145)
SPHEROCYTES BLD QL SMEAR: ABNORMAL
WBC # BLD AUTO: 17.1 K/UL (ref 3.9–12.7)

## 2019-10-20 PROCEDURE — 25000003 PHARM REV CODE 250: Performed by: STUDENT IN AN ORGANIZED HEALTH CARE EDUCATION/TRAINING PROGRAM

## 2019-10-20 PROCEDURE — 99233 PR SUBSEQUENT HOSPITAL CARE,LEVL III: ICD-10-PCS | Mod: 24,,, | Performed by: SURGERY

## 2019-10-20 PROCEDURE — C1751 CATH, INF, PER/CENT/MIDLINE: HCPCS

## 2019-10-20 PROCEDURE — S4991 NICOTINE PATCH NONLEGEND: HCPCS | Performed by: PHYSICIAN ASSISTANT

## 2019-10-20 PROCEDURE — 27100092 HC HIGH FLOW DELIVERY CANNULA

## 2019-10-20 PROCEDURE — 82330 ASSAY OF CALCIUM: CPT

## 2019-10-20 PROCEDURE — 85027 COMPLETE CBC AUTOMATED: CPT

## 2019-10-20 PROCEDURE — 63600175 PHARM REV CODE 636 W HCPCS: Performed by: STUDENT IN AN ORGANIZED HEALTH CARE EDUCATION/TRAINING PROGRAM

## 2019-10-20 PROCEDURE — 99900035 HC TECH TIME PER 15 MIN (STAT)

## 2019-10-20 PROCEDURE — 31720 CLEARANCE OF AIRWAYS: CPT

## 2019-10-20 PROCEDURE — 27100171 HC OXYGEN HIGH FLOW UP TO 24 HOURS

## 2019-10-20 PROCEDURE — 63600175 PHARM REV CODE 636 W HCPCS: Performed by: SURGERY

## 2019-10-20 PROCEDURE — 37799 UNLISTED PX VASCULAR SURGERY: CPT

## 2019-10-20 PROCEDURE — 84100 ASSAY OF PHOSPHORUS: CPT

## 2019-10-20 PROCEDURE — 20000000 HC ICU ROOM

## 2019-10-20 PROCEDURE — 94761 N-INVAS EAR/PLS OXIMETRY MLT: CPT

## 2019-10-20 PROCEDURE — 94668 MNPJ CHEST WALL SBSQ: CPT

## 2019-10-20 PROCEDURE — 99233 PR SUBSEQUENT HOSPITAL CARE,LEVL III: ICD-10-PCS | Mod: ,,, | Performed by: INTERNAL MEDICINE

## 2019-10-20 PROCEDURE — 80048 BASIC METABOLIC PNL TOTAL CA: CPT

## 2019-10-20 PROCEDURE — 25000003 PHARM REV CODE 250: Performed by: PHYSICIAN ASSISTANT

## 2019-10-20 PROCEDURE — 99233 SBSQ HOSP IP/OBS HIGH 50: CPT | Mod: ,,, | Performed by: INTERNAL MEDICINE

## 2019-10-20 PROCEDURE — 82803 BLOOD GASES ANY COMBINATION: CPT

## 2019-10-20 PROCEDURE — 99900026 HC AIRWAY MAINTENANCE (STAT)

## 2019-10-20 PROCEDURE — 27000221 HC OXYGEN, UP TO 24 HOURS

## 2019-10-20 PROCEDURE — 83735 ASSAY OF MAGNESIUM: CPT

## 2019-10-20 PROCEDURE — 25000242 PHARM REV CODE 250 ALT 637 W/ HCPCS: Performed by: STUDENT IN AN ORGANIZED HEALTH CARE EDUCATION/TRAINING PROGRAM

## 2019-10-20 PROCEDURE — 36556 INSERT NON-TUNNEL CV CATH: CPT

## 2019-10-20 PROCEDURE — 27200966 HC CLOSED SUCTION SYSTEM

## 2019-10-20 PROCEDURE — 94640 AIRWAY INHALATION TREATMENT: CPT

## 2019-10-20 PROCEDURE — 85007 BL SMEAR W/DIFF WBC COUNT: CPT

## 2019-10-20 PROCEDURE — 99233 SBSQ HOSP IP/OBS HIGH 50: CPT | Mod: 24,,, | Performed by: SURGERY

## 2019-10-20 RX ORDER — SODIUM CHLORIDE 9 MG/ML
INJECTION, SOLUTION INTRAVENOUS ONCE
Status: DISCONTINUED | OUTPATIENT
Start: 2019-10-20 | End: 2019-10-22

## 2019-10-20 RX ORDER — SODIUM CHLORIDE 9 MG/ML
INJECTION, SOLUTION INTRAVENOUS
Status: DISCONTINUED | OUTPATIENT
Start: 2019-10-20 | End: 2019-10-31 | Stop reason: HOSPADM

## 2019-10-20 RX ORDER — QUETIAPINE FUMARATE 25 MG/1
25 TABLET, FILM COATED ORAL 2 TIMES DAILY
Status: DISCONTINUED | OUTPATIENT
Start: 2019-10-20 | End: 2019-10-20

## 2019-10-20 RX ADMIN — IPRATROPIUM BROMIDE AND ALBUTEROL SULFATE 3 ML: .5; 3 SOLUTION RESPIRATORY (INHALATION) at 07:10

## 2019-10-20 RX ADMIN — CHLORHEXIDINE GLUCONATE 0.12% ORAL RINSE 15 ML: 1.2 LIQUID ORAL at 09:10

## 2019-10-20 RX ADMIN — CHLORHEXIDINE GLUCONATE 0.12% ORAL RINSE 15 ML: 1.2 LIQUID ORAL at 08:10

## 2019-10-20 RX ADMIN — HEPARIN SODIUM 7500 UNITS: 5000 INJECTION, SOLUTION INTRAVENOUS; SUBCUTANEOUS at 01:10

## 2019-10-20 RX ADMIN — HYDRALAZINE HYDROCHLORIDE 20 MG: 20 INJECTION INTRAMUSCULAR; INTRAVENOUS at 01:10

## 2019-10-20 RX ADMIN — IPRATROPIUM BROMIDE AND ALBUTEROL SULFATE 3 ML: .5; 3 SOLUTION RESPIRATORY (INHALATION) at 03:10

## 2019-10-20 RX ADMIN — FAMOTIDINE 20 MG: 40 POWDER, FOR SUSPENSION ORAL at 10:10

## 2019-10-20 RX ADMIN — IPRATROPIUM BROMIDE AND ALBUTEROL SULFATE 3 ML: .5; 3 SOLUTION RESPIRATORY (INHALATION) at 11:10

## 2019-10-20 RX ADMIN — QUETIAPINE FUMARATE 25 MG: 25 TABLET ORAL at 10:10

## 2019-10-20 RX ADMIN — HYDRALAZINE HYDROCHLORIDE 20 MG: 20 INJECTION INTRAMUSCULAR; INTRAVENOUS at 09:10

## 2019-10-20 RX ADMIN — FLUCONAZOLE 400 MG: 2 INJECTION, SOLUTION INTRAVENOUS at 09:10

## 2019-10-20 RX ADMIN — HEPARIN SODIUM 7500 UNITS: 5000 INJECTION, SOLUTION INTRAVENOUS; SUBCUTANEOUS at 05:10

## 2019-10-20 RX ADMIN — HEPARIN SODIUM 7500 UNITS: 5000 INJECTION, SOLUTION INTRAVENOUS; SUBCUTANEOUS at 09:10

## 2019-10-20 RX ADMIN — NICOTINE 1 PATCH: 21 PATCH, EXTENDED RELEASE TRANSDERMAL at 08:10

## 2019-10-20 NOTE — ASSESSMENT & PLAN NOTE
BHASKAR 2/2 ischemic ATN due to intraoperative hypotension and acute anemia.      Plan/Recommendations:  - HD tomorrow for metabolic clearance and volume removal  -Avoid nephrotoxic medications  -Renally dose medications  -Avoid contrast studies   -Daily renal function panels   -Strict I/O's   -Will continue to follow closely

## 2019-10-20 NOTE — PROGRESS NOTES
[facility]  Nephrology  Progress Note    Patient Name: Pasha Harmon  MRN: [id number]  Admission Date: 10/9/2019  Hospital Length of Stay: 10 days  Attending Provider: Paulino Parrish MD   Primary Care Physician: Eze Root MD  Principal Problem:Bowel perforation    Subjective:     HPI: Mr Harmon is a 75 yo male with COPD, CAD, and DMII who recently underwent EGD for resection of a 7cm gastric leiomyoma. There was difficulty retrieving the mass through the distal esophagus. The mass was subsequently removed piecemeal and there was concern for esophageal tears. POD 1, he had increasing leukocytosis and pneumoperitoneum on AXR. ACS took the patient to the OR for emergent exp laparotomy. Thoracic surgery was available during the exp lap and performed an EGD which showed a long segment esophageal tear, but no full thickness tear. He has normal renal function at baseline with a sCr of 0.8. On admission his sCr was 1.4 (10-9/19). His sCr began to improve, but then bumped again to 2.1 on 10/13/19. Of note, his hgb dropped to 6.0 on 10/12/19. His sCr continued to rise and peaked at 2.8 on 10/14/19. Today (10/15/19) his sCr is down to 2.6. Currently, he is not anuric or oliguric. Anesthesia records during the patient's ex lap on 10/10/19, and during the patient's EGD on 10/9/19, both reveal episodes of intraoperative hypotension (80's/40's). Nephrology has been consulted for BHASKAR.          Interval History: Patient seen and evaluated at bedside and found in no distress. Dialysis line changed today. No acute event reported overnight.    Review of patient's allergies indicates:   Allergen Reactions    Meloxicam Other (See Comments)     Ulcer, GI bleed      Current Facility-Administered Medications   Medication Frequency    albuterol-ipratropium 2.5 mg-0.5 mg/3 mL nebulizer solution 3 mL Q4H PRN    albuterol-ipratropium 2.5 mg-0.5 mg/3 mL nebulizer solution 3 mL Q4H    calcium gluconate 3,000  mg in dextrose 5 % 100 mL IVPB Continuous    chlorhexidine 0.12 % solution 15 mL BID    dexmedetomidine (PRECEDEX) 400mcg/100mL 0.9% NaCL infusion Continuous    dextrose 10% (D10W) Bolus PRN    dextrose 10% (D10W) Bolus PRN    famotidine 40 mg/5 mL (8 mg/mL) suspension 20 mg Daily    fentaNYL injection 25 mcg Q1H PRN    fluconazole (DIFLUCAN) IVPB 400 mg 200 mL Q24H    fluticasone furoate-vilanterol 100-25 mcg/dose diskus inhaler 1 puff Daily    glucagon (human recombinant) injection 1 mg PRN    heparin (porcine) injection 7,500 Units Q8H    hydrALAZINE injection 20 mg Q6H PRN    insulin aspart U-100 pen 0-5 Units QID (AC + HS) PRN    nicotine 21 mg/24 hr 1 patch Daily    norepinephrine 4 mg in dextrose 5% 250 mL infusion (premix) (titrating) Continuous    ondansetron injection 4 mg Q6H PRN    sodium chloride 0.9% flush 10 mL PRN       Objective:     Vital Signs (Most Recent):  Temp: 99.6 °F (37.6 °C) (10/20/19 1100)  Pulse: 94 (10/20/19 1400)  Resp: 14 (10/20/19 1154)  BP: (!) 143/65 (10/20/19 1400)  SpO2: 99 % (10/20/19 1400)  O2 Device (Oxygen Therapy): Trach Collar (10/20/19 1400) Vital Signs (24h Range):  Temp:  [98.2 °F (36.8 °C)-99.6 °F (37.6 °C)] 99.6 °F (37.6 °C)  Pulse:  [82-98] 94  Resp:  [14-24] 14  SpO2:  [96 %-100 %] 99 %  BP: (127-185)/(60-83) 143/65  Arterial Line BP: (124-184)/(50-77) 159/65     Weight: (!) 150 kg (330 lb 11 oz) (10/18/19 0500)  Body mass index is 42.44 kg/m².  Body surface area is 2.8 meters squared.    I/O last 3 completed shifts:  In: 2630 [I.V.:890; NG/GT:1640; IV Piggyback:100]  Out: 4326 [Urine:864; Drains:1445; Other:1463; Chest Tube:554]    Physical Exam   Constitutional: He is oriented to person, place, and time. He appears well-nourished.   HENT:   Head: Normocephalic.   Eyes: EOM are normal.   Neck: Neck supple.   Cardiovascular: Normal rate.   Pulmonary/Chest: Effort normal.   Abdominal: Soft. Bowel sounds are normal.   G-tube to gravity with bilious  output.  J-tube with tube feeds infusing.    Musculoskeletal: Normal range of motion. He exhibits edema.   Neurological: He is oriented to person, place, and time.   Skin: Skin is warm and dry.   Psychiatric: He has a normal mood and affect.       Significant Labs:  CBC:   Recent Labs   Lab 10/20/19  0330   WBC 17.10*   RBC 2.91*   HGB 7.2*   HCT 24.5*   *   MCV 84   MCH 24.7*   MCHC 29.4*     CMP:   Recent Labs   Lab 10/14/19  1500  10/19/19  1400 10/20/19  0330   *   < > 144* 124*   CALCIUM 8.7   < > 8.2* 8.3*   ALBUMIN 2.1*   < > 1.7*  --    PROT 5.6*  --   --   --    *   < > 144 143   K 4.3   < > 4.3 4.5   CO2 17*   < > 23 22*      < > 111* 108   BUN 79*   < > 55* 67*   CREATININE 2.8*   < > 2.9* 3.9*   ALKPHOS 67  --   --   --    ALT 12  --   --   --    AST 19  --   --   --    BILITOT 1.0  --   --   --     < > = values in this interval not displayed.     All labs within the past 24 hours have been reviewed.       Assessment/Plan:     BHASKAR (acute kidney injury)  BHASKAR 2/2 ischemic ATN due to intraoperative hypotension and acute anemia.      Plan/Recommendations:  - HD tomorrow for metabolic clearance and volume removal  -Avoid nephrotoxic medications  -Renally dose medications  -Avoid contrast studies   -Daily renal function panels   -Strict I/O's   -Will continue to follow closely            Thank you for your consult. I will follow-up with patient. Please contact us if you have any additional questions.    Derrell Wade MD  Nephrology  [facility]

## 2019-10-20 NOTE — PLAN OF CARE
"Dx: Bowel perforation    Shift Events: VSS; pt on PAV+, IbS292%, PEEP 5, 50% support with sats remaining >95%. No acute events. Pt bathed and on new linens.    Neuro: AAO x4, Arouses to Voice, Follows Commands and Moves All Extremities    Vital Signs: BP (!) 185/82 (BP Location: Left arm, Patient Position: Lying)   Pulse 97   Temp 98.2 °F (36.8 °C) (Oral)   Resp (!) 23   Ht 6' 2.02" (1.88 m)   Wt (!) 150 kg (330 lb 11 oz)   SpO2 100%   BMI 42.44 kg/m²     Diet: NPO and Tube Feeds    Urine Output: Urinary Catheter  cc/shift    Drains: DENISE Drain, total output 50 cc / shift, Chest Tube, total output 185 cc /  shift and G-tube/J-tube, total output 350 cc /  shift     Labs/Accuchecks: Accu Q4    Skin: CDI; heels, sacrum and elbows without breakdown       "

## 2019-10-20 NOTE — SUBJECTIVE & OBJECTIVE
Interval History/Significant Events:   Failed trach trail yesterday due to tachypnea. Placed on 50% pressure support overnight and is on 30% this AM.  CRRT clotted off yesterday after getting net negative 1L. Nephrology recommended 120 Lasix bolus BID and patient didn't respond. Uremia and creatinine elevated from yesterday, but overall lower than prior peak. Making 20-70 cc urine/hr.  Remains off sedation.  TF at 50, increase till goal.    Follow-up For: Procedure(s) (LRB):  CREATION, TRACHEOSTOMY (N/A)    Post-Operative Day: 3 Day Post-Op    Objective:     Vital Signs (Most Recent):  Temp: 99.5 °F (37.5 °C) (10/20/19 0700)  Pulse: 93 (10/20/19 0915)  Resp: (!) 24 (10/20/19 0740)  BP: (!) 177/79 (10/20/19 0900)  SpO2: 99 % (10/20/19 0915) Vital Signs (24h Range):  Temp:  [98.2 °F (36.8 °C)-99.5 °F (37.5 °C)] 99.5 °F (37.5 °C)  Pulse:  [82-99] 93  Resp:  [23-24] 24  SpO2:  [95 %-100 %] 99 %  BP: (119-185)/(56-82) 177/79  Arterial Line BP: (120-184)/(50-81) 169/71     Weight: (!) 150 kg (330 lb 11 oz)  Body mass index is 42.44 kg/m².      Intake/Output Summary (Last 24 hours) at 10/20/2019 1013  Last data filed at 10/20/2019 0900  Gross per 24 hour   Intake 1330 ml   Output 2518 ml   Net -1188 ml       Physical Exam   Constitutional: He appears well-developed and well-nourished. No distress.   HENT:   Head: Normocephalic and atraumatic.   Trach in place.   Eyes: Pupils are equal, round, and reactive to light. Conjunctivae are normal.   Neck: Normal range of motion.   Cardiovascular: Normal rate, regular rhythm and intact distal pulses.   Pulmonary/Chest: Effort normal. No respiratory distress.   Trach on PAV.   Abdominal: Soft. He exhibits no distension.   G-tube to gravity with bilious output.  J-tube with tube feeds infusing.   Tube sites clean, no surrounding erythema.  Incision c/d/i, no erythema or drainage.   Musculoskeletal: Normal range of motion.   Neurological: He is alert.   GCS 11T.   Skin: Skin is warm  and dry.       Vents:  Vent Mode: Spont (10/20/19 0914)  Ventilator Initiated: Yes (10/10/19 1621)  Set Rate: 26 bmp (10/17/19 2321)  Vt Set: 550 mL (10/17/19 2321)  PEEP/CPAP: 8 cmH20 (10/20/19 0914)  Oxygen Concentration (%): 35 (10/20/19 0915)  Peak Airway Pressure: 19 cmH2O (10/20/19 0914)  Plateau Pressure: 23 cmH20 (10/20/19 0914)  Total Ve: 15.8 mL (10/20/19 0914)  F/VT Ratio<105 (RSBI): (!) 34.29 (10/20/19 0740)    Lines/Drains/Airways     Central Venous Catheter Line                 Percutaneous Central Line Insertion/Assessment - triple lumen  10/10/19 1853 right internal jugular 9 days         Trialysis (Dialysis) Catheter 10/16/19 1329 left internal jugular 3 days          Drain                 Chest Tube 10/10/19 1510 1 Right Midaxillary;Fourth intercostal space 28 Fr. 9 days         Chest Tube 10/10/19 1513 Left Midaxillary;Fourth intercostal space 28 Fr. 9 days         Closed/Suction Drain 10/10/19 1349 Right RUQ Bulb 19 Fr. 9 days         Gastrostomy/Enterostomy 10/10/19 1400 Jejunostomy tube 9 days         Gastrostomy/Enterostomy 10/10/19 1400 LUQ 9 days         Urethral Catheter 10/10/19 1530 Straight-tip;Double-lumen;Non-latex 16 Fr. 9 days          Airway                 Surgical Airway 10/17/19 1603 Shiley Cuffed 2 days          Arterial Line                 Arterial Line 10/10/19 1245 Right Radial 9 days                Significant Labs:    CBC/Anemia Profile:  Recent Labs   Lab 10/19/19  0335 10/20/19  0330   WBC 20.26* 17.10*   HGB 7.5* 7.2*   HCT 24.6* 24.5*    370*   MCV 83 84   RDW 19.0* 19.2*        Chemistries:  Recent Labs   Lab 10/18/19  2300 10/19/19  0335 10/19/19  1400 10/20/19  0330    144 144 143   K 4.4 4.2 4.3 4.5    110 111* 108   CO2 23 23 23 22*   BUN 42* 48* 55* 67*   CREATININE 2.1* 2.5* 2.9* 3.9*   CALCIUM 8.8 8.4* 8.2* 8.3*   ALBUMIN 1.9* 1.7* 1.7*  --    MG 2.4 2.3 2.4 2.6   PHOS 3.2 3.6  3.6 4.6* 5.0*       ABGs:   Recent Labs   Lab 10/20/19  0454    PH 7.428   PCO2 34.1*   HCO3 22.5*   POCSATURATED 98   BE -2     Significant Imaging:  I have reviewed all pertinent imaging results/findings within the past 24 hours.

## 2019-10-20 NOTE — ASSESSMENT & PLAN NOTE
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 Trach placed on 10/17/19.    Bilateral chest tubes in place to monitor for esophageal leak - keep them to suction. Will remove once patient passes esophagram demonstrating no further leak  Continue G tube to gravity for gastric decompression - do not use for meds or feeds  Continue tube feeds  Wean vent as tolerated to TC, recommend CPAP at night    Rest of care per SICU

## 2019-10-20 NOTE — PROGRESS NOTES
[facility]  Critical Care - Surgery  Progress Note    Patient Name: Pasha Harmon  MRN: [id number]  Admission Date: 10/9/2019  Hospital Length of Stay: 10 days  Code Status: Full Code  Attending Provider: Paulino Parrish MD  Primary Care Provider: Eze Root MD   Principal Problem: Bowel perforation    Subjective:     Hospital/ICU Course:  10/10: Admitted to SICU. R IJ TLC placed. On minimal pressors.   10/11: Remaining intubated. On minimal pressors.   10/12: Difficulty weaning pressors while on propofol and fentanyl to control resp rate from acidosis. Received 2u PRBCs after 1.25L albumin.   10/13: Pressors weaned, still not following commands after stopping sedation  10/14: NGT removed by primary team. Metoprolol started for HTN    Interval History/Significant Events:   Failed trach trail yesterday due to tachypnea. Placed on 50% pressure support overnight and is on 30% this AM.  CRRT clotted off yesterday after getting net negative 1L. Nephrology recommended 120 Lasix bolus BID and patient didn't respond. Uremia and creatinine elevated from yesterday, but overall lower than prior peak. Making 20-70 cc urine/hr.  Remains off sedation.  TF at 50, increase till goal.    Follow-up For: Procedure(s) (LRB):  CREATION, TRACHEOSTOMY (N/A)    Post-Operative Day: 3 Day Post-Op    Objective:     Vital Signs (Most Recent):  Temp: 99.5 °F (37.5 °C) (10/20/19 0700)  Pulse: 93 (10/20/19 0915)  Resp: (!) 24 (10/20/19 0740)  BP: (!) 177/79 (10/20/19 0900)  SpO2: 99 % (10/20/19 0915) Vital Signs (24h Range):  Temp:  [98.2 °F (36.8 °C)-99.5 °F (37.5 °C)] 99.5 °F (37.5 °C)  Pulse:  [82-99] 93  Resp:  [23-24] 24  SpO2:  [95 %-100 %] 99 %  BP: (119-185)/(56-82) 177/79  Arterial Line BP: (120-184)/(50-81) 169/71     Weight: (!) 150 kg (330 lb 11 oz)  Body mass index is 42.44 kg/m².      Intake/Output Summary (Last 24 hours) at 10/20/2019 1013  Last data filed at 10/20/2019 0900  Gross per 24 hour    Intake 1330 ml   Output 2518 ml   Net -1188 ml       Physical Exam   Constitutional: He appears well-developed and well-nourished. No distress.   HENT:   Head: Normocephalic and atraumatic.   Trach in place.   Eyes: Pupils are equal, round, and reactive to light. Conjunctivae are normal.   Neck: Normal range of motion.   Cardiovascular: Normal rate, regular rhythm and intact distal pulses.   Pulmonary/Chest: Effort normal. No respiratory distress.   Trach on PAV.   Abdominal: Soft. He exhibits no distension.   G-tube to gravity with bilious output.  J-tube with tube feeds infusing.   Tube sites clean, no surrounding erythema.  Incision c/d/i, no erythema or drainage.   Musculoskeletal: Normal range of motion.   Neurological: He is alert.   GCS 11T.   Skin: Skin is warm and dry.       Vents:  Vent Mode: Spont (10/20/19 0914)  Ventilator Initiated: Yes (10/10/19 1621)  Set Rate: 26 bmp (10/17/19 2321)  Vt Set: 550 mL (10/17/19 2321)  PEEP/CPAP: 8 cmH20 (10/20/19 0914)  Oxygen Concentration (%): 35 (10/20/19 0915)  Peak Airway Pressure: 19 cmH2O (10/20/19 0914)  Plateau Pressure: 23 cmH20 (10/20/19 0914)  Total Ve: 15.8 mL (10/20/19 0914)  F/VT Ratio<105 (RSBI): (!) 34.29 (10/20/19 0740)    Lines/Drains/Airways     Central Venous Catheter Line                 Percutaneous Central Line Insertion/Assessment - triple lumen  10/10/19 1853 right internal jugular 9 days         Trialysis (Dialysis) Catheter 10/16/19 1329 left internal jugular 3 days          Drain                 Chest Tube 10/10/19 1510 1 Right Midaxillary;Fourth intercostal space 28 Fr. 9 days         Chest Tube 10/10/19 1513 Left Midaxillary;Fourth intercostal space 28 Fr. 9 days         Closed/Suction Drain 10/10/19 1349 Right RUQ Bulb 19 Fr. 9 days         Gastrostomy/Enterostomy 10/10/19 1400 Jejunostomy tube 9 days         Gastrostomy/Enterostomy 10/10/19 1400 LUQ 9 days         Urethral Catheter 10/10/19 1530 Straight-tip;Double-lumen;Non-latex 16  Fr. 9 days          Airway                 Surgical Airway 10/17/19 1603 Shiley Cuffed 2 days          Arterial Line                 Arterial Line 10/10/19 1245 Right Radial 9 days                Significant Labs:    CBC/Anemia Profile:  Recent Labs   Lab 10/19/19  0335 10/20/19  0330   WBC 20.26* 17.10*   HGB 7.5* 7.2*   HCT 24.6* 24.5*    370*   MCV 83 84   RDW 19.0* 19.2*        Chemistries:  Recent Labs   Lab 10/18/19  2300 10/19/19  0335 10/19/19  1400 10/20/19  0330    144 144 143   K 4.4 4.2 4.3 4.5    110 111* 108   CO2 23 23 23 22*   BUN 42* 48* 55* 67*   CREATININE 2.1* 2.5* 2.9* 3.9*   CALCIUM 8.8 8.4* 8.2* 8.3*   ALBUMIN 1.9* 1.7* 1.7*  --    MG 2.4 2.3 2.4 2.6   PHOS 3.2 3.6  3.6 4.6* 5.0*       ABGs:   Recent Labs   Lab 10/20/19  0340   PH 7.428   PCO2 34.1*   HCO3 22.5*   POCSATURATED 98   BE -2     Significant Imaging:  I have reviewed all pertinent imaging results/findings within the past 24 hours.    Assessment/Plan:     * Bowel perforation  74M current smoker who underwent excision of bleeding gastric mass, post op course complicated by intraperitoneal free air. Now admitted to SICU following ex-lap 10/10/19 which revealed esophageal and gastric perforations which have been repaired. Now s/p tracheostomy placement on 10/17 for difficulty with extubating patient.     Neuro:  - Off of sedation, following commands and alert: GCS 11T  - PRN pain control  - Seroquel 25 BID for agitation while on trach collar    CV  - Fluid resuscitate as necessary  - Off of pressors   - Echo 9/12/10 wnl    Pulm  - Extubated 10/14, reintubated 10/15 for encephalopathy with respiratory distress and increased WOB, subsequently underwent tracheostomy placement on 10/17.  - Continue PAV+ as he appears more comfortable on it if not tolerating trach collar - trach collar trial today.  - ABG's prn  - Uses CPAP at home, NO CPAP due to esophageal rupture  - COPD with Significant history of smoking (3 ppd);  continue nicotine patch    GI  - Post-op esophageal and gastric perforations 10/9, now repaired via ex-lap  - Monitor drains, care per surgery  - Do not use G-tube. Leave to gravity.  - Tube feeds per J tube, increase to goal as tolerated.    Renal  - Nephrology following and performing daily SLED for electrolyte derangements and acidosis, taking off ~1L with UF of 350.  - recommending continuing CRRT; plan to either exchange trialysis catheter or place new one today  - Uremia worsening to 67, mental status improved.  - BHASKAR but intermittently worse, Cr of 3.9 today, baseline Cr of 1  - CMP daily  - Electrolyte repletion PRN      Heme/ID  - Trend WBC, remains elevated   - Abdominal culture (10/10) resulted with Yeast (awaiting speciation), Fluconazole restarted with end date of 10/24.  - Hgb stable  - CBCs daily  - Completed course of Cefepime, flagyl, vancomycin, and diflucan for contaminated peritoneum on 10/16.    Endo  - DM2  - POCT glucose q4h  - SSI     Dispo: Continue ICU care, wean to trach collar, increase tube feeds to goal.         Travis Malone MD  Critical Care - Surgery  [facility]

## 2019-10-20 NOTE — SUBJECTIVE & OBJECTIVE
Interval History: Patient seen and evaluated at bedside and found in no distress. Dialysis line changed today. No acute event reported overnight.    Review of patient's allergies indicates:   Allergen Reactions    Meloxicam Other (See Comments)     Ulcer, GI bleed      Current Facility-Administered Medications   Medication Frequency    albuterol-ipratropium 2.5 mg-0.5 mg/3 mL nebulizer solution 3 mL Q4H PRN    albuterol-ipratropium 2.5 mg-0.5 mg/3 mL nebulizer solution 3 mL Q4H    calcium gluconate 3,000 mg in dextrose 5 % 100 mL IVPB Continuous    chlorhexidine 0.12 % solution 15 mL BID    dexmedetomidine (PRECEDEX) 400mcg/100mL 0.9% NaCL infusion Continuous    dextrose 10% (D10W) Bolus PRN    dextrose 10% (D10W) Bolus PRN    famotidine 40 mg/5 mL (8 mg/mL) suspension 20 mg Daily    fentaNYL injection 25 mcg Q1H PRN    fluconazole (DIFLUCAN) IVPB 400 mg 200 mL Q24H    fluticasone furoate-vilanterol 100-25 mcg/dose diskus inhaler 1 puff Daily    glucagon (human recombinant) injection 1 mg PRN    heparin (porcine) injection 7,500 Units Q8H    hydrALAZINE injection 20 mg Q6H PRN    insulin aspart U-100 pen 0-5 Units QID (AC + HS) PRN    nicotine 21 mg/24 hr 1 patch Daily    norepinephrine 4 mg in dextrose 5% 250 mL infusion (premix) (titrating) Continuous    ondansetron injection 4 mg Q6H PRN    sodium chloride 0.9% flush 10 mL PRN       Objective:     Vital Signs (Most Recent):  Temp: 99.6 °F (37.6 °C) (10/20/19 1100)  Pulse: 94 (10/20/19 1400)  Resp: 14 (10/20/19 1154)  BP: (!) 143/65 (10/20/19 1400)  SpO2: 99 % (10/20/19 1400)  O2 Device (Oxygen Therapy): Trach Collar (10/20/19 1400) Vital Signs (24h Range):  Temp:  [98.2 °F (36.8 °C)-99.6 °F (37.6 °C)] 99.6 °F (37.6 °C)  Pulse:  [82-98] 94  Resp:  [14-24] 14  SpO2:  [96 %-100 %] 99 %  BP: (127-185)/(60-83) 143/65  Arterial Line BP: (124-184)/(50-77) 159/65     Weight: (!) 150 kg (330 lb 11 oz) (10/18/19 0500)  Body mass index is 42.44  kg/m².  Body surface area is 2.8 meters squared.    I/O last 3 completed shifts:  In: 2630 [I.V.:890; NG/GT:1640; IV Piggyback:100]  Out: 4326 [Urine:864; Drains:1445; Other:1463; Chest Tube:554]    Physical Exam   Constitutional: He is oriented to person, place, and time. He appears well-nourished.   HENT:   Head: Normocephalic.   Eyes: EOM are normal.   Neck: Neck supple.   Cardiovascular: Normal rate.   Pulmonary/Chest: Effort normal.   Abdominal: Soft. Bowel sounds are normal.   G-tube to gravity with bilious output.  J-tube with tube feeds infusing.    Musculoskeletal: Normal range of motion. He exhibits edema.   Neurological: He is oriented to person, place, and time.   Skin: Skin is warm and dry.   Psychiatric: He has a normal mood and affect.       Significant Labs:  CBC:   Recent Labs   Lab 10/20/19  0330   WBC 17.10*   RBC 2.91*   HGB 7.2*   HCT 24.5*   *   MCV 84   MCH 24.7*   MCHC 29.4*     CMP:   Recent Labs   Lab 10/14/19  1500  10/19/19  1400 10/20/19  0330   *   < > 144* 124*   CALCIUM 8.7   < > 8.2* 8.3*   ALBUMIN 2.1*   < > 1.7*  --    PROT 5.6*  --   --   --    *   < > 144 143   K 4.3   < > 4.3 4.5   CO2 17*   < > 23 22*      < > 111* 108   BUN 79*   < > 55* 67*   CREATININE 2.8*   < > 2.9* 3.9*   ALKPHOS 67  --   --   --    ALT 12  --   --   --    AST 19  --   --   --    BILITOT 1.0  --   --   --     < > = values in this interval not displayed.     All labs within the past 24 hours have been reviewed.

## 2019-10-20 NOTE — SUBJECTIVE & OBJECTIVE
Interval History: No acute events overnight. Still on vent    Medications:  Continuous Infusions:   calcium gluconate IVPB 3,000 mg (10/18/19 2300)    dexmedetomidine (PRECEDEX) infusion Stopped (10/18/19 0600)    norepinephrine bitartrate-D5W Stopped (10/18/19 0300)     Scheduled Meds:   albuterol-ipratropium  3 mL Nebulization Q4H    chlorhexidine  15 mL Mouth/Throat BID    famotidine  20 mg Per J Tube Daily    fluconazole (DIFLUCAN) IVPB  400 mg Intravenous Q24H    fluticasone furoate-vilanterol  1 puff Inhalation Daily    heparin (porcine)  7,500 Units Subcutaneous Q8H    nicotine  1 patch Transdermal Daily     PRN Meds:albuterol-ipratropium, Dextrose 10% Bolus, Dextrose 10% Bolus, fentaNYL, glucagon (human recombinant), hydrALAZINE, insulin aspart U-100, ondansetron, sodium chloride 0.9%     Review of patient's allergies indicates:   Allergen Reactions    Meloxicam Other (See Comments)     Ulcer, GI bleed      Objective:     Vital Signs (Most Recent):  Temp: 99.5 °F (37.5 °C) (10/20/19 0700)  Pulse: 89 (10/20/19 0800)  Resp: (!) 24 (10/20/19 0740)  BP: (!) 162/72 (10/20/19 0700)  SpO2: 100 % (10/20/19 0800) Vital Signs (24h Range):  Temp:  [98.2 °F (36.8 °C)-99.5 °F (37.5 °C)] 99.5 °F (37.5 °C)  Pulse:  [] 89  Resp:  [23-24] 24  SpO2:  [95 %-100 %] 100 %  BP: (119-185)/(56-82) 162/72  Arterial Line BP: (120-184)/(50-81) 166/66     Weight: (!) 150 kg (330 lb 11 oz)  Body mass index is 42.44 kg/m².    Intake/Output - Last 3 Shifts       10/18 0700 - 10/19 0659 10/19 0700 - 10/20 0659 10/20 0700 - 10/21 0659    I.V. (mL/kg) 660 (4.4) 230 (1.5)     NG/ 1150 100    IV Piggyback 1101      Total Intake(mL/kg) 2536 (16.9) 1380 (9.2) 100 (0.7)    Urine (mL/kg/hr) 230 (0.1) 709 (0.2) 65 (0.2)    Drains 745 920 300    Other 2759      Stool 0 0     Chest Tube 300 369 80    Total Output 4034 1998 445    Net -1498 -618 -345           Stool Occurrence 1 x 1 x           Physical Exam   Constitutional: He  appears well-developed and well-nourished. He is sedated and intubated (Trach in place).   Cardiovascular: Normal rate and regular rhythm.   Pulmonary/Chest: Effort normal. He is intubated (Trach in place). No respiratory distress.   Bilateral chest tubes, draining SS, no air leak   Abdominal: Soft. He exhibits no distension. There is no tenderness. There is no guarding.   G tube in place, draining gastric contents  Jtube in place    Genitourinary:   Genitourinary Comments: Hallman in place   Neurological: He is alert.   Skin: Skin is warm and dry.   Vitals reviewed.      Significant Labs:  CBC:   Recent Labs   Lab 10/20/19  0330   WBC 17.10*   RBC 2.91*   HGB 7.2*   HCT 24.5*   *   MCV 84   MCH 24.7*   MCHC 29.4*     CMP:   Recent Labs   Lab 10/14/19  1500  10/19/19  1400 10/20/19  0330   *   < > 144* 124*   CALCIUM 8.7   < > 8.2* 8.3*   ALBUMIN 2.1*   < > 1.7*  --    PROT 5.6*  --   --   --    *   < > 144 143   K 4.3   < > 4.3 4.5   CO2 17*   < > 23 22*      < > 111* 108   BUN 79*   < > 55* 67*   CREATININE 2.8*   < > 2.9* 3.9*   ALKPHOS 67  --   --   --    ALT 12  --   --   --    AST 19  --   --   --    BILITOT 1.0  --   --   --     < > = values in this interval not displayed.     Coagulation:   No results for input(s): LABPROT, INR, APTT in the last 168 hours.  ABGs:   Recent Labs   Lab 10/20/19  0340   PH 7.428   PCO2 34.1*   PO2 104*   HCO3 22.5*   POCSATURATED 98   BE -2       Significant Diagnostics:  I have reviewed all pertinent imaging results/findings within the past 24 hours.

## 2019-10-20 NOTE — ASSESSMENT & PLAN NOTE
74M current smoker who underwent excision of bleeding gastric mass, post op course complicated by intraperitoneal free air. Now admitted to SICU following ex-lap 10/10/19 which revealed esophageal and gastric perforations which have been repaired. Now s/p tracheostomy placement on 10/17 for difficulty with extubating patient.     Neuro:  - Off of sedation, following commands and alert: GCS 11T  - PRN pain control  - Seroquel 25 BID for agitation while on trach collar    CV  - Fluid resuscitate as necessary  - Off of pressors   - Echo 9/12/10 wnl    Pulm  - Extubated 10/14, reintubated 10/15 for encephalopathy with respiratory distress and increased WOB, subsequently underwent tracheostomy placement on 10/17.  - Continue PAV+ as he appears more comfortable on it if not tolerating trach collar - trach collar trial today.  - ABG's prn  - Uses CPAP at home, NO CPAP due to esophageal rupture  - COPD with Significant history of smoking (3 ppd); continue nicotine patch    GI  - Post-op esophageal and gastric perforations 10/9, now repaired via ex-lap  - Monitor drains, care per surgery  - Do not use G-tube. Leave to gravity.  - Tube feeds per J tube, increase to goal as tolerated.    Renal  - Nephrology following and performing daily SLED for electrolyte derangements and acidosis, taking off ~1L with UF of 350.  - recommending continuing CRRT; plan to either exchange trialysis catheter or place new one today  - Uremia worsening to 67, mental status improved.  - BHASKAR but intermittently worse, Cr of 3.9 today, baseline Cr of 1  - CMP daily  - Electrolyte repletion PRN      Heme/ID  - Trend WBC, remains elevated   - Abdominal culture (10/10) resulted with Yeast (awaiting speciation), Fluconazole restarted with end date of 10/24.  - Hgb stable  - CBCs daily  - Completed course of Cefepime, flagyl, vancomycin, and diflucan for contaminated peritoneum on 10/16.    Endo  - DM2  - POCT glucose q4h  - SSI     Dispo: Continue ICU  care, wean to trach collar, increase tube feeds to goal.

## 2019-10-20 NOTE — PROCEDURES
"Pasha Harmon is a 74 y.o. male patient.    Temp: 99.6 °F (37.6 °C) (10/20/19 1100)  Pulse: 86 (10/20/19 1520)  Resp: 18 (10/20/19 1520)  BP: (!) 150/66 (10/20/19 1500)  SpO2: 98 % (10/20/19 1520)  Weight: (!) 150 kg (330 lb 11 oz) (10/18/19 0500)  Height: 6' 2.02" (188 cm) (10/15/19 1631)       Central Line  Date/Time: 10/20/2019 3:26 PM  Location procedure was performed: [facility]  Performed by: Eugenio Brooks MD  Assisting provider: Paulino Parrish MD  Pre-operative Diagnosis: renal failure  Post-operative diagnosis: renal failure  Consent Done: Yes  Time out: Immediately prior to procedure a "time out" was called to verify the correct patient, procedure, equipment, support staff and site/side marked as required.  Indications: med administration, vascular access and hemodialysis  Anesthesia: local infiltration    Anesthesia:  Local anesthesia used: yes  Local Anesthetic: lidocaine 1% with epinephrine  Anesthetic total: 5 mL  Description of findings: right triple lumen catheter replaced over wire with trialysis line   Preparation: skin prepped with ChloraPrep  Skin prep agent dried: skin prep agent completely dried prior to procedure  Sterile barriers: all five maximum sterile barriers used - cap, mask, sterile gown, sterile gloves, and large sterile sheet  Hand hygiene: hand hygiene performed prior to central venous catheter insertion  Location details: right internal jugular  Catheter type: trialysis  Catheter size: 14 Fr  Catheter Length: 16cm    Ultrasound guidance: no  Manometry: No   Number of attempts: 1  Assessment: placement verified by x-ray,  no pneumothorax on x-ray,  tip termination and successful placement  Technical procedures used: replacement over wire  Significant surgical tasks conducted by the assistant(s): none  Complications: none  Estimated blood loss (mL): 1  Specimens: No  Implants: Yes (trialysis catheter)  Post-procedure: line sutured,  chlorhexidine patch,  " sterile dressing applied and blood return through all ports  Complications: No          Eugenio Cecilia  10/20/2019

## 2019-10-20 NOTE — PROGRESS NOTES
"[facility]  General Surgery  Progress Note    Subjective:     History of Present Illness:  Patient is a 72 yo M with history of COPD and bleeding gastric mass who underwent EGD with AES yesterday where they removed a 7 cm "ulcerated lipoma" from his gastric antrum using submucosal endoscopic dissection. When removing the mass, it became stuck in the distal esophagus. They had to removed it piecemeal, and caused some mucosal esophageal tears. He was admitted for obs overnight, and this morning his WBC was elevated and he was complaining of abd pain. CXR showed free intraperitoneal free air and general surgery was consulted for evaluation.    Post-Op Info:  Procedure(s) (LRB):  CREATION, TRACHEOSTOMY (N/A)   3 Days Post-Op     Interval History: No acute events overnight. Still on vent    Medications:  Continuous Infusions:   calcium gluconate IVPB 3,000 mg (10/18/19 2300)    dexmedetomidine (PRECEDEX) infusion Stopped (10/18/19 0600)    norepinephrine bitartrate-D5W Stopped (10/18/19 0300)     Scheduled Meds:   albuterol-ipratropium  3 mL Nebulization Q4H    chlorhexidine  15 mL Mouth/Throat BID    famotidine  20 mg Per J Tube Daily    fluconazole (DIFLUCAN) IVPB  400 mg Intravenous Q24H    fluticasone furoate-vilanterol  1 puff Inhalation Daily    heparin (porcine)  7,500 Units Subcutaneous Q8H    nicotine  1 patch Transdermal Daily     PRN Meds:albuterol-ipratropium, Dextrose 10% Bolus, Dextrose 10% Bolus, fentaNYL, glucagon (human recombinant), hydrALAZINE, insulin aspart U-100, ondansetron, sodium chloride 0.9%     Review of patient's allergies indicates:   Allergen Reactions    Meloxicam Other (See Comments)     Ulcer, GI bleed      Objective:     Vital Signs (Most Recent):  Temp: 99.5 °F (37.5 °C) (10/20/19 0700)  Pulse: 89 (10/20/19 0800)  Resp: (!) 24 (10/20/19 0740)  BP: (!) 162/72 (10/20/19 0700)  SpO2: 100 % (10/20/19 0800) Vital Signs (24h Range):  Temp:  [98.2 °F (36.8 " °C)-99.5 °F (37.5 °C)] 99.5 °F (37.5 °C)  Pulse:  [] 89  Resp:  [23-24] 24  SpO2:  [95 %-100 %] 100 %  BP: (119-185)/(56-82) 162/72  Arterial Line BP: (120-184)/(50-81) 166/66     Weight: (!) 150 kg (330 lb 11 oz)  Body mass index is 42.44 kg/m².    Intake/Output - Last 3 Shifts       10/18 0700 - 10/19 0659 10/19 0700 - 10/20 0659 10/20 0700 - 10/21 0659    I.V. (mL/kg) 660 (4.4) 230 (1.5)     NG/ 1150 100    IV Piggyback 1101      Total Intake(mL/kg) 2536 (16.9) 1380 (9.2) 100 (0.7)    Urine (mL/kg/hr) 230 (0.1) 709 (0.2) 65 (0.2)    Drains 745 920 300    Other 2759      Stool 0 0     Chest Tube 300 369 80    Total Output 4034 1998 445    Net 1498 -618 -660           Stool Occurrence 1 x 1 x           Physical Exam   Constitutional: He appears well-developed and well-nourished. He is sedated and intubated (Trach in place).   Cardiovascular: Normal rate and regular rhythm.   Pulmonary/Chest: Effort normal. He is intubated (Trach in place). No respiratory distress.   Bilateral chest tubes, draining SS, no air leak   Abdominal: Soft. He exhibits no distension. There is no tenderness. There is no guarding.   G tube in place, draining gastric contents  Jtube in place    Genitourinary:   Genitourinary Comments: Hallman in place   Neurological: He is alert.   Skin: Skin is warm and dry.   Vitals reviewed.      Significant Labs:  CBC:   Recent Labs   Lab 10/20/19  0330   WBC 17.10*   RBC 2.91*   HGB 7.2*   HCT 24.5*   *   MCV 84   MCH 24.7*   MCHC 29.4*     CMP:   Recent Labs   Lab 10/14/19  1500  10/19/19  1400 10/20/19  0330   *   < > 144* 124*   CALCIUM 8.7   < > 8.2* 8.3*   ALBUMIN 2.1*   < > 1.7*  --    PROT 5.6*  --   --   --    *   < > 144 143   K 4.3   < > 4.3 4.5   CO2 17*   < > 23 22*      < > 111* 108   BUN 79*   < > 55* 67*   CREATININE 2.8*   < > 2.9* 3.9*   ALKPHOS 67  --   --   --    ALT 12  --   --   --    AST 19  --   --   --    BILITOT 1.0  --   --   --     < > =  values in this interval not displayed.     Coagulation:   No results for input(s): LABPROT, INR, APTT in the last 168 hours.  ABGs:   Recent Labs   Lab 10/20/19  0340   PH 7.428   PCO2 34.1*   PO2 104*   HCO3 22.5*   POCSATURATED 98   BE -2       Significant Diagnostics:  I have reviewed all pertinent imaging results/findings within the past 24 hours.    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 Trach placed on 10/17/19.    Bilateral chest tubes in place to monitor for esophageal leak - keep them to suction. Will remove once patient passes esophagram demonstrating no further leak  Continue G tube to gravity for gastric decompression - do not use for meds or feeds  Continue tube feeds  Wean vent as tolerated to TC, recommend CPAP at night    Rest of care per SICU     Esophageal tear             Nu Schulte MD  General Surgery  [facility]

## 2019-10-21 LAB
ALLENS TEST: ABNORMAL
ANION GAP SERPL CALC-SCNC: 12 MMOL/L (ref 8–16)
BASOPHILS # BLD AUTO: 0.05 K/UL (ref 0–0.2)
BASOPHILS NFR BLD: 0.3 % (ref 0–1.9)
BUN SERPL-MCNC: 90 MG/DL (ref 8–23)
CALCIUM SERPL-MCNC: 8.3 MG/DL (ref 8.7–10.5)
CHLORIDE SERPL-SCNC: 107 MMOL/L (ref 95–110)
CO2 SERPL-SCNC: 24 MMOL/L (ref 23–29)
CREAT SERPL-MCNC: 4.9 MG/DL (ref 0.5–1.4)
DELSYS: ABNORMAL
DIFFERENTIAL METHOD: ABNORMAL
EOSINOPHIL # BLD AUTO: 0.5 K/UL (ref 0–0.5)
EOSINOPHIL NFR BLD: 3.1 % (ref 0–8)
ERYTHROCYTE [DISTWIDTH] IN BLOOD BY AUTOMATED COUNT: 19.8 % (ref 11.5–14.5)
ERYTHROCYTE [SEDIMENTATION RATE] IN BLOOD BY WESTERGREN METHOD: 20 MM/H
EST. GFR  (AFRICAN AMERICAN): 12.6 ML/MIN/1.73 M^2
EST. GFR  (NON AFRICAN AMERICAN): 10.9 ML/MIN/1.73 M^2
FIO2: 35
FLOW: 8
GLUCOSE SERPL-MCNC: 124 MG/DL (ref 70–110)
HCO3 UR-SCNC: 24.4 MMOL/L (ref 24–28)
HCT VFR BLD AUTO: 24.1 % (ref 40–54)
HGB BLD-MCNC: 7.5 G/DL (ref 14–18)
IMM GRANULOCYTES # BLD AUTO: 0.63 K/UL (ref 0–0.04)
IMM GRANULOCYTES NFR BLD AUTO: 4.2 % (ref 0–0.5)
LYMPHOCYTES # BLD AUTO: 0.8 K/UL (ref 1–4.8)
LYMPHOCYTES NFR BLD: 5.1 % (ref 18–48)
MAGNESIUM SERPL-MCNC: 2.7 MG/DL (ref 1.6–2.6)
MCH RBC QN AUTO: 24.9 PG (ref 27–31)
MCHC RBC AUTO-ENTMCNC: 31.1 G/DL (ref 32–36)
MCV RBC AUTO: 80 FL (ref 82–98)
MODE: ABNORMAL
MONOCYTES # BLD AUTO: 1.3 K/UL (ref 0.3–1)
MONOCYTES NFR BLD: 8.6 % (ref 4–15)
NEUTROPHILS # BLD AUTO: 11.8 K/UL (ref 1.8–7.7)
NEUTROPHILS NFR BLD: 78.7 % (ref 38–73)
NRBC BLD-RTO: 0 /100 WBC
PCO2 BLDA: 37.3 MMHG (ref 35–45)
PH SMN: 7.42 [PH] (ref 7.35–7.45)
PHOSPHATE SERPL-MCNC: 5.8 MG/DL (ref 2.7–4.5)
PLATELET # BLD AUTO: 466 K/UL (ref 150–350)
PMV BLD AUTO: 10.4 FL (ref 9.2–12.9)
PO2 BLDA: 109 MMHG (ref 80–100)
POC BE: 0 MMOL/L
POC SATURATED O2: 98 % (ref 95–100)
POC TCO2: 26 MMOL/L (ref 23–27)
POCT GLUCOSE: 122 MG/DL (ref 70–110)
POCT GLUCOSE: 126 MG/DL (ref 70–110)
POCT GLUCOSE: 133 MG/DL (ref 70–110)
POCT GLUCOSE: 134 MG/DL (ref 70–110)
POCT GLUCOSE: 142 MG/DL (ref 70–110)
POTASSIUM SERPL-SCNC: 4.9 MMOL/L (ref 3.5–5.1)
RBC # BLD AUTO: 3.01 M/UL (ref 4.6–6.2)
SAMPLE: ABNORMAL
SITE: ABNORMAL
SODIUM SERPL-SCNC: 143 MMOL/L (ref 136–145)
SP02: 99
WBC # BLD AUTO: 14.99 K/UL (ref 3.9–12.7)

## 2019-10-21 PROCEDURE — 94640 AIRWAY INHALATION TREATMENT: CPT

## 2019-10-21 PROCEDURE — 37799 UNLISTED PX VASCULAR SURGERY: CPT

## 2019-10-21 PROCEDURE — 90935 PR HEMODIALYSIS, ONE EVALUATION: ICD-10-PCS | Mod: ,,, | Performed by: NURSE PRACTITIONER

## 2019-10-21 PROCEDURE — 94761 N-INVAS EAR/PLS OXIMETRY MLT: CPT

## 2019-10-21 PROCEDURE — 31720 CLEARANCE OF AIRWAYS: CPT

## 2019-10-21 PROCEDURE — 25000003 PHARM REV CODE 250: Performed by: STUDENT IN AN ORGANIZED HEALTH CARE EDUCATION/TRAINING PROGRAM

## 2019-10-21 PROCEDURE — 27000221 HC OXYGEN, UP TO 24 HOURS

## 2019-10-21 PROCEDURE — 99900026 HC AIRWAY MAINTENANCE (STAT)

## 2019-10-21 PROCEDURE — 92610 EVALUATE SWALLOWING FUNCTION: CPT

## 2019-10-21 PROCEDURE — 99291 PR CRITICAL CARE, E/M 30-74 MINUTES: ICD-10-PCS | Mod: 24,GC,, | Performed by: SURGERY

## 2019-10-21 PROCEDURE — 63600175 PHARM REV CODE 636 W HCPCS: Performed by: SURGERY

## 2019-10-21 PROCEDURE — 82803 BLOOD GASES ANY COMBINATION: CPT

## 2019-10-21 PROCEDURE — 99233 PR SUBSEQUENT HOSPITAL CARE,LEVL III: ICD-10-PCS | Mod: ,,, | Performed by: INTERNAL MEDICINE

## 2019-10-21 PROCEDURE — 80048 BASIC METABOLIC PNL TOTAL CA: CPT

## 2019-10-21 PROCEDURE — L8501 TRACHEOSTOMY SPEAKING VALVE: HCPCS

## 2019-10-21 PROCEDURE — 99233 SBSQ HOSP IP/OBS HIGH 50: CPT | Mod: ,,, | Performed by: INTERNAL MEDICINE

## 2019-10-21 PROCEDURE — 25000003 PHARM REV CODE 250: Performed by: PHYSICIAN ASSISTANT

## 2019-10-21 PROCEDURE — S4991 NICOTINE PATCH NONLEGEND: HCPCS | Performed by: PHYSICIAN ASSISTANT

## 2019-10-21 PROCEDURE — 80100014 HC HEMODIALYSIS 1:1

## 2019-10-21 PROCEDURE — 92597 ORAL SPEECH DEVICE EVAL: CPT

## 2019-10-21 PROCEDURE — 94668 MNPJ CHEST WALL SBSQ: CPT

## 2019-10-21 PROCEDURE — 83735 ASSAY OF MAGNESIUM: CPT

## 2019-10-21 PROCEDURE — 99291 CRITICAL CARE FIRST HOUR: CPT | Mod: 24,GC,, | Performed by: SURGERY

## 2019-10-21 PROCEDURE — 85025 COMPLETE CBC W/AUTO DIFF WBC: CPT

## 2019-10-21 PROCEDURE — 84100 ASSAY OF PHOSPHORUS: CPT

## 2019-10-21 PROCEDURE — 20000000 HC ICU ROOM

## 2019-10-21 PROCEDURE — 97803 MED NUTRITION INDIV SUBSEQ: CPT

## 2019-10-21 PROCEDURE — 99900035 HC TECH TIME PER 15 MIN (STAT)

## 2019-10-21 PROCEDURE — 25000242 PHARM REV CODE 250 ALT 637 W/ HCPCS: Performed by: STUDENT IN AN ORGANIZED HEALTH CARE EDUCATION/TRAINING PROGRAM

## 2019-10-21 PROCEDURE — 90935 HEMODIALYSIS ONE EVALUATION: CPT | Mod: ,,, | Performed by: NURSE PRACTITIONER

## 2019-10-21 RX ORDER — SODIUM CHLORIDE 9 MG/ML
INJECTION, SOLUTION INTRAVENOUS ONCE
Status: DISCONTINUED | OUTPATIENT
Start: 2019-10-21 | End: 2019-10-22

## 2019-10-21 RX ORDER — FLUCONAZOLE 2 MG/ML
200 INJECTION, SOLUTION INTRAVENOUS
Status: DISCONTINUED | OUTPATIENT
Start: 2019-10-21 | End: 2019-10-25

## 2019-10-21 RX ORDER — ACETAMINOPHEN 650 MG/20.3ML
650 LIQUID ORAL EVERY 6 HOURS PRN
Status: DISCONTINUED | OUTPATIENT
Start: 2019-10-21 | End: 2019-10-31 | Stop reason: HOSPADM

## 2019-10-21 RX ORDER — LIDOCAINE 50 MG/G
1 PATCH TOPICAL
Status: DISCONTINUED | OUTPATIENT
Start: 2019-10-21 | End: 2019-10-31 | Stop reason: HOSPADM

## 2019-10-21 RX ADMIN — HEPARIN SODIUM 7500 UNITS: 5000 INJECTION, SOLUTION INTRAVENOUS; SUBCUTANEOUS at 01:10

## 2019-10-21 RX ADMIN — FLUCONAZOLE 200 MG: 2 INJECTION, SOLUTION INTRAVENOUS at 09:10

## 2019-10-21 RX ADMIN — CHLORHEXIDINE GLUCONATE 0.12% ORAL RINSE 15 ML: 1.2 LIQUID ORAL at 08:10

## 2019-10-21 RX ADMIN — FAMOTIDINE 20 MG: 40 POWDER, FOR SUSPENSION ORAL at 08:10

## 2019-10-21 RX ADMIN — HEPARIN SODIUM 7500 UNITS: 5000 INJECTION, SOLUTION INTRAVENOUS; SUBCUTANEOUS at 10:10

## 2019-10-21 RX ADMIN — ACETAMINOPHEN 650 MG: 160 SOLUTION ORAL at 06:10

## 2019-10-21 RX ADMIN — IPRATROPIUM BROMIDE AND ALBUTEROL SULFATE 3 ML: .5; 3 SOLUTION RESPIRATORY (INHALATION) at 07:10

## 2019-10-21 RX ADMIN — IPRATROPIUM BROMIDE AND ALBUTEROL SULFATE 3 ML: .5; 3 SOLUTION RESPIRATORY (INHALATION) at 03:10

## 2019-10-21 RX ADMIN — LIDOCAINE 1 PATCH: 50 PATCH TOPICAL at 09:10

## 2019-10-21 RX ADMIN — ACETAMINOPHEN 650 MG: 160 SOLUTION ORAL at 08:10

## 2019-10-21 RX ADMIN — NICOTINE 1 PATCH: 21 PATCH, EXTENDED RELEASE TRANSDERMAL at 08:10

## 2019-10-21 RX ADMIN — IPRATROPIUM BROMIDE AND ALBUTEROL SULFATE 3 ML: .5; 3 SOLUTION RESPIRATORY (INHALATION) at 11:10

## 2019-10-21 RX ADMIN — HEPARIN SODIUM 7500 UNITS: 5000 INJECTION, SOLUTION INTRAVENOUS; SUBCUTANEOUS at 07:10

## 2019-10-21 NOTE — PROGRESS NOTES
"[facility]  Adult Nutrition  Progress Note    SUMMARY       Recommendations  Recommendation/Intervention:   As medically able, recommend advancing TF to Peptamen Intense VHP at a goal rate of 70 mL/hr - to provide 1680 kcal/day, 155g protein/day, and 1411mL free fluid/day.   RD to monitor.    Goals: Patient to receive nutrition by RD follow-up  Nutrition Goal Status: goal met  Communication of RD Recs: discussed on rounds    Reason for Assessment  Reason For Assessment: RD follow-up  Diagnosis: surgery/postoperative complications(bowel perforation)  Relevant Medical History: CAD, COPD, DM2, gastric lipoma, tobacco abuse  Interdisciplinary Rounds: attended  General Information Comments: Trach collar. Tolerating TF at 50 currently. 65g AA and 240g Dextrose  Nutrition Discharge Planning: Adequate nutrition via TF.    Nutrition Risk Screen  Nutrition Risk Screen: tube feeding or parenteral nutrition    Nutrition/Diet History  Spiritual, Cultural Beliefs, Mandaen Practices, Values that Affect Care: no  Factors Affecting Nutritional Intake: NPO    Anthropometrics  Temp: 98 °F (36.7 °C)  Height Method: Stated  Height: 6' 2.02" (188 cm)  Height (inches): 74.02 in  Weight Method: Bed Scale  Weight: (!) 150 kg (330 lb 11 oz)  Weight (lb): (!) 330.69 lb  Ideal Body Weight (IBW), Male: 190.12 lb  % Ideal Body Weight, Male (lb): 171.97 lb  BMI (Calculated): 42  BMI Grade: greater than 40 - morbid obesity  Usual Body Weight (UBW), k.8 kg(admit weight 10/)  % Usual Body Weight: 111.07  % Weight Change From Usual Weight: 10.84 %    Lab/Procedures/Meds  Pertinent Labs Reviewed: reviewed  Pertinent Labs Comments: BUN 90, Creat 4.9, Glu 124, Ca 8.3, Phos 5.8, Mag 2.7  Pertinent Medications Reviewed: reviewed  Pertinent Medications Comments: famotidine    Estimated/Assessed Needs  Weight Used For Calorie Calculations: 133.8 kg (294 lb 15.6 oz)(admit weight)  Energy Calorie Requirements (kcal): 3 " kcal/day  Energy Need Method: Kcal/kg(14)  Protein Requirements: 130-173 g/day(1.5-2.0 g/kg)  Weight Used For Protein Calculations: 86.4 kg (190 lb 7.6 oz)(IBW)  Fluid Requirements (mL): 1 mL/kcal or per MD  Estimated Fluid Requirement Method: RDA Method  RDA Method (mL): 1873    Nutrition Prescription Ordered  Current Diet Order: NPO  Current Nutrition Support Formula Ordered: Peptamen Intense VHP  Current Nutrition Support Rate Ordered: 65 (ml)  Current Nutrition Support Frequency Ordered: mL/hr    Evaluation of Received Nutrient/Fluid Intake  Enteral Calories (kcal): 1560  Enteral Protein (gm): 144  Enteral (Free Water) Fluid (mL): 1310  % Kcal Needs: 83%  % Protein Needs: 100%  I/O: -1.5L x 24hrs, +9L since admit  Energy Calories Required: not meeting needs  Protein Required: meeting needs  Fluid Required: (per MD)  Comments: LBM 10/19  Tolerance: tolerating(at 50 mL/hr)  % Intake of Estimated Energy Needs: 75 - 100 %  % Meal Intake: NPO    Nutrition Risk  Level of Risk/Frequency of Follow-up: high(2x/week)     Assessment and Plan  Nutrition Problem  Inadequate energy intake     Related to (etiology):   Decreased ability to consume sufficient energy     Signs and Symptoms (as evidenced by):   NPO with no alternative means of nutrition at this time     Interventions (treatment strategy):  Collaboration of nutrition care with other providers     Nutrition Diagnosis Status:   Improving - increasing TF to goal rate    Monitor and Evaluation  Food and Nutrient Intake: energy intake, enteral nutrition intake  Food and Nutrient Adminstration: enteral and parenteral nutrition administration  Anthropometric Measurements: weight, weight change  Biochemical Data, Medical Tests and Procedures: electrolyte and renal panel, gastrointestinal profile, inflammatory profile  Nutrition-Focused Physical Findings: overall appearance     Nutrition Follow-Up  RD Follow-up?: Yes

## 2019-10-21 NOTE — PROGRESS NOTES
[facility]  Critical Care - Surgery  Progress Note    Patient Name: Pasha Harmon  MRN: [id number]  Admission Date: 10/9/2019  Hospital Length of Stay: 11 days  Code Status: Full Code  Attending Provider: Paulino Parrish MD  Primary Care Provider: Eze Root MD   Principal Problem: Bowel perforation    Subjective:     Hospital/ICU Course:  10/10: Admitted to SICU. R IJ TLC placed. On minimal pressors.   10/11: Remaining intubated. On minimal pressors.   10/12: Difficulty weaning pressors while on propofol and fentanyl to control resp rate from acidosis. Received 2u PRBCs after 1.25L albumin.   10/13: Pressors weaned, still not following commands after stopping sedation  10/14: NGT removed by primary team. Metoprolol started for HTN  10/21: Tolerating trach collar >24hrs. Plan for SLP eval and esophogram today.      Interval History/Significant Events: Patient is awake and alert this morning. HDS. Tolerating trach collar >24hrs. Continues to be oliguric. Plan for HD today, SLP eval, and esophogram if can swallow. Left CT with disconnect, will reinforce.     Follow-up For: Procedure(s) (LRB):  CREATION, TRACHEOSTOMY (N/A)    Post-Operative Day: 4 Days Post-Op    Objective:     Vital Signs (Most Recent):  Temp: 97.9 °F (36.6 °C) (10/21/19 0700)  Pulse: 103 (10/21/19 0715)  Resp: 18 (10/21/19 0713)  BP: (!) 114/58 (10/21/19 0700)  SpO2: 97 % (10/21/19 0715) Vital Signs (24h Range):  Temp:  [97.9 °F (36.6 °C)-99.6 °F (37.6 °C)] 97.9 °F (36.6 °C)  Pulse:  [] 103  Resp:  [14-22] 18  SpO2:  [90 %-100 %] 97 %  BP: (114-177)/(56-90) 114/58  Arterial Line BP: (109-184)/(48-77) 116/52     Weight: (!) 150 kg (330 lb 11 oz)  Body mass index is 42.44 kg/m².      Intake/Output Summary (Last 24 hours) at 10/21/2019 0740  Last data filed at 10/21/2019 0700  Gross per 24 hour   Intake 1345 ml   Output 2932 ml   Net -1587 ml       Physical Exam   Constitutional: He appears well-developed and  well-nourished. No distress.   HENT:   Head: Normocephalic and atraumatic.   Trach in place.   Eyes: Pupils are equal, round, and reactive to light. Conjunctivae are normal.   Neck: Normal range of motion.   Cardiovascular: Normal rate, regular rhythm and intact distal pulses.   Pulmonary/Chest: Effort normal. No respiratory distress.   Trach on PAV.   Abdominal: Soft. He exhibits no distension.   G-tube to gravity with bilious output.  J-tube with tube feeds infusing.   Tube sites clean, no surrounding erythema.  Incision c/d/i, no erythema or drainage.   Musculoskeletal: Normal range of motion.   Neurological: He is alert.   GCS 11T.   Skin: Skin is warm and dry.       Lines/Drains/Airways     Central Venous Catheter Line                 Trialysis (Dialysis) Catheter 10/20/19 1415 right internal jugular less than 1 day          Drain                 Chest Tube 10/10/19 1510 1 Right Midaxillary;Fourth intercostal space 28 Fr. 10 days         Chest Tube 10/10/19 1513 Left Midaxillary;Fourth intercostal space 28 Fr. 10 days         Closed/Suction Drain 10/10/19 1349 Right RUQ Bulb 19 Fr. 10 days         Gastrostomy/Enterostomy 10/10/19 1400 Jejunostomy tube 10 days         Gastrostomy/Enterostomy 10/10/19 1400 LUQ 10 days         Urethral Catheter 10/10/19 1530 Straight-tip;Double-lumen;Non-latex 16 Fr. 10 days          Airway                 Surgical Airway 10/17/19 1603 Shiley Cuffed 3 days          Arterial Line                 Arterial Line 10/10/19 1245 Right Radial 10 days                Significant Labs:    CBC/Anemia Profile:  Recent Labs   Lab 10/20/19  0330 10/21/19  0340   WBC 17.10* 14.99*   HGB 7.2* 7.5*   HCT 24.5* 24.1*   * 466*   MCV 84 80*   RDW 19.2* 19.8*        Chemistries:  Recent Labs   Lab 10/19/19  1400 10/20/19  0330 10/21/19  0340    143 143   K 4.3 4.5 4.9   * 108 107   CO2 23 22* 24   BUN 55* 67* 90*   CREATININE 2.9* 3.9* 4.9*   CALCIUM 8.2* 8.3* 8.3*   ALBUMIN 1.7*  --    --    MG 2.4 2.6 2.7*   PHOS 4.6* 5.0* 5.8*       All pertinent labs within the past 24 hours have been reviewed.    Significant Imaging:  I have reviewed all pertinent imaging results/findings within the past 24 hours.    Assessment/Plan:     * Bowel perforation  74M current smoker who underwent excision of bleeding gastric mass, post op course complicated by intraperitoneal free air. Now admitted to SICU following ex-lap 10/10/19 which revealed esophageal and gastric perforations which have been repaired. Now s/p tracheostomy placement on 10/17 for difficulty with extubating patient.     Neuro:  - Off of sedation, following commands and alert: GCS 11T  - PRN pain control  - Seroquel 25 BID for agitation while on trach collar    CV  - Fluid resuscitate as necessary  - Off of pressors   - Echo 9/12/10 wnl    Pulm  - Extubated 10/14, reintubated 10/15 for encephalopathy with respiratory distress and increased WOB, subsequently underwent tracheostomy placement on 10/17.  - Continue PAV+ as he appears more comfortable on it if not tolerating trach collar  - Has been on continuous trach collar for >24hrs  - ABG's prn  - Uses CPAP at home, NO CPAP due to gastric perforation  - COPD with Significant history of smoking (3 ppd); continue nicotine patch  - Speaking valve today with SLP    GI  - Post-op esophageal and gastric perforations 10/9, now repaired via ex-lap  - Monitor drains, care per surgery  - Do not use G-tube. Leave to gravity.  - Tube feeds per J tube, increase to goal as tolerated.  - Esophogram today, can pull chest tubes if no leaks observed and start on PO nutrition.     Renal  - Nephrology following, continuing HD for clearance of uremia.   - Mental status improved.  - BHASKAR but intermittently worse, baseline Cr of 1  - CMP daily  - Electrolyte repletion PRN      Heme/ID  - Trend WBC, remains elevated but trending down.   - Abdominal culture (10/10) resulted with Yeast (awaiting speciation), Fluconazole  restarted with end date of 10/24.  - Hgb stable  - CBCs daily  - Completed course of Cefepime, flagyl, vancomycin, and diflucan for contaminated peritoneum on 10/16.    Endo  - DM2  - POCT glucose q4h  - SSI     Dispo: On continuous trach collar. Esophogram today to eval esophagus and stomach perforation sites. Can remove chest tubes and start PO nutrition if he passes. HD today. Will start work up for LTAC placement.       Critical care was time spent personally by me on the following activities: development of treatment plan with patient or surrogate and bedside caregivers, discussions with consultants, evaluation of patient's response to treatment, examination of patient, ordering and performing treatments and interventions, ordering and review of laboratory studies, ordering and review of radiographic studies, pulse oximetry, re-evaluation of patient's condition.  This critical care time did not overlap with that of any other provider or involve time for any procedures.     Jac Zamudio MD  Critical Care - Surgery  [facility]

## 2019-10-21 NOTE — PLAN OF CARE
10/21/19 1221   Post-Acute Status   Post-Acute Authorization Placement   Post-Acute Placement Status Referrals Sent   Magdalena spoke with pt's daughter, Tena ([phone]) to discuss LTAC placement. Her preference are 1. [facility] [place] [facility]. [facility] 3. [facility].  She stated she and her sister Bessie are listed at pt's medical POA. Tena lives at home with pt and she works from home and Bessie lives 2 doors down. MAGDALENA sent referrals via .      UPDATE:12:51 PM  MAGDALENA notified that [facility] [place] does not have a contract with pt's insurance nor can they do HD until November 1st.      UPDATE:2:18 PM  MAGDALENA updated Tena on the above. MAGDALENA spoke with Karen. Pt is under review, but wanting information regarding plan of care from nephrology. MAGDALENA spoke with Resident Cousins who stated they are still waiting for nephrology recs.     Mona Jj, NICHOLAS  [facility]  [id number]

## 2019-10-21 NOTE — PLAN OF CARE
HR 's , -160's SPO2 >88% on trach collar. Tube feed at goal rate of 50cc/hr, no residuals. Chest tubes with serous drainage. DENISE drain to abdomen with serous drainage. G-tube with gastric output. Patient medicated with PRN tylenol for complaints of pain. Plan of care reviewed with patient and daughter at bedside.

## 2019-10-21 NOTE — ASSESSMENT & PLAN NOTE
BHASKAR 2/2 ischemic ATN due to intraoperative hypotension and acute anemia.      Plan/Recommendations:  - HD toady with net UFG of 1-2L, keep map >65  -Avoid nephrotoxic medications  -Renally dose medications  -Avoid contrast studies   -Daily renal function panels   -Strict I/O's   -Will continue to follow closely

## 2019-10-21 NOTE — PROGRESS NOTES
[facility]  Nephrology  Progress Note    Patient Name: Pasha Harmon  MRN: [id number]  Admission Date: 10/9/2019  Hospital Length of Stay: 11 days  Attending Provider: Paulino Parrish MD   Primary Care Physician: Eze Root MD  Principal Problem:Bowel perforation      Interval History: Patient seen and examined at bedside. Continues to make some urine; 0.8L/past 24hrs. On trach collar 35% FiO2. Remains off pressors.      Review of patient's allergies indicates:   Allergen Reactions    Meloxicam Other (See Comments)     Ulcer, GI bleed      Current Facility-Administered Medications   Medication Frequency    0.9%  NaCl infusion PRN    0.9%  NaCl infusion Once    acetaminophen oral solution 650 mg Q6H PRN    albuterol-ipratropium 2.5 mg-0.5 mg/3 mL nebulizer solution 3 mL Q4H PRN    albuterol-ipratropium 2.5 mg-0.5 mg/3 mL nebulizer solution 3 mL Q4H    chlorhexidine 0.12 % solution 15 mL BID    dextrose 10% (D10W) Bolus PRN    dextrose 10% (D10W) Bolus PRN    famotidine 40 mg/5 mL (8 mg/mL) suspension 20 mg Daily    fentaNYL injection 25 mcg Q1H PRN    fluconazole (DIFLUCAN) IVPB 200 mg 100 mL Q24H    fluticasone furoate-vilanterol 100-25 mcg/dose diskus inhaler 1 puff Daily    glucagon (human recombinant) injection 1 mg PRN    heparin (porcine) injection 7,500 Units Q8H    hydrALAZINE injection 20 mg Q6H PRN    insulin aspart U-100 pen 0-5 Units QID (AC + HS) PRN    lidocaine 5 % patch 1 patch Q24H    nicotine 21 mg/24 hr 1 patch Daily    ondansetron injection 4 mg Q6H PRN    sodium chloride 0.9% flush 10 mL PRN     Family History     Problem Relation (Age of Onset)    Cancer Father    Heart disease Mother        Tobacco Use    Smoking status: Current Every Day Smoker     Packs/day: 2.00     Types: Cigarettes    Smokeless tobacco: Never Used   Substance and Sexual Activity    Alcohol use: No    Drug use: No    Sexual activity: Not on file       Objective:      Vital Signs (Most Recent):  Temp: 97.9 °F (36.6 °C) (10/21/19 0700)  Pulse: 96 (10/21/19 0800)  Resp: 18 (10/21/19 0713)  BP: (!) 104/57 (10/21/19 0800)  SpO2: 100 % (10/21/19 0800)  O2 Device (Oxygen Therapy): Trach Collar (10/21/19 0800) Vital Signs (24h Range):  Temp:  [97.9 °F (36.6 °C)-99.6 °F (37.6 °C)] 97.9 °F (36.6 °C)  Pulse:  [] 96  Resp:  [14-22] 18  SpO2:  [89 %-100 %] 100 %  BP: (104-171)/(52-90) 104/57  Arterial Line BP: ()/(48-77) 131/55     Weight: (!) 150 kg (330 lb 11 oz) (10/18/19 0500)  Body mass index is 42.44 kg/m².  Body surface area is 2.8 meters squared.    I/O last 3 completed shifts:  In: 2095 [I.V.:235; NG/GT:1860]  Out: 4530 [Urine:1471; Drains:2030; Chest Tube:1029]    Physical Exam   Constitutional: He appears well-developed and well-nourished.   Eyes: Pupils are equal, round, and reactive to light. Conjunctivae and EOM are normal.   Neck: Normal range of motion. Neck supple.   Cardiovascular: Normal rate and regular rhythm.   Pulmonary/Chest: Effort normal. He has rales.   Trached with supplemental O2 in place    Abdominal: He exhibits no distension and no mass. There is tenderness. There is no guarding. No hernia.   Midline surgical incision to abdomen. Multiple abdominal drains noted.    Musculoskeletal: He exhibits edema (1+ pitting edema to BLE ). He exhibits no tenderness or deformity.   Neurological: He is alert.   Skin: Skin is warm and dry. No rash noted. He is not diaphoretic. No erythema. No pallor.   Psychiatric:   Follows commands and nods and gestures appropriately        Significant Labs:  CBC:   Recent Labs   Lab 10/21/19  0340   WBC 14.99*   RBC 3.01*   HGB 7.5*   HCT 24.1*   *   MCV 80*   MCH 24.9*   MCHC 31.1*     CMP:   Recent Labs   Lab 10/14/19  1500  10/19/19  1400  10/21/19  0340   *   < > 144*   < > 124*   CALCIUM 8.7   < > 8.2*   < > 8.3*   ALBUMIN 2.1*   < > 1.7*  --   --    PROT 5.6*  --   --   --   --    *   < > 144   < >  143   K 4.3   < > 4.3   < > 4.9   CO2 17*   < > 23   < > 24      < > 111*   < > 107   BUN 79*   < > 55*   < > 90*   CREATININE 2.8*   < > 2.9*   < > 4.9*   ALKPHOS 67  --   --   --   --    ALT 12  --   --   --   --    AST 19  --   --   --   --    BILITOT 1.0  --   --   --   --     < > = values in this interval not displayed.     All labs within the past 24 hours have been reviewed.        Assessment/Plan:     BHASKAR (acute kidney injury)  BHASKAR 2/2 ischemic ATN due to intraoperative hypotension and acute anemia.      Plan/Recommendations:  - HD toady with net UFG of 1-2L, keep map >65  -Avoid nephrotoxic medications  -Renally dose medications  -Avoid contrast studies   -Daily renal function panels   -Strict I/O's   -Will continue to follow closely            Thank you for your consult. I will follow-up with patient. Please contact us if you have any additional questions.    Ranjeet Judd, NP  Nephrology  [facility]

## 2019-10-21 NOTE — PLAN OF CARE
12:10 PM CM visited patient. POD # 4 s/p trach. On trach collar O2 at 8L. AAOX4. No family at BS. Updated white erase board w/ex[ected discharge date & informed patient CM is following him for discharge needs. He verbalized his understanding. Spoke to ICU nurse, Janel, who states patients daughter wants [facility] on [place].     Per Dr. Santoro notes: Bilateral chest tubes in place to monitor for esophageal leak - keep them to suction. Will remove once patient passes esophagram demonstrating no further leak  Continue G tube to gravity for gastric decompression - do not use for meds or feeds  Continue j-tube tube feeds      Continue on trach collar.    1:17 PM Patient's daughter, Brisa, called CM to verify discharge place as above.     2:26 PM Updated CHARLES Jj, on above. She is aware. See her notes.        10/21/19 1210   Discharge Reassessment   Assessment Type Discharge Planning Reassessment   Provided patient/caregiver education on the expected discharge date and the discharge plan   (11:20 AM SICU Resident, Dr. Zamudio, called CM to state: D/c date: Potentially 10/24,pending Esophogram today to eval esophagus and stomach perforation sites.Can remove chest tubes and start oral diet, if passes test;HD for BHASKAR/Nephr recs D/c plan: LTAC.)   Do you have any problems affording any of your prescribed medications? No   Discharge Plan A Long-term acute care facility (LTAC)   Discharge Plan B Long-term acute care facility (LTAC)   Anticipated Discharge Disposition LTAC   Can the patient answer the patient profile reliably? Yes, cognitively intact

## 2019-10-21 NOTE — NURSING
Patient up to Cardiac chair, noted that left chest line is disconnected. Line reconnect and Dr. Morataya notified. Dr. Zamudio to bedside to assess patient.

## 2019-10-21 NOTE — PROGRESS NOTES
HD tx completed, 3L of fluid removed of 3hrs tx and pt tolerated well.Blood rinse back via RIJ cvc flushed with NS and luer locked. Report given to the primary nurse at bedside.

## 2019-10-21 NOTE — ASSESSMENT & PLAN NOTE
74M current smoker who underwent excision of bleeding gastric mass, post op course complicated by intraperitoneal free air. Now admitted to SICU following ex-lap 10/10/19 which revealed esophageal and gastric perforations which have been repaired. Now s/p tracheostomy placement on 10/17 for difficulty with extubating patient.     Neuro:  - Off of sedation, following commands and alert: GCS 11T  - PRN pain control  - Seroquel 25 BID for agitation while on trach collar    CV  - Fluid resuscitate as necessary  - Off of pressors   - Echo 9/12/10 wnl    Pulm  - Extubated 10/14, reintubated 10/15 for encephalopathy with respiratory distress and increased WOB, subsequently underwent tracheostomy placement on 10/17.  - Continue PAV+ as he appears more comfortable on it if not tolerating trach collar  - Has been on continuous trach collar for >24hrs  - ABG's prn  - Uses CPAP at home, NO CPAP due to gastric perforation  - COPD with Significant history of smoking (3 ppd); continue nicotine patch  - Speaking valve today with SLP    GI  - Post-op esophageal and gastric perforations 10/9, now repaired via ex-lap  - Monitor drains, care per surgery  - Do not use G-tube. Leave to gravity.  - Tube feeds per J tube, increase to goal as tolerated.  - Esophogram today, can pull chest tubes if no leaks observed and start on PO nutrition.     Renal  - Nephrology following, continuing HD for clearance of uremia.   - Mental status improved.  - BHASKAR but intermittently worse, baseline Cr of 1  - CMP daily  - Electrolyte repletion PRN      Heme/ID  - Trend WBC, remains elevated but trending down.   - Abdominal culture (10/10) resulted with Yeast (awaiting speciation), Fluconazole restarted with end date of 10/24.  - Hgb stable  - CBCs daily  - Completed course of Cefepime, flagyl, vancomycin, and diflucan for contaminated peritoneum on 10/16.    Endo  - DM2  - POCT glucose q4h  - SSI     Dispo: On continuous trach collar. Esophogram today to  eval esophagus and stomach perforation sites. Can remove chest tubes and start PO nutrition if he passes. HD today. Will start work up for LTAC placement.

## 2019-10-21 NOTE — ASSESSMENT & PLAN NOTE
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 Trach placed on 10/17/19.    Bilateral chest tubes in place to monitor for esophageal leak - keep them to suction. Will remove once patient passes esophagram demonstrating no further leak  Continue G tube to gravity for gastric decompression - do not use for meds or feeds  Continue tube feeds  Continue on trach collar  Will get speech eval now patient is on trach collar, if passes and OK for liquids, will get esophagram to evaluate presence of a leak, if no leak can DC chest tubes (right first likely), and then ADAT.     Rest of care per SICU

## 2019-10-21 NOTE — PLAN OF CARE
10/18/19 1523   Discharge Reassessment   Assessment Type Discharge Planning Reassessment   Provided patient/caregiver education on the expected discharge date and the discharge plan Yes  (Per MD. Patient is not medically stable for discharge. In ICU: POD # 1 S/p Tracheostomy. On CRRT. D/c plan LTAC when closer to discharge. )   Do you have any problems affording any of your prescribed medications? No   Discharge Plan A Long-term acute care facility (LTAC)   Discharge Plan B Long-term acute care facility (LTAC)   Anticipated Discharge Disposition LTAC

## 2019-10-21 NOTE — SUBJECTIVE & OBJECTIVE
Interval History: Patient seen and examined at bedside. Continues to make some urine; 0.8L/past 24hrs. On trach collar 35% FiO2. Remains off pressors.      Review of patient's allergies indicates:   Allergen Reactions    Meloxicam Other (See Comments)     Ulcer, GI bleed      Current Facility-Administered Medications   Medication Frequency    0.9%  NaCl infusion PRN    0.9%  NaCl infusion Once    acetaminophen oral solution 650 mg Q6H PRN    albuterol-ipratropium 2.5 mg-0.5 mg/3 mL nebulizer solution 3 mL Q4H PRN    albuterol-ipratropium 2.5 mg-0.5 mg/3 mL nebulizer solution 3 mL Q4H    chlorhexidine 0.12 % solution 15 mL BID    dextrose 10% (D10W) Bolus PRN    dextrose 10% (D10W) Bolus PRN    famotidine 40 mg/5 mL (8 mg/mL) suspension 20 mg Daily    fentaNYL injection 25 mcg Q1H PRN    fluconazole (DIFLUCAN) IVPB 200 mg 100 mL Q24H    fluticasone furoate-vilanterol 100-25 mcg/dose diskus inhaler 1 puff Daily    glucagon (human recombinant) injection 1 mg PRN    heparin (porcine) injection 7,500 Units Q8H    hydrALAZINE injection 20 mg Q6H PRN    insulin aspart U-100 pen 0-5 Units QID (AC + HS) PRN    lidocaine 5 % patch 1 patch Q24H    nicotine 21 mg/24 hr 1 patch Daily    ondansetron injection 4 mg Q6H PRN    sodium chloride 0.9% flush 10 mL PRN     Family History     Problem Relation (Age of Onset)    Cancer Father    Heart disease Mother        Tobacco Use    Smoking status: Current Every Day Smoker     Packs/day: 2.00     Types: Cigarettes    Smokeless tobacco: Never Used   Substance and Sexual Activity    Alcohol use: No    Drug use: No    Sexual activity: Not on file       Objective:     Vital Signs (Most Recent):  Temp: 97.9 °F (36.6 °C) (10/21/19 0700)  Pulse: 96 (10/21/19 0800)  Resp: 18 (10/21/19 0713)  BP: (!) 104/57 (10/21/19 0800)  SpO2: 100 % (10/21/19 0800)  O2 Device (Oxygen Therapy): Trach Collar (10/21/19 0800) Vital Signs (24h Range):  Temp:  [97.9 °F (36.6 °C)-99.6 °F  (37.6 °C)] 97.9 °F (36.6 °C)  Pulse:  [] 96  Resp:  [14-22] 18  SpO2:  [89 %-100 %] 100 %  BP: (104-171)/(52-90) 104/57  Arterial Line BP: ()/(48-77) 131/55     Weight: (!) 150 kg (330 lb 11 oz) (10/18/19 0500)  Body mass index is 42.44 kg/m².  Body surface area is 2.8 meters squared.    I/O last 3 completed shifts:  In: 2095 [I.V.:235; NG/GT:1860]  Out: 4530 [Urine:1471; Drains:2030; Chest Tube:1029]    Physical Exam   Constitutional: He appears well-developed and well-nourished.   Eyes: Pupils are equal, round, and reactive to light. Conjunctivae and EOM are normal.   Neck: Normal range of motion. Neck supple.   Cardiovascular: Normal rate and regular rhythm.   Pulmonary/Chest: Effort normal. He has rales.   Trached with supplemental O2 in place    Abdominal: He exhibits no distension and no mass. There is tenderness. There is no guarding. No hernia.   Midline surgical incision to abdomen. Multiple abdominal drains noted.    Musculoskeletal: He exhibits edema (1+ pitting edema to BLE ). He exhibits no tenderness or deformity.   Neurological: He is alert.   Skin: Skin is warm and dry. No rash noted. He is not diaphoretic. No erythema. No pallor.   Psychiatric:   Follows commands and nods and gestures appropriately        Significant Labs:  CBC:   Recent Labs   Lab 10/21/19  0340   WBC 14.99*   RBC 3.01*   HGB 7.5*   HCT 24.1*   *   MCV 80*   MCH 24.9*   MCHC 31.1*     CMP:   Recent Labs   Lab 10/14/19  1500  10/19/19  1400  10/21/19  0340   *   < > 144*   < > 124*   CALCIUM 8.7   < > 8.2*   < > 8.3*   ALBUMIN 2.1*   < > 1.7*  --   --    PROT 5.6*  --   --   --   --    *   < > 144   < > 143   K 4.3   < > 4.3   < > 4.9   CO2 17*   < > 23   < > 24      < > 111*   < > 107   BUN 79*   < > 55*   < > 90*   CREATININE 2.8*   < > 2.9*   < > 4.9*   ALKPHOS 67  --   --   --   --    ALT 12  --   --   --   --    AST 19  --   --   --   --    BILITOT 1.0  --   --   --   --     < > = values in  this interval not displayed.     All labs within the past 24 hours have been reviewed.

## 2019-10-21 NOTE — PT/OT/SLP PROGRESS
Occupational Therapy      Patient Name:  Pasha Harmon   MRN:  [id number]    Patient not seen today secondary to pt just returned to bed from cardiac chair  . Will follow-up 10/22/19.    ANTIONETTE Ochoa  10/21/2019

## 2019-10-21 NOTE — PLAN OF CARE
"Dx: Bowel perforation    Shift Events: VSS; pt on TC, 2L, 28% with sats remaining >95%. HD performed with 3L removed. No acute events. PT OOBTC x4 hours.    Neuro: AAO x4, Arouses to Voice, Follows Commands and Moves All Extremities    Vital Signs: BP (!) 103/58   Pulse 102   Temp 98 °F (36.7 °C) (Oral)   Resp (!) 22   Ht 6' 2.02" (1.88 m)   Wt (!) 150 kg (330 lb 11 oz)   SpO2 97%   BMI 42.44 kg/m²     Diet: NPO and Tube Feeds    Urine Output: Urinary Catheter 5-50 cc/hour    Drains: DENISE Drain, total output 45 cc / shift, Chest Tube, total output 160 cc /  shift and G-tube/J-tube, total output 275 cc /  shift    Labs/Accuchecks: Accu Q4, no coverage needed.    Skin: CDI; heels, sacrum and elbows without breakdown       "

## 2019-10-21 NOTE — SUBJECTIVE & OBJECTIVE
Interval History: Patient on trach collar now, alert and following commands, up in the chair this morning.     Medications:  Continuous Infusions:    Scheduled Meds:   sodium chloride 0.9%   Intravenous Once    albuterol-ipratropium  3 mL Nebulization Q4H    chlorhexidine  15 mL Mouth/Throat BID    famotidine  20 mg Per J Tube Daily    fluconazole (DIFLUCAN) IVPB  400 mg Intravenous Q24H    fluticasone furoate-vilanterol  1 puff Inhalation Daily    heparin (porcine)  7,500 Units Subcutaneous Q8H    nicotine  1 patch Transdermal Daily     PRN Meds:sodium chloride 0.9%, acetaminophen, albuterol-ipratropium, Dextrose 10% Bolus, Dextrose 10% Bolus, fentaNYL, glucagon (human recombinant), hydrALAZINE, insulin aspart U-100, ondansetron, sodium chloride 0.9%     Review of patient's allergies indicates:   Allergen Reactions    Meloxicam Other (See Comments)     Ulcer, GI bleed      Objective:     Vital Signs (Most Recent):  Temp: 97.9 °F (36.6 °C) (10/21/19 0700)  Pulse: 103 (10/21/19 0715)  Resp: 18 (10/21/19 0713)  BP: (!) 114/58 (10/21/19 0700)  SpO2: 97 % (10/21/19 0715) Vital Signs (24h Range):  Temp:  [97.9 °F (36.6 °C)-99.6 °F (37.6 °C)] 97.9 °F (36.6 °C)  Pulse:  [] 103  Resp:  [14-24] 18  SpO2:  [90 %-100 %] 97 %  BP: (114-177)/(56-90) 114/58  Arterial Line BP: (109-184)/(48-77) 116/52     Weight: (!) 150 kg (330 lb 11 oz)  Body mass index is 42.44 kg/m².    Intake/Output - Last 3 Shifts       10/19 0700 - 10/20 0659 10/20 0700 - 10/21 0659 10/21 0700 - 10/22 0659    I.V. (mL/kg) 230 (1.5) 235 (1.6)     NG/GT 1150 1110 50    IV Piggyback       Total Intake(mL/kg) 1380 (9.2) 1345 (9) 50 (0.3)    Urine (mL/kg/hr) 709 (0.2) 842 (0.2) 30 (0.6)    Drains 920 1570 60    Other       Stool 0      Chest Tube 369 745 100    Total Output 1998 2984 617    Net -618 -1812 -140           Stool Occurrence 1 x            Physical Exam   Constitutional: He appears well-developed and well-nourished. He is sedated and  intubated (Trach in place).   Cardiovascular: Normal rate and regular rhythm.   Pulmonary/Chest: Effort normal. He is intubated (Trach in place). No respiratory distress.   Bilateral chest tubes, draining SS, no air leak   Abdominal: Soft. He exhibits no distension. There is no tenderness. There is no guarding.   G tube in place, draining gastric contents  Jtube in place    Genitourinary:   Genitourinary Comments: Hallman in place   Neurological: He is alert.   Skin: Skin is warm and dry.   Vitals reviewed.      Significant Labs:  CBC:   Recent Labs   Lab 10/21/19  0340   WBC 14.99*   RBC 3.01*   HGB 7.5*   HCT 24.1*   *   MCV 80*   MCH 24.9*   MCHC 31.1*     CMP:   Recent Labs   Lab 10/14/19  1500  10/19/19  1400  10/21/19  0340   *   < > 144*   < > 124*   CALCIUM 8.7   < > 8.2*   < > 8.3*   ALBUMIN 2.1*   < > 1.7*  --   --    PROT 5.6*  --   --   --   --    *   < > 144   < > 143   K 4.3   < > 4.3   < > 4.9   CO2 17*   < > 23   < > 24      < > 111*   < > 107   BUN 79*   < > 55*   < > 90*   CREATININE 2.8*   < > 2.9*   < > 4.9*   ALKPHOS 67  --   --   --   --    ALT 12  --   --   --   --    AST 19  --   --   --   --    BILITOT 1.0  --   --   --   --     < > = values in this interval not displayed.     Coagulation:   No results for input(s): LABPROT, INR, APTT in the last 168 hours.  ABGs:   Recent Labs   Lab 10/21/19  0343   PH 7.424   PCO2 37.3   PO2 109*   HCO3 24.4   POCSATURATED 98   BE 0       Significant Diagnostics:  I have reviewed all pertinent imaging results/findings within the past 24 hours.

## 2019-10-21 NOTE — PROGRESS NOTES
"[facility]  General Surgery  Progress Note    Subjective:     History of Present Illness:  Patient is a 74 yo M with history of COPD and bleeding gastric mass who underwent EGD with AES yesterday where they removed a 7 cm "ulcerated lipoma" from his gastric antrum using submucosal endoscopic dissection. When removing the mass, it became stuck in the distal esophagus. They had to removed it piecemeal, and caused some mucosal esophageal tears. He was admitted for obs overnight, and this morning his WBC was elevated and he was complaining of abd pain. CXR showed free intraperitoneal free air and general surgery was consulted for evaluation.    Post-Op Info:  Procedure(s) (LRB):  CREATION, TRACHEOSTOMY (N/A)   4 Days Post-Op     Interval History: Patient on trach collar now, alert and following commands, up in the chair this morning.     Medications:  Continuous Infusions:    Scheduled Meds:   sodium chloride 0.9%   Intravenous Once    albuterol-ipratropium  3 mL Nebulization Q4H    chlorhexidine  15 mL Mouth/Throat BID    famotidine  20 mg Per J Tube Daily    fluconazole (DIFLUCAN) IVPB  400 mg Intravenous Q24H    fluticasone furoate-vilanterol  1 puff Inhalation Daily    heparin (porcine)  7,500 Units Subcutaneous Q8H    nicotine  1 patch Transdermal Daily     PRN Meds:sodium chloride 0.9%, acetaminophen, albuterol-ipratropium, Dextrose 10% Bolus, Dextrose 10% Bolus, fentaNYL, glucagon (human recombinant), hydrALAZINE, insulin aspart U-100, ondansetron, sodium chloride 0.9%     Review of patient's allergies indicates:   Allergen Reactions    Meloxicam Other (See Comments)     Ulcer, GI bleed      Objective:     Vital Signs (Most Recent):  Temp: 97.9 °F (36.6 °C) (10/21/19 0700)  Pulse: 103 (10/21/19 0715)  Resp: 18 (10/21/19 0713)  BP: (!) 114/58 (10/21/19 0700)  SpO2: 97 % (10/21/19 0715) Vital Signs (24h Range):  Temp:  [97.9 °F (36.6 °C)-99.6 °F (37.6 °C)] 97.9 °F (36.6 °C)  Pulse:  " [] 103  Resp:  [14-24] 18  SpO2:  [90 %-100 %] 97 %  BP: (114-177)/(56-90) 114/58  Arterial Line BP: (109-184)/(48-77) 116/52     Weight: (!) 150 kg (330 lb 11 oz)  Body mass index is 42.44 kg/m².    Intake/Output - Last 3 Shifts       10/19 0700 - 10/20 0659 10/20 0700 - 10/21 0659 10/21 0700 - 10/22 0659    I.V. (mL/kg) 230 (1.5) 235 (1.6)     NG/GT 1150 1110 50    IV Piggyback       Total Intake(mL/kg) 1380 (9.2) 1345 (9) 50 (0.3)    Urine (mL/kg/hr) 709 (0.2) 842 (0.2) 30 (0.6)    Drains 920 1570 60    Other       Stool 0      Chest Tube 369 745 100    Total Output 1998 3157 190    Net -618 -1812 -140           Stool Occurrence 1 x            Physical Exam   Constitutional: He appears well-developed and well-nourished. He is sedated and intubated (Trach in place).   Cardiovascular: Normal rate and regular rhythm.   Pulmonary/Chest: Effort normal. He is intubated (Trach in place). No respiratory distress.   Bilateral chest tubes, draining SS, no air leak   Abdominal: Soft. He exhibits no distension. There is no tenderness. There is no guarding.   G tube in place, draining gastric contents  Jtube in place    Genitourinary:   Genitourinary Comments: Hallman in place   Neurological: He is alert.   Skin: Skin is warm and dry.   Vitals reviewed.      Significant Labs:  CBC:   Recent Labs   Lab 10/21/19  0340   WBC 14.99*   RBC 3.01*   HGB 7.5*   HCT 24.1*   *   MCV 80*   MCH 24.9*   MCHC 31.1*     CMP:   Recent Labs   Lab 10/14/19  1500  10/19/19  1400  10/21/19  0340   *   < > 144*   < > 124*   CALCIUM 8.7   < > 8.2*   < > 8.3*   ALBUMIN 2.1*   < > 1.7*  --   --    PROT 5.6*  --   --   --   --    *   < > 144   < > 143   K 4.3   < > 4.3   < > 4.9   CO2 17*   < > 23   < > 24      < > 111*   < > 107   BUN 79*   < > 55*   < > 90*   CREATININE 2.8*   < > 2.9*   < > 4.9*   ALKPHOS 67  --   --   --   --    ALT 12  --   --   --   --    AST 19  --   --   --   --    BILITOT 1.0  --   --   --   --      < > = values in this interval not displayed.     Coagulation:   No results for input(s): LABPROT, INR, APTT in the last 168 hours.  ABGs:   Recent Labs   Lab 10/21/19  0343   PH 7.424   PCO2 37.3   PO2 109*   HCO3 24.4   POCSATURATED 98   BE 0       Significant Diagnostics:  I have reviewed all pertinent imaging results/findings within the past 24 hours.    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 Trach placed on 10/17/19.    Bilateral chest tubes in place to monitor for esophageal leak - keep them to suction. Will remove once patient passes esophagram demonstrating no further leak  Continue G tube to gravity for gastric decompression - do not use for meds or feeds  Continue tube feeds  Continue on trach collar  Will get speech eval now patient is on trach collar, if passes and OK for liquids, will get esophagram to evaluate presence of a leak, if no leak can DC chest tubes (right first likely), and then ADAT.     Rest of care per SICU     Esophageal tear             Gabino Santoro MD  General Surgery  [facility]

## 2019-10-21 NOTE — PLAN OF CARE
Problem: SLP Goal  Goal: SLP Goal  Description  Speech Language Pathology Goals  Goals expected to be met by 10/28:  1. Pt will tolerate PMSV with O2 >93% and VSS to improve respiration and phonation.   2. Pt will demonstrate placement/removal of PMSV x5 indep.   3. Pt will vocalize x3 with PMSV in place  4. Pt will recall instructions/precautions of PMSV with 100% acc indep.   5. Pt will participate in ongoing swallow assessment to determine least restrictive diet recommendations.        Outcome: Ongoing, Progressing     PMSV eval completed with implemented plan of care.   Emily P. Abadie M.S., CCC-SLP  Speech Language Pathologist  [phone]  10/21/2019

## 2019-10-21 NOTE — PT/OT/SLP EVAL
Speech Language Pathology One Way Speaking Valve Evaluation and   Bedside Swallow Evaluation    Patient Name:  Pasha Harmon   MRN:  [id number]  Admitting Diagnosis: Bowel perforation    Recommendations:                 General Recommendations:  One Way Speaking Valve and ongoing swallow assessment  Diet recommendations:  NPO, NPO   ICE CHIPS SPARINGLY FOR PLEASURE.   Aspiration Precautions: Strict aspiration precautions   General Precautions: Standard, fall  Communication strategies:  go to room if call light pushed    History:     Past Medical History:   Diagnosis Date    COPD (chronic obstructive pulmonary disease)     Diabetes mellitus     Emphysema of lung     Gastric ulcer     Hypertension     MI (myocardial infarction)        Past Surgical History:   Procedure Laterality Date    APPENDECTOMY      CHOLECYSTECTOMY      ENDOSCOPIC ULTRASOUND OF UPPER GASTROINTESTINAL TRACT Left 6/4/2019    Procedure: ULTRASOUND, UPPER GI TRACT, ENDOSCOPIC;  Surgeon: Lavon Parra MD;  Location: [facility];  Service: Endoscopy;  Laterality: Left;  GIF plus Radial    ESOPHAGOGASTRODUODENOSCOPY N/A 5/25/2019    Procedure: EGD (ESOPHAGOGASTRODUODENOSCOPY);  Surgeon: Mitch Buitrago MD;  Location: [facility];  Service: Endoscopy;  Laterality: N/A;    ESOPHAGOGASTRODUODENOSCOPY N/A 6/4/2019    Procedure: EGD (ESOPHAGOGASTRODUODENOSCOPY);  Surgeon: Lavon Parra MD;  Location: [facility];  Service: Endoscopy;  Laterality: N/A;    ESOPHAGOGASTRODUODENOSCOPY N/A 9/3/2019    Procedure: EGD (ESOPHAGOGASTRODUODENOSCOPY);  Surgeon: Keshav Harris MD;  Location: [facility];  Service: Endoscopy;  Laterality: N/A;    ESOPHAGOGASTRODUODENOSCOPY N/A 10/10/2019    Procedure: EGD (ESOPHAGOGASTRODUODENOSCOPY);  Surgeon: Paulino Parrish MD;  Location: [address];  Service: General;  Laterality: N/A;    KNEE SURGERY Right     PERCUTANEOUS INSERTION OF GASTROSTOMY TUBE Left 10/10/2019    Procedure: INSERTION,  GASTROSTOMY TUBE, PERCUTANEOUS;  Surgeon: Paulino Parrish MD;  Location: [facility] OR 2ND FLR;  Service: General;  Laterality: Left;    PLACEMENT OF JEJUNOSTOMY TUBE Left 10/10/2019    Procedure: INSERTION, JEJUNOSTOMY TUBE;  Surgeon: Paulino Parrish MD;  Location: [facility] OR 2ND FLR;  Service: General;  Laterality: Left;    REPAIR OF BOWEL PERFORATION N/A 10/10/2019    Procedure: REPAIR, PERFORATION, GASTRIC;  Surgeon: Paulino Parrish MD;  Location: [facility] OR 2ND FLR;  Service: General;  Laterality: N/A;    TRACHEOSTOMY N/A 10/17/2019    Procedure: CREATION, TRACHEOSTOMY;  Surgeon: Paulino Parrish MD;  Location: [facility] OR [facility] FLR;  Service: General;  Laterality: N/A;    TUBE THORACOTOMY Bilateral 10/10/2019    Procedure: INSERTION, CATHETER, INTERCOSTAL, FOR DRAINAGE;  Surgeon: Paulino Parrish MD;  Location: [facility] OR [facility] FLR;  Service: General;  Laterality: Bilateral;     Prior Intubation HX:  Intubation 10/9. Trach placed 10/17    Prior diet: Regular / thin liquids.     Subjective     Patient awake;alert.   Eager for ice chips/water.     Pain/Comfort:  · Pain Rating 1: 0/10    Objective:     Passy Gibson Speaking Valve  Trach size/type: 8 Shiley  Able to phonate around trach: No  Vocal quality with digital finger occlusion: No  O2 sats at rest: 98%  O2 sats with PMSV in place: 96%  Vocal quality with valve in place: Dysphonic  Back pressure upon removal: Moderate  Minutes PMSV worn: ~2 minutes x2.   Overall, patient with increased work of breathing and discomfort ultimately leading to SLP removal of PMSV.    Per team, swallow evaluation recommended for purposes of completion of esophagram. SLP completed swallow eval without use of PMSV.      Bedside Swallow Eval:   Consistencies Assessed:  · Thin liquids via cup sips over 5oz     Oral Phase:   · WFL    Pharyngeal Phase:   · SLP unable to fully rule out airway compromise, however, patient did not appear to be blatantly aspirating thin liquids via cup sips without  use of PMSV . O2 remained stable at 95% throughout.     Compensatory Strategies  · None    Education topics addressed: cleaning valve, one-way technology, anatomy of trach, precautions with cuffed trach, removal of valve prior to sleeping, trials outside of SLP sessions, and ongoing SLP POC. Patient with head nod in understanding.     Assessment:     Pasha Harmon is a 74 y.o. male with an SLP diagnosis of Dysphagia, S/P Trach and One Way Speaking Valve Training.     Goals:   Multidisciplinary Problems     SLP Goals        Problem: SLP Goal    Goal Priority Disciplines Outcome   SLP Goal     SLP Ongoing, Progressing   Description:  Speech Language Pathology Goals  Goals expected to be met by 10/28:  1. Pt will tolerate PMSV with O2 >93% and VSS to improve respiration and phonation.   2. Pt will demonstrate placement/removal of PMSV x5 indep.   3. Pt will vocalize x3 with PMSV in place  4. Pt will recall instructions/precautions of PMSV with 100% acc indep.   5. Pt will participate in ongoing swallow assessment to determine least restrictive diet recommendations.                         Plan:     · Patient to be seen:  4 x/week   · Plan of Care expires:  11/21/19  · Plan of Care reviewed with:  patient   · SLP Follow-Up:  Yes       Discharge recommendations:  nursing facility, skilled   Barriers to Discharge:  None    Time Tracking:     SLP Treatment Date:   10/21/19  Speech Start Time:  0830  Speech Stop Time:  0846     Speech Total Time (min):  16 min    Billable Minutes: Eval Swallow and Oral Function 8 and Therapeutic Speech Device 8    Emily Abadie, CCC-SLP  10/21/2019

## 2019-10-21 NOTE — SUBJECTIVE & OBJECTIVE
Interval History/Significant Events: Patient is awake and alert this morning. HDS. Tolerating trach collar >24hrs. Continues to be oliguric. Plan for HD today, SLP eval, and esophogram if can swallow. Left CT with disconnect, will reinforce.     Follow-up For: Procedure(s) (LRB):  CREATION, TRACHEOSTOMY (N/A)    Post-Operative Day: 4 Days Post-Op    Objective:     Vital Signs (Most Recent):  Temp: 97.9 °F (36.6 °C) (10/21/19 0700)  Pulse: 103 (10/21/19 0715)  Resp: 18 (10/21/19 0713)  BP: (!) 114/58 (10/21/19 0700)  SpO2: 97 % (10/21/19 0715) Vital Signs (24h Range):  Temp:  [97.9 °F (36.6 °C)-99.6 °F (37.6 °C)] 97.9 °F (36.6 °C)  Pulse:  [] 103  Resp:  [14-22] 18  SpO2:  [90 %-100 %] 97 %  BP: (114-177)/(56-90) 114/58  Arterial Line BP: (109-184)/(48-77) 116/52     Weight: (!) 150 kg (330 lb 11 oz)  Body mass index is 42.44 kg/m².      Intake/Output Summary (Last 24 hours) at 10/21/2019 0740  Last data filed at 10/21/2019 0700  Gross per 24 hour   Intake 1345 ml   Output 2932 ml   Net -1587 ml       Physical Exam   Constitutional: He appears well-developed and well-nourished. No distress.   HENT:   Head: Normocephalic and atraumatic.   Trach in place.   Eyes: Pupils are equal, round, and reactive to light. Conjunctivae are normal.   Neck: Normal range of motion.   Cardiovascular: Normal rate, regular rhythm and intact distal pulses.   Pulmonary/Chest: Effort normal. No respiratory distress.   Trach on PAV.   Abdominal: Soft. He exhibits no distension.   G-tube to gravity with bilious output.  J-tube with tube feeds infusing.   Tube sites clean, no surrounding erythema.  Incision c/d/i, no erythema or drainage.   Musculoskeletal: Normal range of motion.   Neurological: He is alert.   GCS 11T.   Skin: Skin is warm and dry.       Lines/Drains/Airways     Central Venous Catheter Line                 Trialysis (Dialysis) Catheter 10/20/19 1415 right internal jugular less than 1 day          Drain                  Chest Tube 10/10/19 1510 1 Right Midaxillary;Fourth intercostal space 28 Fr. 10 days         Chest Tube 10/10/19 1513 Left Midaxillary;Fourth intercostal space 28 Fr. 10 days         Closed/Suction Drain 10/10/19 1349 Right RUQ Bulb 19 Fr. 10 days         Gastrostomy/Enterostomy 10/10/19 1400 Jejunostomy tube 10 days         Gastrostomy/Enterostomy 10/10/19 1400 LUQ 10 days         Urethral Catheter 10/10/19 1530 Straight-tip;Double-lumen;Non-latex 16 Fr. 10 days          Airway                 Surgical Airway 10/17/19 1603 Shiley Cuffed 3 days          Arterial Line                 Arterial Line 10/10/19 1245 Right Radial 10 days                Significant Labs:    CBC/Anemia Profile:  Recent Labs   Lab 10/20/19  0330 10/21/19  0340   WBC 17.10* 14.99*   HGB 7.2* 7.5*   HCT 24.5* 24.1*   * 466*   MCV 84 80*   RDW 19.2* 19.8*        Chemistries:  Recent Labs   Lab 10/19/19  1400 10/20/19  0330 10/21/19  0340    143 143   K 4.3 4.5 4.9   * 108 107   CO2 23 22* 24   BUN 55* 67* 90*   CREATININE 2.9* 3.9* 4.9*   CALCIUM 8.2* 8.3* 8.3*   ALBUMIN 1.7*  --   --    MG 2.4 2.6 2.7*   PHOS 4.6* 5.0* 5.8*       All pertinent labs within the past 24 hours have been reviewed.    Significant Imaging:  I have reviewed all pertinent imaging results/findings within the past 24 hours.

## 2019-10-21 NOTE — PLAN OF CARE
"Dx: Bowel perforation    Shift Events: VSS; pt TC FiO2 2L 28% with sats remaining >95%. HD done today, 3L pulled off, pt tolerated well. No acute events. Cardiac chair x4 hours.    Neuro: AAO x4, Arouses to Voice, Follows Commands and Moves All Extremities    Vital Signs: BP (!) 103/58   Pulse 102   Temp 98 °F (36.7 °C) (Oral)   Resp (!) 22   Ht 6' 2.02" (1.88 m)   Wt (!) 150 kg (330 lb 11 oz)   SpO2 97%   BMI 42.44 kg/m²     Diet: NPO and Tube Feeds    Urine Output: Urinary Catheter 5-50 cc/hour    Drains: DENISE Drain, total output 45 cc / shift, Chest Tube, total output 150 cc /  shift and G-tube/J-tube, total output 275 cc /  shift     Labs/Accuchecks: Accu Q4, no coverage needed.    Skin: CDI; heels, sacrum and elbows without breakdown.       "

## 2019-10-22 LAB
ANION GAP SERPL CALC-SCNC: 14 MMOL/L (ref 8–16)
BASOPHILS # BLD AUTO: 0.07 K/UL (ref 0–0.2)
BASOPHILS NFR BLD: 0.5 % (ref 0–1.9)
BUN SERPL-MCNC: 83 MG/DL (ref 8–23)
CALCIUM SERPL-MCNC: 8.8 MG/DL (ref 8.7–10.5)
CHLORIDE SERPL-SCNC: 106 MMOL/L (ref 95–110)
CO2 SERPL-SCNC: 24 MMOL/L (ref 23–29)
CREAT SERPL-MCNC: 4.8 MG/DL (ref 0.5–1.4)
DIFFERENTIAL METHOD: ABNORMAL
EOSINOPHIL # BLD AUTO: 0.5 K/UL (ref 0–0.5)
EOSINOPHIL NFR BLD: 3.6 % (ref 0–8)
ERYTHROCYTE [DISTWIDTH] IN BLOOD BY AUTOMATED COUNT: 19.8 % (ref 11.5–14.5)
EST. GFR  (AFRICAN AMERICAN): 12.9 ML/MIN/1.73 M^2
EST. GFR  (NON AFRICAN AMERICAN): 11.2 ML/MIN/1.73 M^2
GLUCOSE SERPL-MCNC: 116 MG/DL (ref 70–110)
HCT VFR BLD AUTO: 24.2 % (ref 40–54)
HGB BLD-MCNC: 7.2 G/DL (ref 14–18)
IMM GRANULOCYTES # BLD AUTO: 0.41 K/UL (ref 0–0.04)
IMM GRANULOCYTES NFR BLD AUTO: 2.9 % (ref 0–0.5)
LYMPHOCYTES # BLD AUTO: 1.1 K/UL (ref 1–4.8)
LYMPHOCYTES NFR BLD: 7.9 % (ref 18–48)
MAGNESIUM SERPL-MCNC: 2.6 MG/DL (ref 1.6–2.6)
MCH RBC QN AUTO: 25.1 PG (ref 27–31)
MCHC RBC AUTO-ENTMCNC: 29.8 G/DL (ref 32–36)
MCV RBC AUTO: 84 FL (ref 82–98)
MONOCYTES # BLD AUTO: 1.5 K/UL (ref 0.3–1)
MONOCYTES NFR BLD: 10.5 % (ref 4–15)
NEUTROPHILS # BLD AUTO: 10.5 K/UL (ref 1.8–7.7)
NEUTROPHILS NFR BLD: 74.6 % (ref 38–73)
NRBC BLD-RTO: 0 /100 WBC
PHOSPHATE SERPL-MCNC: 4.9 MG/DL (ref 2.7–4.5)
PLATELET # BLD AUTO: 526 K/UL (ref 150–350)
PMV BLD AUTO: 10.3 FL (ref 9.2–12.9)
POCT GLUCOSE: 116 MG/DL (ref 70–110)
POCT GLUCOSE: 118 MG/DL (ref 70–110)
POCT GLUCOSE: 122 MG/DL (ref 70–110)
POCT GLUCOSE: 124 MG/DL (ref 70–110)
POCT GLUCOSE: 133 MG/DL (ref 70–110)
POTASSIUM SERPL-SCNC: 4.7 MMOL/L (ref 3.5–5.1)
RBC # BLD AUTO: 2.87 M/UL (ref 4.6–6.2)
SODIUM SERPL-SCNC: 144 MMOL/L (ref 136–145)
WBC # BLD AUTO: 14.09 K/UL (ref 3.9–12.7)

## 2019-10-22 PROCEDURE — 63600175 PHARM REV CODE 636 W HCPCS: Performed by: STUDENT IN AN ORGANIZED HEALTH CARE EDUCATION/TRAINING PROGRAM

## 2019-10-22 PROCEDURE — 20000000 HC ICU ROOM

## 2019-10-22 PROCEDURE — 99900026 HC AIRWAY MAINTENANCE (STAT)

## 2019-10-22 PROCEDURE — 83735 ASSAY OF MAGNESIUM: CPT

## 2019-10-22 PROCEDURE — 97530 THERAPEUTIC ACTIVITIES: CPT

## 2019-10-22 PROCEDURE — 94761 N-INVAS EAR/PLS OXIMETRY MLT: CPT

## 2019-10-22 PROCEDURE — 85025 COMPLETE CBC W/AUTO DIFF WBC: CPT

## 2019-10-22 PROCEDURE — 25000003 PHARM REV CODE 250: Performed by: PHYSICIAN ASSISTANT

## 2019-10-22 PROCEDURE — 25000003 PHARM REV CODE 250: Performed by: STUDENT IN AN ORGANIZED HEALTH CARE EDUCATION/TRAINING PROGRAM

## 2019-10-22 PROCEDURE — 84100 ASSAY OF PHOSPHORUS: CPT

## 2019-10-22 PROCEDURE — 99900035 HC TECH TIME PER 15 MIN (STAT)

## 2019-10-22 PROCEDURE — 27000221 HC OXYGEN, UP TO 24 HOURS

## 2019-10-22 PROCEDURE — 97535 SELF CARE MNGMENT TRAINING: CPT

## 2019-10-22 PROCEDURE — 25500020 PHARM REV CODE 255: Performed by: SURGERY

## 2019-10-22 PROCEDURE — 99233 PR SUBSEQUENT HOSPITAL CARE,LEVL III: ICD-10-PCS | Mod: 24,GC,, | Performed by: SURGERY

## 2019-10-22 PROCEDURE — 63600175 PHARM REV CODE 636 W HCPCS: Performed by: SURGERY

## 2019-10-22 PROCEDURE — 94640 AIRWAY INHALATION TREATMENT: CPT

## 2019-10-22 PROCEDURE — 99233 SBSQ HOSP IP/OBS HIGH 50: CPT | Mod: ,,, | Performed by: NURSE PRACTITIONER

## 2019-10-22 PROCEDURE — 94668 MNPJ CHEST WALL SBSQ: CPT

## 2019-10-22 PROCEDURE — 99233 SBSQ HOSP IP/OBS HIGH 50: CPT | Mod: 24,GC,, | Performed by: SURGERY

## 2019-10-22 PROCEDURE — 80048 BASIC METABOLIC PNL TOTAL CA: CPT

## 2019-10-22 PROCEDURE — 99233 PR SUBSEQUENT HOSPITAL CARE,LEVL III: ICD-10-PCS | Mod: ,,, | Performed by: NURSE PRACTITIONER

## 2019-10-22 PROCEDURE — S4991 NICOTINE PATCH NONLEGEND: HCPCS | Performed by: PHYSICIAN ASSISTANT

## 2019-10-22 PROCEDURE — 25000242 PHARM REV CODE 250 ALT 637 W/ HCPCS: Performed by: STUDENT IN AN ORGANIZED HEALTH CARE EDUCATION/TRAINING PROGRAM

## 2019-10-22 RX ORDER — QUETIAPINE FUMARATE 25 MG/1
25 TABLET, FILM COATED ORAL ONCE
Status: COMPLETED | OUTPATIENT
Start: 2019-10-22 | End: 2019-10-22

## 2019-10-22 RX ORDER — SENNOSIDES 8.8 MG/5ML
5 LIQUID ORAL DAILY
Status: DISCONTINUED | OUTPATIENT
Start: 2019-10-22 | End: 2019-10-22

## 2019-10-22 RX ORDER — AMOXICILLIN 250 MG
1 CAPSULE ORAL DAILY
Status: DISCONTINUED | OUTPATIENT
Start: 2019-10-22 | End: 2019-10-22

## 2019-10-22 RX ORDER — SODIUM CHLORIDE 9 MG/ML
INJECTION, SOLUTION INTRAVENOUS ONCE
Status: DISCONTINUED | OUTPATIENT
Start: 2019-10-23 | End: 2019-10-31 | Stop reason: HOSPADM

## 2019-10-22 RX ORDER — SENNOSIDES 8.8 MG/5ML
10 LIQUID ORAL DAILY
Status: DISCONTINUED | OUTPATIENT
Start: 2019-10-23 | End: 2019-10-31 | Stop reason: HOSPADM

## 2019-10-22 RX ORDER — DOCUSATE SODIUM 50 MG/5ML
100 LIQUID ORAL DAILY
Status: DISCONTINUED | OUTPATIENT
Start: 2019-10-23 | End: 2019-10-31 | Stop reason: HOSPADM

## 2019-10-22 RX ADMIN — HEPARIN SODIUM 7500 UNITS: 5000 INJECTION, SOLUTION INTRAVENOUS; SUBCUTANEOUS at 06:10

## 2019-10-22 RX ADMIN — CHLORHEXIDINE GLUCONATE 0.12% ORAL RINSE 15 ML: 1.2 LIQUID ORAL at 08:10

## 2019-10-22 RX ADMIN — IPRATROPIUM BROMIDE AND ALBUTEROL SULFATE 3 ML: .5; 3 SOLUTION RESPIRATORY (INHALATION) at 11:10

## 2019-10-22 RX ADMIN — FAMOTIDINE 20 MG: 40 POWDER, FOR SUSPENSION ORAL at 08:10

## 2019-10-22 RX ADMIN — NICOTINE 1 PATCH: 21 PATCH, EXTENDED RELEASE TRANSDERMAL at 08:10

## 2019-10-22 RX ADMIN — FLUCONAZOLE 200 MG: 2 INJECTION, SOLUTION INTRAVENOUS at 09:10

## 2019-10-22 RX ADMIN — IPRATROPIUM BROMIDE AND ALBUTEROL SULFATE 3 ML: .5; 3 SOLUTION RESPIRATORY (INHALATION) at 03:10

## 2019-10-22 RX ADMIN — LIDOCAINE 1 PATCH: 50 PATCH TOPICAL at 09:10

## 2019-10-22 RX ADMIN — SENNOSIDES, DOCUSATE SODIUM 1 TABLET: 50; 8.6 TABLET, FILM COATED ORAL at 06:10

## 2019-10-22 RX ADMIN — IOHEXOL 110 ML: 350 INJECTION, SOLUTION INTRAVENOUS at 02:10

## 2019-10-22 RX ADMIN — HEPARIN SODIUM 7500 UNITS: 5000 INJECTION, SOLUTION INTRAVENOUS; SUBCUTANEOUS at 03:10

## 2019-10-22 RX ADMIN — QUETIAPINE FUMARATE 25 MG: 25 TABLET ORAL at 11:10

## 2019-10-22 RX ADMIN — ONDANSETRON 4 MG: 2 INJECTION INTRAMUSCULAR; INTRAVENOUS at 09:10

## 2019-10-22 RX ADMIN — IPRATROPIUM BROMIDE AND ALBUTEROL SULFATE 3 ML: .5; 3 SOLUTION RESPIRATORY (INHALATION) at 08:10

## 2019-10-22 RX ADMIN — HEPARIN SODIUM 7500 UNITS: 5000 INJECTION, SOLUTION INTRAVENOUS; SUBCUTANEOUS at 09:10

## 2019-10-22 NOTE — PLAN OF CARE
10/22/19 1127   Post-Acute Status   Post-Acute Authorization Placement   Post-Acute Placement Status Pending Post-Acute Clinical Review   MAGDALENA sent updates to JULIUS Parikh and Pancho via KANE Jj LMSW  [facility]  [phone]

## 2019-10-22 NOTE — PROGRESS NOTES
Pt off floor with RN to Xray. Portable monitor and O2 in use.       Unable to complete Esophogram. Pt returned to room 30693. Reconnected to bedside monitor and wall O2. Plan to return to Xray for 1400. Will continue to monitor.

## 2019-10-22 NOTE — PLAN OF CARE
Problem: Occupational Therapy Goal  Goal: Occupational Therapy Goal  Description  Goals to be met by: 10/25/2019    Patient will increase functional independence with ADLs by performing:    Feeding with [place].  UE Dressing with Minimal Assistance.  LE Dressing with Moderate Assistance.  Grooming while standing with Minimal Assistance.  Toileting from bedside commode with Minimal Assistance for hygiene and clothing management.   Supine to sit with Minimal Assistance.  Stand pivot transfers with Moderate Assistance.  Toilet transfer to bedside commode with Moderate Assistance.     Outcome: Ongoing, Progressing   The above goals remain appropriate. ANTIONETTE Ochoa  10/22/2019

## 2019-10-22 NOTE — PT/OT/SLP PROGRESS
Physical Therapy Treatment    Patient Name:  Pasha Harmon   MRN:  [id number]    Recommendations:     Discharge Recommendations:  nursing facility, skilled   Discharge Equipment Recommendations: (TBD, pending progress )   Barriers to discharge: None    Assessment:     Pasha Harmon is a 74 y.o. male admitted with a medical diagnosis of Bowel perforation.  He presents with the following impairments/functional limitations:  weakness, impaired functional mobilty, impaired cardiopulmonary response to activity, impaired coordination, impaired endurance, gait instability, pain, impaired balance, impaired self care skills, decreased lower extremity function.Pt cooperative with PT; Pt able to sit EOB this date requiring totalA x 2 and was able to sit EOB ~3 minutes but was limited by nausea and not feeling well this date. Pt declined further mobility and initiated returning self to bed at this time. Would benefit from SNF once medically stable to maximize functional potential as pt is not at functional baseline and requires assist for all care.     Rehab Prognosis: Good; patient would benefit from acute skilled PT services to address these deficits and reach maximum level of function.    Recent Surgery: Procedure(s) (LRB):  CREATION, TRACHEOSTOMY (N/A) 5 Days Post-Op    Plan:     During this hospitalization, patient to be seen 4 x/week to address the identified rehab impairments via gait training, therapeutic activities, therapeutic exercises, neuromuscular re-education and progress toward the following goals:    · Plan of Care Expires:  11/15/19    Subjective     Chief Complaint: Nausea, resulting in limited treatment   Patient/Family Comments/goals: For pt to get stronger   Pain/Comfort:  · Pain Rating 1: 6/10  · Location 1: (Back and B LE)  · Pain Addressed 1: Nurse notified, Reposition, Distraction  · Pain Rating Post-Intervention 1: 6/10      Objective:     Communicated with RN prior to session.  Patient  found supine with blood pressure cuff, pulse ox (continuous), telemetry, tracheostomy, PEG Tube, oxygen, chest tube, shrestha catheter, central line, DENISE drain(Trach collar ) upon PT entry to room.     General Precautions: Standard, fall   Orthopedic Precautions:N/A   Braces: N/A     Functional Mobility:  · Bed Mobility:     · Supine to Sit: total assistance and of 2 persons  · Sit to Supine: total assistance and of 2 persons  · Balance: Able to sit EOB 3 minutes with maxA x 1, limited by nausea this date (pt not feeling well and initiated return to supine position on own as being in seated position made him feel worse). Pt with R lateral lean in sitting, requiring intermittent verbal/tactile cuing and mod-maxA x 1 to return to neutral position.       AM-PAC 6 CLICK MOBILITY  Turning over in bed (including adjusting bedclothes, sheets and blankets)?: 2  Sitting down on and standing up from a chair with arms (e.g., wheelchair, bedside commode, etc.): 1  Moving from lying on back to sitting on the side of the bed?: 2  Moving to and from a bed to a chair (including a wheelchair)?: 1  Need to walk in hospital room?: 1  Climbing 3-5 steps with a railing?: 1  Basic Mobility Total Score: 8       Therapeutic Activities and Exercises:  Pt educated on: PT role/POC; safety with mobility; benefits of OOB activities; pt and daughter verbalizing understanding.       Patient left HOB elevated with all lines intact, call button in reach, RN notified and daughter present.    GOALS:   Multidisciplinary Problems     Physical Therapy Goals        Problem: Physical Therapy Goal    Goal Priority Disciplines Outcome Goal Variances Interventions   Physical Therapy Goal     PT, PT/OT Ongoing, Progressing     Description:  Goals to be met by: 19     Patient will increase functional independence with mobility by performin. Supine to sit with Maximum Assistance  2. Sit to supine with Maximum Assistance  3. Rolling to Left and Right  with Maximum Assistance and use of bedrail as needed.  4. Sitting at edge of bed x10 minutes with Maximum Assistance  5. Lower extremity exercise program x10 reps per handout, with assistance as needed                      Time Tracking:     PT Received On: 10/22/19  PT Start Time: 0930     PT Stop Time: 0939  PT Total Time (min): 9 min     Billable Minutes: Therapeutic Activity 9 min    Treatment Type: Treatment  PT/PTA: PT     PTA Visit Number: 0     Alondra Stein PT, DPT  10/22/2019

## 2019-10-22 NOTE — PLAN OF CARE
Problem: Physical Therapy Goal  Goal: Physical Therapy Goal  Description  Goals to be met by: 19     Patient will increase functional independence with mobility by performin. Supine to sit with Maximum Assistance  2. Sit to supine with Maximum Assistance  3. Rolling to Left and Right with Maximum Assistance and use of bedrail as needed.  4. Sitting at edge of bed x10 minutes with Maximum Assistance  5. Lower extremity exercise program x10 reps per handout, with assistance as needed     Outcome: Ongoing, Progressing   Goals not met at this time, pt limited by nausea this date. Will continue to follow while in house.     Alondra Stein, PT, DPT  10/22/2019

## 2019-10-22 NOTE — SUBJECTIVE & OBJECTIVE
Interval History: No acute events overnight. Did not pass swallow study with speech yesterday. Looks good today.    Medications:  Continuous Infusions:    Scheduled Meds:   sodium chloride 0.9%   Intravenous Once    sodium chloride 0.9%   Intravenous Once    albuterol-ipratropium  3 mL Nebulization Q4H    chlorhexidine  15 mL Mouth/Throat BID    famotidine  20 mg Per J Tube Daily    fluconazole (DIFLUCAN) IVPB  200 mg Intravenous Q24H    fluticasone furoate-vilanterol  1 puff Inhalation Daily    heparin (porcine)  7,500 Units Subcutaneous Q8H    lidocaine  1 patch Transdermal Q24H    nicotine  1 patch Transdermal Daily    senna-docusate 8.6-50 mg  1 tablet Per J Tube Daily     PRN Meds:sodium chloride 0.9%, acetaminophen, albuterol-ipratropium, Dextrose 10% Bolus, Dextrose 10% Bolus, fentaNYL, glucagon (human recombinant), hydrALAZINE, insulin aspart U-100, ondansetron, sodium chloride 0.9%     Review of patient's allergies indicates:   Allergen Reactions    Meloxicam Other (See Comments)     Ulcer, GI bleed      Objective:     Vital Signs (Most Recent):  Temp: 99.3 °F (37.4 °C) (10/22/19 0300)  Pulse: 98 (10/22/19 0600)  Resp: 20 (10/22/19 0348)  BP: (!) 147/68 (10/22/19 0600)  SpO2: (!) 94 % (10/22/19 0600) Vital Signs (24h Range):  Temp:  [97.6 °F (36.4 °C)-99.3 °F (37.4 °C)] 99.3 °F (37.4 °C)  Pulse:  [] 98  Resp:  [18-22] 20  SpO2:  [91 %-100 %] 94 %  BP: ()/(56-86) 147/68  Arterial Line BP: ()/() 77/62     Weight: (!) 150 kg (330 lb 11 oz)  Body mass index is 42.44 kg/m².    Intake/Output - Last 3 Shifts       10/20 0700 - 10/21 0659 10/21 0700 - 10/22 0659    I.V. (mL/kg) 235 (1.6)     Other  500    NG/GT 1260 1160    IV Piggyback  100    Total Intake(mL/kg) 1495 (10) 1760 (11.7)    Urine (mL/kg/hr) 887 (0.2) 569 (0.2)    Drains 1570 1143    Other  3500    Chest Tube 745 390    Total Output 9559 0693    Net -3639 -9438                Physical Exam   Constitutional: He  appears well-developed and well-nourished. He is sedated and intubated (Trach in place).   Cardiovascular: Normal rate and regular rhythm.   Pulmonary/Chest: Effort normal. He is intubated (Trach in place). No respiratory distress.   Bilateral chest tubes, draining SS  On trach collar this morning   Abdominal: Soft. He exhibits no distension. There is no tenderness. There is no guarding.   G tube in place, draining gastric contents  Jtube in place    Genitourinary:   Genitourinary Comments: Hallman in place   Neurological: He is alert.   Skin: Skin is warm and dry.   Vitals reviewed.      Significant Labs:  CBC:   Recent Labs   Lab 10/22/19  0417   WBC 14.09*   RBC 2.87*   HGB 7.2*   HCT 24.2*   *   MCV 84   MCH 25.1*   MCHC 29.8*     CMP:   Recent Labs   Lab 10/19/19  1400  10/22/19  0417   *   < > 116*   CALCIUM 8.2*   < > 8.8   ALBUMIN 1.7*  --   --       < > 144   K 4.3   < > 4.7   CO2 23   < > 24   *   < > 106   BUN 55*   < > 83*   CREATININE 2.9*   < > 4.8*    < > = values in this interval not displayed.     Coagulation:   No results for input(s): LABPROT, INR, APTT in the last 168 hours.  ABGs:   Recent Labs   Lab 10/21/19  0343   PH 7.424   PCO2 37.3   PO2 109*   HCO3 24.4   POCSATURATED 98   BE 0       Significant Diagnostics:  I have reviewed all pertinent imaging results/findings within the past 24 hours.

## 2019-10-22 NOTE — PROGRESS NOTES
[facility]  Critical Care - Surgery  Progress Note    Patient Name: Pasha Harmon  MRN: [id number]  Admission Date: 10/9/2019  Hospital Length of Stay: 12 days  Code Status: Full Code  Attending Provider: Paulino Parrish MD  Primary Care Provider: Eze Root MD   Principal Problem: Bowel perforation    Subjective:     Hospital/ICU Course:  10/10: Admitted to SICU. R IJ TLC placed. On minimal pressors.   10/11: Remaining intubated. On minimal pressors.   10/12: Difficulty weaning pressors while on propofol and fentanyl to control resp rate from acidosis. Received 2u PRBCs after 1.25L albumin.   10/13: Pressors weaned, still not following commands after stopping sedation  10/14: NGT removed by primary team. Metoprolol started for HTN  10/21: Tolerating trach collar >24hrs. Tolerating HD sessions. HDS without pressor requirement. Plan for SLP eval today.    10/22: Plan for esophogram today.     Interval History/Significant Events: Awake and alert this morning. Tolerating trach collar > 48 hrs. HDS with no pressor requirement. Tolerated HD session yesterday. Tube feeds at goal, tolerating well. Per speech, OK for esophogram. If passes esophogram, will evaluate patient for diet. Afebrile. No bowel movement for 3 days, placing patient on bowel regimen.     Follow-up For: Procedure(s) (LRB):  CREATION, TRACHEOSTOMY (N/A)    Post-Operative Day: 5 Days Post-Op    Objective:     Vital Signs (Most Recent):  Temp: 99 °F (37.2 °C) (10/22/19 0700)  Pulse: 98 (10/22/19 0700)  Resp: (!) 28 (10/22/19 0700)  BP: (!) 163/77 (10/22/19 0700)  SpO2: 99 % (10/22/19 0700) Vital Signs (24h Range):  Temp:  [97.6 °F (36.4 °C)-99.3 °F (37.4 °C)] 99 °F (37.2 °C)  Pulse:  [] 98  Resp:  [18-28] 28  SpO2:  [91 %-100 %] 99 %  BP: ()/(56-86) 163/77  Arterial Line BP: ()/() 77/62     Weight: (!) 150 kg (330 lb 11 oz)  Body mass index is 42.44 kg/m².      Intake/Output Summary (Last 24 hours) at  10/22/2019 0734  Last data filed at 10/22/2019 0600  Gross per 24 hour   Intake 1710 ml   Output 5412 ml   Net -3702 ml       Physical Exam   Constitutional: He appears well-developed and well-nourished. No distress.   HENT:   Head: Normocephalic and atraumatic.   Trach in place.   Eyes: Pupils are equal, round, and reactive to light. Conjunctivae are normal.   Neck: Normal range of motion.   Cardiovascular: Normal rate, regular rhythm and intact distal pulses.   Pulmonary/Chest: Effort normal. No respiratory distress.   Trach collar.   Abdominal: Soft. He exhibits no distension.   G-tube to gravity with bilious output.  J-tube with tube feeds infusing.   Tube sites clean, no surrounding erythema.  Incision c/d/i, no erythema or drainage.   Musculoskeletal: Normal range of motion.   Neurological: He is alert.   Skin: Skin is warm and dry.     Lines/Drains/Airways     Central Venous Catheter Line                 Trialysis (Dialysis) Catheter 10/20/19 1415 right internal jugular 1 day          Drain                 Chest Tube 10/10/19 1510 1 Right Midaxillary;Fourth intercostal space 28 Fr. 11 days         Chest Tube 10/10/19 1513 Left Midaxillary;Fourth intercostal space 28 Fr. 11 days         Closed/Suction Drain 10/10/19 1349 Right RUQ Bulb 19 Fr. 11 days         Gastrostomy/Enterostomy 10/10/19 1400 Jejunostomy tube 11 days         Gastrostomy/Enterostomy 10/10/19 1400 LUQ 11 days         Urethral Catheter 10/10/19 1530 Straight-tip;Double-lumen;Non-latex 16 Fr. 11 days          Airway                 Surgical Airway 10/17/19 1603 Shiley Cuffed 4 days          Arterial Line                 Arterial Line 10/10/19 1245 Right Radial 11 days                Significant Labs:    CBC/Anemia Profile:  Recent Labs   Lab 10/21/19  0340 10/22/19  0417   WBC 14.99* 14.09*   HGB 7.5* 7.2*   HCT 24.1* 24.2*   * 526*   MCV 80* 84   RDW 19.8* 19.8*        Chemistries:  Recent Labs   Lab 10/21/19  0340 10/22/19  0417   NA  143 144   K 4.9 4.7    106   CO2 24 24   BUN 90* 83*   CREATININE 4.9* 4.8*   CALCIUM 8.3* 8.8   MG 2.7* 2.6   PHOS 5.8* 4.9*       All pertinent labs within the past 24 hours have been reviewed.    Significant Imaging:  I have reviewed all pertinent imaging results/findings within the past 24 hours.    Assessment/Plan:     * Bowel perforation  74M current smoker who underwent excision of bleeding gastric mass, post op course complicated by intraperitoneal free air. Now admitted to SICU following ex-lap 10/10/19 which revealed esophageal and gastric perforations which have been repaired. Now s/p tracheostomy placement on 10/17 for difficulty with extubating patient.     Neuro:  - Off of sedation, following commands and alert: GCS 11T  - PRN pain control  - Seroquel 25 BID for agitation while on trach collar    CV  - Fluid resuscitate as necessary  - Off of pressors   - Echo 9/12/10 wnl    Pulm  - Extubated 10/14, reintubated 10/15 for encephalopathy with respiratory distress and increased WOB, subsequently underwent tracheostomy placement on 10/17.  - Continue PAV+ as he appears more comfortable on it if not tolerating trach collar  - Has been on continuous trach collar for >48hrs  - ABG's prn  - Uses CPAP at home, NO CPAP due to gastric perforation  - COPD with Significant history of smoking (3 ppd); continue nicotine patch  - Speaking valve with SLP    GI  - Post-op esophageal and gastric perforations 10/9, now repaired via ex-lap  - Monitor drains, care per surgery  - Do not use G-tube. Leave to gravity.  - Tube feeds per J tube, increase to goal as tolerated.  - Esophogram today, can pull chest tubes if no leaks observed and start on PO nutrition if OK'ed by SLP.     Renal  - Nephrology following, continuing HD for clearance of uremia.   - Mental status improved.  - BHASKAR but intermittently worse, baseline Cr of 1  - CMP daily  - Electrolyte repletion PRN  - Tolerating HD  - Unclear at this time if patient  will need HD outpatient or if kidneys will recover. Appreciate nephrology recs.  - If outpatient HD needed, will need permacath and outpatient HD chair.       Heme/ID  - Trend WBC, trending down.   - Abdominal culture (10/10) resulted with Yeast (awaiting speciation), Fluconazole restarted with end date of 10/24.  - Hgb stable  - CBCs daily  - Completed course of Cefepime, flagyl, vancomycin, and diflucan for contaminated peritoneum on 10/16.    Endo  - DM2  - POCT glucose q4h  - SSI     Dispo: OK for step down to floor. On continuous trach collar. Esophogram today to eval esophagus and stomach perforation sites. Can remove chest tubes and start PO nutrition if he passes. Tolerating HD. Continue work up for LTAC placement.       Critical care was time spent personally by me on the following activities: development of treatment plan with patient or surrogate and bedside caregivers, discussions with consultants, evaluation of patient's response to treatment, examination of patient, ordering and performing treatments and interventions, ordering and review of laboratory studies, ordering and review of radiographic studies, pulse oximetry, re-evaluation of patient's condition.  This critical care time did not overlap with that of any other provider or involve time for any procedures.     Jac Zamudio MD  Critical Care - Surgery  [facility]

## 2019-10-22 NOTE — PROGRESS NOTES
[facility]  Nephrology  Progress Note    Patient Name: Pasha Harmon  MRN: [id number]  Admission Date: 10/9/2019  Hospital Length of Stay: 12 days  Attending Provider: Paulino Parrish MD   Primary Care Physician: Eze Root MD  Principal Problem:Bowel perforation      Interval History: Patient seen and examined at bedside. Continues to make some urine; 0.5L/past 24hrs. On trach collar 28% FiO2. Remains off pressors. Tolerated HD yesterday with 3L net UF.       Review of patient's allergies indicates:   Allergen Reactions    Meloxicam Other (See Comments)     Ulcer, GI bleed      Current Facility-Administered Medications   Medication Frequency    0.9%  NaCl infusion PRN    0.9%  NaCl infusion Once    0.9%  NaCl infusion Once    acetaminophen oral solution 650 mg Q6H PRN    albuterol-ipratropium 2.5 mg-0.5 mg/3 mL nebulizer solution 3 mL Q4H PRN    albuterol-ipratropium 2.5 mg-0.5 mg/3 mL nebulizer solution 3 mL Q4H    chlorhexidine 0.12 % solution 15 mL BID    dextrose 10% (D10W) Bolus PRN    dextrose 10% (D10W) Bolus PRN    [START ON 10/23/2019] docusate 50 mg/5 mL liquid 100 mg Daily    famotidine 40 mg/5 mL (8 mg/mL) suspension 20 mg Daily    fentaNYL injection 25 mcg Q1H PRN    fluconazole (DIFLUCAN) IVPB 200 mg 100 mL Q24H    fluticasone furoate-vilanterol 100-25 mcg/dose diskus inhaler 1 puff Daily    glucagon (human recombinant) injection 1 mg PRN    heparin (porcine) injection 7,500 Units Q8H    hydrALAZINE injection 20 mg Q6H PRN    insulin aspart U-100 pen 0-5 Units QID (AC + HS) PRN    lidocaine 5 % patch 1 patch Q24H    nicotine 21 mg/24 hr 1 patch Daily    ondansetron injection 4 mg Q6H PRN    [START ON 10/23/2019] sennosides 8.8 mg/5 ml syrup 10 mL Daily    sodium chloride 0.9% flush 10 mL PRN     Family History     Problem Relation (Age of Onset)    Cancer Father    Heart disease Mother        Tobacco Use    Smoking status: Current Every Day  Smoker     Packs/day: 2.00     Types: Cigarettes    Smokeless tobacco: Never Used   Substance and Sexual Activity    Alcohol use: No    Drug use: No    Sexual activity: Not on file       Objective:     Vital Signs (Most Recent):  Temp: 99 °F (37.2 °C) (10/22/19 0700)  Pulse: 102 (10/22/19 0900)  Resp: (!) 21 (10/22/19 0900)  BP: (!) 160/73 (10/22/19 0900)  SpO2: 96 % (10/22/19 0900)  O2 Device (Oxygen Therapy): Trach Collar (10/22/19 0900) Vital Signs (24h Range):  Temp:  [97.6 °F (36.4 °C)-99.3 °F (37.4 °C)] 99 °F (37.2 °C)  Pulse:  [] 102  Resp:  [18-28] 21  SpO2:  [91 %-100 %] 96 %  BP: ()/(56-86) 160/73  Arterial Line BP: ()/() 77/62     Weight: (!) 150 kg (330 lb 11 oz) (10/18/19 0500)  Body mass index is 42.44 kg/m².  Body surface area is 2.8 meters squared.    I/O last 3 completed shifts:  In: 2310 [Other:500; NG/GT:1710; IV Piggyback:100]  Out: 6929 [Urine:826; Drains:1783; Other:3500; Chest Tube:820]    Physical Exam   Constitutional: He appears well-developed and well-nourished.   Eyes: Pupils are equal, round, and reactive to light. Conjunctivae and EOM are normal.   Neck: Normal range of motion. Neck supple.   Cardiovascular: Normal rate and regular rhythm.   Pulmonary/Chest: Effort normal. He has rales.   Trached with supplemental O2 in place    Abdominal: He exhibits no distension and no mass. There is tenderness. There is no guarding. No hernia.   Midline surgical incision to abdomen. Multiple abdominal drains noted.    Musculoskeletal: He exhibits edema (1+ pitting edema to BLE ). He exhibits no tenderness or deformity.   Neurological: He is alert.   Skin: Skin is warm and dry. No rash noted. He is not diaphoretic. No erythema. No pallor.   Psychiatric:   Follows commands and nods and gestures appropriately        Significant Labs:  CBC:   Recent Labs   Lab 10/22/19  0417   WBC 14.09*   RBC 2.87*   HGB 7.2*   HCT 24.2*   *   MCV 84   MCH 25.1*   MCHC 29.8*     CMP:    Recent Labs   Lab 10/19/19  1400  10/22/19  0417   *   < > 116*   CALCIUM 8.2*   < > 8.8   ALBUMIN 1.7*  --   --       < > 144   K 4.3   < > 4.7   CO2 23   < > 24   *   < > 106   BUN 55*   < > 83*   CREATININE 2.9*   < > 4.8*    < > = values in this interval not displayed.     All labs within the past 24 hours have been reviewed.        Assessment/Plan:     BHASKAR (acute kidney injury)  BHASKAR 2/2 ischemic ATN due to intraoperative hypotension and acute anemia.      Plan/Recommendations:  -Tentatively plan for HD tomorrow  -Pt may need perm-cath placement for HD; will discuss with staff and communicate plans with primary team   -Avoid nephrotoxic medications  -Renally dose medications  -Avoid contrast studies   -Daily renal function panels   -Strict I/O's   -Will continue to follow closely            Thank you for your consult. I will follow-up with patient. Please contact us if you have any additional questions.    Ranjeet Judd NP  Nephrology  [facility]

## 2019-10-22 NOTE — SUBJECTIVE & OBJECTIVE
Interval History: Patient seen and examined at bedside. Continues to make some urine; 0.5L/past 24hrs. On trach collar 28% FiO2. Remains off pressors. Tolerated HD yesterday with 3L net UF.       Review of patient's allergies indicates:   Allergen Reactions    Meloxicam Other (See Comments)     Ulcer, GI bleed      Current Facility-Administered Medications   Medication Frequency    0.9%  NaCl infusion PRN    0.9%  NaCl infusion Once    0.9%  NaCl infusion Once    acetaminophen oral solution 650 mg Q6H PRN    albuterol-ipratropium 2.5 mg-0.5 mg/3 mL nebulizer solution 3 mL Q4H PRN    albuterol-ipratropium 2.5 mg-0.5 mg/3 mL nebulizer solution 3 mL Q4H    chlorhexidine 0.12 % solution 15 mL BID    dextrose 10% (D10W) Bolus PRN    dextrose 10% (D10W) Bolus PRN    [START ON 10/23/2019] docusate 50 mg/5 mL liquid 100 mg Daily    famotidine 40 mg/5 mL (8 mg/mL) suspension 20 mg Daily    fentaNYL injection 25 mcg Q1H PRN    fluconazole (DIFLUCAN) IVPB 200 mg 100 mL Q24H    fluticasone furoate-vilanterol 100-25 mcg/dose diskus inhaler 1 puff Daily    glucagon (human recombinant) injection 1 mg PRN    heparin (porcine) injection 7,500 Units Q8H    hydrALAZINE injection 20 mg Q6H PRN    insulin aspart U-100 pen 0-5 Units QID (AC + HS) PRN    lidocaine 5 % patch 1 patch Q24H    nicotine 21 mg/24 hr 1 patch Daily    ondansetron injection 4 mg Q6H PRN    [START ON 10/23/2019] sennosides 8.8 mg/5 ml syrup 10 mL Daily    sodium chloride 0.9% flush 10 mL PRN     Family History     Problem Relation (Age of Onset)    Cancer Father    Heart disease Mother        Tobacco Use    Smoking status: Current Every Day Smoker     Packs/day: 2.00     Types: Cigarettes    Smokeless tobacco: Never Used   Substance and Sexual Activity    Alcohol use: No    Drug use: No    Sexual activity: Not on file       Objective:     Vital Signs (Most Recent):  Temp: 99 °F (37.2 °C) (10/22/19 0700)  Pulse: 102 (10/22/19  0900)  Resp: (!) 21 (10/22/19 0900)  BP: (!) 160/73 (10/22/19 0900)  SpO2: 96 % (10/22/19 0900)  O2 Device (Oxygen Therapy): Trach Collar (10/22/19 0900) Vital Signs (24h Range):  Temp:  [97.6 °F (36.4 °C)-99.3 °F (37.4 °C)] 99 °F (37.2 °C)  Pulse:  [] 102  Resp:  [18-28] 21  SpO2:  [91 %-100 %] 96 %  BP: ()/(56-86) 160/73  Arterial Line BP: ()/() 77/62     Weight: (!) 150 kg (330 lb 11 oz) (10/18/19 0500)  Body mass index is 42.44 kg/m².  Body surface area is 2.8 meters squared.    I/O last 3 completed shifts:  In: 2310 [Other:500; NG/GT:1710; IV Piggyback:100]  Out: 6929 [Urine:826; Drains:1783; Other:3500; Chest Tube:820]    Physical Exam   Constitutional: He appears well-developed and well-nourished.   Eyes: Pupils are equal, round, and reactive to light. Conjunctivae and EOM are normal.   Neck: Normal range of motion. Neck supple.   Cardiovascular: Normal rate and regular rhythm.   Pulmonary/Chest: Effort normal. He has rales.   Trached with supplemental O2 in place    Abdominal: He exhibits no distension and no mass. There is tenderness. There is no guarding. No hernia.   Midline surgical incision to abdomen. Multiple abdominal drains noted.    Musculoskeletal: He exhibits edema (1+ pitting edema to BLE ). He exhibits no tenderness or deformity.   Neurological: He is alert.   Skin: Skin is warm and dry. No rash noted. He is not diaphoretic. No erythema. No pallor.   Psychiatric:   Follows commands and nods and gestures appropriately        Significant Labs:  CBC:   Recent Labs   Lab 10/22/19  0417   WBC 14.09*   RBC 2.87*   HGB 7.2*   HCT 24.2*   *   MCV 84   MCH 25.1*   MCHC 29.8*     CMP:   Recent Labs   Lab 10/19/19  1400  10/22/19  0417   *   < > 116*   CALCIUM 8.2*   < > 8.8   ALBUMIN 1.7*  --   --       < > 144   K 4.3   < > 4.7   CO2 23   < > 24   *   < > 106   BUN 55*   < > 83*   CREATININE 2.9*   < > 4.8*    < > = values in this interval not displayed.      All labs within the past 24 hours have been reviewed.

## 2019-10-22 NOTE — PLAN OF CARE
Problem: SLP Goal  Goal: SLP Goal  Description  Speech Language Pathology Goals  Goals expected to be met by 10/28:  1. Pt will tolerate PMSV with O2 >93% and VSS to improve respiration and phonation.   2. Pt will demonstrate placement/removal of PMSV x5 indep.   3. Pt will vocalize x3 with PMSV in place  4. Pt will recall instructions/precautions of PMSV with 100% acc indep.   5. Pt will participate in ongoing swallow assessment to determine least restrictive diet recommendations.        Outcome: Ongoing, Progressing     Pt seen for one-way speaking valve training.   PT politely declined trials of valve 2/2 nausea, anxiety.  Ongoing education provided to Pt and Daughter. PO trials held 2/2 Pt NPO for procedure this service day. Current goals remain ongoing.      MERNA Montero., [facility]-SLP  Speech-Language Pathology  Pager: [phone]  10/22/2019

## 2019-10-22 NOTE — ASSESSMENT & PLAN NOTE
BHASKAR 2/2 ischemic ATN due to intraoperative hypotension and acute anemia.      Plan/Recommendations:  -Tentatively plan for HD tomorrow  -Pt may need perm-cath placement for HD; will discuss with staff and communicate plans with primary team   -Avoid nephrotoxic medications  -Renally dose medications  -Avoid contrast studies   -Daily renal function panels   -Strict I/O's   -Will continue to follow closely

## 2019-10-22 NOTE — PT/OT/SLP PROGRESS
Occupational Therapy   Treatment    Name: Pasha Harmon  MRN: [id number]  Admitting Diagnosis:  Bowel perforation  5 Days Post-Op;  PEG/Trach 2* esophageal tear    Recommendations:     Discharge Recommendations: nursing facility, skilled  Discharge Equipment Recommendations:  (TBD)  Barriers to discharge:  None    Assessment:     Pasha Harmon is a 74 y.o. male with a medical diagnosis of Bowel perforation.  He presents on trach collar with c/o nausea and fatigue limiting session. Pt did demonstrated improved bed mobility and sitting balance. Performance deficits affecting function are weakness, impaired balance, impaired endurance, gait instability, impaired cardiopulmonary response to activity, impaired self care skills, impaired functional mobilty.     Rehab Prognosis:  Good; patient would benefit from acute skilled OT services to address these deficits and reach maximum level of function.       Plan:     Patient to be seen 4 x/week to address the above listed problems via self-care/home management, therapeutic exercises, therapeutic activities  · Plan of Care Expires:    · Plan of Care Reviewed with: patient, daughter    Subjective     Pain/Comfort:  · Pain Rating 1: 6/10  · Location - Side 1: Bilateral  · Location - Orientation 1: generalized  · Location 1: (back and legs)  · Pain Addressed 1: Reposition, Distraction, Nurse notified  · Pain Rating Post-Intervention 1: 6/10    Objective:     Communicated with: RN prior to session.  Patient found supine with blood pressure cuff, pulse ox (continuous), telemetry, tracheostomy, PEG Tube, oxygen, chest tube, shrestha catheter, central line, DENISE drain(trach collar) upon OT entry to room.    General Precautions: Standard, fall   Orthopedic Precautions:N/A   Braces:       Occupational Performance:     Bed Mobility:    · Patient completed Rolling/Turning to Right with total assistance  · Patient completed Supine to Sit with total assistance  · Patient completed  Sit to Supine with total assistance     Functional Mobility/Transfers:  NT    Activities of Daily Living:  · Grooming: maximal assistance washing face      AMPAC 6 Click ADL: 7    Treatment & Education:  Pt ed on OT POC  Pt completed B UE A/AAROM ex's x 10 reps against gravity  Pt completed B LE hip abd/add x 10 reps  Pt sat EOB with max A for static sitting 2* lean to L despite UE support  Pt only able to tolerate EOB x 3 min, pushing himself back to supine    Patient left supine with all lines intact, call button in reach, RN notified and dtr presentEducation:      GOALS:   Multidisciplinary Problems     Occupational Therapy Goals        Problem: Occupational Therapy Goal    Goal Priority Disciplines Outcome Interventions   Occupational Therapy Goal     OT, PT/OT Ongoing, Progressing    Description:  Goals to be met by: 10/25/2019    Patient will increase functional independence with ADLs by performing:    Feeding with Ware.  UE Dressing with Minimal Assistance.  LE Dressing with Moderate Assistance.  Grooming while standing with Minimal Assistance.  Toileting from bedside commode with Minimal Assistance for hygiene and clothing management.   Supine to sit with Minimal Assistance.  Stand pivot transfers with Moderate Assistance.  Toilet transfer to bedside commode with Moderate Assistance.                      Time Tracking:     OT Date of Treatment: 10/22/19  OT Start Time: 0914  OT Stop Time: 0942  OT Total Time (min): 28 min    Billable Minutes:Therapeutic Exercise 23    ANTIONETTE Ochoa  10/22/2019

## 2019-10-22 NOTE — TREATMENT PLAN
GI Treatment Plan    Pasha Harmon is a 74 y.o. male admitted to hospital 10/9/2019 (Hospital Day: 14) due to Bowel perforation.     Interval History  - Tolerating trach collar  - HD yesterday with 3L removed   - Working with SLP  - Esophagram today    Objective  Temp:  [98 °F (36.7 °C)-99.3 °F (37.4 °C)] 98 °F (36.7 °C) (10/22 1500)  Pulse:  [] 94 (10/22 1529)  BP: ()/(56-86) 133/68 (10/22 1500)  Resp:  [18-28] 22 (10/22 1529)  SpO2:  [93 %-100 %] 97 % (10/22 1529)  Arterial Line BP: ()/(56-78) 77/62 (10/21 1730)    General: Alert, Oriented x3, no distress  Abdomen: +G tube, +J tube. Soft, Non-distended. Non-tender. No peritoneal signs.    Laboratory    Recent Labs   Lab 10/20/19  0330 10/21/19  0340 10/22/19  0417   HGB 7.2* 7.5* 7.2*       Lab Results   Component Value Date    WBC 14.09 (H) 10/22/2019    HGB 7.2 (L) 10/22/2019    HCT 24.2 (L) 10/22/2019    MCV 84 10/22/2019     (H) 10/22/2019       Lab Results   Component Value Date     10/22/2019    K 4.7 10/22/2019     10/22/2019    CO2 24 10/22/2019    BUN 83 (H) 10/22/2019    CREATININE 4.8 (H) 10/22/2019    CALCIUM 8.8 10/22/2019    ANIONGAP 14 10/22/2019    ESTGFRAFRICA 12.9 (A) 10/22/2019    EGFRNONAA 11.2 (A) 10/22/2019       Lab Results   Component Value Date    ALT 12 10/14/2019    AST 19 10/14/2019    ALKPHOS 67 10/14/2019    BILITOT 1.0 10/14/2019       Lab Results   Component Value Date    INR 1.0 10/12/2019    INR 1.2 10/10/2019    INR 1.2 05/24/2019       Plan  - F/u esophagram  - Continue working with SLP. If cleared, would recommend advancing diet  - Discussed with nephrology volume removal goals. Plan for HD tomorrow  - Patient still producing urine  - We will continue to follow.    Thank you for involving us in the care of Pasha Harmon. Please call with any additional questions, concerns or changes in the patient's clinical status.    Howard Macedo MD  Gastroenterology Fellow  Spectralink:  [phone]

## 2019-10-22 NOTE — ASSESSMENT & PLAN NOTE
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 Trach placed on 10/17/19.    Bilateral chest tubes in place to monitor for esophageal leak - keep them to suction. Will remove once patient passes esophagram demonstrating no further leak. CXR with tube in good position today  Continue G tube to gravity for gastric decompression - do not use for meds or feeds  Continue tube feeds  Continue on trach collar  Continue to work with speech for swallow    Rest of care per SICU

## 2019-10-22 NOTE — SUBJECTIVE & OBJECTIVE
Interval History/Significant Events: Awake and alert this morning. Tolerating trach collar > 48 hrs. HDS with no pressor requirement. Tolerated HD session yesterday. Tube feeds at goal, tolerating well. Per speech, OK for esophogram. If passes esophogram, will evaluate patient for diet. Afebrile. No bowel movement for 3 days, placing patient on bowel regimen.     Follow-up For: Procedure(s) (LRB):  CREATION, TRACHEOSTOMY (N/A)    Post-Operative Day: 5 Days Post-Op    Objective:     Vital Signs (Most Recent):  Temp: 99 °F (37.2 °C) (10/22/19 0700)  Pulse: 98 (10/22/19 0700)  Resp: (!) 28 (10/22/19 0700)  BP: (!) 163/77 (10/22/19 0700)  SpO2: 99 % (10/22/19 0700) Vital Signs (24h Range):  Temp:  [97.6 °F (36.4 °C)-99.3 °F (37.4 °C)] 99 °F (37.2 °C)  Pulse:  [] 98  Resp:  [18-28] 28  SpO2:  [91 %-100 %] 99 %  BP: ()/(56-86) 163/77  Arterial Line BP: ()/() 77/62     Weight: (!) 150 kg (330 lb 11 oz)  Body mass index is 42.44 kg/m².      Intake/Output Summary (Last 24 hours) at 10/22/2019 0734  Last data filed at 10/22/2019 0600  Gross per 24 hour   Intake 1710 ml   Output 5412 ml   Net -3702 ml       Physical Exam   Constitutional: He appears well-developed and well-nourished. No distress.   HENT:   Head: Normocephalic and atraumatic.   Trach in place.   Eyes: Pupils are equal, round, and reactive to light. Conjunctivae are normal.   Neck: Normal range of motion.   Cardiovascular: Normal rate, regular rhythm and intact distal pulses.   Pulmonary/Chest: Effort normal. No respiratory distress.   Trach collar.   Abdominal: Soft. He exhibits no distension.   G-tube to gravity with bilious output.  J-tube with tube feeds infusing.   Tube sites clean, no surrounding erythema.  Incision c/d/i, no erythema or drainage.   Musculoskeletal: Normal range of motion.   Neurological: He is alert.   Skin: Skin is warm and dry.     Lines/Drains/Airways     Central Venous Catheter Line                 Trialysis  (Dialysis) Catheter 10/20/19 1415 right internal jugular 1 day          Drain                 Chest Tube 10/10/19 1510 1 Right Midaxillary;Fourth intercostal space 28 Fr. 11 days         Chest Tube 10/10/19 1513 Left Midaxillary;Fourth intercostal space 28 Fr. 11 days         Closed/Suction Drain 10/10/19 1349 Right RUQ Bulb 19 Fr. 11 days         Gastrostomy/Enterostomy 10/10/19 1400 Jejunostomy tube 11 days         Gastrostomy/Enterostomy 10/10/19 1400 LUQ 11 days         Urethral Catheter 10/10/19 1530 Straight-tip;Double-lumen;Non-latex 16 Fr. 11 days          Airway                 Surgical Airway 10/17/19 1603 Shiley Cuffed 4 days          Arterial Line                 Arterial Line 10/10/19 1245 Right Radial 11 days                Significant Labs:    CBC/Anemia Profile:  Recent Labs   Lab 10/21/19  0340 10/22/19  0417   WBC 14.99* 14.09*   HGB 7.5* 7.2*   HCT 24.1* 24.2*   * 526*   MCV 80* 84   RDW 19.8* 19.8*        Chemistries:  Recent Labs   Lab 10/21/19  0340 10/22/19  0417    144   K 4.9 4.7    106   CO2 24 24   BUN 90* 83*   CREATININE 4.9* 4.8*   CALCIUM 8.3* 8.8   MG 2.7* 2.6   PHOS 5.8* 4.9*       All pertinent labs within the past 24 hours have been reviewed.    Significant Imaging:  I have reviewed all pertinent imaging results/findings within the past 24 hours.

## 2019-10-22 NOTE — ASSESSMENT & PLAN NOTE
74M current smoker who underwent excision of bleeding gastric mass, post op course complicated by intraperitoneal free air. Now admitted to SICU following ex-lap 10/10/19 which revealed esophageal and gastric perforations which have been repaired. Now s/p tracheostomy placement on 10/17 for difficulty with extubating patient.     Neuro:  - Off of sedation, following commands and alert: GCS 11T  - PRN pain control  - Seroquel 25 BID for agitation while on trach collar    CV  - Fluid resuscitate as necessary  - Off of pressors   - Echo 9/12/10 wnl    Pulm  - Extubated 10/14, reintubated 10/15 for encephalopathy with respiratory distress and increased WOB, subsequently underwent tracheostomy placement on 10/17.  - Continue PAV+ as he appears more comfortable on it if not tolerating trach collar  - Has been on continuous trach collar for >48hrs  - ABG's prn  - Uses CPAP at home, NO CPAP due to gastric perforation  - COPD with Significant history of smoking (3 ppd); continue nicotine patch  - Speaking valve with SLP    GI  - Post-op esophageal and gastric perforations 10/9, now repaired via ex-lap  - Monitor drains, care per surgery  - Do not use G-tube. Leave to gravity.  - Tube feeds per J tube, increase to goal as tolerated.  - Esophogram today, can pull chest tubes if no leaks observed and start on PO nutrition if OK'ed by SLP.     Renal  - Nephrology following, continuing HD for clearance of uremia.   - Mental status improved.  - BHASKAR but intermittently worse, baseline Cr of 1  - CMP daily  - Electrolyte repletion PRN  - Tolerating HD  - Unclear at this time if patient will need HD outpatient or if kidneys will recover. Appreciate nephrology recs.  - If outpatient HD needed, will need permacath and outpatient HD chair.       Heme/ID  - Trend WBC, trending down.   - Abdominal culture (10/10) resulted with Yeast (awaiting speciation), Fluconazole restarted with end date of 10/24.  - Hgb stable  - CBCs daily  -  Completed course of Cefepime, flagyl, vancomycin, and diflucan for contaminated peritoneum on 10/16.    Endo  - DM2  - POCT glucose q4h  - SSI     Dispo: OK for step down to floor. On continuous trach collar. Esophogram today to eval esophagus and stomach perforation sites. Can remove chest tubes and start PO nutrition if he passes. Tolerating HD. Continue work up for LTAC placement.

## 2019-10-22 NOTE — PROGRESS NOTES
"[facility]  General Surgery  Progress Note    Subjective:     History of Present Illness:  Patient is a 72 yo M with history of COPD and bleeding gastric mass who underwent EGD with AES yesterday where they removed a 7 cm "ulcerated lipoma" from his gastric antrum using submucosal endoscopic dissection. When removing the mass, it became stuck in the distal esophagus. They had to removed it piecemeal, and caused some mucosal esophageal tears. He was admitted for obs overnight, and this morning his WBC was elevated and he was complaining of abd pain. CXR showed free intraperitoneal free air and general surgery was consulted for evaluation.    Post-Op Info:  Procedure(s) (LRB):  CREATION, TRACHEOSTOMY (N/A)   5 Days Post-Op     Interval History: No acute events overnight. Did not pass swallow study with speech yesterday. Looks good today.    Medications:  Continuous Infusions:    Scheduled Meds:   sodium chloride 0.9%   Intravenous Once    sodium chloride 0.9%   Intravenous Once    albuterol-ipratropium  3 mL Nebulization Q4H    chlorhexidine  15 mL Mouth/Throat BID    famotidine  20 mg Per J Tube Daily    fluconazole (DIFLUCAN) IVPB  200 mg Intravenous Q24H    fluticasone furoate-vilanterol  1 puff Inhalation Daily    heparin (porcine)  7,500 Units Subcutaneous Q8H    lidocaine  1 patch Transdermal Q24H    nicotine  1 patch Transdermal Daily    senna-docusate 8.6-50 mg  1 tablet Per J Tube Daily     PRN Meds:sodium chloride 0.9%, acetaminophen, albuterol-ipratropium, Dextrose 10% Bolus, Dextrose 10% Bolus, fentaNYL, glucagon (human recombinant), hydrALAZINE, insulin aspart U-100, ondansetron, sodium chloride 0.9%     Review of patient's allergies indicates:   Allergen Reactions    Meloxicam Other (See Comments)     Ulcer, GI bleed      Objective:     Vital Signs (Most Recent):  Temp: 99.3 °F (37.4 °C) (10/22/19 0300)  Pulse: 98 (10/22/19 0600)  Resp: 20 (10/22/19 0348)  BP: (!) 147/68 " (10/22/19 0600)  SpO2: (!) 94 % (10/22/19 0600) Vital Signs (24h Range):  Temp:  [97.6 °F (36.4 °C)-99.3 °F (37.4 °C)] 99.3 °F (37.4 °C)  Pulse:  [] 98  Resp:  [18-22] 20  SpO2:  [91 %-100 %] 94 %  BP: ()/(56-86) 147/68  Arterial Line BP: ()/() 77/62     Weight: (!) 150 kg (330 lb 11 oz)  Body mass index is 42.44 kg/m².    Intake/Output - Last 3 Shifts       10/20 0700 - 10/21 0659 10/21 0700 - 10/22 0659    I.V. (mL/kg) 235 (1.6)     Other  500    NG/GT 1260 1160    IV Piggyback  100    Total Intake(mL/kg) 1495 (10) 1760 (11.7)    Urine (mL/kg/hr) 887 (0.2) 569 (0.2)    Drains 1570 1143    Other  3500    Chest Tube 745 390    Total Output 3202 5602    Net -3439 -6930                Physical Exam   Constitutional: He appears well-developed and well-nourished. He is sedated and intubated (Trach in place).   Cardiovascular: Normal rate and regular rhythm.   Pulmonary/Chest: Effort normal. He is intubated (Trach in place). No respiratory distress.   Bilateral chest tubes, draining SS  On trach collar this morning   Abdominal: Soft. He exhibits no distension. There is no tenderness. There is no guarding.   G tube in place, draining gastric contents  Jtube in place    Genitourinary:   Genitourinary Comments: Hallman in place   Neurological: He is alert.   Skin: Skin is warm and dry.   Vitals reviewed.      Significant Labs:  CBC:   Recent Labs   Lab 10/22/19  0417   WBC 14.09*   RBC 2.87*   HGB 7.2*   HCT 24.2*   *   MCV 84   MCH 25.1*   MCHC 29.8*     CMP:   Recent Labs   Lab 10/19/19  1400  10/22/19  0417   *   < > 116*   CALCIUM 8.2*   < > 8.8   ALBUMIN 1.7*  --   --       < > 144   K 4.3   < > 4.7   CO2 23   < > 24   *   < > 106   BUN 55*   < > 83*   CREATININE 2.9*   < > 4.8*    < > = values in this interval not displayed.     Coagulation:   No results for input(s): LABPROT, INR, APTT in the last 168 hours.  ABGs:   Recent Labs   Lab 10/21/19  0343   PH 7.424   PCO2  37.3   PO2 109*   HCO3 24.4   POCSATURATED 98   BE 0       Significant Diagnostics:  I have reviewed all pertinent imaging results/findings within the past 24 hours.    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 Trach placed on 10/17/19.    Bilateral chest tubes in place to monitor for esophageal leak - keep them to suction. Will remove once patient passes esophagram demonstrating no further leak. CXR with tube in good position today  Continue G tube to gravity for gastric decompression - do not use for meds or feeds  Continue tube feeds  Continue on trach collar  Continue to work with speech for swallow    Rest of care per SICU     Esophageal tear             Nu Schulte MD  General Surgery  [facility]

## 2019-10-22 NOTE — PLAN OF CARE
Pt kept free from falls and injuries during shift. VSS on trach collar, 5L, 28%, sats >95%. NSR on monitor. SBP maintained <160. Pt awake and alert, TORRES. Esophogram complete, pt tolerated well. Tube feeds restarted when returned from esophogram, currently @ 60, goal of 70. Chest tubes in place with serous output, 60 cc from right 100 cc from left.. RUQ Gene drain in place with 30 cc serous output total for shift. Tube feeds to J tube. G tube to gravity. Weight shift asssitance provided. Pressure points protected. Will continue to monitor.

## 2019-10-22 NOTE — PT/OT/SLP PROGRESS
"Speech Language Pathology Treatment    Patient Name:  Pasha Harmon   MRN:  [id number]  Admitting Diagnosis: Bowel perforation    Recommendations:                 General Recommendations:  One Way Speaking Valve and ongoing swallow assessment   Diet recommendations:  NPO, NPO   Ice chips sparingly for pleasure   Aspiration Precautions: Strict aspiration precautions   General Precautions: Standard, fall  Communication strategies:  go to room if call light pushed    Subjective     Reviewed Pt with RN, RN reported Pt did not like PMSV attempt last night   Reviewed Pt with RT, RT reported Pt with manageable secretions and recently suctioned, cleared Pt for trials if feasible   Pt presents calm  Daughter at bedside  He mouths, "I can talk when this is out"     Pain/Comfort:  · Pain Rating 1: (Pt reporting nausea s/p OT session)    Objective:     Has the patient been evaluated by SLP for swallowing?   Yes  Keep patient NPO? Yes   Current Respiratory Status: room air      Pt seen for ongoing PMSV trials. Upon first SLP attempt, Pt with OT/PT. Upon second attempt, Pt found awake in bed with Shiley 8.0 with cuff deflated and trach collar in place. He reported feeling nauseated s/p OT/PT session and deferred PMSV trials. Pt seen demonstrating functional mouthing and gesturing for communication purposes. SLP provided education to Pt and daughter at bedside on SLP role, trach anatomy, purpose/benefits of PMSV, breathing/relaxation strategies, continued use of total communication, safety precautions, and ongoing POC/ongoing PMSV trials. Pt nodded head for understanding, but would benefit from ongoing reinforcement. Daughter verbalized understanding. Pt remained awake in bed with daughter at bedside upon SLP exit. Findings reviewed with RN following session.     Assessment:     Pasha Harmon is a 74 y.o. male with an SLP diagnosis of Dysphagia s/p Trach and One Way Speaking Valve Training.     Goals: "   Multidisciplinary Problems     SLP Goals        Problem: SLP Goal    Goal Priority Disciplines Outcome   SLP Goal     SLP Ongoing, Progressing   Description:  Speech Language Pathology Goals  Goals expected to be met by 10/28:  1. Pt will tolerate PMSV with O2 >93% and VSS to improve respiration and phonation.   2. Pt will demonstrate placement/removal of PMSV x5 indep.   3. Pt will vocalize x3 with PMSV in place  4. Pt will recall instructions/precautions of PMSV with 100% acc indep.   5. Pt will participate in ongoing swallow assessment to determine least restrictive diet recommendations.                         Plan:     · Patient to be seen:  4 x/week   · Plan of Care expires:  11/21/19  · Plan of Care reviewed with:  patient, daughter   · SLP Follow-Up:  Yes       Discharge recommendations:  nursing facility, skilled     Time Tracking:     SLP Treatment Date:   10/22/19  Speech Start Time:  0941  Speech Stop Time:  0952     Speech Total Time (min):  11 min    Billable Minutes: [facility]/Home Management Training 11    MERLINE Rangel  10/22/2019

## 2019-10-23 ENCOUNTER — ANESTHESIA EVENT (OUTPATIENT)
Dept: SURGERY | Facility: HOSPITAL | Age: 74
DRG: 003 | End: 2019-10-23
Payer: MEDICARE

## 2019-10-23 LAB
ALLENS TEST: NORMAL
ANION GAP SERPL CALC-SCNC: 14 MMOL/L (ref 8–16)
BASOPHILS # BLD AUTO: 0.05 K/UL (ref 0–0.2)
BASOPHILS NFR BLD: 0.4 % (ref 0–1.9)
BUN SERPL-MCNC: 103 MG/DL (ref 8–23)
CALCIUM SERPL-MCNC: 8.8 MG/DL (ref 8.7–10.5)
CHLORIDE SERPL-SCNC: 106 MMOL/L (ref 95–110)
CO2 SERPL-SCNC: 24 MMOL/L (ref 23–29)
CREAT SERPL-MCNC: 5.2 MG/DL (ref 0.5–1.4)
DELSYS: NORMAL
DIFFERENTIAL METHOD: ABNORMAL
EOSINOPHIL # BLD AUTO: 0.5 K/UL (ref 0–0.5)
EOSINOPHIL NFR BLD: 4 % (ref 0–8)
ERYTHROCYTE [DISTWIDTH] IN BLOOD BY AUTOMATED COUNT: 19.8 % (ref 11.5–14.5)
EST. GFR  (AFRICAN AMERICAN): 11.7 ML/MIN/1.73 M^2
EST. GFR  (NON AFRICAN AMERICAN): 10.1 ML/MIN/1.73 M^2
FIO2: 28
FLOW: 5
GLUCOSE SERPL-MCNC: 110 MG/DL (ref 70–110)
HCO3 UR-SCNC: 24.2 MMOL/L (ref 24–28)
HCT VFR BLD AUTO: 23 % (ref 40–54)
HGB BLD-MCNC: 7.1 G/DL (ref 14–18)
IMM GRANULOCYTES # BLD AUTO: 0.19 K/UL (ref 0–0.04)
IMM GRANULOCYTES NFR BLD AUTO: 1.6 % (ref 0–0.5)
LYMPHOCYTES # BLD AUTO: 1.1 K/UL (ref 1–4.8)
LYMPHOCYTES NFR BLD: 9 % (ref 18–48)
MAGNESIUM SERPL-MCNC: 2.6 MG/DL (ref 1.6–2.6)
MCH RBC QN AUTO: 25.2 PG (ref 27–31)
MCHC RBC AUTO-ENTMCNC: 30.9 G/DL (ref 32–36)
MCV RBC AUTO: 82 FL (ref 82–98)
MODE: NORMAL
MONOCYTES # BLD AUTO: 1.2 K/UL (ref 0.3–1)
MONOCYTES NFR BLD: 10.4 % (ref 4–15)
NEUTROPHILS # BLD AUTO: 8.8 K/UL (ref 1.8–7.7)
NEUTROPHILS NFR BLD: 74.6 % (ref 38–73)
NRBC BLD-RTO: 0 /100 WBC
PCO2 BLDA: 35.7 MMHG (ref 35–45)
PH SMN: 7.44 [PH] (ref 7.35–7.45)
PHOSPHATE SERPL-MCNC: 6 MG/DL (ref 2.7–4.5)
PLATELET # BLD AUTO: 583 K/UL (ref 150–350)
PMV BLD AUTO: 9.5 FL (ref 9.2–12.9)
PO2 BLDA: 85 MMHG (ref 80–100)
POC BE: 0 MMOL/L
POC SATURATED O2: 97 % (ref 95–100)
POC TCO2: 25 MMOL/L (ref 23–27)
POCT GLUCOSE: 111 MG/DL (ref 70–110)
POCT GLUCOSE: 118 MG/DL (ref 70–110)
POCT GLUCOSE: 126 MG/DL (ref 70–110)
POCT GLUCOSE: 134 MG/DL (ref 70–110)
POCT GLUCOSE: 136 MG/DL (ref 70–110)
POTASSIUM SERPL-SCNC: 5.1 MMOL/L (ref 3.5–5.1)
RBC # BLD AUTO: 2.82 M/UL (ref 4.6–6.2)
SAMPLE: NORMAL
SITE: NORMAL
SODIUM SERPL-SCNC: 144 MMOL/L (ref 136–145)
SP02: 99
WBC # BLD AUTO: 11.84 K/UL (ref 3.9–12.7)

## 2019-10-23 PROCEDURE — 94640 AIRWAY INHALATION TREATMENT: CPT

## 2019-10-23 PROCEDURE — 20000000 HC ICU ROOM

## 2019-10-23 PROCEDURE — 99900035 HC TECH TIME PER 15 MIN (STAT)

## 2019-10-23 PROCEDURE — 99233 PR SUBSEQUENT HOSPITAL CARE,LEVL III: ICD-10-PCS | Mod: 24,GC,, | Performed by: SURGERY

## 2019-10-23 PROCEDURE — 84100 ASSAY OF PHOSPHORUS: CPT

## 2019-10-23 PROCEDURE — 80048 BASIC METABOLIC PNL TOTAL CA: CPT

## 2019-10-23 PROCEDURE — 63600175 PHARM REV CODE 636 W HCPCS: Performed by: SURGERY

## 2019-10-23 PROCEDURE — 92609 USE OF SPEECH DEVICE SERVICE: CPT

## 2019-10-23 PROCEDURE — 25000003 PHARM REV CODE 250: Performed by: STUDENT IN AN ORGANIZED HEALTH CARE EDUCATION/TRAINING PROGRAM

## 2019-10-23 PROCEDURE — 25000242 PHARM REV CODE 250 ALT 637 W/ HCPCS: Performed by: STUDENT IN AN ORGANIZED HEALTH CARE EDUCATION/TRAINING PROGRAM

## 2019-10-23 PROCEDURE — 99233 SBSQ HOSP IP/OBS HIGH 50: CPT | Mod: 24,GC,, | Performed by: SURGERY

## 2019-10-23 PROCEDURE — 85025 COMPLETE CBC W/AUTO DIFF WBC: CPT

## 2019-10-23 PROCEDURE — 80100014 HC HEMODIALYSIS 1:1

## 2019-10-23 PROCEDURE — 63600175 PHARM REV CODE 636 W HCPCS: Performed by: STUDENT IN AN ORGANIZED HEALTH CARE EDUCATION/TRAINING PROGRAM

## 2019-10-23 PROCEDURE — 83735 ASSAY OF MAGNESIUM: CPT

## 2019-10-23 PROCEDURE — 94668 MNPJ CHEST WALL SBSQ: CPT

## 2019-10-23 PROCEDURE — 82803 BLOOD GASES ANY COMBINATION: CPT

## 2019-10-23 PROCEDURE — 36600 WITHDRAWAL OF ARTERIAL BLOOD: CPT

## 2019-10-23 PROCEDURE — 90935 HEMODIALYSIS ONE EVALUATION: CPT | Mod: ,,, | Performed by: NURSE PRACTITIONER

## 2019-10-23 PROCEDURE — 99900026 HC AIRWAY MAINTENANCE (STAT)

## 2019-10-23 PROCEDURE — 63600175 PHARM REV CODE 636 W HCPCS: Mod: JG | Performed by: STUDENT IN AN ORGANIZED HEALTH CARE EDUCATION/TRAINING PROGRAM

## 2019-10-23 PROCEDURE — 94761 N-INVAS EAR/PLS OXIMETRY MLT: CPT

## 2019-10-23 PROCEDURE — 25000003 PHARM REV CODE 250: Performed by: PHYSICIAN ASSISTANT

## 2019-10-23 PROCEDURE — 90935 PR HEMODIALYSIS, ONE EVALUATION: ICD-10-PCS | Mod: ,,, | Performed by: NURSE PRACTITIONER

## 2019-10-23 PROCEDURE — 97530 THERAPEUTIC ACTIVITIES: CPT

## 2019-10-23 PROCEDURE — 27000221 HC OXYGEN, UP TO 24 HOURS

## 2019-10-23 PROCEDURE — 92526 ORAL FUNCTION THERAPY: CPT

## 2019-10-23 PROCEDURE — S4991 NICOTINE PATCH NONLEGEND: HCPCS | Performed by: PHYSICIAN ASSISTANT

## 2019-10-23 RX ORDER — QUETIAPINE FUMARATE 25 MG/1
25 TABLET, FILM COATED ORAL ONCE
Status: COMPLETED | OUTPATIENT
Start: 2019-10-23 | End: 2019-10-23

## 2019-10-23 RX ORDER — SEVELAMER CARBONATE FOR ORAL SUSPENSION 800 MG/1
0.8 POWDER, FOR SUSPENSION ORAL
Status: DISCONTINUED | OUTPATIENT
Start: 2019-10-23 | End: 2019-10-31 | Stop reason: HOSPADM

## 2019-10-23 RX ORDER — POLYETHYLENE GLYCOL 3350 17 G/17G
17 POWDER, FOR SOLUTION ORAL DAILY
Status: DISCONTINUED | OUTPATIENT
Start: 2019-10-23 | End: 2019-10-31 | Stop reason: HOSPADM

## 2019-10-23 RX ADMIN — IPRATROPIUM BROMIDE AND ALBUTEROL SULFATE 3 ML: .5; 3 SOLUTION RESPIRATORY (INHALATION) at 12:10

## 2019-10-23 RX ADMIN — ONDANSETRON 4 MG: 2 INJECTION INTRAMUSCULAR; INTRAVENOUS at 09:10

## 2019-10-23 RX ADMIN — IPRATROPIUM BROMIDE AND ALBUTEROL SULFATE 3 ML: .5; 3 SOLUTION RESPIRATORY (INHALATION) at 04:10

## 2019-10-23 RX ADMIN — IPRATROPIUM BROMIDE AND ALBUTEROL SULFATE 3 ML: .5; 3 SOLUTION RESPIRATORY (INHALATION) at 11:10

## 2019-10-23 RX ADMIN — CHLORHEXIDINE GLUCONATE 0.12% ORAL RINSE 15 ML: 1.2 LIQUID ORAL at 09:10

## 2019-10-23 RX ADMIN — ALTEPLASE 2 MG: 2.2 INJECTION, POWDER, LYOPHILIZED, FOR SOLUTION INTRAVENOUS at 05:10

## 2019-10-23 RX ADMIN — DOCUSATE SODIUM 100 MG: 50 LIQUID ORAL at 08:10

## 2019-10-23 RX ADMIN — ACETAMINOPHEN 650 MG: 160 SOLUTION ORAL at 09:10

## 2019-10-23 RX ADMIN — FENTANYL CITRATE 25 MCG: 50 INJECTION INTRAMUSCULAR; INTRAVENOUS at 08:10

## 2019-10-23 RX ADMIN — HEPARIN SODIUM 7500 UNITS: 5000 INJECTION, SOLUTION INTRAVENOUS; SUBCUTANEOUS at 05:10

## 2019-10-23 RX ADMIN — HEPARIN SODIUM 7500 UNITS: 5000 INJECTION, SOLUTION INTRAVENOUS; SUBCUTANEOUS at 03:10

## 2019-10-23 RX ADMIN — IPRATROPIUM BROMIDE AND ALBUTEROL SULFATE 3 ML: .5; 3 SOLUTION RESPIRATORY (INHALATION) at 03:10

## 2019-10-23 RX ADMIN — FENTANYL CITRATE 25 MCG: 50 INJECTION INTRAMUSCULAR; INTRAVENOUS at 03:10

## 2019-10-23 RX ADMIN — IPRATROPIUM BROMIDE AND ALBUTEROL SULFATE 3 ML: .5; 3 SOLUTION RESPIRATORY (INHALATION) at 07:10

## 2019-10-23 RX ADMIN — SENNOSIDES 10 ML: 8.8 SYRUP ORAL at 08:10

## 2019-10-23 RX ADMIN — FAMOTIDINE 20 MG: 40 POWDER, FOR SUSPENSION ORAL at 08:10

## 2019-10-23 RX ADMIN — FLUCONAZOLE 200 MG: 2 INJECTION, SOLUTION INTRAVENOUS at 11:10

## 2019-10-23 RX ADMIN — IPRATROPIUM BROMIDE AND ALBUTEROL SULFATE 3 ML: .5; 3 SOLUTION RESPIRATORY (INHALATION) at 08:10

## 2019-10-23 RX ADMIN — POLYETHYLENE GLYCOL 3350 17 G: 17 POWDER, FOR SOLUTION ORAL at 08:10

## 2019-10-23 RX ADMIN — NICOTINE 1 PATCH: 21 PATCH, EXTENDED RELEASE TRANSDERMAL at 09:10

## 2019-10-23 RX ADMIN — HEPARIN SODIUM 7500 UNITS: 5000 INJECTION, SOLUTION INTRAVENOUS; SUBCUTANEOUS at 09:10

## 2019-10-23 RX ADMIN — LIDOCAINE 1 PATCH: 50 PATCH TOPICAL at 09:10

## 2019-10-23 RX ADMIN — QUETIAPINE FUMARATE 25 MG: 25 TABLET ORAL at 09:10

## 2019-10-23 RX ADMIN — CHLORHEXIDINE GLUCONATE 0.12% ORAL RINSE 15 ML: 1.2 LIQUID ORAL at 08:10

## 2019-10-23 NOTE — PLAN OF CARE
HR 's , -160's SPO2 >88% on trach collar. Tube feed at goal rate of 70cc/hr, no residuals. Chest tubes with serous drainage. DENISE drain to abdomen with serous drainage. G-tube with gastric output. Patient medicated with PRN tylenol for complaints of pain. Plan of care reviewed with patient and daughter at bedside.

## 2019-10-23 NOTE — PROGRESS NOTES
"[facility]  General Surgery  Progress Note    Subjective:     History of Present Illness:  Patient is a 72 yo M with history of COPD and bleeding gastric mass who underwent EGD with AES yesterday where they removed a 7 cm "ulcerated lipoma" from his gastric antrum using submucosal endoscopic dissection. When removing the mass, it became stuck in the distal esophagus. They had to removed it piecemeal, and caused some mucosal esophageal tears. He was admitted for obs overnight, and this morning his WBC was elevated and he was complaining of abd pain. CXR showed free intraperitoneal free air and general surgery was consulted for evaluation.    Post-Op Info:  Procedure(s) (LRB):  CREATION, TRACHEOSTOMY (N/A)   6 Days Post-Op     Interval History: No acute events overnight. Passed esophagram yesterday. On trach collar this morning.     Medications:  Continuous Infusions:    Scheduled Meds:   sodium chloride 0.9%   Intravenous Once    albuterol-ipratropium  3 mL Nebulization Q4H    chlorhexidine  15 mL Mouth/Throat BID    docusate  100 mg Per J Tube Daily    famotidine  20 mg Per J Tube Daily    fluconazole (DIFLUCAN) IVPB  200 mg Intravenous Q24H    fluticasone furoate-vilanterol  1 puff Inhalation Daily    heparin (porcine)  7,500 Units Subcutaneous Q8H    lidocaine  1 patch Transdermal Q24H    nicotine  1 patch Transdermal Daily    polyethylene glycol  17 g Per J Tube Daily    sennosides 8.8 mg/5 ml  10 mL Per J Tube Daily     PRN Meds:sodium chloride 0.9%, acetaminophen, albuterol-ipratropium, Dextrose 10% Bolus, Dextrose 10% Bolus, fentaNYL, glucagon (human recombinant), hydrALAZINE, insulin aspart U-100, ondansetron, sodium chloride 0.9%     Review of patient's allergies indicates:   Allergen Reactions    Meloxicam Other (See Comments)     Ulcer, GI bleed      Objective:     Vital Signs (Most Recent):  Temp: 98.9 °F (37.2 °C) (10/23/19 0700)  Pulse: 96 (10/23/19 0817)  Resp: (!) 22 " (10/23/19 0817)  BP: (!) 161/74 (10/23/19 0800)  SpO2: 97 % (10/23/19 0817) Vital Signs (24h Range):  Temp:  [98 °F (36.7 °C)-99 °F (37.2 °C)] 98.9 °F (37.2 °C)  Pulse:  [] 96  Resp:  [17-31] 22  SpO2:  [96 %-100 %] 97 %  BP: (133-195)/(65-88) 161/74     Weight: (!) 150 kg (330 lb 11 oz)  Body mass index is 42.44 kg/m².    Intake/Output - Last 3 Shifts       10/21 0700 - 10/22 0659 10/22 0700 - 10/23 0659 10/23 0700 - 10/24 0659    I.V. (mL/kg)       Other 500      NG/GT 1160 860     IV Piggyback 100      Total Intake(mL/kg) 1760 (11.7) 860 (5.7)     Urine (mL/kg/hr) 569 (0.2) 928 (0.3) 75 (0.3)    Drains 1143 1221 10    Other 3500      Chest Tube 390 420 4    Total Output 5602 2569 89    Net -3842 -1709 -89                 Physical Exam   Constitutional: He appears well-developed and well-nourished. He is sedated and intubated (Trach in place).   Cardiovascular: Normal rate and regular rhythm.   Pulmonary/Chest: Effort normal. He is intubated (Trach in place). No respiratory distress.   Bilateral chest tubes, draining SS  On trach collar this morning   Abdominal: Soft. He exhibits no distension. There is no tenderness. There is no guarding.   G tube in place, draining gastric contents  Jtube in place    Genitourinary:   Genitourinary Comments: Hallman in place   Neurological: He is alert.   Skin: Skin is warm and dry.   Vitals reviewed.      Significant Labs:  CBC:   Recent Labs   Lab 10/23/19  0322   WBC 11.84   RBC 2.82*   HGB 7.1*   HCT 23.0*   *   MCV 82   MCH 25.2*   MCHC 30.9*     CMP:   Recent Labs   Lab 10/19/19  1400  10/23/19  0322   *   < > 110   CALCIUM 8.2*   < > 8.8   ALBUMIN 1.7*  --   --       < > 144   K 4.3   < > 5.1   CO2 23   < > 24   *   < > 106   BUN 55*   < > 103*   CREATININE 2.9*   < > 5.2*    < > = values in this interval not displayed.     Coagulation:   No results for input(s): LABPROT, INR, APTT in the last 168 hours.  ABGs:   Recent Labs   Lab  10/23/19  0435   PH 7.440   PCO2 35.7   PO2 85   HCO3 24.2   POCSATURATED 97   BE 0       Significant Diagnostics:  I have reviewed all pertinent imaging results/findings within the past 24 hours.    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 Trach placed on 10/17/19.    Bilateral chest tubes to water seal today, will start d/c'ing tomorrow  Permacath tomorrow  Continue G tube to gravity for gastric decompression - do not use for meds or feeds  Continue tube feeds  Continue on trach collar  Continue to work with speech for swallow    Rest of care per SICU     Esophageal tear             Nu Schulte MD  General Surgery  [facility]

## 2019-10-23 NOTE — PLAN OF CARE
Problem: Occupational Therapy Goal  Goal: Occupational Therapy Goal  Description  Goals to be met by: 10/25/2019    Patient will increase functional independence with ADLs by performing:    Feeding with [place].  UE Dressing with Minimal Assistance.  LE Dressing with Moderate Assistance.  Grooming while standing with Minimal Assistance.  Toileting from bedside commode with Minimal Assistance for hygiene and clothing management.   Supine to sit with Minimal Assistance.  Stand pivot transfers with Moderate Assistance.  Toilet transfer to bedside commode with Moderate Assistance.     Outcome: Ongoing, Progressing   The above goals remain appropriate. ANTIONETTE Ochoa  10/23/2019

## 2019-10-23 NOTE — PROGRESS NOTES
HD completed, blood returned , and ports flushed with normal saline. . Ports capped and secured. . Post  B/P 135/69 pulse 98  , patient alert. And stable

## 2019-10-23 NOTE — PROGRESS NOTES
HD initiated, Catheter ports cleansed and aspirated, adequate blood  For HD . / UF goal  Net set for 1000. Ml.  Patient alert , B/P 164/66

## 2019-10-23 NOTE — SUBJECTIVE & OBJECTIVE
Interval History: No acute events overnight. Passed esophagram yesterday. On trach collar this morning.     Medications:  Continuous Infusions:    Scheduled Meds:   sodium chloride 0.9%   Intravenous Once    albuterol-ipratropium  3 mL Nebulization Q4H    chlorhexidine  15 mL Mouth/Throat BID    docusate  100 mg Per J Tube Daily    famotidine  20 mg Per J Tube Daily    fluconazole (DIFLUCAN) IVPB  200 mg Intravenous Q24H    fluticasone furoate-vilanterol  1 puff Inhalation Daily    heparin (porcine)  7,500 Units Subcutaneous Q8H    lidocaine  1 patch Transdermal Q24H    nicotine  1 patch Transdermal Daily    polyethylene glycol  17 g Per J Tube Daily    sennosides 8.8 mg/5 ml  10 mL Per J Tube Daily     PRN Meds:sodium chloride 0.9%, acetaminophen, albuterol-ipratropium, Dextrose 10% Bolus, Dextrose 10% Bolus, fentaNYL, glucagon (human recombinant), hydrALAZINE, insulin aspart U-100, ondansetron, sodium chloride 0.9%     Review of patient's allergies indicates:   Allergen Reactions    Meloxicam Other (See Comments)     Ulcer, GI bleed      Objective:     Vital Signs (Most Recent):  Temp: 98.9 °F (37.2 °C) (10/23/19 0700)  Pulse: 96 (10/23/19 0817)  Resp: (!) 22 (10/23/19 0817)  BP: (!) 161/74 (10/23/19 0800)  SpO2: 97 % (10/23/19 0817) Vital Signs (24h Range):  Temp:  [98 °F (36.7 °C)-99 °F (37.2 °C)] 98.9 °F (37.2 °C)  Pulse:  [] 96  Resp:  [17-31] 22  SpO2:  [96 %-100 %] 97 %  BP: (133-195)/(65-88) 161/74     Weight: (!) 150 kg (330 lb 11 oz)  Body mass index is 42.44 kg/m².    Intake/Output - Last 3 Shifts       10/21 0700 - 10/22 0659 10/22 0700 - 10/23 0659 10/23 0700 - 10/24 0659    I.V. (mL/kg)       Other 500      NG/GT 1160 860     IV Piggyback 100      Total Intake(mL/kg) 1760 (11.7) 860 (5.7)     Urine (mL/kg/hr) 569 (0.2) 928 (0.3) 75 (0.3)    Drains 1143 1221 10    Other 3500      Chest Tube 390 420 4    Total Output 5602 2569 89    Net -3842 -1709 -89                 Physical Exam    Constitutional: He appears well-developed and well-nourished. He is sedated and intubated (Trach in place).   Cardiovascular: Normal rate and regular rhythm.   Pulmonary/Chest: Effort normal. He is intubated (Trach in place). No respiratory distress.   Bilateral chest tubes, draining SS  On trach collar this morning   Abdominal: Soft. He exhibits no distension. There is no tenderness. There is no guarding.   G tube in place, draining gastric contents  Jtube in place    Genitourinary:   Genitourinary Comments: Hallman in place   Neurological: He is alert.   Skin: Skin is warm and dry.   Vitals reviewed.      Significant Labs:  CBC:   Recent Labs   Lab 10/23/19  0322   WBC 11.84   RBC 2.82*   HGB 7.1*   HCT 23.0*   *   MCV 82   MCH 25.2*   MCHC 30.9*     CMP:   Recent Labs   Lab 10/19/19  1400  10/23/19  0322   *   < > 110   CALCIUM 8.2*   < > 8.8   ALBUMIN 1.7*  --   --       < > 144   K 4.3   < > 5.1   CO2 23   < > 24   *   < > 106   BUN 55*   < > 103*   CREATININE 2.9*   < > 5.2*    < > = values in this interval not displayed.     Coagulation:   No results for input(s): LABPROT, INR, APTT in the last 168 hours.  ABGs:   Recent Labs   Lab 10/23/19  0435   PH 7.440   PCO2 35.7   PO2 85   HCO3 24.2   POCSATURATED 97   BE 0       Significant Diagnostics:  I have reviewed all pertinent imaging results/findings within the past 24 hours.

## 2019-10-23 NOTE — PROGRESS NOTES
[facility]  Nephrology  Progress Note    Patient Name: Pasha Harmon  MRN: [id number]  Admission Date: 10/9/2019  Hospital Length of Stay: 13 days  Attending Provider: Paulino Parrish MD   Primary Care Physician: Eze Root MD  Principal Problem:Bowel perforation      Interval History: Patient seen and examined at bedside. Continues to make some urine; 0.9L/past 24hrs. On trach collar 28% FiO2. Remains off pressors. Kidney function on labs not showing improvement. Plan for HD today.       Review of patient's allergies indicates:   Allergen Reactions    Meloxicam Other (See Comments)     Ulcer, GI bleed      Current Facility-Administered Medications   Medication Frequency    0.9%  NaCl infusion PRN    0.9%  NaCl infusion Once    acetaminophen oral solution 650 mg Q6H PRN    albuterol-ipratropium 2.5 mg-0.5 mg/3 mL nebulizer solution 3 mL Q4H PRN    albuterol-ipratropium 2.5 mg-0.5 mg/3 mL nebulizer solution 3 mL Q4H    chlorhexidine 0.12 % solution 15 mL BID    dextrose 10% (D10W) Bolus PRN    dextrose 10% (D10W) Bolus PRN    docusate 50 mg/5 mL liquid 100 mg Daily    famotidine 40 mg/5 mL (8 mg/mL) suspension 20 mg Daily    fentaNYL injection 25 mcg Q1H PRN    fluconazole (DIFLUCAN) IVPB 200 mg 100 mL Q24H    fluticasone furoate-vilanterol 100-25 mcg/dose diskus inhaler 1 puff Daily    glucagon (human recombinant) injection 1 mg PRN    heparin (porcine) injection 7,500 Units Q8H    hydrALAZINE injection 20 mg Q6H PRN    insulin aspart U-100 pen 0-5 Units QID (AC + HS) PRN    lidocaine 5 % patch 1 patch Q24H    nicotine 21 mg/24 hr 1 patch Daily    ondansetron injection 4 mg Q6H PRN    polyethylene glycol packet 17 g Daily    sennosides 8.8 mg/5 ml syrup 10 mL Daily    sevelamer carbonate pwpk 0.8 g TID WM    sodium chloride 0.9% flush 10 mL PRN     Family History     Problem Relation (Age of Onset)    Cancer Father    Heart disease Mother        Tobacco Use     Smoking status: Current Every Day Smoker     Packs/day: 2.00     Types: Cigarettes    Smokeless tobacco: Never Used   Substance and Sexual Activity    Alcohol use: No    Drug use: No    Sexual activity: Not on file       Objective:     Vital Signs (Most Recent):  Temp: 98.9 °F (37.2 °C) (10/23/19 0700)  Pulse: 96 (10/23/19 0817)  Resp: (!) 22 (10/23/19 0817)  BP: (!) 161/74 (10/23/19 0800)  SpO2: 97 % (10/23/19 0817)  O2 Device (Oxygen Therapy): Trach Collar (10/23/19 0817) Vital Signs (24h Range):  Temp:  [98 °F (36.7 °C)-99 °F (37.2 °C)] 98.9 °F (37.2 °C)  Pulse:  [] 96  Resp:  [17-31] 22  SpO2:  [96 %-100 %] 97 %  BP: (133-195)/(65-88) 161/74     Weight: (!) 150 kg (330 lb 11 oz) (10/18/19 0500)  Body mass index is 42.44 kg/m².  Body surface area is 2.8 meters squared.    I/O last 3 completed shifts:  In: 1420 [NG/GT:1420]  Out: 3553 [Urine:1287; Drains:1766; Chest Tube:500]    Physical Exam   Constitutional: He appears well-developed and well-nourished.   Eyes: Pupils are equal, round, and reactive to light. Conjunctivae and EOM are normal.   Neck: Normal range of motion. Neck supple.   Cardiovascular: Normal rate and regular rhythm.   Pulmonary/Chest: Effort normal. He has rales.   Trached with supplemental O2 in place    Abdominal: He exhibits no distension and no mass. There is tenderness. There is no guarding. No hernia.   Midline surgical incision to abdomen. Multiple abdominal drains noted.    Musculoskeletal: He exhibits edema (1+ pitting edema to BLE ). He exhibits no tenderness or deformity.   Neurological: He is alert.   Skin: Skin is warm and dry. No rash noted. He is not diaphoretic. No erythema. No pallor.   Psychiatric:   Follows commands and nods and gestures appropriately        Significant Labs:  CBC:   Recent Labs   Lab 10/23/19  0322   WBC 11.84   RBC 2.82*   HGB 7.1*   HCT 23.0*   *   MCV 82   MCH 25.2*   MCHC 30.9*     CMP:   Recent Labs   Lab 10/19/19  1400   10/23/19  0322   *   < > 110   CALCIUM 8.2*   < > 8.8   ALBUMIN 1.7*  --   --       < > 144   K 4.3   < > 5.1   CO2 23   < > 24   *   < > 106   BUN 55*   < > 103*   CREATININE 2.9*   < > 5.2*    < > = values in this interval not displayed.     All labs within the past 24 hours have been reviewed.        Assessment/Plan:     BHASKAR (acute kidney injury)  BHASKAR 2/2 ischemic ATN due to intraoperative hypotension and acute anemia.      Plan/Recommendations:  -Plan for HD today with UFG of 1L, keep map >65  -Will need tunnel cath placement for HD as kidney function does not appear to be improving; will confirm recommendation with staff   -Phos elevated (6.0), will start renvela   -Avoid nephrotoxic medications  -Renally dose medications  -Avoid contrast studies   -Daily renal function panels   -Strict I/O's   -Will continue to follow closely            Thank you for your consult. I will follow-up with patient. Please contact us if you have any additional questions.    Ranjeet Judd, NP  Nephrology  [facility]

## 2019-10-23 NOTE — PROGRESS NOTES
[facility]  Critical Care - Surgery  Progress Note    Patient Name: Pasha Harmon  MRN: [id number]  Admission Date: 10/9/2019  Hospital Length of Stay: 13 days  Code Status: Full Code  Attending Provider: Paulino Parrish MD  Primary Care Provider: Eze Root MD   Principal Problem: Bowel perforation    Subjective:     Hospital/ICU Course:  10/10: Admitted to SICU. R IJ TLC placed. On minimal pressors.   10/11: Remaining intubated. On minimal pressors.   10/12: Difficulty weaning pressors while on propofol and fentanyl to control resp rate from acidosis. Received 2u PRBCs after 1.25L albumin.   10/13: Pressors weaned, still not following commands after stopping sedation  10/14: NGT removed by primary team. Metoprolol started for HTN  10/21: Tolerating trach collar >24hrs. Tolerating HD sessions. HDS without pressor requirement. Plan for SLP eval today.    10/22: Plan for esophogram today. No evidence of leak but esophageal dysmotility noted.   10/23: Stable.     Interval History/Significant Events:   Patient continues to be HDS, tolerating trach collar. Plan for HD session today, tolerates well. Esophogram yesterday with evidence of esophogeal stasis. TF at goal rate.     Follow-up For: Procedure(s) (LRB):  CREATION, TRACHEOSTOMY (N/A)    Post-Operative Day: 6 Days Post-Op    Objective:     Vital Signs (Most Recent):  Temp: 98.3 °F (36.8 °C) (10/23/19 0300)  Pulse: 96 (10/23/19 0435)  Resp: (!) 23 (10/23/19 0435)  BP: (!) 169/77 (10/23/19 0400)  SpO2: 99 % (10/23/19 0435) Vital Signs (24h Range):  Temp:  [98 °F (36.7 °C)-99 °F (37.2 °C)] 98.3 °F (36.8 °C)  Pulse:  [] 96  Resp:  [17-31] 23  SpO2:  [95 %-100 %] 99 %  BP: (133-195)/(65-88) 169/77     Weight: (!) 150 kg (330 lb 11 oz)  Body mass index is 42.44 kg/m².      Intake/Output Summary (Last 24 hours) at 10/23/2019 0722  Last data filed at 10/23/2019 0500  Gross per 24 hour   Intake 860 ml   Output 2084 ml   Net -1224 ml        Physical Exam   Constitutional: He appears well-developed and well-nourished. No distress.   HENT:   Head: Normocephalic and atraumatic.   Trach in place.   Eyes: Pupils are equal, round, and reactive to light. Conjunctivae are normal.   Neck: Normal range of motion.   Cardiovascular: Normal rate, regular rhythm and intact distal pulses.   Pulmonary/Chest: Effort normal. No respiratory distress.   Trach collar.   Abdominal: Soft. He exhibits no distension.   G-tube to gravity with bilious output.  J-tube with tube feeds infusing.   Tube sites clean, no surrounding erythema.  Incision c/d/i, no erythema or drainage.   Musculoskeletal: Normal range of motion.   Neurological: He is alert.   Skin: Skin is warm and dry.     Lines/Drains/Airways     Central Venous Catheter Line                 Trialysis (Dialysis) Catheter 10/20/19 1415 right internal jugular 2 days          Drain                 Chest Tube 10/10/19 1510 1 Right Midaxillary;Fourth intercostal space 28 Fr. 12 days         Chest Tube 10/10/19 1513 Left Midaxillary;Fourth intercostal space 28 Fr. 12 days         Closed/Suction Drain 10/10/19 1349 Right RUQ Bulb 19 Fr. 12 days         Gastrostomy/Enterostomy 10/10/19 1400 Jejunostomy tube 12 days         Gastrostomy/Enterostomy 10/10/19 1400 LUQ 12 days         Urethral Catheter 10/10/19 1530 Straight-tip;Double-lumen;Non-latex 16 Fr. 12 days          Airway                 Surgical Airway 10/17/19 1603 Shiley Cuffed 5 days                Significant Labs:    CBC/Anemia Profile:  Recent Labs   Lab 10/22/19  0417 10/23/19  0322   WBC 14.09* 11.84   HGB 7.2* 7.1*   HCT 24.2* 23.0*   * 583*   MCV 84 82   RDW 19.8* 19.8*        Chemistries:  Recent Labs   Lab 10/22/19  0417 10/23/19  0322    144   K 4.7 5.1    106   CO2 24 24   BUN 83* 103*   CREATININE 4.8* 5.2*   CALCIUM 8.8 8.8   MG 2.6 2.6   PHOS 4.9* 6.0*       All pertinent labs within the past 24 hours have been  reviewed.    Significant Imaging:  I have reviewed all pertinent imaging results/findings within the past 24 hours.    Assessment/Plan:     * Bowel perforation  74M current smoker who underwent excision of bleeding gastric mass, post op course complicated by intraperitoneal free air. Now admitted to SICU following ex-lap 10/10/19 which revealed esophageal and gastric perforations which have been repaired. Now s/p tracheostomy placement on 10/17 for difficulty with extubating patient.     Neuro:  - Off of sedation, following commands and alert: GCS 11T  - PRN pain control  - Seroquel 25 BID for agitation while on trach collar    CV  - Fluid resuscitate as necessary  - Off of pressors   - Echo 9/12/10 wnl    Pulm  - Extubated 10/14, reintubated 10/15 for encephalopathy with respiratory distress and increased WOB, subsequently underwent tracheostomy placement on 10/17.  - Continue PAV+ as he appears more comfortable on it if not tolerating trach collar  - Has been on continuous trach collar for >72hrs  - ABG's prn  - Uses CPAP at home, NO CPAP due to gastric perforation  - COPD with Significant history of smoking (3 ppd); continue nicotine patch  - Speaking valve with SLP if tolerates    GI  - Post-op esophageal and gastric perforations 10/9, now repaired via ex-lap  - Monitor drains, care per surgery  - Do not use G-tube. Leave to gravity.  - Tube feeds per J tube, increase to goal as tolerated.  - Esophogram with evidence of esophogeal stasis but no leaks observed, can pull chest tubes.  - NPO. Will follow SLP recs    Renal  - Nephrology following, continuing HD for clearance of uremia.   - Mental status improved.  - BHASKAR but intermittently worse, baseline Cr of 1  - CMP daily  - Electrolyte repletion PRN  - Tolerating HD sessions  - Unclear at this time if patient will need HD outpatient or if kidneys will recover. Appreciate nephrology recs.  - If outpatient HD needed, will need permacath and outpatient HD chair.        Heme/ID  - Trend WBC, trending down.   - Abdominal culture (10/10) resulted with Yeast (awaiting speciation), Fluconazole restarted with end date of 10/24.  - Hgb stable  - CBCs daily  - Completed course of Cefepime, flagyl, vancomycin, and diflucan for contaminated peritoneum on 10/16.    Endo  - DM2  - POCT glucose q4h  - SSI     Dispo: OK for step down to floor; no ICU needs at this time. On continuous trach collar. Esophogram without evidence of leaks, but esophogeal stasis noted. Can remove chest tubes. Tolerating HD, may need arrangements made for outpatient HD. Continue work up for LTAC placement.       Critical care was time spent personally by me on the following activities: development of treatment plan with patient or surrogate and bedside caregivers, discussions with consultants, evaluation of patient's response to treatment, examination of patient, ordering and performing treatments and interventions, ordering and review of laboratory studies, ordering and review of radiographic studies, pulse oximetry, re-evaluation of patient's condition.  This critical care time did not overlap with that of any other provider or involve time for any procedures.     Jac Zamudio MD  Critical Care - Surgery  [facility]

## 2019-10-23 NOTE — PLAN OF CARE
Problem: SLP Goal  Goal: SLP Goal  Description  Speech Language Pathology Goals  Goals expected to be met by 10/28:  1. Pt will tolerate PMSV with O2 >93% and VSS to improve respiration and phonation.   2. Pt will demonstrate placement/removal of PMSV x5 indep.   3. Pt will vocalize x3 with PMSV in place  4. Pt will recall instructions/precautions of PMSV with 100% acc indep.   5. Pt will participate in ongoing swallow assessment to determine least restrictive diet recommendations.        Outcome: Ongoing, Progressing     Goals remain appropriate.   Emily P. Abadie M.S., CCC-SLP  Speech Language Pathologist  [phone]  10/23/2019

## 2019-10-23 NOTE — ASSESSMENT & PLAN NOTE
74M current smoker who underwent excision of bleeding gastric mass, post op course complicated by intraperitoneal free air. Now admitted to SICU following ex-lap 10/10/19 which revealed esophageal and gastric perforations which have been repaired. Now s/p tracheostomy placement on 10/17 for difficulty with extubating patient.     Neuro:  - Off of sedation, following commands and alert: GCS 11T  - PRN pain control  - Seroquel 25 BID for agitation while on trach collar    CV  - Fluid resuscitate as necessary  - Off of pressors   - Echo 9/12/10 wnl    Pulm  - Extubated 10/14, reintubated 10/15 for encephalopathy with respiratory distress and increased WOB, subsequently underwent tracheostomy placement on 10/17.  - Continue PAV+ as he appears more comfortable on it if not tolerating trach collar  - Has been on continuous trach collar for >72hrs  - ABG's prn  - Uses CPAP at home, NO CPAP due to gastric perforation  - COPD with Significant history of smoking (3 ppd); continue nicotine patch  - Speaking valve with SLP if tolerates    GI  - Post-op esophageal and gastric perforations 10/9, now repaired via ex-lap  - Monitor drains, care per surgery  - Do not use G-tube. Leave to gravity.  - Tube feeds per J tube, increase to goal as tolerated.  - Esophogram with evidence of esophogeal stasis but no leaks observed, can pull chest tubes.  - NPO. Will follow SLP recs    Renal  - Nephrology following, continuing HD for clearance of uremia.   - Mental status improved.  - BHASKAR but intermittently worse, baseline Cr of 1  - CMP daily  - Electrolyte repletion PRN  - Tolerating HD sessions  - Unclear at this time if patient will need HD outpatient or if kidneys will recover. Appreciate nephrology recs.  - If outpatient HD needed, will need permacath and outpatient HD chair.       Heme/ID  - Trend WBC, trending down.   - Abdominal culture (10/10) resulted with Yeast (awaiting speciation), Fluconazole restarted with end date of  10/24.  - Hgb stable  - CBCs daily  - Completed course of Cefepime, flagyl, vancomycin, and diflucan for contaminated peritoneum on 10/16.    Endo  - DM2  - POCT glucose q4h  - SSI     Dispo: OK for step down to floor; no ICU needs at this time. On continuous trach collar. Esophogram without evidence of leaks, but esophogeal stasis noted. Can remove chest tubes. Tolerating HD, may need arrangements made for outpatient HD. Continue work up for LTAC placement.

## 2019-10-23 NOTE — ASSESSMENT & PLAN NOTE
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 Trach placed on 10/17/19.    Bilateral chest tubes to water seal today, will start d/c'ing tomorrow  Permacath tomorrow  Continue G tube to gravity for gastric decompression - do not use for meds or feeds  Continue tube feeds  Continue on trach collar  Continue to work with speech for swallow    Rest of care per SICU

## 2019-10-23 NOTE — SUBJECTIVE & OBJECTIVE
Interval History: Patient seen and examined at bedside. Continues to make some urine; 0.9L/past 24hrs. On trach collar 28% FiO2. Remains off pressors. Kidney function on labs not showing improvement. Plan for HD today.       Review of patient's allergies indicates:   Allergen Reactions    Meloxicam Other (See Comments)     Ulcer, GI bleed      Current Facility-Administered Medications   Medication Frequency    0.9%  NaCl infusion PRN    0.9%  NaCl infusion Once    acetaminophen oral solution 650 mg Q6H PRN    albuterol-ipratropium 2.5 mg-0.5 mg/3 mL nebulizer solution 3 mL Q4H PRN    albuterol-ipratropium 2.5 mg-0.5 mg/3 mL nebulizer solution 3 mL Q4H    chlorhexidine 0.12 % solution 15 mL BID    dextrose 10% (D10W) Bolus PRN    dextrose 10% (D10W) Bolus PRN    docusate 50 mg/5 mL liquid 100 mg Daily    famotidine 40 mg/5 mL (8 mg/mL) suspension 20 mg Daily    fentaNYL injection 25 mcg Q1H PRN    fluconazole (DIFLUCAN) IVPB 200 mg 100 mL Q24H    fluticasone furoate-vilanterol 100-25 mcg/dose diskus inhaler 1 puff Daily    glucagon (human recombinant) injection 1 mg PRN    heparin (porcine) injection 7,500 Units Q8H    hydrALAZINE injection 20 mg Q6H PRN    insulin aspart U-100 pen 0-5 Units QID (AC + HS) PRN    lidocaine 5 % patch 1 patch Q24H    nicotine 21 mg/24 hr 1 patch Daily    ondansetron injection 4 mg Q6H PRN    polyethylene glycol packet 17 g Daily    sennosides 8.8 mg/5 ml syrup 10 mL Daily    sevelamer carbonate pwpk 0.8 g TID WM    sodium chloride 0.9% flush 10 mL PRN     Family History     Problem Relation (Age of Onset)    Cancer Father    Heart disease Mother        Tobacco Use    Smoking status: Current Every Day Smoker     Packs/day: 2.00     Types: Cigarettes    Smokeless tobacco: Never Used   Substance and Sexual Activity    Alcohol use: No    Drug use: No    Sexual activity: Not on file       Objective:     Vital Signs (Most Recent):  Temp: 98.9 °F (37.2 °C)  (10/23/19 0700)  Pulse: 96 (10/23/19 0817)  Resp: (!) 22 (10/23/19 0817)  BP: (!) 161/74 (10/23/19 0800)  SpO2: 97 % (10/23/19 0817)  O2 Device (Oxygen Therapy): Trach Collar (10/23/19 0817) Vital Signs (24h Range):  Temp:  [98 °F (36.7 °C)-99 °F (37.2 °C)] 98.9 °F (37.2 °C)  Pulse:  [] 96  Resp:  [17-31] 22  SpO2:  [96 %-100 %] 97 %  BP: (133-195)/(65-88) 161/74     Weight: (!) 150 kg (330 lb 11 oz) (10/18/19 0500)  Body mass index is 42.44 kg/m².  Body surface area is 2.8 meters squared.    I/O last 3 completed shifts:  In: 1420 [NG/GT:1420]  Out: 3553 [Urine:1287; Drains:1766; Chest Tube:500]    Physical Exam   Constitutional: He appears well-developed and well-nourished.   Eyes: Pupils are equal, round, and reactive to light. Conjunctivae and EOM are normal.   Neck: Normal range of motion. Neck supple.   Cardiovascular: Normal rate and regular rhythm.   Pulmonary/Chest: Effort normal. He has rales.   Trached with supplemental O2 in place    Abdominal: He exhibits no distension and no mass. There is tenderness. There is no guarding. No hernia.   Midline surgical incision to abdomen. Multiple abdominal drains noted.    Musculoskeletal: He exhibits edema (1+ pitting edema to BLE ). He exhibits no tenderness or deformity.   Neurological: He is alert.   Skin: Skin is warm and dry. No rash noted. He is not diaphoretic. No erythema. No pallor.   Psychiatric:   Follows commands and nods and gestures appropriately        Significant Labs:  CBC:   Recent Labs   Lab 10/23/19  0322   WBC 11.84   RBC 2.82*   HGB 7.1*   HCT 23.0*   *   MCV 82   MCH 25.2*   MCHC 30.9*     CMP:   Recent Labs   Lab 10/19/19  1400  10/23/19  0322   *   < > 110   CALCIUM 8.2*   < > 8.8   ALBUMIN 1.7*  --   --       < > 144   K 4.3   < > 5.1   CO2 23   < > 24   *   < > 106   BUN 55*   < > 103*   CREATININE 2.9*   < > 5.2*    < > = values in this interval not displayed.     All labs within the past 24 hours have been  reviewed.

## 2019-10-23 NOTE — ASSESSMENT & PLAN NOTE
BHASKAR 2/2 ischemic ATN due to intraoperative hypotension and acute anemia.      Plan/Recommendations:  -Plan for HD today with UFG of 1L, keep map >65  -Will need tunnel cath placement for HD as kidney function does not appear to be improving; will confirm recommendation with staff   -Phos elevated (6.0), will start renvela   -Avoid nephrotoxic medications  -Renally dose medications  -Avoid contrast studies   -Daily renal function panels   -Strict I/O's   -Will continue to follow closely

## 2019-10-23 NOTE — ANESTHESIA PREPROCEDURE EVALUATION
[facility]  Anesthesia Pre-Operative Evaluation         Patient Name: Pasha Harmon  YOB: 1945  MRN: [id number]    SUBJECTIVE:     Pre-operative evaluation for Procedure(s) (LRB):  INSERTION-CATHETER-ALEJANDRA (N/A)     10/23/2019    Pasha Harmon is a 74 y.o. male w/ a significant PMHx of history of COPD, former smoker, HTN, HLD, DMII, CAD, and bleeding gastric mass who underwent EGD with AES 10/9/19 w/ removal of a lipoma from gastric antrum c/b need for piecemeal removal and intraperitoneal free air. S/p diagnostic ex lap w/ repair of gastric and esophageal perforations. Currently on 5 L trach collar.    Patient now presents for the above procedure(s).      LDA:        Trialysis (Dialysis) Catheter 10/20/19 1415 right internal jugular (Active)   IV Device Securement sutures 10/23/2019 10:30 AM   Additional Site Signs no erythema;no warmth;no edema;no pain;no palpable cord;no streak formation;no drainage 10/23/2019  8:00 AM   Patency/Care normal saline locked 10/23/2019  8:00 AM   Waveform normal 10/23/2019  3:30 AM   Site Assessment Clean;Dry;Intact 10/23/2019 10:30 AM   Status Clamped 10/23/2019  8:00 AM   Flows Good 10/23/2019 10:30 AM   Dressing Intervention Dressing reinforced 10/23/2019  3:30 AM   Dressing Status Biopatch in place 10/23/2019 10:30 AM   Dressing Change Due 10/28/19 10/23/2019  3:30 AM   Verification by X-ray Yes 10/23/2019  8:00 AM   Site Condition No complications 10/23/2019 10:30 AM   Dressing Occlusive 10/23/2019 10:30 AM   Daily Line Review Not Performed 10/23/2019  8:00 AM   Number of days: 2            Chest Tube 10/10/19 1513 Left Midaxillary;Fourth intercostal space 28 Fr. (Active)   Chest Tube WDL ex 10/23/2019  8:00 AM   Function -20 cm H2O 10/23/2019  8:00 AM   Air Leak/Fluctuation air leak not present;fluctuation not present 10/23/2019  8:00 AM   Care drainage unit changed 10/18/2019 11:05 AM   Safety all tubing connections taped;2  rubber-tipped hemostats w/ patient;all connections secured;suction checked 10/23/2019  8:00 AM   Securement tubing secured to body distal to insertion site w/ tape 10/23/2019  8:00 AM   Patency Intervention Tip/tilt 10/23/2019  8:00 AM   Drainage Description Serous 10/23/2019  8:00 AM   Dressing Appearance occlusive gauze dressing intact;clean and dry 10/23/2019  8:00 AM   Dressing Care dressing reinforced 10/23/2019  3:30 AM   Left Subcutaneous Emphysema none present 10/23/2019  8:00 AM   Right Subcutaneous Emphysema none present 10/23/2019  8:00 AM   Site Assessment Not assessed 10/23/2019  8:00 AM   Surrounding Skin Unable to view 10/23/2019  8:00 AM   Output (mL) 0 mL 10/23/2019  8:00 AM   Number of days: 12            Chest Tube 10/10/19 1510 1 Right Midaxillary;Fourth intercostal space 28 Fr. (Active)   Chest Tube WDL ex 10/23/2019  8:00 AM   Function -20 cm H2O 10/23/2019  8:00 AM   Air Leak/Fluctuation air leak not present;fluctuation not present 10/23/2019  8:00 AM   Care drainage unit emptied 10/23/2019  3:30 AM   Safety all tubing connections taped;2 rubber-tipped hemostats w/ patient;all connections secured;suction checked 10/23/2019  8:00 AM   Securement tubing secured to body distal to insertion site with sutures 10/23/2019  8:00 AM   Patency Intervention Tip/tilt 10/23/2019  8:00 AM   Drainage Description Serous 10/23/2019  8:00 AM   Dressing Appearance occlusive gauze dressing intact;clean and dry 10/23/2019  8:00 AM   Dressing Care dressing reinforced 10/23/2019  3:30 AM   Left Subcutaneous Emphysema none present 10/23/2019  8:00 AM   Right Subcutaneous Emphysema none present 10/23/2019  8:00 AM   Site Assessment Not assessed 10/23/2019  8:00 AM   Surrounding Skin Unable to view 10/23/2019  8:00 AM   Output (mL) 4 mL 10/23/2019  8:00 AM   Number of days: 12            Closed/Suction Drain 10/10/19 1349 Right RUQ Bulb 19 Fr. (Active)   Site Description Unable to view 10/23/2019  8:00 AM   Dressing  Type Gauze 10/23/2019  8:00 AM   Dressing Status Clean;Dry;Intact 10/23/2019  8:00 AM   Dressing Intervention Dressing reinforced 10/23/2019  3:30 AM   Drainage Serous 10/23/2019  8:00 AM   Status To bulb suction 10/23/2019  8:00 AM   Output (mL) 10 mL 10/23/2019  8:00 AM   Number of days: 12            Gastrostomy/Enterostomy 10/10/19 1400 LUQ (Active)   Securement secured to abdomen 10/23/2019  8:00 AM   Interventions Prior to Feeding patency checked;residual checked 10/23/2019  8:00 AM   Suction Setting/Drainage Method dependent drainage 10/23/2019  8:00 AM   Drainage green;yellow;thin 10/23/2019  8:00 AM   Clamp Status/Tolerance unclamped;no abdominal discomfort;no abdominal distention;no emesis;no nausea;no restlessness 10/23/2019  8:00 AM   Dressing no dressing 10/23/2019  8:00 AM   Insertion Site dry;no redness;no warmth;no drainage;no tenderness;no swelling 10/23/2019  8:00 AM   Site Care device rotatated;site cleansed w/ soap and water;sterile 4 x 4 gauze dressing applied 10/20/2019  3:00 PM   Flush/Irrigation flushed w/;water;no resistance met 10/20/2019  3:00 PM   Intake (mL) 450 mL 10/16/2019 11:40 AM   Tube Output(mL)(Include Discarded Residual) 95 mL 10/23/2019  4:00 AM   Tube Feeding Intake (mL) 0 10/15/2019  3:00 PM   Number of days: 12            Gastrostomy/Enterostomy 10/10/19 1400 Jejunostomy tube (Active)   Securement secured to abdomen 10/23/2019  8:00 AM   Interventions Prior to Feeding patency checked 10/23/2019  8:00 AM   Feeding Type continuous;by pump 10/23/2019  8:00 AM   Clamp Status/Tolerance unclamped;no abdominal discomfort;no abdominal distention;no emesis;no nausea;no residual;no restlessness 10/23/2019  8:00 AM   Feeding Action feeding continued 10/23/2019  8:00 AM   Dressing no dressing 10/23/2019  8:00 AM   Insertion Site dry;no redness;no warmth;no drainage;no tenderness;no swelling 10/23/2019  8:00 AM   Site Care device rotatated;site cleansed w/ soap and water;sterile 4 x 4  "gauze dressing applied 10/20/2019  3:00 PM   Flush/Irrigation flushed w/;water;no resistance met 10/23/2019  8:00 AM   Current Rate (mL/hr) 60 mL/hr 10/22/2019  3:00 PM   Goal Rate (mL/hr) 70 mL/hr 10/22/2019  3:00 PM   Water Bolus (mL) 60 mL 10/20/2019 10:00 AM   Tube Feeding Intake (mL) 70 10/23/2019  5:00 AM   Residual Amount (ml) 0 ml 10/22/2019  7:00 AM   Number of days: 12            Urethral Catheter 10/10/19 1530 Straight-tip;Double-lumen;Non-latex 16 Fr. (Active)   Site Assessment Clean;Intact 10/23/2019  8:00 AM   Collection Container Urimeter 10/23/2019  8:00 AM   Securement Method secured to top of thigh w/ adhesive device 10/23/2019  8:00 AM   Catheter Care Performed yes 10/23/2019  8:00 AM   Reason for Continuing Urinary Catheterization Critically ill in ICU requiring intensive monitoring 10/23/2019  8:00 AM   CAUTI Prevention Bundle StatLock in place w 1" slack;Intact seal between catheter & drainage tubing;Drainage bag/urimeter off the floor;Green sheeting clip in use;No dependent loops or kinks;Drainage bag/urimeter not overfilled (<2/3 full);Drainage bag/urimeter below bladder 10/23/2019  8:00 AM   Output (mL) 40 mL 10/23/2019  8:00 AM   Number of days: 12       Prev airway: Surgical trach      Patient Active Problem List   Diagnosis    COPD (chronic obstructive pulmonary disease)    Abscess of gluteal cleft    Type 2 diabetes mellitus without complication, without long-term current use of insulin    Hyperlipidemia    Essential hypertension    Hyponatremia    Depression    Anemia    Gastric tumor    Peptic ulcer disease with hemorrhage    Leiomyoma of stomach    Stromal tumor of digestive system    History of anemia    Atherosclerosis of abdominal aorta    Obesity (BMI 35.0-39.9 without comorbidity)    Cigarette smoker    Benign prostatic hyperplasia with nocturia    MIGUELINA (obstructive sleep apnea)    Upper GI bleed    Chronic blood loss anemia    Gastrointestinal hemorrhage with " melena    Syncope    Leukocytosis    Thrombocytosis    Iron deficiency anemia, unspecified    Subepithelial gastric lesion    Insomnia    Bowel perforation    Perforated stomach, acute    Esophageal tear    Acute respiratory failure with hypoxia and hypercarbia    BHASKAR (acute kidney injury)    Esophageal perforation    Metabolic acidosis    ATN (acute tubular necrosis)       Review of patient's allergies indicates:   Allergen Reactions    Meloxicam Other (See Comments)     Ulcer, GI bleed        Current Inpatient Medications:   sodium chloride 0.9%   Intravenous Once    albuterol-ipratropium  3 mL Nebulization Q4H    chlorhexidine  15 mL Mouth/Throat BID    docusate  100 mg Per J Tube Daily    famotidine  20 mg Per J Tube Daily    fluconazole (DIFLUCAN) IVPB  200 mg Intravenous Q24H    fluticasone furoate-vilanterol  1 puff Inhalation Daily    heparin (porcine)  7,500 Units Subcutaneous Q8H    lidocaine  1 patch Transdermal Q24H    nicotine  1 patch Transdermal Daily    polyethylene glycol  17 g Per J Tube Daily    sennosides 8.8 mg/5 ml  10 mL Per J Tube Daily    sevelamer carbonate  0.8 g Oral TID WM       No current facility-administered medications on file prior to encounter.      Current Outpatient Medications on File Prior to Encounter   Medication Sig Dispense Refill    acetaminophen (TYLENOL) 650 MG TbSR Take 1,300 mg by mouth 2 (two) times daily.       budesonide-formoterol 160-4.5 mcg (SYMBICORT) 160-4.5 mcg/actuation HFAA Inhale 2 puffs into the lungs every 12 (twelve) hours. 1 Inhaler 6    cholecalciferol, vitamin D3, (VITAMIN D3) 5,000 unit Tab Take 5,000 Units by mouth once daily.      citalopram (CELEXA) 20 MG tablet Take 1 tablet (20 mg total) by mouth nightly. 90 tablet 3    diphenhydrAMINE (BENADRYL) 25 mg capsule Take 25 mg by mouth every evening. 1 tab po am, 3 tabs po HS       lisinopril (PRINIVIL,ZESTRIL) 20 MG tablet Take 1 tablet (20 mg total) by mouth once  daily. 90 tablet 3    metFORMIN (GLUCOPHAGE) 500 MG tablet Take 1 tablet (500 mg total) by mouth daily with breakfast. 90 tablet 3    multivit with min-FA-lycopene (ONE-A-DAY MEN'S MULTIVITAMIN) 400-300 mcg Tab Take 1 tablet by mouth nightly.      pantoprazole (PROTONIX) 40 MG tablet Take 1 tablet (40 mg total) by mouth 2 (two) times daily. 180 tablet 3    simvastatin (ZOCOR) 40 MG tablet Take 1 tablet (40 mg total) by mouth every evening. 90 tablet 3    sucralfate (CARAFATE) 1 gram tablet Take 1 tablet (1 g total) by mouth 4 (four) times daily. 120 tablet 0    albuterol (ACCUNEB) 1.25 mg/3 mL Nebu Take 3 mLs (1.25 mg total) by nebulization every 6 (six) hours as needed. Rescue 3 Box 3    albuterol-ipratropium (DUO-NEB) 2.5 mg-0.5 mg/3 mL nebulizer solution Take 3 mLs by nebulization every 6 (six) hours as needed for Wheezing. Rescue      nicotine (NICODERM CQ) 21 mg/24 hr Place 1 patch onto the skin once daily.  0       Past Surgical History:   Procedure Laterality Date    APPENDECTOMY      CHOLECYSTECTOMY      ENDOSCOPIC ULTRASOUND OF UPPER GASTROINTESTINAL TRACT Left 6/4/2019    Procedure: ULTRASOUND, UPPER GI TRACT, ENDOSCOPIC;  Surgeon: Lavon Parra MD;  Location: [facility];  Service: Endoscopy;  Laterality: Left;  GIF plus Radial    ESOPHAGOGASTRODUODENOSCOPY N/A 5/25/2019    Procedure: EGD (ESOPHAGOGASTRODUODENOSCOPY);  Surgeon: Mitch Buitrago MD;  Location: [facility];  Service: Endoscopy;  Laterality: N/A;    ESOPHAGOGASTRODUODENOSCOPY N/A 6/4/2019    Procedure: EGD (ESOPHAGOGASTRODUODENOSCOPY);  Surgeon: Lavon Parra MD;  Location: [facility];  Service: Endoscopy;  Laterality: N/A;    ESOPHAGOGASTRODUODENOSCOPY N/A 9/3/2019    Procedure: EGD (ESOPHAGOGASTRODUODENOSCOPY);  Surgeon: Keshav Harris MD;  Location: [facility];  Service: Endoscopy;  Laterality: N/A;    ESOPHAGOGASTRODUODENOSCOPY N/A 10/10/2019    Procedure: EGD (ESOPHAGOGASTRODUODENOSCOPY);  Surgeon: Paulino BURCIAGA  MD Shivani;  Location: [facility] OR 2ND FLR;  Service: General;  Laterality: N/A;    KNEE SURGERY Right     PERCUTANEOUS INSERTION OF GASTROSTOMY TUBE Left 10/10/2019    Procedure: INSERTION, GASTROSTOMY TUBE, PERCUTANEOUS;  Surgeon: Paulino Parrish MD;  Location: [facility] 2ND [facility];  Service: General;  Laterality: Left;    PLACEMENT OF JEJUNOSTOMY TUBE Left 10/10/2019    Procedure: INSERTION, JEJUNOSTOMY TUBE;  Surgeon: Paulino Parrish MD;  Location: [facility];  Service: General;  Laterality: Left;    REPAIR OF BOWEL PERFORATION N/A 10/10/2019    Procedure: REPAIR, PERFORATION, GASTRIC;  Surgeon: Paulino Parrish MD;  Location: [facility];  Service: General;  Laterality: N/A;    TRACHEOSTOMY N/A 10/17/2019    Procedure: CREATION, TRACHEOSTOMY;  Surgeon: Paulino Parrish MD;  Location: [facility] OR 2ND FLR;  Service: General;  Laterality: N/A;    TUBE THORACOTOMY Bilateral 10/10/2019    Procedure: INSERTION, CATHETER, INTERCOSTAL, FOR DRAINAGE;  Surgeon: Paulino Parrish MD;  Location: [facility] OR 2ND FLR;  Service: General;  Laterality: Bilateral;       Social History     Socioeconomic History    Marital status: Single     Spouse name: Not on file    Number of children: Not on file    Years of education: Not on file    Highest education level: Not on file   Occupational History    Not on file   Social Needs    Financial resource strain: Not on file    Food insecurity:     Worry: Not on file     Inability: Not on file    Transportation needs:     Medical: Not on file     Non-medical: Not on file   Tobacco Use    Smoking status: Current Every Day Smoker     Packs/day: 2.00     Types: Cigarettes    Smokeless tobacco: Never Used   Substance and Sexual Activity    Alcohol use: No    Drug use: No    Sexual activity: Not on file   Lifestyle    Physical activity:     Days per week: Not on file     Minutes per session: Not on file    Stress: Not on file   Relationships    Social connections:      Talks on phone: Not on file     Gets together: Not on file     Attends Taoism service: Not on file     Active member of club or organization: Not on file     Attends meetings of clubs or organizations: Not on file     Relationship status: Not on file   Other Topics Concern    Not on file   Social History Narrative    Not on file       OBJECTIVE:     Vital Signs Range (Last 24H):  Temp:  [36.7 °C (98 °F)-37.2 °C (99 °F)]   Pulse:  []   Resp:  [17-31]   BP: (123-195)/(60-88)   SpO2:  [96 %-100 %]       Significant Labs:  Lab Results   Component Value Date    WBC 11.84 10/23/2019    HGB 7.1 (L) 10/23/2019    HCT 23.0 (L) 10/23/2019     (H) 10/23/2019    CHOL 136 09/19/2019    TRIG 113 09/19/2019    HDL 44 09/19/2019    ALT 12 10/14/2019    AST 19 10/14/2019     10/23/2019    K 5.1 10/23/2019     10/23/2019    CREATININE 5.2 (H) 10/23/2019     (H) 10/23/2019    CO2 24 10/23/2019    PSA 0.30 09/19/2019    INR 1.0 10/12/2019    HGBA1C 6.1 (H) 09/19/2019       Diagnostic Studies: No relevant studies.    EKG:   Results for orders placed or performed during the hospital encounter of 10/09/19   EKG 12-lead    Collection Time: 10/12/19  9:25 AM    Narrative    Test Reason : Z98.890,    Vent. Rate : 092 BPM     Atrial Rate : 092 BPM     P-R Int : 160 ms          QRS Dur : 086 ms      QT Int : 366 ms       P-R-T Axes : 059 039 055 degrees     QTc Int : 452 ms    Normal sinus rhythm  Normal ECG  No previous ECGs available  Confirmed by Junie Fitzgerald MD ([id number]) on 10/13/2019 1:40:01 PM    Referred By: JACQUE SIMS           Confirmed By:Junie Fitzgerald MD       2D ECHO:  TTE:  Results for orders placed or performed during the hospital encounter of 09/12/19   Echo   Result Value Ref Range    LVIDS 3.09 2.1 - 4.0 cm    STJ 2.89 cm    AV mean gradient 6 mmHg    Ao peak shira 1.81 m/s    Ao VTI 36.7 cm    IVS 1.22 (A) 0.6 - 1.1 cm    LA size 4.4 cm    LVIDD 4.65 3.5 - 6.0 cm    LVOT diameter 2.2 cm     LVOT peak VTI 36.40 cm    PW 1.08 0.6 - 1.1 cm    MV Peak A Dejan 1.31 m/s    E wave decelartion time 382 msec    RA Major Axis 4.09 cm    RA Width 3.67 cm    LA WIDTH 4.01 cm    LVOT peak dejan 159.00 m/s    BSA 2.68 m2    TDI SEPTAL 0.09 m/s    LV LATERAL E/E' RATIO 11.09 m/s    LV SEPTAL E/E' RATIO 13.56 m/s    TDI LATERAL 0.11 m/s    FS 34 28 - 44 %    LA volume 81.51 cm3    LV mass 196.25 g    Left Ventricle Relative Wall Thickness 0.46 cm    AV valve area 3.77 cm2    AV Velocity Ratio 87.85     AV index (prosthetic) 0.99     E/A ratio 0.93     Mean e' 0.10 m/s    LVOT area 3.8 cm2    LVOT stroke volume 138.30 cm3    AV peak gradient 13 mmHg    E/E' ratio 12.20 m/s    MV Peak E Dejan 1.22 m/s    LA Volume Index 31.4 mL/m2    LV Mass Index 76 g/m2    Left Atrium Minor Axis 5.47 cm    Left Atrium Major Axis 5.40 cm    Right Atrial Pressure (from IVC) 3 mmHg    Narrative    · Normal left ventricular systolic function. The estimated ejection   fraction is 60%  · Mild concentric left ventricular hypertrophy.          NELI:  No results found for this or any previous visit.    ASSESSMENT/PLAN:                                                                                             .    Anesthesia Evaluation    I have reviewed the Patient Summary Reports.    I have reviewed the Nursing Notes.   I have reviewed the Medications.     Review of Systems  Anesthesia Hx:  No problems with previous Anesthesia  History of prior surgery of interest to airway management or planning: Denies Family Hx of Anesthesia complications.   Denies Personal Hx of Anesthesia complications.   Social:  Smoker    Hematology/Oncology:         -- Anemia: Hematology Comments: 7.1 / 23   EENT/Dental:EENT/Dental Normal   Cardiovascular:   Hypertension CAD   hyperlipidemia    Pulmonary:   COPD Sleep Apnea Tracheostomy   Renal/:   Chronic Renal Disease    Hepatic/GI:   PUD,    Musculoskeletal:  Musculoskeletal Normal    Neurological:  Neurology Normal     Endocrine:   Diabetes, well controlled, type 2    Psych:   depression          Physical Exam  General:  Morbid Obesity, Well nourished    Airway/Jaw/Neck:  Airway Findings: Tongue: Normal Pre-Existing Airway Tube(s): Tracheostomy tube  General Airway Assessment: Adult  TM Distance: Normal, at least 6 cm  Jaw/Neck Findings:  Micrognathia: Negative    Eyes/Ears/Nose:  EYES/EARS/NOSE FINDINGS: Normal    Chest/Lungs:  Chest/Lungs Findings: Normal Respiratory Rate  Chest tube x2     Heart/Vascular:  Heart Findings: Rate: Normal  Rhythm: Regular Rhythm  Sounds: Normal     Abdomen:  Abdomen Findings: Normal    Musculoskeletal:  Musculoskeletal Findings: Normal   Skin:  Skin Findings: Normal    Mental Status:  Mental Status Findings:  Cooperative         Anesthesia Plan  Type of Anesthesia, risks & benefits discussed:  Anesthesia Type:  general  Patient's Preference:   Intra-op Monitoring Plan: standard ASA monitors  Intra-op Monitoring Plan Comments:   Post Op Pain Control Plan: multimodal analgesia, IV/PO Opioids PRN and per primary service following discharge from PACU  Post Op Pain Control Plan Comments:   Induction:   IV  Beta Blocker:  Patient is not currently on a Beta-Blocker (No further documentation required).       Informed Consent: Patient representative understands risks and agrees with Anesthesia plan.  Questions answered. Anesthesia consent signed with patient representative.  ASA Score: 4     Day of Surgery Review of History & Physical:    H&P update referred to the surgeon.         Ready For Surgery From Anesthesia Perspective.

## 2019-10-23 NOTE — PLAN OF CARE
10/23/19 0848   Post-Acute Status   Post-Acute Authorization Placement   Post-Acute Placement Status Pending Payor Review   Magdalena spoke with Vicki at [facility] ([phone]). She stated auth was submitted late Monday afternoon and was still pending yesterday. She stated she has not had a chance to check yet today and she will let  know.    UPDATE:12:09 PM  Magdalena left message for Lalita with hospitals ([phone]) regarding plan for pt's permacath placement.   MAGDALENA spoke with Lalita and she confirmed auth is still pending.    Mona Jj, NICHOLAS  [facility]  [id number]

## 2019-10-23 NOTE — PT/OT/SLP PROGRESS
"Speech Language Pathology Treatment    Patient Name:  Pasha Harmon   MRN:  [id number]  Admitting Diagnosis: Bowel perforation    Recommendations:                 General Recommendations:  One Way Speaking Valve and ongoing swallow assessment as appropriate.   Diet recommendations:  NPO, Liquid Diet Level: NPO(except for ice chips)   Aspiration Precautions: Ice chips sparingly   General Precautions: Standard, fall  Communication strategies:  go to room if call light pushed    Subjective     Patient awake;alert. Daughter present.     Pain/Comfort:  · Pain Rating 1: 0/10    Objective:     Has the patient been evaluated by SLP for swallowing?   Yes  Keep patient NPO? Yes   Current Respiratory Status: room air      Passy Tommy Speaking Valve  Trach size/type: 8 Shiley  Able to phonate around trach: No  O2 sats at rest: 96%  O2 sats with PMSV in place: 95%  Vocal quality with valve in place: Harsh, dysphonic.   Back pressure upon removal: Significant, audible   Minutes PMSV worn: ~1 minute x1  Education topics addressed: cleaning valve, one-way technology, anatomy of trach, precautions with cuffed trach, removal of valve prior to sleeping.      Patient was administered ice chip trials without use of PMSV. Patient tolerated ice chips x5 with no overt signs of airway compromise. Daughter reported patient impulsive with ice chips and she noted increased wet/breath sounds during episode where patient placed multiple ice chips in oral cavity at a time.     Esophagram notable for "Esophagus demonstrates a poor secondary wave with significant amount of stasis and tertiary contractions.  However, contrast eventually passed freely through the gastroesophageal junction and into the stomach."      SLP to continue to monitor progression of dysphagia therapy involving therapeutic trials at the bedside and need for possible MBSS, however no immediate need for MBSS at this time given poor valve tolerance and severity of esophageal " dysmotility.    SLP stressed ice chips for pleasure/comfort only and ONLY 3-5 per hour. Patient and daughter verbalized understanding.     Assessment:     Pasha Harmon is a 74 y.o. male with an SLP diagnosis of Dysphagia, S/P Trach and One Way Speaking Valve Training.     Goals:   Multidisciplinary Problems     SLP Goals        Problem: SLP Goal    Goal Priority Disciplines Outcome   SLP Goal     SLP Ongoing, Progressing   Description:  Speech Language Pathology Goals  Goals expected to be met by 10/28:  1. Pt will tolerate PMSV with O2 >93% and VSS to improve respiration and phonation.   2. Pt will demonstrate placement/removal of PMSV x5 indep.   3. Pt will vocalize x3 with PMSV in place  4. Pt will recall instructions/precautions of PMSV with 100% acc indep.   5. Pt will participate in ongoing swallow assessment to determine least restrictive diet recommendations.                         Plan:     · Patient to be seen:  4 x/week   · Plan of Care expires:  11/21/19  · Plan of Care reviewed with:  patient, daughter   · SLP Follow-Up:  Yes       Discharge recommendations:  nursing facility, skilled   Barriers to Discharge:  None    Time Tracking:     SLP Treatment Date:   10/23/19  Speech Start Time:  0952  Speech Stop Time:  1008     Speech Total Time (min):  16 min    Billable Minutes: Treatment Swallowing Dysfunction 8 and Therapeutic Speech Device 8    Emily Abadie, CCC-SLP  10/23/2019

## 2019-10-23 NOTE — PT/OT/SLP PROGRESS
Occupational Therapy   Treatment    Name: Pasha Harmon  MRN: [id number]  Admitting Diagnosis:  Bowel perforation  6 Days Post-Op    Recommendations:     Discharge Recommendations: nursing facility, skilled  Discharge Equipment Recommendations:  (TBD)  Barriers to discharge:  None    Assessment:     Pasha Harmon is a 74 y.o. male with a medical diagnosis of Bowel perforation.  He presents with increased active participation. Pt mainly limited by back pain. Performance deficits affecting function are weakness, gait instability, impaired balance, impaired endurance, impaired self care skills, impaired functional mobilty, decreased safety awareness, pain, impaired cardiopulmonary response to activity.     Rehab Prognosis:  Good; patient would benefit from acute skilled OT services to address these deficits and reach maximum level of function.       Plan:     Patient to be seen 4 x/week to address the above listed problems via self-care/home management, therapeutic activities, therapeutic exercises  · Plan of Care Expires:    · Plan of Care Reviewed with: patient, daughter    Subjective     Pain/Comfort:  · Pain Rating 1: 7/10  · Location - Side 1: Bilateral  · Location - Orientation 1: generalized  · Location 1: back  · Pain Addressed 1: Pre-medicate for activity, Reposition, Distraction  · Pain Rating Post-Intervention 1: 7/10    Objective:     Communicated with: RN prior to session.  Patient found supine with blood pressure cuff, pulse ox (continuous), telemetry, tracheostomy, oxygen, PEG Tube, DENISE drain, shrestha catheter, chest tube, central line upon OT entry to room.    General Precautions: Standard, fall   Orthopedic Precautions:N/A   Braces:       Occupational Performance:     Bed Mobility:    · Patient completed Rolling/Turning to Right with minimum assistance and with side rail  · Patient completed Scooting/Bridging with minimum assistance to EOB  · Patient completed Supine to Sit with moderate  assistance  · Patient completed Sit to Supine with moderate assistance     Functional Mobility/Transfers:  · Patient completed Sit <> Stand Transfer with moderate assistance  with  no assistive device  from EOB  · Functional Mobility: unable to take sidesteps    Activities of Daily Living:  · Grooming: minimum assistance washing face seated EOB  · Upper Body Dressing: maximal assistance    · Lower Body Dressing: total assistance donning socks      AMPA 6 Click ADL: 9    Treatment & Education:  Pt ed on OT POC  Pt performed supine LE therex x 15 rep and B hip rotations 2* back pain  Pt sat EOB x 9 minutes with SBA<>CGA with pt leaning onto siderail on R side to alleviate back pain; pt able to correct posture to midline without A  Pt attempting to return supine after 4 minutes, however remained EOB for CT dressing change; pt ed on importance of safety with returning himself to supine without therapist being ready  Pt able to stand x 45 seconds with minimal A for balance    Patient left supine with all lines intact, call button in reach, RN notified and dtr presentEducation:      GOALS:   Multidisciplinary Problems     Occupational Therapy Goals        Problem: Occupational Therapy Goal    Goal Priority Disciplines Outcome Interventions   Occupational Therapy Goal     OT, PT/OT Ongoing, Progressing    Description:  Goals to be met by: 10/25/2019    Patient will increase functional independence with ADLs by performing:    Feeding with [place].  UE Dressing with Minimal Assistance.  LE Dressing with Moderate Assistance.  Grooming while standing with Minimal Assistance.  Toileting from bedside commode with Minimal Assistance for hygiene and clothing management.   Supine to sit with Minimal Assistance.  Stand pivot transfers with Moderate Assistance.  Toilet transfer to bedside commode with Moderate Assistance.                      Time Tracking:     OT Date of Treatment: 10/23/19  OT Start Time: 0909  OT Stop Time:  0938  OT Total Time (min): 29 min    Billable Minutes:Therapeutic Activity 29    ANTIONETTE Ochoa  10/23/2019

## 2019-10-23 NOTE — SUBJECTIVE & OBJECTIVE
Interval History/Significant Events:   Patient continues to be HDS, tolerating trach collar. Plan for HD session today, tolerates well. Esophogram yesterday with evidence of esophogeal stasis. TF at goal rate.     Follow-up For: Procedure(s) (LRB):  CREATION, TRACHEOSTOMY (N/A)    Post-Operative Day: 6 Days Post-Op    Objective:     Vital Signs (Most Recent):  Temp: 98.3 °F (36.8 °C) (10/23/19 0300)  Pulse: 96 (10/23/19 0435)  Resp: (!) 23 (10/23/19 0435)  BP: (!) 169/77 (10/23/19 0400)  SpO2: 99 % (10/23/19 0435) Vital Signs (24h Range):  Temp:  [98 °F (36.7 °C)-99 °F (37.2 °C)] 98.3 °F (36.8 °C)  Pulse:  [] 96  Resp:  [17-31] 23  SpO2:  [95 %-100 %] 99 %  BP: (133-195)/(65-88) 169/77     Weight: (!) 150 kg (330 lb 11 oz)  Body mass index is 42.44 kg/m².      Intake/Output Summary (Last 24 hours) at 10/23/2019 0722  Last data filed at 10/23/2019 0500  Gross per 24 hour   Intake 860 ml   Output 2084 ml   Net -1224 ml       Physical Exam   Constitutional: He appears well-developed and well-nourished. No distress.   HENT:   Head: Normocephalic and atraumatic.   Trach in place.   Eyes: Pupils are equal, round, and reactive to light. Conjunctivae are normal.   Neck: Normal range of motion.   Cardiovascular: Normal rate, regular rhythm and intact distal pulses.   Pulmonary/Chest: Effort normal. No respiratory distress.   Trach collar.   Abdominal: Soft. He exhibits no distension.   G-tube to gravity with bilious output.  J-tube with tube feeds infusing.   Tube sites clean, no surrounding erythema.  Incision c/d/i, no erythema or drainage.   Musculoskeletal: Normal range of motion.   Neurological: He is alert.   Skin: Skin is warm and dry.     Lines/Drains/Airways     Central Venous Catheter Line                 Trialysis (Dialysis) Catheter 10/20/19 1415 right internal jugular 2 days          Drain                 Chest Tube 10/10/19 1510 1 Right Midaxillary;Fourth intercostal space 28 Fr. 12 days         Chest  Tube 10/10/19 1513 Left Midaxillary;Fourth intercostal space 28 Fr. 12 days         Closed/Suction Drain 10/10/19 1349 Right RUQ Bulb 19 Fr. 12 days         Gastrostomy/Enterostomy 10/10/19 1400 Jejunostomy tube 12 days         Gastrostomy/Enterostomy 10/10/19 1400 LUQ 12 days         Urethral Catheter 10/10/19 1530 Straight-tip;Double-lumen;Non-latex 16 Fr. 12 days          Airway                 Surgical Airway 10/17/19 1603 Shiley Cuffed 5 days                Significant Labs:    CBC/Anemia Profile:  Recent Labs   Lab 10/22/19  0417 10/23/19  0322   WBC 14.09* 11.84   HGB 7.2* 7.1*   HCT 24.2* 23.0*   * 583*   MCV 84 82   RDW 19.8* 19.8*        Chemistries:  Recent Labs   Lab 10/22/19  0417 10/23/19  0322    144   K 4.7 5.1    106   CO2 24 24   BUN 83* 103*   CREATININE 4.8* 5.2*   CALCIUM 8.8 8.8   MG 2.6 2.6   PHOS 4.9* 6.0*       All pertinent labs within the past 24 hours have been reviewed.    Significant Imaging:  I have reviewed all pertinent imaging results/findings within the past 24 hours.

## 2019-10-23 NOTE — PLAN OF CARE
No s/s of distress today. Speech therapy presents to bedside to evaluate and treat.  Occupational therapy presents to bedside to evaluate and treat. Hemodialysis per plan, with pt tolerating well. Plan for Permacath dialysis catheter per primary team. Orders received for transfer to floor, and will implement when room becomes available. Plan of care reviewed with pt and pt's daughter, with verbalization of understanding from pt's daughter and demonstration of understanding from pt. Emotional support offered.

## 2019-10-24 ENCOUNTER — ANESTHESIA (OUTPATIENT)
Dept: SURGERY | Facility: HOSPITAL | Age: 74
DRG: 003 | End: 2019-10-24
Payer: MEDICARE

## 2019-10-24 LAB
ABO + RH BLD: NORMAL
ALBUMIN SERPL BCP-MCNC: 2.5 G/DL (ref 3.5–5.2)
ALP SERPL-CCNC: 100 U/L (ref 55–135)
ALT SERPL W/O P-5'-P-CCNC: 34 U/L (ref 10–44)
ANION GAP SERPL CALC-SCNC: 11 MMOL/L (ref 8–16)
ANION GAP SERPL CALC-SCNC: 13 MMOL/L (ref 8–16)
AST SERPL-CCNC: 31 U/L (ref 10–40)
BASOPHILS # BLD AUTO: 0.07 K/UL (ref 0–0.2)
BASOPHILS # BLD AUTO: 0.07 K/UL (ref 0–0.2)
BASOPHILS # BLD AUTO: 0.09 K/UL (ref 0–0.2)
BASOPHILS NFR BLD: 0.6 % (ref 0–1.9)
BASOPHILS NFR BLD: 0.7 % (ref 0–1.9)
BASOPHILS NFR BLD: 0.7 % (ref 0–1.9)
BILIRUB SERPL-MCNC: 0.5 MG/DL (ref 0.1–1)
BLD GP AB SCN CELLS X3 SERPL QL: NORMAL
BLD PROD TYP BPU: NORMAL
BLOOD UNIT EXPIRATION DATE: NORMAL
BLOOD UNIT TYPE CODE: 6200
BLOOD UNIT TYPE: NORMAL
BUN SERPL-MCNC: 100 MG/DL (ref 8–23)
BUN SERPL-MCNC: 94 MG/DL (ref 8–23)
CALCIUM SERPL-MCNC: 8.6 MG/DL (ref 8.7–10.5)
CALCIUM SERPL-MCNC: 8.8 MG/DL (ref 8.7–10.5)
CHLORIDE SERPL-SCNC: 103 MMOL/L (ref 95–110)
CHLORIDE SERPL-SCNC: 105 MMOL/L (ref 95–110)
CO2 SERPL-SCNC: 25 MMOL/L (ref 23–29)
CO2 SERPL-SCNC: 26 MMOL/L (ref 23–29)
CODING SYSTEM: NORMAL
CREAT SERPL-MCNC: 4.6 MG/DL (ref 0.5–1.4)
CREAT SERPL-MCNC: 4.9 MG/DL (ref 0.5–1.4)
DIFFERENTIAL METHOD: ABNORMAL
DISPENSE STATUS: NORMAL
EOSINOPHIL # BLD AUTO: 0.5 K/UL (ref 0–0.5)
EOSINOPHIL NFR BLD: 3.8 % (ref 0–8)
EOSINOPHIL NFR BLD: 4.6 % (ref 0–8)
EOSINOPHIL NFR BLD: 4.9 % (ref 0–8)
ERYTHROCYTE [DISTWIDTH] IN BLOOD BY AUTOMATED COUNT: 19.6 % (ref 11.5–14.5)
ERYTHROCYTE [DISTWIDTH] IN BLOOD BY AUTOMATED COUNT: 19.8 % (ref 11.5–14.5)
ERYTHROCYTE [DISTWIDTH] IN BLOOD BY AUTOMATED COUNT: 19.8 % (ref 11.5–14.5)
EST. GFR  (AFRICAN AMERICAN): 12.6 ML/MIN/1.73 M^2
EST. GFR  (AFRICAN AMERICAN): 13.6 ML/MIN/1.73 M^2
EST. GFR  (NON AFRICAN AMERICAN): 10.9 ML/MIN/1.73 M^2
EST. GFR  (NON AFRICAN AMERICAN): 11.7 ML/MIN/1.73 M^2
GLUCOSE SERPL-MCNC: 103 MG/DL (ref 70–110)
GLUCOSE SERPL-MCNC: 103 MG/DL (ref 70–110)
HCT VFR BLD AUTO: 22.5 % (ref 40–54)
HCT VFR BLD AUTO: 23.1 % (ref 40–54)
HCT VFR BLD AUTO: 25.5 % (ref 40–54)
HGB BLD-MCNC: 6.7 G/DL (ref 14–18)
HGB BLD-MCNC: 6.9 G/DL (ref 14–18)
HGB BLD-MCNC: 7.8 G/DL (ref 14–18)
IMM GRANULOCYTES # BLD AUTO: 0.13 K/UL (ref 0–0.04)
IMM GRANULOCYTES # BLD AUTO: 0.14 K/UL (ref 0–0.04)
IMM GRANULOCYTES # BLD AUTO: 0.22 K/UL (ref 0–0.04)
IMM GRANULOCYTES NFR BLD AUTO: 1.2 % (ref 0–0.5)
IMM GRANULOCYTES NFR BLD AUTO: 1.4 % (ref 0–0.5)
IMM GRANULOCYTES NFR BLD AUTO: 1.6 % (ref 0–0.5)
LYMPHOCYTES # BLD AUTO: 1.1 K/UL (ref 1–4.8)
LYMPHOCYTES # BLD AUTO: 1.1 K/UL (ref 1–4.8)
LYMPHOCYTES # BLD AUTO: 1.2 K/UL (ref 1–4.8)
LYMPHOCYTES NFR BLD: 10.3 % (ref 18–48)
LYMPHOCYTES NFR BLD: 10.9 % (ref 18–48)
LYMPHOCYTES NFR BLD: 8.2 % (ref 18–48)
MAGNESIUM SERPL-MCNC: 2.4 MG/DL (ref 1.6–2.6)
MAGNESIUM SERPL-MCNC: 2.5 MG/DL (ref 1.6–2.6)
MCH RBC QN AUTO: 24.7 PG (ref 27–31)
MCH RBC QN AUTO: 25.1 PG (ref 27–31)
MCH RBC QN AUTO: 25.4 PG (ref 27–31)
MCHC RBC AUTO-ENTMCNC: 29 G/DL (ref 32–36)
MCHC RBC AUTO-ENTMCNC: 30.6 G/DL (ref 32–36)
MCHC RBC AUTO-ENTMCNC: 30.7 G/DL (ref 32–36)
MCV RBC AUTO: 82 FL (ref 82–98)
MCV RBC AUTO: 83 FL (ref 82–98)
MCV RBC AUTO: 85 FL (ref 82–98)
MONOCYTES # BLD AUTO: 1.2 K/UL (ref 0.3–1)
MONOCYTES # BLD AUTO: 1.2 K/UL (ref 0.3–1)
MONOCYTES # BLD AUTO: 1.5 K/UL (ref 0.3–1)
MONOCYTES NFR BLD: 11.2 % (ref 4–15)
MONOCYTES NFR BLD: 11.5 % (ref 4–15)
MONOCYTES NFR BLD: 11.9 % (ref 4–15)
NEUTROPHILS # BLD AUTO: 7.2 K/UL (ref 1.8–7.7)
NEUTROPHILS # BLD AUTO: 7.8 K/UL (ref 1.8–7.7)
NEUTROPHILS # BLD AUTO: 9.9 K/UL (ref 1.8–7.7)
NEUTROPHILS NFR BLD: 70.8 % (ref 38–73)
NEUTROPHILS NFR BLD: 71.5 % (ref 38–73)
NEUTROPHILS NFR BLD: 74.2 % (ref 38–73)
NRBC BLD-RTO: 0 /100 WBC
PHOSPHATE SERPL-MCNC: 5.1 MG/DL (ref 2.7–4.5)
PHOSPHATE SERPL-MCNC: 5.5 MG/DL (ref 2.7–4.5)
PLATELET # BLD AUTO: 562 K/UL (ref 150–350)
PLATELET # BLD AUTO: 580 K/UL (ref 150–350)
PLATELET # BLD AUTO: 582 K/UL (ref 150–350)
PMV BLD AUTO: 10 FL (ref 9.2–12.9)
PMV BLD AUTO: 10.2 FL (ref 9.2–12.9)
PMV BLD AUTO: 9.7 FL (ref 9.2–12.9)
POCT GLUCOSE: 101 MG/DL (ref 70–110)
POCT GLUCOSE: 104 MG/DL (ref 70–110)
POCT GLUCOSE: 107 MG/DL (ref 70–110)
POCT GLUCOSE: 111 MG/DL (ref 70–110)
POCT GLUCOSE: 117 MG/DL (ref 70–110)
POCT GLUCOSE: 99 MG/DL (ref 70–110)
POTASSIUM SERPL-SCNC: 4.9 MMOL/L (ref 3.5–5.1)
POTASSIUM SERPL-SCNC: 5 MMOL/L (ref 3.5–5.1)
PROT SERPL-MCNC: 5.9 G/DL (ref 6–8.4)
RBC # BLD AUTO: 2.71 M/UL (ref 4.6–6.2)
RBC # BLD AUTO: 2.75 M/UL (ref 4.6–6.2)
RBC # BLD AUTO: 3.07 M/UL (ref 4.6–6.2)
SODIUM SERPL-SCNC: 141 MMOL/L (ref 136–145)
SODIUM SERPL-SCNC: 142 MMOL/L (ref 136–145)
TRANS ERYTHROCYTES VOL PATIENT: NORMAL ML
WBC # BLD AUTO: 10.21 K/UL (ref 3.9–12.7)
WBC # BLD AUTO: 10.89 K/UL (ref 3.9–12.7)
WBC # BLD AUTO: 13.35 K/UL (ref 3.9–12.7)

## 2019-10-24 PROCEDURE — 25000003 PHARM REV CODE 250: Performed by: STUDENT IN AN ORGANIZED HEALTH CARE EDUCATION/TRAINING PROGRAM

## 2019-10-24 PROCEDURE — 63600175 PHARM REV CODE 636 W HCPCS: Performed by: STUDENT IN AN ORGANIZED HEALTH CARE EDUCATION/TRAINING PROGRAM

## 2019-10-24 PROCEDURE — S0020 INJECTION, BUPIVICAINE HYDRO: HCPCS | Performed by: SURGERY

## 2019-10-24 PROCEDURE — 25000003 PHARM REV CODE 250: Performed by: PHYSICIAN ASSISTANT

## 2019-10-24 PROCEDURE — 25000003 PHARM REV CODE 250: Performed by: SURGERY

## 2019-10-24 PROCEDURE — 77001 FLUOROGUIDE FOR VEIN DEVICE: CPT | Mod: 26,,, | Performed by: SURGERY

## 2019-10-24 PROCEDURE — 25000242 PHARM REV CODE 250 ALT 637 W/ HCPCS: Performed by: STUDENT IN AN ORGANIZED HEALTH CARE EDUCATION/TRAINING PROGRAM

## 2019-10-24 PROCEDURE — 94640 AIRWAY INHALATION TREATMENT: CPT

## 2019-10-24 PROCEDURE — P9021 RED BLOOD CELLS UNIT: HCPCS

## 2019-10-24 PROCEDURE — 36000707: Performed by: SURGERY

## 2019-10-24 PROCEDURE — 84100 ASSAY OF PHOSPHORUS: CPT | Mod: 91

## 2019-10-24 PROCEDURE — 36430 TRANSFUSION BLD/BLD COMPNT: CPT

## 2019-10-24 PROCEDURE — 36558 PR INSERT TUNNELED CV CATH W/O PORT OR PUMP: ICD-10-PCS | Mod: 78,RT,, | Performed by: SURGERY

## 2019-10-24 PROCEDURE — 99233 SBSQ HOSP IP/OBS HIGH 50: CPT | Mod: 24,GC,, | Performed by: SURGERY

## 2019-10-24 PROCEDURE — D9220A PRA ANESTHESIA: ICD-10-PCS | Mod: ,,, | Performed by: ANESTHESIOLOGY

## 2019-10-24 PROCEDURE — 99900026 HC AIRWAY MAINTENANCE (STAT)

## 2019-10-24 PROCEDURE — D9220A PRA ANESTHESIA: Mod: ,,, | Performed by: ANESTHESIOLOGY

## 2019-10-24 PROCEDURE — 20600001 HC STEP DOWN PRIVATE ROOM

## 2019-10-24 PROCEDURE — 85025 COMPLETE CBC W/AUTO DIFF WBC: CPT | Mod: 91

## 2019-10-24 PROCEDURE — 31720 CLEARANCE OF AIRWAYS: CPT

## 2019-10-24 PROCEDURE — 86901 BLOOD TYPING SEROLOGIC RH(D): CPT

## 2019-10-24 PROCEDURE — 94668 MNPJ CHEST WALL SBSQ: CPT

## 2019-10-24 PROCEDURE — 37000009 HC ANESTHESIA EA ADD 15 MINS: Performed by: SURGERY

## 2019-10-24 PROCEDURE — 97530 THERAPEUTIC ACTIVITIES: CPT

## 2019-10-24 PROCEDURE — 77001 CHG FLUOROGUIDE CNTRL VEN ACCESS,PLACE,REPLACE,REMOVE: ICD-10-PCS | Mod: 26,,, | Performed by: SURGERY

## 2019-10-24 PROCEDURE — 99900035 HC TECH TIME PER 15 MIN (STAT)

## 2019-10-24 PROCEDURE — C1750 CATH, HEMODIALYSIS,LONG-TERM: HCPCS | Performed by: SURGERY

## 2019-10-24 PROCEDURE — 36558 INSERT TUNNELED CV CATH: CPT | Mod: 78,RT,, | Performed by: SURGERY

## 2019-10-24 PROCEDURE — 99233 SBSQ HOSP IP/OBS HIGH 50: CPT | Mod: ,,, | Performed by: NURSE PRACTITIONER

## 2019-10-24 PROCEDURE — 63600175 PHARM REV CODE 636 W HCPCS: Performed by: SURGERY

## 2019-10-24 PROCEDURE — 36000706: Performed by: SURGERY

## 2019-10-24 PROCEDURE — 84100 ASSAY OF PHOSPHORUS: CPT

## 2019-10-24 PROCEDURE — 99233 PR SUBSEQUENT HOSPITAL CARE,LEVL III: ICD-10-PCS | Mod: ,,, | Performed by: NURSE PRACTITIONER

## 2019-10-24 PROCEDURE — 83735 ASSAY OF MAGNESIUM: CPT | Mod: 91

## 2019-10-24 PROCEDURE — 94761 N-INVAS EAR/PLS OXIMETRY MLT: CPT

## 2019-10-24 PROCEDURE — S4991 NICOTINE PATCH NONLEGEND: HCPCS | Performed by: PHYSICIAN ASSISTANT

## 2019-10-24 PROCEDURE — 80053 COMPREHEN METABOLIC PANEL: CPT

## 2019-10-24 PROCEDURE — 99233 PR SUBSEQUENT HOSPITAL CARE,LEVL III: ICD-10-PCS | Mod: 24,GC,, | Performed by: SURGERY

## 2019-10-24 PROCEDURE — 27000221 HC OXYGEN, UP TO 24 HOURS

## 2019-10-24 PROCEDURE — 97535 SELF CARE MNGMENT TRAINING: CPT

## 2019-10-24 PROCEDURE — 37000008 HC ANESTHESIA 1ST 15 MINUTES: Performed by: SURGERY

## 2019-10-24 PROCEDURE — 83735 ASSAY OF MAGNESIUM: CPT

## 2019-10-24 PROCEDURE — 80048 BASIC METABOLIC PNL TOTAL CA: CPT

## 2019-10-24 PROCEDURE — 86920 COMPATIBILITY TEST SPIN: CPT

## 2019-10-24 DEVICE — CATH HEMOSPLIT 14.5FR 19CM: Type: IMPLANTABLE DEVICE | Site: CHEST | Status: FUNCTIONAL

## 2019-10-24 RX ORDER — HEPARIN SODIUM 10000 [USP'U]/ML
INJECTION, SOLUTION INTRAVENOUS; SUBCUTANEOUS
Status: DISCONTINUED | OUTPATIENT
Start: 2019-10-24 | End: 2019-10-24 | Stop reason: HOSPADM

## 2019-10-24 RX ORDER — EPHEDRINE SULFATE 50 MG/ML
INJECTION, SOLUTION INTRAVENOUS
Status: DISCONTINUED | OUTPATIENT
Start: 2019-10-24 | End: 2019-10-24

## 2019-10-24 RX ORDER — CEFAZOLIN SODIUM 1 G/3ML
INJECTION, POWDER, FOR SOLUTION INTRAMUSCULAR; INTRAVENOUS
Status: DISCONTINUED | OUTPATIENT
Start: 2019-10-24 | End: 2019-10-24

## 2019-10-24 RX ORDER — PROPOFOL 10 MG/ML
VIAL (ML) INTRAVENOUS
Status: DISCONTINUED | OUTPATIENT
Start: 2019-10-24 | End: 2019-10-24

## 2019-10-24 RX ORDER — LIDOCAINE HYDROCHLORIDE 10 MG/ML
INJECTION, SOLUTION EPIDURAL; INFILTRATION; INTRACAUDAL; PERINEURAL
Status: DISCONTINUED | OUTPATIENT
Start: 2019-10-24 | End: 2019-10-24 | Stop reason: HOSPADM

## 2019-10-24 RX ORDER — PHENYLEPHRINE HYDROCHLORIDE 10 MG/ML
INJECTION INTRAVENOUS
Status: DISCONTINUED | OUTPATIENT
Start: 2019-10-24 | End: 2019-10-24

## 2019-10-24 RX ORDER — KETAMINE HYDROCHLORIDE 10 MG/ML
INJECTION, SOLUTION INTRAMUSCULAR; INTRAVENOUS
Status: DISCONTINUED | OUTPATIENT
Start: 2019-10-24 | End: 2019-10-24

## 2019-10-24 RX ORDER — SODIUM CHLORIDE 9 MG/ML
INJECTION, SOLUTION INTRAVENOUS ONCE
Status: COMPLETED | OUTPATIENT
Start: 2019-10-25 | End: 2019-10-25

## 2019-10-24 RX ORDER — HYDROCODONE BITARTRATE AND ACETAMINOPHEN 500; 5 MG/1; MG/1
TABLET ORAL
Status: DISCONTINUED | OUTPATIENT
Start: 2019-10-24 | End: 2019-10-31 | Stop reason: HOSPADM

## 2019-10-24 RX ORDER — BUPIVACAINE HYDROCHLORIDE 5 MG/ML
INJECTION, SOLUTION EPIDURAL; INTRACAUDAL
Status: DISCONTINUED | OUTPATIENT
Start: 2019-10-24 | End: 2019-10-24 | Stop reason: HOSPADM

## 2019-10-24 RX ORDER — DIPHENHYDRAMINE HYDROCHLORIDE 50 MG/ML
25 INJECTION INTRAMUSCULAR; INTRAVENOUS ONCE
Status: COMPLETED | OUTPATIENT
Start: 2019-10-24 | End: 2019-10-24

## 2019-10-24 RX ADMIN — SENNOSIDES 10 ML: 8.8 SYRUP ORAL at 08:10

## 2019-10-24 RX ADMIN — IPRATROPIUM BROMIDE AND ALBUTEROL SULFATE 3 ML: .5; 3 SOLUTION RESPIRATORY (INHALATION) at 03:10

## 2019-10-24 RX ADMIN — CHLORHEXIDINE GLUCONATE 0.12% ORAL RINSE 15 ML: 1.2 LIQUID ORAL at 08:10

## 2019-10-24 RX ADMIN — FENTANYL CITRATE 25 MCG: 50 INJECTION INTRAMUSCULAR; INTRAVENOUS at 09:10

## 2019-10-24 RX ADMIN — KETAMINE HYDROCHLORIDE 30 MG: 10 INJECTION, SOLUTION INTRAMUSCULAR; INTRAVENOUS at 10:10

## 2019-10-24 RX ADMIN — NICOTINE 1 PATCH: 21 PATCH, EXTENDED RELEASE TRANSDERMAL at 08:10

## 2019-10-24 RX ADMIN — PHENYLEPHRINE HYDROCHLORIDE 100 MCG: 10 INJECTION INTRAVENOUS at 10:10

## 2019-10-24 RX ADMIN — FAMOTIDINE 20 MG: 40 POWDER, FOR SUSPENSION ORAL at 08:10

## 2019-10-24 RX ADMIN — IPRATROPIUM BROMIDE AND ALBUTEROL SULFATE 3 ML: .5; 3 SOLUTION RESPIRATORY (INHALATION) at 07:10

## 2019-10-24 RX ADMIN — PHENYLEPHRINE HYDROCHLORIDE 100 MCG: 10 INJECTION INTRAVENOUS at 11:10

## 2019-10-24 RX ADMIN — DOCUSATE SODIUM 100 MG: 50 LIQUID ORAL at 08:10

## 2019-10-24 RX ADMIN — IPRATROPIUM BROMIDE AND ALBUTEROL SULFATE 3 ML: .5; 3 SOLUTION RESPIRATORY (INHALATION) at 11:10

## 2019-10-24 RX ADMIN — LIDOCAINE 1 PATCH: 50 PATCH TOPICAL at 10:10

## 2019-10-24 RX ADMIN — CHLORHEXIDINE GLUCONATE 0.12% ORAL RINSE 15 ML: 1.2 LIQUID ORAL at 09:10

## 2019-10-24 RX ADMIN — POLYETHYLENE GLYCOL 3350 17 G: 17 POWDER, FOR SOLUTION ORAL at 08:10

## 2019-10-24 RX ADMIN — PROPOFOL 50 MG: 10 INJECTION, EMULSION INTRAVENOUS at 10:10

## 2019-10-24 RX ADMIN — ONDANSETRON 4 MG: 2 INJECTION INTRAMUSCULAR; INTRAVENOUS at 11:10

## 2019-10-24 RX ADMIN — FENTANYL CITRATE 25 MCG: 50 INJECTION INTRAMUSCULAR; INTRAVENOUS at 11:10

## 2019-10-24 RX ADMIN — HEPARIN SODIUM 7500 UNITS: 5000 INJECTION, SOLUTION INTRAVENOUS; SUBCUTANEOUS at 02:10

## 2019-10-24 RX ADMIN — FENTANYL CITRATE 25 MCG: 50 INJECTION INTRAMUSCULAR; INTRAVENOUS at 08:10

## 2019-10-24 RX ADMIN — DIPHENHYDRAMINE HYDROCHLORIDE 25 MG: 50 INJECTION, SOLUTION INTRAMUSCULAR; INTRAVENOUS at 10:10

## 2019-10-24 RX ADMIN — HEPARIN SODIUM 7500 UNITS: 5000 INJECTION, SOLUTION INTRAVENOUS; SUBCUTANEOUS at 05:10

## 2019-10-24 RX ADMIN — HEPARIN SODIUM 7500 UNITS: 5000 INJECTION, SOLUTION INTRAVENOUS; SUBCUTANEOUS at 09:10

## 2019-10-24 RX ADMIN — SODIUM CHLORIDE, SODIUM GLUCONATE, SODIUM ACETATE, POTASSIUM CHLORIDE, MAGNESIUM CHLORIDE, SODIUM PHOSPHATE, DIBASIC, AND POTASSIUM PHOSPHATE: .53; .5; .37; .037; .03; .012; .00082 INJECTION, SOLUTION INTRAVENOUS at 10:10

## 2019-10-24 RX ADMIN — ONDANSETRON 4 MG: 2 INJECTION INTRAMUSCULAR; INTRAVENOUS at 10:10

## 2019-10-24 RX ADMIN — CEFAZOLIN 3 G: 330 INJECTION, POWDER, FOR SOLUTION INTRAMUSCULAR; INTRAVENOUS at 10:10

## 2019-10-24 RX ADMIN — ACETAMINOPHEN 650 MG: 160 SOLUTION ORAL at 06:10

## 2019-10-24 RX ADMIN — EPHEDRINE SULFATE 5 MG: 50 INJECTION, SOLUTION INTRAMUSCULAR; INTRAVENOUS; SUBCUTANEOUS at 10:10

## 2019-10-24 RX ADMIN — IPRATROPIUM BROMIDE AND ALBUTEROL SULFATE 3 ML: .5; 3 SOLUTION RESPIRATORY (INHALATION) at 08:10

## 2019-10-24 NOTE — PLAN OF CARE
Discharge Recommendation: SS-SNF.    1 goal met and 2 new goals created today. PT goals appropriate.    Appropriate transfer level with nursing staff: Pt safe to sit EOB with SBA and stand EOB with Min A x 2 with HHA    Problem: Physical Therapy Goal  Goal: Physical Therapy Goal  Description  Goals to be met by: 19     Patient will increase functional independence with mobility by performin. Supine to sit with Maximum Assistance-met 10/24  1a. Supine to sit with Minimum Assistance - not met  2. Sit to supine with Maximum Assistance - not met  3. Rolling to Left and Right with Maximum Assistance and use of bedrail as needed. - not met  4. Sitting at edge of bed x10 minutes with M aximum Assistance - not met  5. Lower extremity exercise program x10 reps per handout, with assistance as needed - not met  6. Sit <> stand with Moderate Assistance using LRAD - not met  7. Stand for 3 minutes with HHA or LRAD with CGA to demonstrate improved standing tolerance      Outcome: Ongoing, Progressing    Juliet Dean PT, DPT  10/24/2019  Pager: [phone]

## 2019-10-24 NOTE — ASSESSMENT & PLAN NOTE
74M current smoker who underwent excision of bleeding gastric mass, post op course complicated by intraperitoneal free air. Now admitted to SICU following ex-lap 10/10/19 which revealed esophageal and gastric perforations which have been repaired. Now s/p tracheostomy placement on 10/17 for difficulty with extubating patient.     Neuro:  - Off of sedation, following commands and alert: GCS 11T  - PRN pain control  - Seroquel 25 BID for agitation while on trach collar    CV  - Fluid resuscitate as necessary  - Off of pressors   - Echo 9/12/10 wnl    Pulm  - Extubated 10/14, reintubated 10/15 for encephalopathy with respiratory distress and increased WOB, subsequently underwent tracheostomy placement on 10/17.  - Continue PAV+ as he appears more comfortable on it if not tolerating trach collar  - Has been on continuous trach collar for >4 days  - ABG's prn  - Uses CPAP at home, NO CPAP due to gastric perforation  - COPD with Significant history of smoking (3 ppd); continue nicotine patch  - Speaking valve with SLP if tolerates    GI  - Post-op esophageal and gastric perforations 10/9, now repaired via ex-lap  - Monitor drains, care per surgery  - Do not use G-tube. Leave to gravity.  - Tube feeds per J tube, increase to goal as tolerated.  - Esophogram with evidence of esophogeal stasis but no leaks observed, can pull chest tubes.  - NPO, can have ice chips. Will follow SLP recs for swallow    Renal  - Nephrology following, continuing HD for clearance of uremia.   - Mental status improved.  - BHASKAR but intermittently worse, baseline Cr of 1  - CMP daily  - Electrolyte repletion PRN  - Tolerating HD sessions  - Permacath placement today. Will need outpatient HD chair.       Heme/ID  - Trend WBC, trending down.   - Abdominal culture (10/10) resulted with Yeast (awaiting speciation), Fluconazole restarted with end date of 10/24.  - Hgb stable  - CBCs daily  - Completed course of Cefepime, flagyl, vancomycin, and diflucan  for contaminated peritoneum on 10/16.    Endo  - DM2  - POCT glucose q4h  - SSI     Dispo: OK for step down to floor; no ICU needs at this time. On continuous trach collar. Esophogram without evidence of leaks, but esophogeal stasis noted. Can remove chest tubes. Tolerating HD, will need arrangements made for outpatient HD. Continue work up for LTAC placement.

## 2019-10-24 NOTE — PROGRESS NOTES
[facility]  Critical Care - Surgery  Progress Note    Patient Name: Pasha Harmon  MRN: [id number]  Admission Date: 10/9/2019  Hospital Length of Stay: 14 days  Code Status: Full Code  Attending Provider: Paulino Parrish MD  Primary Care Provider: Eze Root MD   Principal Problem: Bowel perforation    Subjective:     Hospital/ICU Course:  10/10: Admitted to SICU. R IJ TLC placed. On minimal pressors.   10/11: Remaining intubated. On minimal pressors.   10/12: Difficulty weaning pressors while on propofol and fentanyl to control resp rate from acidosis. Received 2u PRBCs after 1.25L albumin.   10/13: Pressors weaned, still not following commands after stopping sedation  10/14: NGT removed by primary team. Metoprolol started for HTN  10/21: Tolerating trach collar >24hrs. Tolerating HD sessions. HDS without pressor requirement. Plan for SLP eval today.    10/22: Plan for esophogram today. No evidence of leak but esophageal dysmotility noted.   10/23: Stable. Right chest tube pulled.   10/24: Gave 1 unit pRBC. Permacath placement today.     Interval History/Significant Events: NAEON. Receiving 1u pRBC for stable drop in H/H. Doing well. Plan for permacath today. Still awaiting transfer to floor.     Follow-up For: Procedure(s) (LRB):  CREATION, TRACHEOSTOMY (N/A)    Post-Operative Day: 7 Days Post-Op    Objective:     Vital Signs (Most Recent):  Temp: 99 °F (37.2 °C) (10/24/19 0755)  Pulse: 101 (10/24/19 0800)  Resp: (!) 23 (10/24/19 0800)  BP: (!) 147/64 (10/24/19 0800)  SpO2: 100 % (10/24/19 0800) Vital Signs (24h Range):  Temp:  [98.4 °F (36.9 °C)-99 °F (37.2 °C)] 99 °F (37.2 °C)  Pulse:  [] 101  Resp:  [11-32] 23  SpO2:  [95 %-100 %] 100 %  BP: (112-164)/(58-83) 147/64     Weight: (!) 150 kg (330 lb 11 oz)  Body mass index is 42.44 kg/m².      Intake/Output Summary (Last 24 hours) at 10/24/2019 0837  Last data filed at 10/24/2019 0800  Gross per 24 hour   Intake 2764 ml    Output 4382 ml   Net -1618 ml       Physical Exam   Constitutional: He appears well-developed and well-nourished. No distress.   HENT:   Head: Normocephalic and atraumatic.   Trach in place.   Eyes: Pupils are equal, round, and reactive to light. Conjunctivae are normal.   Neck: Normal range of motion.   Cardiovascular: Normal rate, regular rhythm and intact distal pulses.   Pulmonary/Chest: Effort normal. No respiratory distress.   Trach collar.   Abdominal: Soft. He exhibits no distension.   G-tube to gravity with bilious output.  J-tube with tube feeds infusing.   Left chest ube site clean, no surrounding erythema.  Incision c/d/i, no erythema or drainage.   Musculoskeletal: Normal range of motion.   Neurological: He is alert.   Skin: Skin is warm and dry    Lines/Drains/Airways     Central Venous Catheter Line                 Trialysis (Dialysis) Catheter 10/20/19 1415 right internal jugular 3 days          Drain                 Chest Tube 10/10/19 1513 Left Midaxillary;Fourth intercostal space 28 Fr. 13 days         Closed/Suction Drain 10/10/19 1349 Right RUQ Bulb 19 Fr. 13 days         Gastrostomy/Enterostomy 10/10/19 1400 Jejunostomy tube 13 days         Gastrostomy/Enterostomy 10/10/19 1400 LUQ 13 days         Urethral Catheter 10/10/19 1530 Straight-tip;Double-lumen;Non-latex 16 Fr. 13 days          Airway                 Surgical Airway 10/17/19 1603 Shiley Cuffed 6 days                Significant Labs:    CBC/Anemia Profile:  Recent Labs   Lab 10/23/19  0322 10/24/19  0320 10/24/19  0400   WBC 11.84 10.21 10.89   HGB 7.1* 6.7* 6.9*   HCT 23.0* 23.1* 22.5*   * 582* 580*   MCV 82 85 82   RDW 19.8* 19.6* 19.8*        Chemistries:  Recent Labs   Lab 10/23/19  0322 10/24/19  0320    142   K 5.1 5.0    105   CO2 24 26   * 94*   CREATININE 5.2* 4.6*   CALCIUM 8.8 8.6*   MG 2.6 2.5   PHOS 6.0* 5.1*       All pertinent labs within the past 24 hours have been reviewed.    Significant  Imaging:  I have reviewed all pertinent imaging results/findings within the past 24 hours.    Assessment/Plan:     * Bowel perforation  74M current smoker who underwent excision of bleeding gastric mass, post op course complicated by intraperitoneal free air. Now admitted to SICU following ex-lap 10/10/19 which revealed esophageal and gastric perforations which have been repaired. Now s/p tracheostomy placement on 10/17 for difficulty with extubating patient.     Neuro:  - Off of sedation, following commands and alert: GCS 11T  - PRN pain control  - Seroquel 25 BID for agitation while on trach collar    CV  - Fluid resuscitate as necessary  - Off of pressors   - Echo 9/12/10 wnl    Pulm  - Extubated 10/14, reintubated 10/15 for encephalopathy with respiratory distress and increased WOB, subsequently underwent tracheostomy placement on 10/17.  - Continue PAV+ as he appears more comfortable on it if not tolerating trach collar  - Has been on continuous trach collar for >4 days  - ABG's prn  - Uses CPAP at home, NO CPAP due to gastric perforation  - COPD with Significant history of smoking (3 ppd); continue nicotine patch  - Speaking valve with SLP if tolerates    GI  - Post-op esophageal and gastric perforations 10/9, now repaired via ex-lap  - Monitor drains, care per surgery  - Do not use G-tube. Leave to gravity.  - Tube feeds per J tube, increase to goal as tolerated.  - Esophogram with evidence of esophogeal stasis but no leaks observed, can pull chest tubes.  - NPO, can have ice chips. Will follow SLP recs for swallow    Renal  - Nephrology following, continuing HD for clearance of uremia.   - Mental status improved.  - BHASKAR but intermittently worse, baseline Cr of 1  - CMP daily  - Electrolyte repletion PRN  - Tolerating HD sessions  - Permacath placement today. Will need outpatient HD chair.       Heme/ID  - Trend WBC, trending down.   - Abdominal culture (10/10) resulted with Yeast (awaiting speciation),  Fluconazole restarted with end date of 10/24.  - Hgb stable  - CBCs daily  - Completed course of Cefepime, flagyl, vancomycin, and diflucan for contaminated peritoneum on 10/16.    Endo  - DM2  - POCT glucose q4h  - SSI     Dispo: OK for step down to floor; no ICU needs at this time. On continuous trach collar. Esophogram without evidence of leaks, but esophogeal stasis noted. Can remove chest tubes. Tolerating HD, will need arrangements made for outpatient HD. Continue work up for LTAC placement.       Critical care was time spent personally by me on the following activities: development of treatment plan with patient or surrogate and bedside caregivers, discussions with consultants, evaluation of patient's response to treatment, examination of patient, ordering and performing treatments and interventions, ordering and review of laboratory studies, ordering and review of radiographic studies, pulse oximetry, re-evaluation of patient's condition.  This critical care time did not overlap with that of any other provider or involve time for any procedures.     Jac Zamudio MD  Critical Care - Surgery  [facility]

## 2019-10-24 NOTE — PLAN OF CARE
10/24/19 1100   Post-Acute Status   Post-Acute Authorization Placement   Post-Acute Placement Status Pending Payor Review   Hiro spoke with Lalita at [facility] ([phone]). She reported that the request for auth is now in MD Review. HIRO escalated this since it has been over 24 hours. HIRO sent updates via .    Mona Jj LMSW  [facility]  [id number]

## 2019-10-24 NOTE — ASSESSMENT & PLAN NOTE
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 Trach placed on 10/17/19.    Left chest tube to water seal  Permacath today  Hold tube feeds for OR  Continue G tube to gravity for gastric decompression - do not use for meds or feeds  Continue tube feeds  Continue on trach collar  Continue to work with speech for swallow    Rest of care per SICU, likely step down after permacath

## 2019-10-24 NOTE — SUBJECTIVE & OBJECTIVE
Interval History: Patient seen and examined at bedside. 1.4L of UOP/past 24hrs. On trach collar 28% FiO2. Remains off pressors. Kidney function on labs not showing improvement. Tolerated HD yesterday. Perm-cath placement today. Plan for HD tomorrow.        Review of patient's allergies indicates:   Allergen Reactions    Meloxicam Other (See Comments)     Ulcer, GI bleed      Current Facility-Administered Medications   Medication Frequency    0.9%  NaCl infusion (for blood administration) Q24H PRN    0.9%  NaCl infusion PRN    0.9%  NaCl infusion Once    acetaminophen oral solution 650 mg Q6H PRN    albuterol-ipratropium 2.5 mg-0.5 mg/3 mL nebulizer solution 3 mL Q4H PRN    albuterol-ipratropium 2.5 mg-0.5 mg/3 mL nebulizer solution 3 mL Q4H    chlorhexidine 0.12 % solution 15 mL BID    dextrose 10% (D10W) Bolus PRN    dextrose 10% (D10W) Bolus PRN    docusate 50 mg/5 mL liquid 100 mg Daily    famotidine 40 mg/5 mL (8 mg/mL) suspension 20 mg Daily    fentaNYL injection 25 mcg Q1H PRN    fluconazole (DIFLUCAN) IVPB 200 mg 100 mL Q24H    fluticasone furoate-vilanterol 100-25 mcg/dose diskus inhaler 1 puff Daily    glucagon (human recombinant) injection 1 mg PRN    heparin (porcine) injection 7,500 Units Q8H    hydrALAZINE injection 20 mg Q6H PRN    insulin aspart U-100 pen 0-5 Units QID (AC + HS) PRN    lidocaine 5 % patch 1 patch Q24H    nicotine 21 mg/24 hr 1 patch Daily    ondansetron injection 4 mg Q6H PRN    polyethylene glycol packet 17 g Daily    sennosides 8.8 mg/5 ml syrup 10 mL Daily    sevelamer carbonate pwpk 0.8 g TID WM    sodium chloride 0.9% flush 10 mL PRN     Family History     Problem Relation (Age of Onset)    Cancer Father    Heart disease Mother        Tobacco Use    Smoking status: Current Every Day Smoker     Packs/day: 2.00     Types: Cigarettes    Smokeless tobacco: Never Used   Substance and Sexual Activity    Alcohol use: No    Drug use: No    Sexual activity:  Not on file       Objective:     Vital Signs (Most Recent):  Temp: 99 °F (37.2 °C) (10/24/19 0755)  Pulse: 101 (10/24/19 0800)  Resp: (!) 23 (10/24/19 0800)  BP: (!) 147/64 (10/24/19 0800)  SpO2: 100 % (10/24/19 0800)  O2 Device (Oxygen Therapy): Trach Collar (10/24/19 0755) Vital Signs (24h Range):  Temp:  [98.4 °F (36.9 °C)-99 °F (37.2 °C)] 99 °F (37.2 °C)  Pulse:  [] 101  Resp:  [11-32] 23  SpO2:  [95 %-100 %] 100 %  BP: (112-164)/(58-83) 147/64     Weight: (!) 150 kg (330 lb 11 oz) (10/18/19 0500)  Body mass index is 42.44 kg/m².  Body surface area is 2.8 meters squared.    I/O last 3 completed shifts:  In: 3224 [I.V.:125; Blood:219; Other:500; NG/GT:2380]  Out: 5528 [Urine:1663; Drains:1031; Other:2500; Chest Tube:334]    Physical Exam   Constitutional: He appears well-developed and well-nourished.   Eyes: Pupils are equal, round, and reactive to light. Conjunctivae and EOM are normal.   Neck: Normal range of motion. Neck supple.   Cardiovascular: Normal rate and regular rhythm.   Pulmonary/Chest: Effort normal. He has rales.   Trached with supplemental O2 in place    Abdominal: He exhibits no distension and no mass. There is tenderness. There is no guarding. No hernia.   Midline surgical incision to abdomen. Multiple abdominal drains noted.    Musculoskeletal: He exhibits edema (1+ pitting edema to BLE ). He exhibits no tenderness or deformity.   Neurological: He is alert.   Skin: Skin is warm and dry. No rash noted. He is not diaphoretic. No erythema. No pallor.   Psychiatric:   Follows commands and nods and gestures appropriately        Significant Labs:  CBC:   Recent Labs   Lab 10/24/19  0400   WBC 10.89   RBC 2.75*   HGB 6.9*   HCT 22.5*   *   MCV 82   MCH 25.1*   MCHC 30.7*     CMP:   Recent Labs   Lab 10/19/19  1400  10/24/19  0320   *   < > 103   CALCIUM 8.2*   < > 8.6*   ALBUMIN 1.7*  --   --       < > 142   K 4.3   < > 5.0   CO2 23   < > 26   *   < > 105   BUN 55*   <  > 94*   CREATININE 2.9*   < > 4.6*    < > = values in this interval not displayed.     All labs within the past 24 hours have been reviewed.

## 2019-10-24 NOTE — PROGRESS NOTES
Pt arrived to floor AAOx4.  #6 shiley trach on 5 L 28% TC, all VSS.  On telemetry.  Suction, obturator, PMV at bedside.  Resp therapy at bedside.  G tube to gravity.  Hallman cath in place.  Bed locked, lowest position.  Call light in reach.  Pt and wife oriented to room and call light.  TM.

## 2019-10-24 NOTE — SUBJECTIVE & OBJECTIVE
Interval History/Significant Events: NAEON. Receiving 1u pRBC for stable drop in H/H. Doing well. Plan for permacath today. Still awaiting transfer to floor.     Follow-up For: Procedure(s) (LRB):  CREATION, TRACHEOSTOMY (N/A)    Post-Operative Day: 7 Days Post-Op    Objective:     Vital Signs (Most Recent):  Temp: 99 °F (37.2 °C) (10/24/19 0755)  Pulse: 101 (10/24/19 0800)  Resp: (!) 23 (10/24/19 0800)  BP: (!) 147/64 (10/24/19 0800)  SpO2: 100 % (10/24/19 0800) Vital Signs (24h Range):  Temp:  [98.4 °F (36.9 °C)-99 °F (37.2 °C)] 99 °F (37.2 °C)  Pulse:  [] 101  Resp:  [11-32] 23  SpO2:  [95 %-100 %] 100 %  BP: (112-164)/(58-83) 147/64     Weight: (!) 150 kg (330 lb 11 oz)  Body mass index is 42.44 kg/m².      Intake/Output Summary (Last 24 hours) at 10/24/2019 0837  Last data filed at 10/24/2019 0800  Gross per 24 hour   Intake 2764 ml   Output 4382 ml   Net -1618 ml       Physical Exam   Constitutional: He appears well-developed and well-nourished. No distress.   HENT:   Head: Normocephalic and atraumatic.   Trach in place.   Eyes: Pupils are equal, round, and reactive to light. Conjunctivae are normal.   Neck: Normal range of motion.   Cardiovascular: Normal rate, regular rhythm and intact distal pulses.   Pulmonary/Chest: Effort normal. No respiratory distress.   Trach collar.   Abdominal: Soft. He exhibits no distension.   G-tube to gravity with bilious output.  J-tube with tube feeds infusing.   Left chest ube site clean, no surrounding erythema.  Incision c/d/i, no erythema or drainage.   Musculoskeletal: Normal range of motion.   Neurological: He is alert.   Skin: Skin is warm and dry    Lines/Drains/Airways     Central Venous Catheter Line                 Trialysis (Dialysis) Catheter 10/20/19 1415 right internal jugular 3 days          Drain                 Chest Tube 10/10/19 1513 Left Midaxillary;Fourth intercostal space 28 Fr. 13 days         Closed/Suction Drain 10/10/19 1349 Right RUQ Bulb 19  Fr. 13 days         Gastrostomy/Enterostomy 10/10/19 1400 Jejunostomy tube 13 days         Gastrostomy/Enterostomy 10/10/19 1400 LUQ 13 days         Urethral Catheter 10/10/19 1530 Straight-tip;Double-lumen;Non-latex 16 Fr. 13 days          Airway                 Surgical Airway 10/17/19 1603 Shiley Cuffed 6 days                Significant Labs:    CBC/Anemia Profile:  Recent Labs   Lab 10/23/19  0322 10/24/19  0320 10/24/19  0400   WBC 11.84 10.21 10.89   HGB 7.1* 6.7* 6.9*   HCT 23.0* 23.1* 22.5*   * 582* 580*   MCV 82 85 82   RDW 19.8* 19.6* 19.8*        Chemistries:  Recent Labs   Lab 10/23/19  0322 10/24/19  0320    142   K 5.1 5.0    105   CO2 24 26   * 94*   CREATININE 5.2* 4.6*   CALCIUM 8.8 8.6*   MG 2.6 2.5   PHOS 6.0* 5.1*       All pertinent labs within the past 24 hours have been reviewed.    Significant Imaging:  I have reviewed all pertinent imaging results/findings within the past 24 hours.

## 2019-10-24 NOTE — PROGRESS NOTES
[facility]  Nephrology  Progress Note    Patient Name: Pasha Harmon  MRN: [id number]  Admission Date: 10/9/2019  Hospital Length of Stay: 14 days  Attending Provider: Paulino Parrish MD   Primary Care Physician: Eze Root MD  Principal Problem:Bowel perforation      Interval History: Patient seen and examined at bedside. 1.4L of UOP/past 24hrs. On trach collar 28% FiO2. Remains off pressors. Kidney function on labs not showing improvement. Tolerated HD yesterday. Perm-cath placement today. Plan for HD tomorrow.        Review of patient's allergies indicates:   Allergen Reactions    Meloxicam Other (See Comments)     Ulcer, GI bleed      Current Facility-Administered Medications   Medication Frequency    0.9%  NaCl infusion (for blood administration) Q24H PRN    0.9%  NaCl infusion PRN    0.9%  NaCl infusion Once    acetaminophen oral solution 650 mg Q6H PRN    albuterol-ipratropium 2.5 mg-0.5 mg/3 mL nebulizer solution 3 mL Q4H PRN    albuterol-ipratropium 2.5 mg-0.5 mg/3 mL nebulizer solution 3 mL Q4H    chlorhexidine 0.12 % solution 15 mL BID    dextrose 10% (D10W) Bolus PRN    dextrose 10% (D10W) Bolus PRN    docusate 50 mg/5 mL liquid 100 mg Daily    famotidine 40 mg/5 mL (8 mg/mL) suspension 20 mg Daily    fentaNYL injection 25 mcg Q1H PRN    fluconazole (DIFLUCAN) IVPB 200 mg 100 mL Q24H    fluticasone furoate-vilanterol 100-25 mcg/dose diskus inhaler 1 puff Daily    glucagon (human recombinant) injection 1 mg PRN    heparin (porcine) injection 7,500 Units Q8H    hydrALAZINE injection 20 mg Q6H PRN    insulin aspart U-100 pen 0-5 Units QID (AC + HS) PRN    lidocaine 5 % patch 1 patch Q24H    nicotine 21 mg/24 hr 1 patch Daily    ondansetron injection 4 mg Q6H PRN    polyethylene glycol packet 17 g Daily    sennosides 8.8 mg/5 ml syrup 10 mL Daily    sevelamer carbonate pwpk 0.8 g TID WM    sodium chloride 0.9% flush 10 mL PRN     Family History      Problem Relation (Age of Onset)    Cancer Father    Heart disease Mother        Tobacco Use    Smoking status: Current Every Day Smoker     Packs/day: 2.00     Types: Cigarettes    Smokeless tobacco: Never Used   Substance and Sexual Activity    Alcohol use: No    Drug use: No    Sexual activity: Not on file       Objective:     Vital Signs (Most Recent):  Temp: 99 °F (37.2 °C) (10/24/19 0755)  Pulse: 101 (10/24/19 0800)  Resp: (!) 23 (10/24/19 0800)  BP: (!) 147/64 (10/24/19 0800)  SpO2: 100 % (10/24/19 0800)  O2 Device (Oxygen Therapy): Trach Collar (10/24/19 0755) Vital Signs (24h Range):  Temp:  [98.4 °F (36.9 °C)-99 °F (37.2 °C)] 99 °F (37.2 °C)  Pulse:  [] 101  Resp:  [11-32] 23  SpO2:  [95 %-100 %] 100 %  BP: (112-164)/(58-83) 147/64     Weight: (!) 150 kg (330 lb 11 oz) (10/18/19 0500)  Body mass index is 42.44 kg/m².  Body surface area is 2.8 meters squared.    I/O last 3 completed shifts:  In: 3224 [I.V.:125; Blood:219; Other:500; NG/GT:2380]  Out: 5528 [Urine:1663; Drains:1031; Other:2500; Chest Tube:334]    Physical Exam   Constitutional: He appears well-developed and well-nourished.   Eyes: Pupils are equal, round, and reactive to light. Conjunctivae and EOM are normal.   Neck: Normal range of motion. Neck supple.   Cardiovascular: Normal rate and regular rhythm.   Pulmonary/Chest: Effort normal. He has rales.   Trached with supplemental O2 in place    Abdominal: He exhibits no distension and no mass. There is tenderness. There is no guarding. No hernia.   Midline surgical incision to abdomen. Multiple abdominal drains noted.    Musculoskeletal: He exhibits edema (1+ pitting edema to BLE ). He exhibits no tenderness or deformity.   Neurological: He is alert.   Skin: Skin is warm and dry. No rash noted. He is not diaphoretic. No erythema. No pallor.   Psychiatric:   Follows commands and nods and gestures appropriately        Significant Labs:  CBC:   Recent Labs   Lab 10/24/19  0400   WBC  10.89   RBC 2.75*   HGB 6.9*   HCT 22.5*   *   MCV 82   MCH 25.1*   MCHC 30.7*     CMP:   Recent Labs   Lab 10/19/19  1400  10/24/19  0320   *   < > 103   CALCIUM 8.2*   < > 8.6*   ALBUMIN 1.7*  --   --       < > 142   K 4.3   < > 5.0   CO2 23   < > 26   *   < > 105   BUN 55*   < > 94*   CREATININE 2.9*   < > 4.6*    < > = values in this interval not displayed.     All labs within the past 24 hours have been reviewed.        Assessment/Plan:     BHASKAR (acute kidney injury)  BHASKAR 2/2 ischemic ATN due to intraoperative hypotension and acute anemia.      Plan/Recommendations:  -Plan for HD tomorrow for volume management and metabolic clearance    -Continue renvela with meals   -Avoid nephrotoxic medications  -Renally dose medications  -Avoid contrast studies   -Daily renal function panels   -Strict I/O's   -Will continue to follow closely            Thank you for your consult. I will follow-up with patient. Please contact us if you have any additional questions.    Ranjeet Judd NP  Nephrology  [facility]

## 2019-10-24 NOTE — PT/OT/SLP PROGRESS
Speech Language Pathology  Patient Not Seen      Pasha Harmon  MRN: [id number]    Patient not seen today secondary to RUDDY to OR for Right IJ permacath.  SLP to return later this service date pending schedule or next scheduled service date.      Per review of procedure, patient also downsized to size 6 Shiley trach.     Emily Abadie, ROSE-SLP

## 2019-10-24 NOTE — PLAN OF CARE
"Dx: Bowel perforation    Shift Events: VSS at this time. Afebrile. 5L 28% Trach Collar. O2 sats >97%. PRN pain medication given to maintain adequate pain control. Patient complained of bladder fullness and pain. Hallman catheter flushed and bladder scan performed. 0 mL post bladder scan. 1 unit PRBCs to be transfused for Hgb of 6.9. Plan to place Permacath today. Plan of care reviewed with patient and daughter. Questions and concerns addressed. Will continue to monitor.     Neuro: AAO x4, Follows Commands and Moves All Extremities    Vital Signs: BP (!) 162/76 (BP Location: Right arm, Patient Position: Lying)   Pulse 94   Temp 98.5 °F (36.9 °C) (Oral)   Resp (!) 26   Ht 6' 2.02" (1.88 m)   Wt (!) 150 kg (330 lb 11 oz)   SpO2 100%   BMI 42.44 kg/m²     Diet: NPO and Tube Feeds @ 70 (goal)    Urine Output: Urinary Catheter 620 cc/shift    Drains: DENISE Drain, total output 25 cc / shift, Chest Tube, total output 40 cc /  shift and G-tube/J-tube, total output 355 cc /  shift     Labs/Accuchecks: Accuchecks q 4 hours.    Skin: No skin breakdown noted. Abdominal incision intact and approximated with staples intact. Foam dressing applied to sacrum and heels to prevent skin breakdown.   "

## 2019-10-24 NOTE — ASSESSMENT & PLAN NOTE
BHASKAR 2/2 ischemic ATN due to intraoperative hypotension and acute anemia.      Plan/Recommendations:  -Plan for HD tomorrow for volume management and metabolic clearance    -Continue renvela with meals   -Avoid nephrotoxic medications  -Renally dose medications  -Avoid contrast studies   -Daily renal function panels   -Strict I/O's   -Will continue to follow closely

## 2019-10-24 NOTE — SUBJECTIVE & OBJECTIVE
Interval History: No acute events overnight. Right chest tube removed. On trach collar this morning.    Medications:  Continuous Infusions:    Scheduled Meds:   sodium chloride 0.9%   Intravenous Once    albuterol-ipratropium  3 mL Nebulization Q4H    chlorhexidine  15 mL Mouth/Throat BID    docusate  100 mg Per J Tube Daily    famotidine  20 mg Per J Tube Daily    fluconazole (DIFLUCAN) IVPB  200 mg Intravenous Q24H    fluticasone furoate-vilanterol  1 puff Inhalation Daily    heparin (porcine)  7,500 Units Subcutaneous Q8H    lidocaine  1 patch Transdermal Q24H    nicotine  1 patch Transdermal Daily    polyethylene glycol  17 g Per J Tube Daily    sennosides 8.8 mg/5 ml  10 mL Per J Tube Daily    sevelamer carbonate  0.8 g Oral TID WM     PRN Meds:sodium chloride, sodium chloride 0.9%, acetaminophen, albuterol-ipratropium, Dextrose 10% Bolus, Dextrose 10% Bolus, fentaNYL, glucagon (human recombinant), hydrALAZINE, insulin aspart U-100, ondansetron, sodium chloride 0.9%     Review of patient's allergies indicates:   Allergen Reactions    Meloxicam Other (See Comments)     Ulcer, GI bleed      Objective:     Vital Signs (Most Recent):  Temp: 98.4 °F (36.9 °C) (10/24/19 0300)  Pulse: 93 (10/24/19 0600)  Resp: (!) 22 (10/24/19 0600)  BP: (!) 148/68 (10/24/19 0600)  SpO2: 100 % (10/24/19 0600) Vital Signs (24h Range):  Temp:  [98.4 °F (36.9 °C)-98.9 °F (37.2 °C)] 98.4 °F (36.9 °C)  Pulse:  [] 93  Resp:  [11-26] 22  SpO2:  [95 %-100 %] 100 %  BP: (112-164)/(58-81) 148/68     Weight: (!) 150 kg (330 lb 11 oz)  Body mass index is 42.44 kg/m².    Intake/Output - Last 3 Shifts       10/22 0700 - 10/23 0659 10/23 0700 - 10/24 0659    I.V. (mL/kg)  125 (0.8)    Other  500    NG/ 1760    Total Intake(mL/kg) 860 (5.7) 2385 (15.9)    Urine (mL/kg/hr) 928 (0.3) 1125 (0.3)    Drains 1221 550    Other  2500    Chest Tube 420 184    Total Output 9849 3372    Net -1709 -1974                Physical Exam    Constitutional: He appears well-developed and well-nourished. He is sedated and intubated (Trach in place).   Cardiovascular: Normal rate and regular rhythm.   Pulmonary/Chest: Effort normal. He is intubated (Trach in place). No respiratory distress.   Bilateral chest tubes, draining SS  On trach collar this morning   Abdominal: Soft. He exhibits no distension. There is no tenderness. There is no guarding.   G tube in place, draining gastric contents  Jtube in place    Genitourinary:   Genitourinary Comments: Hallman in place   Neurological: He is alert.   Skin: Skin is warm and dry.   Vitals reviewed.      Significant Labs:  CBC:   Recent Labs   Lab 10/24/19  0400   WBC 10.89   RBC 2.75*   HGB 6.9*   HCT 22.5*   *   MCV 82   MCH 25.1*   MCHC 30.7*     CMP:   Recent Labs   Lab 10/19/19  1400  10/24/19  0320   *   < > 103   CALCIUM 8.2*   < > 8.6*   ALBUMIN 1.7*  --   --       < > 142   K 4.3   < > 5.0   CO2 23   < > 26   *   < > 105   BUN 55*   < > 94*   CREATININE 2.9*   < > 4.6*    < > = values in this interval not displayed.     Coagulation:   No results for input(s): LABPROT, INR, APTT in the last 168 hours.  ABGs:   Recent Labs   Lab 10/23/19  0435   PH 7.440   PCO2 35.7   PO2 85   HCO3 24.2   POCSATURATED 97   BE 0       Significant Diagnostics:  I have reviewed all pertinent imaging results/findings within the past 24 hours.

## 2019-10-24 NOTE — PLAN OF CARE
Problem: Occupational Therapy Goal  Goal: Occupational Therapy Goal  Description  Goals to be met by: 10/25/2019    Patient will increase functional independence with ADLs by performing:    Feeding with [place].  UE Dressing with Minimal Assistance.  LE Dressing with Moderate Assistance.  Grooming while standing with Minimal Assistance.  Toileting from bedside commode with Minimal Assistance for hygiene and clothing management.   Supine to sit with Minimal Assistance.  Stand pivot transfers with Moderate Assistance.  Toilet transfer to bedside commode with Moderate Assistance.     Outcome: Ongoing, Progressing   The above goals remain appropriate. ANTIONETTE Ochoa  10/24/2019

## 2019-10-24 NOTE — PROGRESS NOTES
"[facility]  General Surgery  Progress Note    Subjective:     History of Present Illness:  Patient is a 74 yo M with history of COPD and bleeding gastric mass who underwent EGD with AES yesterday where they removed a 7 cm "ulcerated lipoma" from his gastric antrum using submucosal endoscopic dissection. When removing the mass, it became stuck in the distal esophagus. They had to removed it piecemeal, and caused some mucosal esophageal tears. He was admitted for obs overnight, and this morning his WBC was elevated and he was complaining of abd pain. CXR showed free intraperitoneal free air and general surgery was consulted for evaluation.    Post-Op Info:  Procedure(s) (LRB):  CREATION, TRACHEOSTOMY (N/A)   7 Days Post-Op     Interval History: No acute events overnight. Right chest tube removed. On trach collar this morning.    Medications:  Continuous Infusions:    Scheduled Meds:   sodium chloride 0.9%   Intravenous Once    albuterol-ipratropium  3 mL Nebulization Q4H    chlorhexidine  15 mL Mouth/Throat BID    docusate  100 mg Per J Tube Daily    famotidine  20 mg Per J Tube Daily    fluconazole (DIFLUCAN) IVPB  200 mg Intravenous Q24H    fluticasone furoate-vilanterol  1 puff Inhalation Daily    heparin (porcine)  7,500 Units Subcutaneous Q8H    lidocaine  1 patch Transdermal Q24H    nicotine  1 patch Transdermal Daily    polyethylene glycol  17 g Per J Tube Daily    sennosides 8.8 mg/5 ml  10 mL Per J Tube Daily    sevelamer carbonate  0.8 g Oral TID WM     PRN Meds:sodium chloride, sodium chloride 0.9%, acetaminophen, albuterol-ipratropium, Dextrose 10% Bolus, Dextrose 10% Bolus, fentaNYL, glucagon (human recombinant), hydrALAZINE, insulin aspart U-100, ondansetron, sodium chloride 0.9%     Review of patient's allergies indicates:   Allergen Reactions    Meloxicam Other (See Comments)     Ulcer, GI bleed      Objective:     Vital Signs (Most Recent):  Temp: 98.4 °F (36.9 °C) " (10/24/19 0300)  Pulse: 93 (10/24/19 0600)  Resp: (!) 22 (10/24/19 0600)  BP: (!) 148/68 (10/24/19 0600)  SpO2: 100 % (10/24/19 0600) Vital Signs (24h Range):  Temp:  [98.4 °F (36.9 °C)-98.9 °F (37.2 °C)] 98.4 °F (36.9 °C)  Pulse:  [] 93  Resp:  [11-26] 22  SpO2:  [95 %-100 %] 100 %  BP: (112-164)/(58-81) 148/68     Weight: (!) 150 kg (330 lb 11 oz)  Body mass index is 42.44 kg/m².    Intake/Output - Last 3 Shifts       10/22 0700 - 10/23 0659 10/23 0700 - 10/24 0659    I.V. (mL/kg)  125 (0.8)    Other  500    NG/ 1760    Total Intake(mL/kg) 860 (5.7) 2385 (15.9)    Urine (mL/kg/hr) 928 (0.3) 1125 (0.3)    Drains 1221 550    Other  2500    Chest Tube 420 184    Total Output 2569 7849    Net -1709 -1974                Physical Exam   Constitutional: He appears well-developed and well-nourished. He is sedated and intubated (Trach in place).   Cardiovascular: Normal rate and regular rhythm.   Pulmonary/Chest: Effort normal. He is intubated (Trach in place). No respiratory distress.   Bilateral chest tubes, draining SS  On trach collar this morning   Abdominal: Soft. He exhibits no distension. There is no tenderness. There is no guarding.   G tube in place, draining gastric contents  Jtube in place    Genitourinary:   Genitourinary Comments: Hallman in place   Neurological: He is alert.   Skin: Skin is warm and dry.   Vitals reviewed.      Significant Labs:  CBC:   Recent Labs   Lab 10/24/19  0400   WBC 10.89   RBC 2.75*   HGB 6.9*   HCT 22.5*   *   MCV 82   MCH 25.1*   MCHC 30.7*     CMP:   Recent Labs   Lab 10/19/19  1400  10/24/19  0320   *   < > 103   CALCIUM 8.2*   < > 8.6*   ALBUMIN 1.7*  --   --       < > 142   K 4.3   < > 5.0   CO2 23   < > 26   *   < > 105   BUN 55*   < > 94*   CREATININE 2.9*   < > 4.6*    < > = values in this interval not displayed.     Coagulation:   No results for input(s): LABPROT, INR, APTT in the last 168 hours.  ABGs:   Recent Labs   Lab  10/23/19  0435   PH 7.440   PCO2 35.7   PO2 85   HCO3 24.2   POCSATURATED 97   BE 0       Significant Diagnostics:  I have reviewed all pertinent imaging results/findings within the past 24 hours.    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 Trach placed on 10/17/19.    Left chest tube to water seal  Permacath today  Hold tube feeds for OR  Continue G tube to gravity for gastric decompression - do not use for meds or feeds  Continue tube feeds  Continue on trach collar  Continue to work with speech for swallow    Rest of care per SICU, likely step down after permacath    Esophageal tear             Nu Schulte MD  General Surgery  [facility]

## 2019-10-24 NOTE — PT/OT/SLP PROGRESS
Occupational Therapy   Treatment    Name: Pasha Harmon  MRN: [id number]  Admitting Diagnosis:  Bowel perforation     Recommendations:     Discharge Recommendations: nursing facility, skilled  Discharge Equipment Recommendations:  (TBD)  Barriers to discharge:  None    Assessment:     Pasha Harmon is a 74 y.o. male with a medical diagnosis of Bowel perforation.  Pt with increased nausea and back pain this session limiting performance. Performance deficits affecting function are weakness, gait instability, impaired balance, impaired endurance, decreased safety awareness, impaired self care skills, impaired functional mobilty, impaired cardiopulmonary response to activity, pain.     Rehab Prognosis:  Good; patient would benefit from acute skilled OT services to address these deficits and reach maximum level of function.       Plan:     Patient to be seen 4 x/week to address the above listed problems via self-care/home management, therapeutic activities, therapeutic exercises  · Plan of Care Expires:    · Plan of Care Reviewed with: patient, daughter    Subjective     Pain/Comfort:  · Pain Rating 1: (endorses back pain throughout session, but does not  rate)  · Pain Addressed 1: Reposition, Distraction    Objective:     Communicated with: RN prior to session.  Patient found supine with blood pressure cuff, pulse ox (continuous), telemetry, tracheostomy, oxygen, PEG Tube, DENISE drain, chest tube, shrestha catheter upon OT entry to room.    General Precautions: Standard, fall   Orthopedic Precautions:N/A   Braces:       Occupational Performance:     Bed Mobility:    · Patient completed Rolling/Turning to Right with minimum assistance and with side rail  · Patient completed Scooting/Bridging with maximal assistance  · Patient completed Supine to Sit with maximal assistance  · Patient completed Sit to Supine with maximal assistance     Functional Mobility/Transfers:  · Patient completed Sit <> Stand Transfer with  minimum assistance  With B  hand-held assist   · Functional Mobility: NT; pt immediately sat (knee buckling?) upon attempt to sidestep    Activities of Daily Living:  · Grooming: moderate assistance seated EOB; pt distracted by pain and nausea    AMPAC 6 Click ADL: 8    Treatment & Education:  Pt ed on OT POC  Pt required encouragement to participate  Pt sat EOB x ~5 min with R sided leaning to alleviate back pain  Pt unable to engage in meaningful activity other than wiping face 2* distracted by pain and nausea    Patient left supine with all lines intact, call button in reach, RN notified and dtr presentEducation:      GOALS:   Multidisciplinary Problems     Occupational Therapy Goals        Problem: Occupational Therapy Goal    Goal Priority Disciplines Outcome Interventions   Occupational Therapy Goal     OT, PT/OT Ongoing, Progressing    Description:  Goals to be met by: 10/25/2019    Patient will increase functional independence with ADLs by performing:    Feeding with Will.  UE Dressing with Minimal Assistance.  LE Dressing with Moderate Assistance.  Grooming while standing with Minimal Assistance.  Toileting from bedside commode with Minimal Assistance for hygiene and clothing management.   Supine to sit with Minimal Assistance.  Stand pivot transfers with Moderate Assistance.  Toilet transfer to bedside commode with Moderate Assistance.                      Time Tracking:     OT Date of Treatment: 10/24/19  OT Start Time: 0928  OT Stop Time: 0946  OT Total Time (min): 18 min    Billable Minutes:Self Care/Home Management 10    ANTIONETTE Ochoa  10/24/2019

## 2019-10-24 NOTE — ANESTHESIA POSTPROCEDURE EVALUATION
Anesthesia Post Evaluation    Patient: Pasha Harmon    Procedure(s) Performed: Procedure(s) (LRB):  Right IJ Hemodialysis Catheter Insertion (Right)    Final Anesthesia Type: general  Patient location during evaluation: ICU  Note status: Trach.  Level of consciousness: awake  Post-procedure vital signs: reviewed and stable  Pain management: adequate  Airway patency: patent  PONV status at discharge: No PONV  Anesthetic complications: no      Cardiovascular status: hemodynamically stable  Respiratory status: spontaneous ventilation (Face mask over trach)  Hydration status: euvolemic  Follow-up not needed.          Vitals Value Taken Time   /59 10/24/2019 12:02 PM   Temp 36.7 °C (98.1 °F) 10/24/2019 11:57 AM   Pulse 92 10/24/2019 12:08 PM   Resp 19 10/24/2019 12:08 PM   SpO2 100 % 10/24/2019 12:08 PM   Vitals shown include unvalidated device data.      No case tracking events are documented in the log.      Pain/Anish Score: Pain Rating Prior to Med Admin: 7 (10/24/2019  8:38 AM)  Pain Rating Post Med Admin: 4 (10/24/2019  9:08 AM)

## 2019-10-24 NOTE — OP NOTE
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  male with Hx of perforated stomach and esophagus after EGD who became critically ill and eventually became dialysis dependent. A permacath was recommended by nephrology. The risks and benefits of the procedure were explained, and the patient and family agreed to proceed.    Procedure in Detail:   After consent was obtained, the patient was taken to the operating room and general anesthesia was initiated successfully. The patient was positioned supine. Hie right upper neck and chest were prepped and draped in the normal sterile fashion. Pre-operative antibiotics were administered. Time out was performed and all present were in agreement. We began the procedure by identifying the right IJ with U/S. The overlying skin was infiltrated with lidocaine. The IJ was accessed with an 18 Ga needle, and wire threaded into the vein. Venous access was confirmed on fluoro. The planned tract was infiltrated with lidocaine. A stab incision was made in his right upper chest and the catheter tunneled to his wire. The cuff of the catheter was below the skin. The dilator and sheath were then advanced into his IJ under live fluoro. The dilator was removed, and the catheter placed into sheath. The sheath was peeled away. The position was confirmed with fluoro. The cathere flushed and isidra back, and was instilled with heparin. It was sutured into  place with 2-0 prolene. The stab incision in the neck was close with 4-0 monocryl. Sterile dressings were applied.    All counts were reported as correct. The procedure was completed.    The patient was aroused from general anesthesia in the operating room. The patient was brought to the recovery room in stable condition. Dr. Parrish was present for the entire procedure.     Drains:     Estimated Blood Loss (EBL): 5 mL    Total IV Fluids:     Complications: No    Specimens:   Specimen (12h ago, onward)    None          Condition: Good    Disposition: ICU - intubated and hemodynamically stable.    Attestation: Dr. Parrish was present for the entire procedure.      Too Solo MD  General Surgery Resident, PGY-3  Pager [phone]  10/24/2019 4:15 PM

## 2019-10-24 NOTE — NURSING
Dr. Zamudio made aware of patient complaining of bladder fullness and pain. Orders given to bladder scan and flush catheter. 0 mL on bladder scan. Orders given to administer pain medication. Patient claims pain subsiding. Will continue to monitor.

## 2019-10-24 NOTE — BRIEF OP NOTE
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 Trach downsized to 6-0 shiley uncuffed. Left chest tube pulled. DENISE removed. Staples removed.    Findings/Key Components: Same.    Estimated Blood Loss: Minimal         Specimens:   Specimen (12h ago, onward)    None        Dispo: Patient in stable condition, back to SICU

## 2019-10-24 NOTE — PROGRESS NOTES
Pt. Transferred to [facility] via bed.  Sent on O2, wife and Tech. Present for transfer.  Received by Ramya on [facility], pt. Free from skin breakdown during transfer.

## 2019-10-24 NOTE — PT/OT/SLP PROGRESS
Physical Therapy Treatment    Patient Name:  Pasha Harmon   MRN:  [id number]  Admitting Diagnosis:  Bowel perforation   Recent Surgery: Procedure(s) (LRB):  Right IJ Hemodialysis Catheter Insertion (Right) Day of Surgery  Admit Date: 10/9/2019  Length of Stay: 14 days    Recommendations:     Discharge Recommendations:  nursing facility, skilled   Discharge Equipment Recommendations: other (see comments)(tbd)   Barriers to discharge: None    Assessment:     Pasha Harmon is a 74 y.o. male admitted with a medical diagnosis of Bowel perforation.  He presents with the following impairments/functional limitations:  weakness, impaired endurance, gait instability, impaired self care skills, impaired functional mobilty, pain, decreased lower extremity function, decreased safety awareness, impaired cardiopulmonary response to activity. Pt tolerated treatment session fair today. Session today was limited due to Rt back pain as well as nurse beginning to prep pt for surgery for permacath and HD cath insertion. Pt still requires Max A for performing supine<>sit but is demonstrating improved strength with performing sit <> stand transfers as well as standing balance today. Pt attempted to take a side step to the Rt today but sat on bed due to Rt knee buckling. Pt could not endorse if the buckling occurred due to Rt LBP or weakness, however. Pt's pain continues to be pt's primary limiting factor at this time causing the pt to lay down abruptly while sitting EOB and also caused pt to sit down without notice after performing sit <> stand transfer. Pt is not safe to return home at this time and could continue to benefit from skilled therapy services at a SNF level.    Rehab Prognosis: Good; patient would benefit from acute skilled PT services to address these deficits and reach maximum level of function.    Recent Surgery: Procedure(s) (LRB):  Right IJ Hemodialysis Catheter Insertion (Right) Day of Surgery    Plan:      During this hospitalization, patient to be seen 4 x/week to address the identified rehab impairments via gait training, therapeutic activities, therapeutic exercises, neuromuscular re-education and progress toward the following goals:    · Plan of Care Expires:  11/05/19    Subjective     RN notified prior to session. Daughter present upon PT entrance into room.    Chief Complaint: Pt had complaints of back pain that was making him nauseous  Patient/Family Comments/goals: Return back home  Pain/Comfort:  · Pain Rating 1: other (see comments)(did not rate)  · Location - Side 1: Right  · Location - Orientation 1: generalized  · Location 1: back  · Pain Addressed 1: Pre-medicate for activity, Reposition, Distraction  · Pain Rating Post-Intervention 1: other (see comments)(did not rate)      Objective:     Additional staff present: OT for co-treatment    Patient found HOB elevated with: blood pressure cuff, telemetry, pulse ox (continuous), tracheostomy, oxygen, PEG Tube, shrestha catheter, DENISE drain, chest tube, central line   Mental Status: Patient is oriented to AxOx4 and follows multi-step commands. Patient is Alert and Cooperative during session.    General Precautions: Standard, Cardiac fall   Orthopedic Precautions:N/A   Braces: N/A   Body mass index is 42.44 kg/m².  Oxygen Device: Trach Collar 28% & 5L      Outcome Measures:  AM-PAC 6 CLICK MOBILITY  Turning over in bed (including adjusting bedclothes, sheets and blankets)?: 2  Sitting down on and standing up from a chair with arms (e.g., wheelchair, bedside commode, etc.): 2  Moving from lying on back to sitting on the side of the bed?: 2  Moving to and from a bed to a chair (including a wheelchair)?: 1  Need to walk in hospital room?: 1  Climbing 3-5 steps with a railing?: 1  Basic Mobility Total Score: 9     Functional Mobility:    Bed Mobility:   · Rolling/Turning to right: minimum assistance  · Supine to Sit: maximal assistance; from Rt side of  bed  · Scooting anteriorly to EOB to have both feet planted on floor: contact guard assistance  · Sit to Supine: total assistance and 2 persons; to Rt side of bed  · Due to fatigue    Sitting Balance at Edge of Bed:   Assistance Level Required: Stand-by Assistance   Time: approx 5 minutes   Postural deviations noted: slouched posture and forward head   Comments: use of BUE due to fatigue and LBP    Transfers:   · Sit <> Stand Transfer: minimum assistance and of 2 persons with no assistive device   · Stand <> Sit Transfer: minimum assistance and of 2 persons with no assistive device   · [id number] from EOB   · Pt was able to stand for approx 30 sec with bilat HHA       Gait:  · Patient ambulated: attempted 1 side step to the Rt   · Comments: Pt was unable to complete side step due to Rt knee buckling and returned to seated on EOB    Education:   Time provided for education, counseling and discussion of health disposition in regards to patient's current status   All questions answered within PT scope of practice and to patient's satisfaction   PT role in POC to address current functional deficits   Pt educated on proper body mechanics, safety techniques, and energy conservation with PT facilitation and cueing throughout session   Whiteboard updated    Pt safe to sit EOB with CGA    Patient left HOB elevated with all lines intact, call button in reach and RN and daughter present.    GOALS:   Multidisciplinary Problems     Physical Therapy Goals        Problem: Physical Therapy Goal    Goal Priority Disciplines Outcome Goal Variances Interventions   Physical Therapy Goal     PT, PT/OT Ongoing, Progressing     Description:  Goals to be met by: 19     Patient will increase functional independence with mobility by performin. Supine to sit with Maximum Assistance-met 10/24  1a. Supine to sit with Minimum Assistance - not met  2. Sit to supine with Maximum Assistance - not met  3. Rolling to Left and  Right with Maximum Assistance and use of bedrail as needed. - not met  4. Sitting at edge of bed x10 minutes with M aximum Assistance - not met  5. Lower extremity exercise program x10 reps per handout, with assistance as needed - not met  6. Sit <> stand with Moderate Assistance using LRAD - not met  7. Stand for 3 minutes with HHA or LRAD with CGA to demonstrate improved standing tolerance                     Time Tracking:     PT Received On: 10/24/19  PT Start Time: 0928     PT Stop Time: 0946  PT Total Time (min): 18 min       Billable Minutes:   · Therapeutic Activity 18    Treatment Type: Treatment  PT/PTA: PT       Juliet Dean PT, DPT  10/24/2019   Pager: [phone]

## 2019-10-24 NOTE — TRANSFER OF CARE
"Anesthesia Transfer of Care Note    Patient: Pasha Harmon    Procedure(s) Performed: Procedure(s) (LRB):  Right IJ Hemodialysis Catheter Insertion (Right)    Patient location: ICU    Anesthesia Type: general    Transport from OR: Upon arrival to PACU/ICU, patient attached to 100% O2 by T-piece with adequate spontaneous ventilation    Post pain: adequate analgesia    Post assessment: no apparent anesthetic complications    Post vital signs: stable    Level of consciousness: awake, alert and responds to stimulation    Nausea/Vomiting: no nausea/vomiting    Complications: none    Transfer of care protocol was followed      Last vitals:   Visit Vitals  BP (!) 119/59   Pulse 91   Temp 36.7 °C (98.1 °F) (Oral)   Resp 16   Ht 6' 2.02" (1.88 m)   Wt (!) 150 kg (330 lb 11 oz)   SpO2 100%   BMI 42.44 kg/m²     "

## 2019-10-25 LAB
ANION GAP SERPL CALC-SCNC: 15 MMOL/L (ref 8–16)
BASOPHILS # BLD AUTO: 0.07 K/UL (ref 0–0.2)
BASOPHILS NFR BLD: 0.7 % (ref 0–1.9)
BUN SERPL-MCNC: 103 MG/DL (ref 8–23)
CALCIUM SERPL-MCNC: 9.2 MG/DL (ref 8.7–10.5)
CHLORIDE SERPL-SCNC: 107 MMOL/L (ref 95–110)
CO2 SERPL-SCNC: 23 MMOL/L (ref 23–29)
CREAT SERPL-MCNC: 5.1 MG/DL (ref 0.5–1.4)
DIFFERENTIAL METHOD: ABNORMAL
EOSINOPHIL # BLD AUTO: 0.5 K/UL (ref 0–0.5)
EOSINOPHIL NFR BLD: 4.3 % (ref 0–8)
ERYTHROCYTE [DISTWIDTH] IN BLOOD BY AUTOMATED COUNT: 19.5 % (ref 11.5–14.5)
EST. GFR  (AFRICAN AMERICAN): 12 ML/MIN/1.73 M^2
EST. GFR  (NON AFRICAN AMERICAN): 10.4 ML/MIN/1.73 M^2
GLUCOSE SERPL-MCNC: 91 MG/DL (ref 70–110)
HCT VFR BLD AUTO: 25.9 % (ref 40–54)
HGB BLD-MCNC: 8 G/DL (ref 14–18)
IMM GRANULOCYTES # BLD AUTO: 0.12 K/UL (ref 0–0.04)
IMM GRANULOCYTES NFR BLD AUTO: 1.1 % (ref 0–0.5)
LYMPHOCYTES # BLD AUTO: 1.4 K/UL (ref 1–4.8)
LYMPHOCYTES NFR BLD: 13 % (ref 18–48)
MAGNESIUM SERPL-MCNC: 2.5 MG/DL (ref 1.6–2.6)
MCH RBC QN AUTO: 25.8 PG (ref 27–31)
MCHC RBC AUTO-ENTMCNC: 30.9 G/DL (ref 32–36)
MCV RBC AUTO: 84 FL (ref 82–98)
MONOCYTES # BLD AUTO: 1.6 K/UL (ref 0.3–1)
MONOCYTES NFR BLD: 14.7 % (ref 4–15)
NEUTROPHILS # BLD AUTO: 7 K/UL (ref 1.8–7.7)
NEUTROPHILS NFR BLD: 66.2 % (ref 38–73)
NRBC BLD-RTO: 0 /100 WBC
PHOSPHATE SERPL-MCNC: 6.3 MG/DL (ref 2.7–4.5)
PLATELET # BLD AUTO: 625 K/UL (ref 150–350)
PMV BLD AUTO: 10.2 FL (ref 9.2–12.9)
POCT GLUCOSE: 108 MG/DL (ref 70–110)
POCT GLUCOSE: 112 MG/DL (ref 70–110)
POCT GLUCOSE: 113 MG/DL (ref 70–110)
POCT GLUCOSE: 127 MG/DL (ref 70–110)
POCT GLUCOSE: 90 MG/DL (ref 70–110)
POCT GLUCOSE: 98 MG/DL (ref 70–110)
POTASSIUM SERPL-SCNC: 5 MMOL/L (ref 3.5–5.1)
RBC # BLD AUTO: 3.1 M/UL (ref 4.6–6.2)
SODIUM SERPL-SCNC: 145 MMOL/L (ref 136–145)
WBC # BLD AUTO: 10.52 K/UL (ref 3.9–12.7)

## 2019-10-25 PROCEDURE — 25000003 PHARM REV CODE 250: Performed by: STUDENT IN AN ORGANIZED HEALTH CARE EDUCATION/TRAINING PROGRAM

## 2019-10-25 PROCEDURE — 92526 ORAL FUNCTION THERAPY: CPT

## 2019-10-25 PROCEDURE — 63600175 PHARM REV CODE 636 W HCPCS: Performed by: STUDENT IN AN ORGANIZED HEALTH CARE EDUCATION/TRAINING PROGRAM

## 2019-10-25 PROCEDURE — 36415 COLL VENOUS BLD VENIPUNCTURE: CPT

## 2019-10-25 PROCEDURE — 85025 COMPLETE CBC W/AUTO DIFF WBC: CPT

## 2019-10-25 PROCEDURE — 99233 SBSQ HOSP IP/OBS HIGH 50: CPT | Mod: ,,, | Performed by: NURSE PRACTITIONER

## 2019-10-25 PROCEDURE — S4991 NICOTINE PATCH NONLEGEND: HCPCS | Performed by: PHYSICIAN ASSISTANT

## 2019-10-25 PROCEDURE — 25000242 PHARM REV CODE 250 ALT 637 W/ HCPCS: Performed by: STUDENT IN AN ORGANIZED HEALTH CARE EDUCATION/TRAINING PROGRAM

## 2019-10-25 PROCEDURE — 97803 MED NUTRITION INDIV SUBSEQ: CPT

## 2019-10-25 PROCEDURE — 99900035 HC TECH TIME PER 15 MIN (STAT)

## 2019-10-25 PROCEDURE — 94761 N-INVAS EAR/PLS OXIMETRY MLT: CPT

## 2019-10-25 PROCEDURE — 20600001 HC STEP DOWN PRIVATE ROOM

## 2019-10-25 PROCEDURE — 92609 USE OF SPEECH DEVICE SERVICE: CPT

## 2019-10-25 PROCEDURE — 63600175 PHARM REV CODE 636 W HCPCS: Performed by: SURGERY

## 2019-10-25 PROCEDURE — 80048 BASIC METABOLIC PNL TOTAL CA: CPT

## 2019-10-25 PROCEDURE — 25000003 PHARM REV CODE 250: Performed by: PHYSICIAN ASSISTANT

## 2019-10-25 PROCEDURE — 97530 THERAPEUTIC ACTIVITIES: CPT

## 2019-10-25 PROCEDURE — 84100 ASSAY OF PHOSPHORUS: CPT

## 2019-10-25 PROCEDURE — 94640 AIRWAY INHALATION TREATMENT: CPT

## 2019-10-25 PROCEDURE — 63600175 PHARM REV CODE 636 W HCPCS: Performed by: NURSE PRACTITIONER

## 2019-10-25 PROCEDURE — 83735 ASSAY OF MAGNESIUM: CPT

## 2019-10-25 PROCEDURE — 90935 HEMODIALYSIS ONE EVALUATION: CPT

## 2019-10-25 PROCEDURE — 94668 MNPJ CHEST WALL SBSQ: CPT

## 2019-10-25 PROCEDURE — 99233 PR SUBSEQUENT HOSPITAL CARE,LEVL III: ICD-10-PCS | Mod: ,,, | Performed by: NURSE PRACTITIONER

## 2019-10-25 PROCEDURE — 31720 CLEARANCE OF AIRWAYS: CPT

## 2019-10-25 PROCEDURE — 27000221 HC OXYGEN, UP TO 24 HOURS

## 2019-10-25 RX ORDER — RAMELTEON 8 MG/1
8 TABLET ORAL ONCE
Status: COMPLETED | OUTPATIENT
Start: 2019-10-25 | End: 2019-10-26

## 2019-10-25 RX ORDER — HEPARIN SODIUM 1000 [USP'U]/ML
1000 INJECTION, SOLUTION INTRAVENOUS; SUBCUTANEOUS
Status: DISCONTINUED | OUTPATIENT
Start: 2019-10-25 | End: 2019-10-31 | Stop reason: HOSPADM

## 2019-10-25 RX ADMIN — FENTANYL CITRATE 25 MCG: 50 INJECTION INTRAMUSCULAR; INTRAVENOUS at 06:10

## 2019-10-25 RX ADMIN — HEPARIN SODIUM 7500 UNITS: 5000 INJECTION, SOLUTION INTRAVENOUS; SUBCUTANEOUS at 05:10

## 2019-10-25 RX ADMIN — DOCUSATE SODIUM 100 MG: 50 LIQUID ORAL at 11:10

## 2019-10-25 RX ADMIN — SENNOSIDES 10 ML: 8.8 SYRUP ORAL at 08:10

## 2019-10-25 RX ADMIN — FENTANYL CITRATE 25 MCG: 50 INJECTION INTRAMUSCULAR; INTRAVENOUS at 10:10

## 2019-10-25 RX ADMIN — LIDOCAINE 1 PATCH: 50 PATCH TOPICAL at 11:10

## 2019-10-25 RX ADMIN — IPRATROPIUM BROMIDE AND ALBUTEROL SULFATE 3 ML: .5; 3 SOLUTION RESPIRATORY (INHALATION) at 11:10

## 2019-10-25 RX ADMIN — IPRATROPIUM BROMIDE AND ALBUTEROL SULFATE 3 ML: .5; 3 SOLUTION RESPIRATORY (INHALATION) at 07:10

## 2019-10-25 RX ADMIN — NICOTINE 1 PATCH: 21 PATCH, EXTENDED RELEASE TRANSDERMAL at 08:10

## 2019-10-25 RX ADMIN — ONDANSETRON 4 MG: 2 INJECTION INTRAMUSCULAR; INTRAVENOUS at 08:10

## 2019-10-25 RX ADMIN — FAMOTIDINE 20 MG: 40 POWDER, FOR SUSPENSION ORAL at 08:10

## 2019-10-25 RX ADMIN — HEPARIN SODIUM 7500 UNITS: 5000 INJECTION, SOLUTION INTRAVENOUS; SUBCUTANEOUS at 09:10

## 2019-10-25 RX ADMIN — FENTANYL CITRATE 25 MCG: 50 INJECTION INTRAMUSCULAR; INTRAVENOUS at 08:10

## 2019-10-25 RX ADMIN — IPRATROPIUM BROMIDE AND ALBUTEROL SULFATE 3 ML: .5; 3 SOLUTION RESPIRATORY (INHALATION) at 09:10

## 2019-10-25 RX ADMIN — FENTANYL CITRATE 25 MCG: 50 INJECTION INTRAMUSCULAR; INTRAVENOUS at 03:10

## 2019-10-25 RX ADMIN — SODIUM CHLORIDE 300 ML: 0.9 INJECTION, SOLUTION INTRAVENOUS at 04:10

## 2019-10-25 RX ADMIN — IPRATROPIUM BROMIDE AND ALBUTEROL SULFATE 3 ML: .5; 3 SOLUTION RESPIRATORY (INHALATION) at 04:10

## 2019-10-25 RX ADMIN — CHLORHEXIDINE GLUCONATE 0.12% ORAL RINSE 15 ML: 1.2 LIQUID ORAL at 08:10

## 2019-10-25 NOTE — ASSESSMENT & PLAN NOTE
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 Trach placed on 10/17/19. Permacath 10/24/19.    Clamp G-tube today, ok to unclamp for nausea or vomiting  Continue tube feeds per J tube  Continue on trach collar  Continue to work with speech for swallow  PT/OT  Ambulate    Progressing well, stable for discharge to LTAC

## 2019-10-25 NOTE — PLAN OF CARE
Problem: Feeding Intolerance (Enteral Nutrition)  Goal: Feeding Tolerance  Outcome: Met       Recommendations    1. Continue current TF of Peptamen Intense VHP at a goal rate of 70 mL/hr - meeting needs.   2. As medically able, ADAT to Diabetic with texture per SLP.   3. RD to monitor.    Goals: meet >85% EEN/EPN via TF  Nutrition Goal Status: goal met

## 2019-10-25 NOTE — PLAN OF CARE
Patient has now stepped down out of ICU & is on Step down unit. Patient is AAOX4, on trach collar/O2. 2 daughters, & son, are at his BS. Discussed discharge plan-see below.        10/25/19 1315   Discharge Reassessment   Assessment Type Discharge Planning Reassessment   Provided patient/caregiver education on the expected discharge date and the discharge plan Yes  (D/c Plan/date: [facility] pending insurer Auth;ready today, however Insurer [employer] denied LTAC, An expedited Appeal submitted. Updated patient, & 3 of his adult kids: Bessie, Haleigh, & Gómez. They all verbalized their understanding. )   Do you have any problems affording any of your prescribed medications? No   Discharge Plan A Long-term acute care facility (LTAC)   Discharge Plan B Long-term acute care facility (LTAC)   Anticipated Discharge Disposition LTAC   Can the patient answer the patient profile reliably? Yes, cognitively intact

## 2019-10-25 NOTE — PLAN OF CARE
Problem: Physical Therapy Goal  Goal: Physical Therapy Goal  Description  Goals to be met by: 19     Patient will increase functional independence with mobility by performin. Supine to sit with Maximum Assistance-met 10/24  1a. Supine to sit with Minimum Assistance - not met  2. Sit to supine with Maximum Assistance - not met  3. Rolling to Left and Right with Maximum Assistance and use of bedrail as needed. - not met  4. Sitting at edge of bed x10 minutes with M aximum Assistance - not met  5. Lower extremity exercise program x10 reps per handout, with assistance as needed - not met  6. Sit <> stand with Moderate Assistance using LRAD - not met  7. Stand for 3 minutes with HHA or LRAD with CGA to demonstrate improved standing tolerance      Outcome: Ongoing, Progressing         Discharge Recommendations: SNF    Pt requires mod A to sit at the EOB.    Goals remain appropriate.     Shayy Solano, PTA.   [phone]   10/25/2019

## 2019-10-25 NOTE — CODE/ RAPID DOCUMENTATION
Rapid Response Respiratory Therapy Proactive Rounding Note      Time of visit: 1533     Code Status: Full Code   : 1945  Age: 74 y.o.  Weight:   Wt Readings from Last 1 Encounters:   10/18/19 (!) 150 kg (330 lb 11 oz)     Sex: male  Race: White   Bed: [facility] A:   MRN: [id number]    SITUATION     Evaluated patient for: LDA Check     BACKGROUND     Patient has a past medical history of COPD (chronic obstructive pulmonary disease), Diabetes mellitus, Emphysema of lung, Gastric ulcer, Hypertension, and MI (myocardial infarction).  Clinically Significant Surgical Hx: tracheostomy    ASSESSMENT/INTERVENTIONS     Upon arrival in room pt resting comfortable with no complaints. Pt denies any SOB. Breath sounds are diminished bilaterally. Patient is on Trach Collar 5L/28% saturation 96%.    Pulse: 100 Respiratory rate: 18 Temperature: Temp: 98.5 °F (36.9 °C) BP: BP: (!) 150/81 SpO2:18   Level of Consciousness: Level of Consciousness (AVPU): alert  Respiratory Effort: Respiratory Effort: Unlabored Expansion/Accessory Muscle Usage: Expansion/Accessory Muscles/Retractions: expansion symmetric, no retractions, no use of accessory muscles  All Lung Field Breath Sounds: All Lung Fields Breath Sounds: diminished  EMILIANO Breath Sounds: clear  LLL Breath Sounds: diminished  RUL Breath Sounds: clear  RML Breath Sounds: diminished  RLL Breath Sounds: diminished  Mobility at time of assessment: General Mobility: generalized weakness  O2 Device/Concentration: Trach Collar  Most recent blood gas:   Recent Labs     10/23/19  0435   PH 7.440   PCO2 35.7   PO2 85   HCO3 24.2   POCSATURATED 97   BE 0        Surgical airway: Yes, Type: shiley Size: 6.0  Ambu at bedside: Ambu bag with the patient?: Yes, Adult Ambu    Current Respiratory Care Orders:   10/24/19 1817  Pulse Oximetry Continuous Continuous      10/24/19 1817   10/23/19 0855  Oxygen Continuous Continuous     References: Oxygen Titration Protocol   Question Answer Comment    Device type: Low flow    Device: Trach Collar 8L   FiO2%: 28    Titrate O2 per Oxygen Titration Protocol: Yes    To maintain SpO2 goal of: >= 90%    Notify MD of: Inability to achieve desired SpO2; Sudden change in patient status and requires 20% increase in FiO2; Patient requires >60% FiO2        10/23/19 0855   10/15/19 1201  Chest physiotherapy Q4H Every 4 hours (64 of 93 released)    Release   Question: Indications: Answer: LOBAR OR > ATELECTASIS    10/15/19 1200   10/11/19 2000  Inhalation Treatment Q4H Every 4 hours (86 of 109 released)    Release    10/11/19 1944   Unscheduled  Inhalation Treatment Q4H PRN Every 4 hours PRN (0 of 50024 released)    Release    10/09/19 1443   Unscheduled  POCT Arterial Blood Gas-Resp PRN Use PRN (2 of 77985 released)    Release   Comments: Notify Physician if: see parameters below.   Question: Component: Answer: Blood Gas    10/11/19 1097         RECOMMENDATIONS     We recommend: Continue current POC.    ESCALATION      Physician Escalation (Yes/No) No     Discussed plan of care primary RTManuel.      FOLLOW-UP     Please call back the Rapid Response RT, Mona Rojas, RRT at x [phone] for any questions or concerns.

## 2019-10-25 NOTE — PROGRESS NOTES
"[facility]  General Surgery  Progress Note    Subjective:     History of Present Illness:  Patient is a 74 yo M with history of COPD and bleeding gastric mass who underwent EGD with AES yesterday where they removed a 7 cm "ulcerated lipoma" from his gastric antrum using submucosal endoscopic dissection. When removing the mass, it became stuck in the distal esophagus. They had to removed it piecemeal, and caused some mucosal esophageal tears. He was admitted for obs overnight, and this morning his WBC was elevated and he was complaining of abd pain. CXR showed free intraperitoneal free air and general surgery was consulted for evaluation.    Post-Op Info:  Procedure(s) (LRB):  Right IJ Hemodialysis Catheter Insertion (Right)   1 Day Post-Op     Interval History: NAEON, pain well controlled, afebrile, on trach collar, denies nausea or vomiting    Medications:  Continuous Infusions:  Scheduled Meds:   sodium chloride 0.9%   Intravenous Once    sodium chloride 0.9%   Intravenous Once    albuterol-ipratropium  3 mL Nebulization Q4H    chlorhexidine  15 mL Mouth/Throat BID    docusate  100 mg Per J Tube Daily    famotidine  20 mg Per J Tube Daily    fluconazole (DIFLUCAN) IVPB  200 mg Intravenous Q24H    fluticasone furoate-vilanterol  1 puff Inhalation Daily    heparin (porcine)  7,500 Units Subcutaneous Q8H    lidocaine  1 patch Transdermal Q24H    nicotine  1 patch Transdermal Daily    polyethylene glycol  17 g Per J Tube Daily    ramelteon  8 mg Oral Once    sennosides 8.8 mg/5 ml  10 mL Per J Tube Daily    sevelamer carbonate  0.8 g Oral TID WM     PRN Meds:sodium chloride, sodium chloride 0.9%, acetaminophen, albuterol-ipratropium, Dextrose 10% Bolus, Dextrose 10% Bolus, fentaNYL, glucagon (human recombinant), hydrALAZINE, insulin aspart U-100, ondansetron, sodium chloride 0.9%     Review of patient's allergies indicates:   Allergen Reactions    Meloxicam Other (See Comments)     " Ulcer, GI bleed      Objective:     Vital Signs (Most Recent):  Temp: 97.6 °F (36.4 °C) (10/25/19 0556)  Pulse: 102 (10/25/19 0556)  Resp: 18 (10/25/19 0556)  BP: (!) 150/70 (10/25/19 0556)  SpO2: 95 % (10/25/19 0556) Vital Signs (24h Range):  Temp:  [97.4 °F (36.3 °C)-99 °F (37.2 °C)] 97.6 °F (36.4 °C)  Pulse:  [] 102  Resp:  [16-33] 18  SpO2:  [95 %-100 %] 95 %  BP: (119-152)/(59-83) 150/70     Weight: (!) 150 kg (330 lb 11 oz)  Body mass index is 42.44 kg/m².    Intake/Output - Last 3 Shifts       10/23 0700 - 10/24 0659 10/24 0700 - 10/25 0659    I.V. (mL/kg) 125 (0.8) 250 (1.7)    Blood 219     Other 500     NG/GT 1760 400    Total Intake(mL/kg) 2604 (17.4) 650 (4.3)    Urine (mL/kg/hr) 1125 (0.3) 1595 (0.4)    Drains 550 662    Other 2500     Stool  0    Chest Tube 184     Total Output 4359 2257    Net -1755 -1607          Stool Occurrence  0 x          Physical Exam   Constitutional: He appears well-developed and well-nourished. He is sedated and intubated (Trach in place).   Cardiovascular: Normal rate and regular rhythm.   Pulmonary/Chest: Effort normal. He is intubated (Trach in place). No respiratory distress.   On trach collar   Abdominal: Soft. He exhibits no distension. There is no tenderness. There is no guarding.   G tube in place, draining gastric contents  Jtube in place  Incision CDI   Genitourinary:   Genitourinary Comments: Hallman in place   Neurological: He is alert.   Skin: Skin is warm and dry.   Vitals reviewed.      Significant Labs:  CBC:   Recent Labs   Lab 10/25/19  0542   WBC 10.52   RBC 3.10*   HGB 8.0*   HCT 25.9*   *   MCV 84   MCH 25.8*   MCHC 30.9*     CMP:   Recent Labs   Lab 10/24/19  1214 10/25/19  0430    91   CALCIUM 8.8 9.2   ALBUMIN 2.5*  --    PROT 5.9*  --     145   K 4.9 5.0   CO2 25 23    107   * 103*   CREATININE 4.9* 5.1*   ALKPHOS 100  --    ALT 34  --    AST 31  --    BILITOT 0.5  --        Significant Diagnostics:  I have  reviewed all pertinent imaging results/findings within the past 24 hours.    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 Trach placed on 10/17/19. Permacath 10/24/19.    Clamp G-tube today, ok to unclamp for nausea or vomiting  Continue tube feeds per J tube  Continue on trach collar  Continue to work with speech for swallow  PT/OT  Ambulate    Progressing well, stable for discharge to LTAC      Esophageal tear             Too Solo MD  General Surgery  [facility]

## 2019-10-25 NOTE — PLAN OF CARE
Problem: SLP Goal  Goal: SLP Goal  Description  Speech Language Pathology Goals  Goals expected to be met by 10/28:  1. Pt will tolerate PMSV with O2 >93% and VSS to improve respiration and phonation.   2. Pt will demonstrate placement/removal of PMSV x5 indep.   3. Pt will vocalize x3 with PMSV in place  4. Pt will recall instructions/precautions of PMSV with 100% acc indep.   5. Pt will participate in ongoing swallow assessment to determine least restrictive diet recommendations.        Outcome: Ongoing, Progressing     Goals remain appropriate.   Emily P. Abadie M.S., CCC-SLP  Speech Language Pathologist  [phone]  10/25/2019

## 2019-10-25 NOTE — PROGRESS NOTES
[facility]  Nephrology  Progress Note    Patient Name: Pasha Harmon  MRN: [id number]  Admission Date: 10/9/2019  Hospital Length of Stay: 15 days  Attending Provider: Paulino Parrish MD   Primary Care Physician: Eze Root MD  Principal Problem:Bowel perforation      Interval History: Patient seen and examined at bedside. 1.5L of UOP/past 24hrs. On trach collar 28% FiO2. Stepped down from ICU. Kidney function on labs not showing improvement, BUN and sCr remain elevated. Permacath placed yesterday. Plan for HD today.       Review of patient's allergies indicates:   Allergen Reactions    Meloxicam Other (See Comments)     Ulcer, GI bleed      Current Facility-Administered Medications   Medication Frequency    0.9%  NaCl infusion (for blood administration) Q24H PRN    0.9%  NaCl infusion PRN    0.9%  NaCl infusion Once    0.9%  NaCl infusion Once    acetaminophen oral solution 650 mg Q6H PRN    albuterol-ipratropium 2.5 mg-0.5 mg/3 mL nebulizer solution 3 mL Q4H PRN    albuterol-ipratropium 2.5 mg-0.5 mg/3 mL nebulizer solution 3 mL Q4H    chlorhexidine 0.12 % solution 15 mL BID    dextrose 10% (D10W) Bolus PRN    dextrose 10% (D10W) Bolus PRN    docusate 50 mg/5 mL liquid 100 mg Daily    famotidine 40 mg/5 mL (8 mg/mL) suspension 20 mg Daily    fentaNYL injection 25 mcg Q1H PRN    fluticasone furoate-vilanterol 100-25 mcg/dose diskus inhaler 1 puff Daily    glucagon (human recombinant) injection 1 mg PRN    heparin (porcine) injection 7,500 Units Q8H    hydrALAZINE injection 20 mg Q6H PRN    insulin aspart U-100 pen 0-5 Units QID (AC + HS) PRN    lidocaine 5 % patch 1 patch Q24H    nicotine 21 mg/24 hr 1 patch Daily    ondansetron injection 4 mg Q6H PRN    polyethylene glycol packet 17 g Daily    ramelteon tablet 8 mg Once    sennosides 8.8 mg/5 ml syrup 10 mL Daily    sevelamer carbonate pwpk 0.8 g TID WM    sodium chloride 0.9% flush 10 mL PRN     Family  History     Problem Relation (Age of Onset)    Cancer Father    Heart disease Mother        Tobacco Use    Smoking status: Current Every Day Smoker     Packs/day: 2.00     Types: Cigarettes    Smokeless tobacco: Never Used   Substance and Sexual Activity    Alcohol use: No    Drug use: No    Sexual activity: Not on file       Objective:     Vital Signs (Most Recent):  Temp: 97.5 °F (36.4 °C) (10/25/19 0739)  Pulse: 101 (10/25/19 0739)  Resp: 20 (10/25/19 0739)  BP: 139/69 (10/25/19 0739)  SpO2: 98 % (10/25/19 0739)  O2 Device (Oxygen Therapy): Trach Collar (10/25/19 0739) Vital Signs (24h Range):  Temp:  [97.4 °F (36.3 °C)-98.8 °F (37.1 °C)] 97.5 °F (36.4 °C)  Pulse:  [] 101  Resp:  [16-33] 20  SpO2:  [94 %-100 %] 98 %  BP: (119-152)/(59-78) 139/69     Weight: (!) 150 kg (330 lb 11 oz) (10/18/19 0500)  Body mass index is 42.44 kg/m².  Body surface area is 2.8 meters squared.    I/O last 3 completed shifts:  In: 1629 [I.V.:250; Blood:219; NG/GT:1160]  Out: 3297 [Urine:2215; Drains:1042; Chest Tube:40]    Physical Exam   Constitutional: He appears well-developed and well-nourished.   Eyes: Pupils are equal, round, and reactive to light. Conjunctivae and EOM are normal.   Neck: Normal range of motion. Neck supple.   Cardiovascular: Normal rate and regular rhythm.   Pulmonary/Chest: Effort normal. He has rales.   Trached with supplemental O2 in place    Abdominal: He exhibits no distension and no mass. There is tenderness. There is no guarding. No hernia.   Midline surgical incision to abdomen. Multiple abdominal drains noted.    Musculoskeletal: He exhibits edema (1+ pitting edema to BLE ). He exhibits no tenderness or deformity.   Neurological: He is alert.   Skin: Skin is warm and dry. No rash noted. He is not diaphoretic. No erythema. No pallor.   Psychiatric:   Follows commands and nods and gestures appropriately        Significant Labs:  CBC:   Recent Labs   Lab 10/25/19  0542   WBC 10.52   RBC 3.10*    HGB 8.0*   HCT 25.9*   *   MCV 84   MCH 25.8*   MCHC 30.9*     CMP:   Recent Labs   Lab 10/24/19  1214 10/25/19  0430    91   CALCIUM 8.8 9.2   ALBUMIN 2.5*  --    PROT 5.9*  --     145   K 4.9 5.0   CO2 25 23    107   * 103*   CREATININE 4.9* 5.1*   ALKPHOS 100  --    ALT 34  --    AST 31  --    BILITOT 0.5  --      All labs within the past 24 hours have been reviewed.        Assessment/Plan:     BHASKAR (acute kidney injury)  BHASKAR 2/2 ischemic ATN due to intraoperative hypotension and acute anemia.      Plan/Recommendations:  -Plan for HD today for metabolic clearance only. No UF.   -Continue renvela with meals   -Avoid nephrotoxic medications  -Renally dose medications  -Avoid contrast studies   -Daily renal function panels   -Strict I/O's   -Will continue to follow closely            Thank you for your consult. I will follow-up with patient. Please contact us if you have any additional questions.    Ranjeet Judd, NP  Nephrology  [facility]

## 2019-10-25 NOTE — PROGRESS NOTES
Dialysis completed. Right IJ tunneled catheter flushed with normal saline, heparinized,capped and taped. Patient dialyzed for 3 hours with no fluid removal per orders. Tolerated well with stable vital signs.

## 2019-10-25 NOTE — PT/OT/SLP PROGRESS
Speech Language Pathology Treatment    Patient Name:  Pasha Harmon   MRN:  [id number]  Admitting Diagnosis: Bowel perforation    Recommendations:                 General Recommendations:  ongoing diet monitoring and PMSV education.   Diet recommendations:  Dental Soft (Level 6), Liquid Diet Level: Thin   Aspiration Precautions: No straws and Small bites/sips   General Precautions: Standard, fall  Communication strategies:  One way speaking valve    Subjective     Patient awake;alert. Eager to work with SLP for ongoing PMSV / swallow trials.     Pain/Comfort:  · Pain Rating 1: 0/10    Objective:     Has the patient been evaluated by SLP for swallowing?   Yes  Keep patient NPO? No   Current Respiratory Status: room air      Passy Tommy Speaking Valve  Trach size/type: Patient downsized to 6.0 shiley.   Able to phonate around trach: yes  Vocal quality with digital finger occlusion: yes  O2 sats at rest: 98%  O2 sats with PMSV in place: 98%  Vocal quality with valve in place: dysphonic, mildly hoarse.   Back pressure upon removal: None  Minutes PMSV worn: duration of session and left in place upon completion of session.   Education topics addressed: cleaning valve, one-way technology, anatomy of trach, precautions with cuffed trach, removal of valve prior to sleeping    Per MD, patient appropriate for therapeutic trials and possible  diet implementation per SLP despite noted esophageal dysmotility on esophagram.     Dysphagia Therapy:   Patient tolerated thin liquids via cup sips over 6oz along with regular solid trials x4 with no overt signs of airway compromise.  Patient with inspiratory stridor, though no changes in vocal quality with all PO trials. Patient appropriate for implementation of thin liquids and dental soft consistency diet (level 6) at this time given edentulous.  Patient was provided education regarding need for swallow safety at this time with according aspiration precautions (small single cup  sips only, no straws). Patient pleased with progress thus far and time spent with SLP.       Patient and daughter verbalized understanding to all PMSV education/instructions, aspiration precautions, and diet recommendations provide per SLP.   Assessment:     Pasha Harmon is a 74 y.o. male with an SLP diagnosis of S/P Trach and One Way Speaking Valve Training.  He presents with need for strict adherence to  aspiration precautions at this time for increased swallow safety.     Goals:   Multidisciplinary Problems     SLP Goals        Problem: SLP Goal    Goal Priority Disciplines Outcome   SLP Goal     SLP Ongoing, Progressing   Description:  Speech Language Pathology Goals  Goals expected to be met by 10/28:  1. Pt will tolerate PMSV with O2 >93% and VSS to improve respiration and phonation.   2. Pt will demonstrate placement/removal of PMSV x5 indep.   3. Pt will vocalize x3 with PMSV in place  4. Pt will recall instructions/precautions of PMSV with 100% acc indep.   5. Pt will participate in ongoing swallow assessment to determine least restrictive diet recommendations.                         Plan:     · Patient to be seen:  4 x/week   · Plan of Care expires:  11/21/19  · Plan of Care reviewed with:  patient   · SLP Follow-Up:  Yes       Discharge recommendations:  nursing facility, skilled   Barriers to Discharge:  None    Time Tracking:     SLP Treatment Date:   10/25/19  Speech Start Time:  0930  Speech Stop Time:  0946     Speech Total Time (min):  16 min    Billable Minutes: Treatment Swallowing Dysfunction 8 and Therapeutic Speech Device 8    Emily Abadie, CCC-SLP  10/25/2019

## 2019-10-25 NOTE — SUBJECTIVE & OBJECTIVE
Interval History: Patient seen and examined at bedside. 1.5L of UOP/past 24hrs. On trach collar 28% FiO2. Stepped down from ICU. Kidney function on labs not showing improvement, BUN and sCr remain elevated. Permacath placed yesterday. Plan for HD today.       Review of patient's allergies indicates:   Allergen Reactions    Meloxicam Other (See Comments)     Ulcer, GI bleed      Current Facility-Administered Medications   Medication Frequency    0.9%  NaCl infusion (for blood administration) Q24H PRN    0.9%  NaCl infusion PRN    0.9%  NaCl infusion Once    0.9%  NaCl infusion Once    acetaminophen oral solution 650 mg Q6H PRN    albuterol-ipratropium 2.5 mg-0.5 mg/3 mL nebulizer solution 3 mL Q4H PRN    albuterol-ipratropium 2.5 mg-0.5 mg/3 mL nebulizer solution 3 mL Q4H    chlorhexidine 0.12 % solution 15 mL BID    dextrose 10% (D10W) Bolus PRN    dextrose 10% (D10W) Bolus PRN    docusate 50 mg/5 mL liquid 100 mg Daily    famotidine 40 mg/5 mL (8 mg/mL) suspension 20 mg Daily    fentaNYL injection 25 mcg Q1H PRN    fluticasone furoate-vilanterol 100-25 mcg/dose diskus inhaler 1 puff Daily    glucagon (human recombinant) injection 1 mg PRN    heparin (porcine) injection 7,500 Units Q8H    hydrALAZINE injection 20 mg Q6H PRN    insulin aspart U-100 pen 0-5 Units QID (AC + HS) PRN    lidocaine 5 % patch 1 patch Q24H    nicotine 21 mg/24 hr 1 patch Daily    ondansetron injection 4 mg Q6H PRN    polyethylene glycol packet 17 g Daily    ramelteon tablet 8 mg Once    sennosides 8.8 mg/5 ml syrup 10 mL Daily    sevelamer carbonate pwpk 0.8 g TID WM    sodium chloride 0.9% flush 10 mL PRN     Family History     Problem Relation (Age of Onset)    Cancer Father    Heart disease Mother        Tobacco Use    Smoking status: Current Every Day Smoker     Packs/day: 2.00     Types: Cigarettes    Smokeless tobacco: Never Used   Substance and Sexual Activity    Alcohol use: No    Drug use: No     Sexual activity: Not on file       Objective:     Vital Signs (Most Recent):  Temp: 97.5 °F (36.4 °C) (10/25/19 0739)  Pulse: 101 (10/25/19 0739)  Resp: 20 (10/25/19 0739)  BP: 139/69 (10/25/19 0739)  SpO2: 98 % (10/25/19 0739)  O2 Device (Oxygen Therapy): Trach Collar (10/25/19 0739) Vital Signs (24h Range):  Temp:  [97.4 °F (36.3 °C)-98.8 °F (37.1 °C)] 97.5 °F (36.4 °C)  Pulse:  [] 101  Resp:  [16-33] 20  SpO2:  [94 %-100 %] 98 %  BP: (119-152)/(59-78) 139/69     Weight: (!) 150 kg (330 lb 11 oz) (10/18/19 0500)  Body mass index is 42.44 kg/m².  Body surface area is 2.8 meters squared.    I/O last 3 completed shifts:  In: 1629 [I.V.:250; Blood:219; NG/GT:1160]  Out: 3297 [Urine:2215; Drains:1042; Chest Tube:40]    Physical Exam   Constitutional: He appears well-developed and well-nourished.   Eyes: Pupils are equal, round, and reactive to light. Conjunctivae and EOM are normal.   Neck: Normal range of motion. Neck supple.   Cardiovascular: Normal rate and regular rhythm.   Pulmonary/Chest: Effort normal. He has rales.   Trached with supplemental O2 in place    Abdominal: He exhibits no distension and no mass. There is tenderness. There is no guarding. No hernia.   Midline surgical incision to abdomen. Multiple abdominal drains noted.    Musculoskeletal: He exhibits edema (1+ pitting edema to BLE ). He exhibits no tenderness or deformity.   Neurological: He is alert.   Skin: Skin is warm and dry. No rash noted. He is not diaphoretic. No erythema. No pallor.   Psychiatric:   Follows commands and nods and gestures appropriately        Significant Labs:  CBC:   Recent Labs   Lab 10/25/19  0542   WBC 10.52   RBC 3.10*   HGB 8.0*   HCT 25.9*   *   MCV 84   MCH 25.8*   MCHC 30.9*     CMP:   Recent Labs   Lab 10/24/19  1214 10/25/19  0430    91   CALCIUM 8.8 9.2   ALBUMIN 2.5*  --    PROT 5.9*  --     145   K 4.9 5.0   CO2 25 23    107   * 103*   CREATININE 4.9* 5.1*   ALKPHOS 100   --    ALT 34  --    AST 31  --    BILITOT 0.5  --      All labs within the past 24 hours have been reviewed.

## 2019-10-25 NOTE — PLAN OF CARE
[facility]    FACILITY TRANSFER ORDERS      Patient Name: Pasha Harmon  YOB: 1945    PCP: Eze Root MD   PCP Address: [address] [place] [address] / [facility] [address]  PCP Phone Number: [phone]  PCP Fax: [phone]    Encounter Date: 10/31/2019    Admit to: LTAC    Vital Signs:  Routine    Diagnoses:   Active Hospital Problems    Diagnosis  POA    *Bowel perforation [K63.1]  No    Sepsis [A41.9]  Yes    Esophageal perforation [K22.3]  No    Metabolic acidosis [E87.2]  No    ATN (acute tubular necrosis) [N17.0]  No    BHASKAR (acute kidney injury) [N17.9]  Yes    Acute respiratory failure with hypoxia and hypercarbia [J96.01, J96.02]  No    Esophageal tear [S11.21XA]  No    Perforated stomach, acute [K31.89]  Yes    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       Allergies:  Review of patient's allergies indicates:   Allergen Reactions    Meloxicam Other (See Comments)     Ulcer, GI bleed        Diet:   Regular diet as tolerated, wean tube feeds per facility protocol  Question Answer Comment   Patient location: [facility]    Select Adult Formula: Peptamen Intense VHP or equivalent    Formula Rate (mL/hr): 70    Route: Jejunostomy    Container for Formula Delivery Ready to Hang Liter      Clamp g-tube. Unclamp for nausea or vomiting.    Activities: Activity as tolerated, Ambulate in quach, Bathroom privileges, Commode at bedside, Dangle at bedside and Up with assistance, no heavy lifting, nothing heavier than 10lbs    Nursing: to  evaluate and treat    Labs: per facility protocol    CONSULTS:    Physical Therapy to evaluate and treat.  Occupational Therapy to evaluate and treat.  Speech Therapy to evaluate and treat for Language and Swallowing.   to evaluate for community resources/long-range planning.  Respiratory for trach care - 6 uncuffed Shiley placed 10/24  Routine trach care, suction PRN    MISCELLANEOUS CARE:  PEG Care: Clean site every 24 hours.  and Routine Skin for Bedridden Patients: Apply moisture barrier cream to all skin folds and wet areas in perineal area daily and after baths and all bowel movements.    WOUND CARE ORDERS  None    Medications: Review discharge medications with patient and family and provide education.      Current Discharge Medication List      CONTINUE these medications which have NOT CHANGED    Details   acetaminophen (TYLENOL) 650 MG TbSR Take 1,300 mg by mouth 2 (two) times daily.       budesonide-formoterol 160-4.5 mcg (SYMBICORT) 160-4.5 mcg/actuation HFAA Inhale 2 puffs into the lungs every 12 (twelve) hours.  Qty: 1 Inhaler, Refills: 6      cholecalciferol, vitamin D3, (VITAMIN D3) 5,000 unit Tab Take 5,000 Units by mouth once daily.      citalopram (CELEXA) 20 MG tablet Take 1 tablet (20 mg total) by mouth nightly.  Qty: 90 tablet, Refills: 3      diphenhydrAMINE (BENADRYL) 25 mg capsule Take 25 mg by mouth every evening. 1 tab po am, 3 tabs po HS       lisinopril (PRINIVIL,ZESTRIL) 20 MG tablet Take 1 tablet (20 mg total) by mouth once daily.  Qty: 90 tablet, Refills: 3    Associated Diagnoses: Hyponatremia; Essential hypertension      metFORMIN (GLUCOPHAGE) 500 MG tablet Take 1 tablet (500 mg total) by mouth daily with breakfast.  Qty: 90 tablet, Refills: 3      multivit with min-FA-lycopene (ONE-A-DAY MEN'S MULTIVITAMIN) 400-300 mcg Tab Take 1 tablet by mouth nightly.      pantoprazole (PROTONIX) 40 MG tablet Take 1 tablet (40 mg total) by mouth 2 (two) times daily.  Qty: 180 tablet,  Refills: 3      simvastatin (ZOCOR) 40 MG tablet Take 1 tablet (40 mg total) by mouth every evening.  Qty: 90 tablet, Refills: 3      sucralfate (CARAFATE) 1 gram tablet Take 1 tablet (1 g total) by mouth 4 (four) times daily.  Qty: 120 tablet, Refills: 0      albuterol (ACCUNEB) 1.25 mg/3 mL Nebu Take 3 mLs (1.25 mg total) by nebulization every 6 (six) hours as needed. Rescue  Qty: 3 Box, Refills: 3      albuterol-ipratropium (DUO-NEB) 2.5 mg-0.5 mg/3 mL nebulizer solution Take 3 mLs by nebulization every 6 (six) hours as needed for Wheezing. Rescue      nicotine (NICODERM CQ) 21 mg/24 hr Place 1 patch onto the skin once daily.  Refills: 0                  _________________________________  Zelda William PA-C  10/29/2019

## 2019-10-25 NOTE — PLAN OF CARE
Plan of care reviewed with patient who verbalized understanding. AAOx4. VSS on room air. #6 trach at 5L/28%. NPO w/ ice chips. PRN Zofran given for nausea. PRN Fentanyl given for pain. LUQ G tube unclamped with dark green drainage. LUQ J tube clamped. Hallman patent and to gravity with clear, yellow urine. Q4 accuchecks performed. Bed in the lowest position. Call light within reach. No acute events this shift. WCTM.

## 2019-10-25 NOTE — PLAN OF CARE
10/25/19 0857   Post-Acute Status   Post-Acute Authorization Placement   Post-Acute Placement Status Pending Payor Review   Magdalena called Lalita at [facility] ([phone]). She reported her admissions coordinator is calling [employer] this morning and she should hear soon. MAGDALENA updated pt's daughter, Tena that auth is pending. MAGDALENA notified NARCISA Ndiaye.    UPDATEl:10:22 AM  SW received call from Lalita at [facility]. She reported that Bridget told her admissions coordinator that they've attempted to contact us for a peer to peer. ROLAND has not received any information regarding this. Lalita gave SW peer to peer phone number. MAGDALENA waiting for [employer] rep's contact information who told this to the admissions coordinator from [place].    UPDATE:2:54 PM  MAGDALENA notified of pt's LTAC denial. MAGDALENA sent SNF referrals and denied by: [facility], [facility], [facility] [place]. Team notified of this. MAGDALENA updated pt's daughter, Tena of status.    Mona Jj, NICHOLAS  [facility]  [phone]

## 2019-10-25 NOTE — PLAN OF CARE
Plan of care reviewed with patient who verbalized understanding.  Pt speaks well wearing PMV.  5L 28% TC.  Aloud 5 ice chips/hr.  G tube clamping trials to start Q8.  Pt had HD today.  Tube feeds started today, tolerating well.  Voiding after shrestha removal.  Telemetry monitored.  CBG monitored Q4.  No pressure injuries noted to sacral area.  Pt repositioned via wedge.  Worked with therapy but had an episode of nausea, no emesis.  Relieved with G tube unclamping.  SCDs on.  Call bell remains within reach.  Bed in lowest position.  Frequent rounds made for pt safety.  Patient remains free of any new or additional falls/injury at this time. VSS, will continue to monitor.

## 2019-10-25 NOTE — PLAN OF CARE
Problem: Hemodynamic Instability (Hemodialysis)  Goal: Vital Signs Remain in Desired Range  Outcome: Ongoing, Progressing

## 2019-10-25 NOTE — ASSESSMENT & PLAN NOTE
BHASKAR 2/2 ischemic ATN due to intraoperative hypotension and acute anemia.      Plan/Recommendations:  -Plan for HD today for metabolic clearance only. No UF.   -Continue renvela with meals   -Avoid nephrotoxic medications  -Renally dose medications  -Avoid contrast studies   -Daily renal function panels   -Strict I/O's   -Will continue to follow closely

## 2019-10-25 NOTE — PT/OT/SLP PROGRESS
Physical Therapy Treatment    Patient Name:  Pasha Harmon   MRN:  [id number]  Admitting Diagnosis: Bowel perforation  Recent Surgery: Procedure(s) (LRB):  Right IJ Hemodialysis Catheter Insertion (Right) 1 Day Post-Op    Recommendations:     Discharge Recommendations:  nursing facility, skilled   Discharge Equipment Recommendations: (TBD)   Barriers to discharge: None    Plan:     During this hospitalization, patient to be seen 4 x/week to address the above listed problems via therapeutic activities, therapeutic exercises, neuromuscular re-education  · Plan of Care Expires:  11/05/19   Plan of Care Reviewed with: patient    This Plan of care has been discussed with the patient who was involved in its development and understands and is in agreement with the identified goals and treatment plan    Subjective     Communicated with RN (Ramya) prior to session.     **INITIALLY pt requesting to get UIC, then he stated he wanted to use BSC.  Both items were secured, however, not used 2* pt not feeling well    Patient comments: Pt with c/o nausea and dizziness upon sitting at the EOB  Pain/Comfort:  Pt with c/o pain in lower back  · Pain Rating No rating provided  · Pain Rating Post-Intervention No rating provided    Objective:     Patient found with: telemetry, tracheostomy, PEG Tube, DENISE drain(J tube)    Patient found sup in bed with HOB elevated upon PT entry to room, agreeable to treatment.  Daughter present in the room.    General Precautions: Standard, fall   Orthopedic Precautions:N/A   Braces: N/A       BED MOBILITY (vc's for hand placement sequencing of task):        Rolling to the R:  Min A.       Rolling to the L:  NT.        Sup > sit at the EOB:  Mod A for trunk and LE's.       Sit > sup:  Mod A for trunk and LE's.       Scooting hips along the EOB to the R requiring mod A x3 scoots                       SITTING AT THE EDGE OF THE BED (10 min)   Assistance Level Required: SBA for safety with B REMY  support     TRANSFERS  (vc's for hand placement, sequencing of task and safety)    Sit <> Stand Transfer from EOB NP 2* pt with c/o dizziness and nausea while at the EOB       EDUCATION  Patient provided with daily orientation and goals of this PT session. They were educated to call for assistance and to transfer with hospital staff only.  Also, pt was educated on the effects of prolonged immobility and the importance of performing OOB activity and exercises to promote healing and reduce recovery time    Whiteboard updated with correct mobility information. RN/PCT notified.  Pt requires mod A to sit at the EOB.    Patient left righ side lying with HOB elevated, with  all lines intact, call button in reach, RN notified and daughter present    AM-PAC 6 CLICK MOBILITY  Turning over in bed (including adjusting bedclothes, sheets and blankets)?: 3  Sitting down on and standing up from a chair with arms (e.g., wheelchair, bedside commode, etc.): 2  Moving from lying on back to sitting on the side of the bed?: 2  Moving to and from a bed to a chair (including a wheelchair)?: 1  Need to walk in hospital room?: 1  Climbing 3-5 steps with a railing?: 1  Basic Mobility Total Score: 10     Assessment:     Pasha Harmon is a 74 y.o. male admitted with a medical diagnosis of Bowel perforation.  He presents with the following impairments/functional limitations:  weakness, impaired endurance, impaired self care skills, impaired functional mobilty, decreased upper extremity function, decreased lower extremity function, decreased safety awareness, impaired coordination, impaired cardiopulmonary response to activity. requiring significant assistance and verbal cues for bed mob, scooting to/along the EOB, sit < > stand, static standing 2* weakness, pain and limited endurance.   In light of pt's current functional level and deficits, it is anticipated that pt will need to participate in an intense rehab program consisting of PT  and OT in order to achieve full rehab potential to return to previous level of function and roles.  Pt will cont to benefit from skilled PT intervention to address deficits and improve functional mobility.     Rehab Prognosis:  Good; patient would benefit from acute skilled PT services to address these deficits and reach maximum level of function.      GOALS:   Multidisciplinary Problems     Physical Therapy Goals        Problem: Physical Therapy Goal    Goal Priority Disciplines Outcome Goal Variances Interventions   Physical Therapy Goal     PT, PT/OT Ongoing, Progressing     Description:  Goals to be met by: 19     Patient will increase functional independence with mobility by performin. Supine to sit with Maximum Assistance-met 10/24  1a. Supine to sit with Minimum Assistance - not met  2. Sit to supine with Maximum Assistance - not met  3. Rolling to Left and Right with Maximum Assistance and use of bedrail as needed. - not met  4. Sitting at edge of bed x10 minutes with M aximum Assistance - not met  5. Lower extremity exercise program x10 reps per handout, with assistance as needed - not met  6. Sit <> stand with Moderate Assistance using LRAD - not met  7. Stand for 3 minutes with HHA or LRAD with CGA to demonstrate improved standing tolerance                       Time Tracking:     PT Received On: 10/25/19  PT Start Time: 1015     PT Stop Time: 1059  PT Total Time (min): 44 min     Billable Minutes: Therapeutic Activity 44    Treatment Type: Treatment  PT/PTA: PTA     PTA Visit Number: 1       Shayy Solano PTA.  Pager [phone]    10/25/2019    .

## 2019-10-25 NOTE — NURSING TRANSFER
Nursing Transfer Note      10/25/2019     Transfer to dialysis    Transfer via bed    Transfer with tube feeds, G to gravity, 5 L28% TC, obturator and PMV    Transported by transporter x2 and RN     Medicines sent: no

## 2019-10-25 NOTE — PLAN OF CARE
"Had peer to peer conversation with Dr. Tripp Devi from [facility]  Patient denied LTAC at this time despite being on jtube feeds, needing trach care, and being on dialysis because "maybe next week he will be able to eat PO". She said he would be more appropriate for SNF. I told her that we don't know if he will be able to able to tolerate PO and be weaned off jtube feeds next week, as he is working with speech daily, but has been noted to have esophageal dysmotility on his esophagram. She said they would reconsider him for LTAC next week if he's still not eating.  "

## 2019-10-25 NOTE — CARE UPDATE
Cecilia LOPEZ at bedside for trach repositioning due to obstruction at 2115. Trach repositioned and patient tolerate well. No sign of respiratory distress before and after trach repositioning.

## 2019-10-25 NOTE — TREATMENT PLAN
AES Treatment Plan    Pasha Harmon is a 74 y.o. male admitted to hospital 10/9/2019 (Hospital Day: 17) due to Bowel perforation.     Interval History  - patient continues to improve  - made 1.5L over 24hours  - stepped down from ICU  - chest tubes will be removed today  - DENISE drain will be removed today  - permacath placement today  - primary team working on discharge to [facility]      Objective  Temp:  [97.4 °F (36.3 °C)-98.8 °F (37.1 °C)] 97.4 °F (36.3 °C) (10/25 1128)  Pulse:  [] 99 (10/25 1142)  BP: (119-152)/(63-78) 143/66 (10/25 1128)  Resp:  [18-33] 18 (10/25 1142)  SpO2:  [94 %-100 %] 95 % (10/25 1142)    Laboratory    Recent Labs   Lab 10/24/19  0400 10/24/19  1214 10/25/19  0542   HGB 6.9* 7.8* 8.0*       Lab Results   Component Value Date    WBC 10.52 10/25/2019    HGB 8.0 (L) 10/25/2019    HCT 25.9 (L) 10/25/2019    MCV 84 10/25/2019     (H) 10/25/2019       Lab Results   Component Value Date     10/25/2019    K 5.0 10/25/2019     10/25/2019    CO2 23 10/25/2019     (H) 10/25/2019    CREATININE 5.1 (H) 10/25/2019    CALCIUM 9.2 10/25/2019    ANIONGAP 15 10/25/2019    ESTGFRAFRICA 12.0 (A) 10/25/2019    EGFRNONAA 10.4 (A) 10/25/2019       Lab Results   Component Value Date    ALT 34 10/24/2019    AST 31 10/24/2019    ALKPHOS 100 10/24/2019    BILITOT 0.5 10/24/2019       Lab Results   Component Value Date    INR 1.0 10/12/2019    INR 1.2 10/10/2019    INR 1.2 05/24/2019       Plan  - continue working with SLP with goal of PO feeds  - continue J tube feeds for nutrition  - supportive care  - We will continue to follow.    Thank you for involving us in the care of Pasha Harmon. Please call with any additional questions, concerns or changes in the patient's clinical status.    Howard Macedo MD  Gastroenterology Fellow  Spectralink: [phone]

## 2019-10-25 NOTE — SUBJECTIVE & OBJECTIVE
Interval History: NAEON, pain well controlled, afebrile, on trach collar, denies nausea or vomiting    Medications:  Continuous Infusions:  Scheduled Meds:   sodium chloride 0.9%   Intravenous Once    sodium chloride 0.9%   Intravenous Once    albuterol-ipratropium  3 mL Nebulization Q4H    chlorhexidine  15 mL Mouth/Throat BID    docusate  100 mg Per J Tube Daily    famotidine  20 mg Per J Tube Daily    fluconazole (DIFLUCAN) IVPB  200 mg Intravenous Q24H    fluticasone furoate-vilanterol  1 puff Inhalation Daily    heparin (porcine)  7,500 Units Subcutaneous Q8H    lidocaine  1 patch Transdermal Q24H    nicotine  1 patch Transdermal Daily    polyethylene glycol  17 g Per J Tube Daily    ramelteon  8 mg Oral Once    sennosides 8.8 mg/5 ml  10 mL Per J Tube Daily    sevelamer carbonate  0.8 g Oral TID WM     PRN Meds:sodium chloride, sodium chloride 0.9%, acetaminophen, albuterol-ipratropium, Dextrose 10% Bolus, Dextrose 10% Bolus, fentaNYL, glucagon (human recombinant), hydrALAZINE, insulin aspart U-100, ondansetron, sodium chloride 0.9%     Review of patient's allergies indicates:   Allergen Reactions    Meloxicam Other (See Comments)     Ulcer, GI bleed      Objective:     Vital Signs (Most Recent):  Temp: 97.6 °F (36.4 °C) (10/25/19 0556)  Pulse: 102 (10/25/19 0556)  Resp: 18 (10/25/19 0556)  BP: (!) 150/70 (10/25/19 0556)  SpO2: 95 % (10/25/19 0556) Vital Signs (24h Range):  Temp:  [97.4 °F (36.3 °C)-99 °F (37.2 °C)] 97.6 °F (36.4 °C)  Pulse:  [] 102  Resp:  [16-33] 18  SpO2:  [95 %-100 %] 95 %  BP: (119-152)/(59-83) 150/70     Weight: (!) 150 kg (330 lb 11 oz)  Body mass index is 42.44 kg/m².    Intake/Output - Last 3 Shifts       10/23 0700 - 10/24 0659 10/24 0700 - 10/25 0659    I.V. (mL/kg) 125 (0.8) 250 (1.7)    Blood 219     Other 500     NG/GT 1760 400    Total Intake(mL/kg) 2604 (17.4) 650 (4.3)    Urine (mL/kg/hr) 1125 (0.3) 1595 (0.4)    Drains 550 662    Other 2500     Stool  0     Chest Tube 184     Total Output 7777 9874    Net -1755 -1609          Stool Occurrence  0 x          Physical Exam   Constitutional: He appears well-developed and well-nourished. He is sedated and intubated (Trach in place).   Cardiovascular: Normal rate and regular rhythm.   Pulmonary/Chest: Effort normal. He is intubated (Trach in place). No respiratory distress.   On trach collar   Abdominal: Soft. He exhibits no distension. There is no tenderness. There is no guarding.   G tube in place, draining gastric contents  Jtube in place  Incision CDI   Genitourinary:   Genitourinary Comments: Hallman in place   Neurological: He is alert.   Skin: Skin is warm and dry.   Vitals reviewed.      Significant Labs:  CBC:   Recent Labs   Lab 10/25/19  0542   WBC 10.52   RBC 3.10*   HGB 8.0*   HCT 25.9*   *   MCV 84   MCH 25.8*   MCHC 30.9*     CMP:   Recent Labs   Lab 10/24/19  1214 10/25/19  0430    91   CALCIUM 8.8 9.2   ALBUMIN 2.5*  --    PROT 5.9*  --     145   K 4.9 5.0   CO2 25 23    107   * 103*   CREATININE 4.9* 5.1*   ALKPHOS 100  --    ALT 34  --    AST 31  --    BILITOT 0.5  --        Significant Diagnostics:  I have reviewed all pertinent imaging results/findings within the past 24 hours.

## 2019-10-25 NOTE — PROGRESS NOTES
Patient received from dialysis in his bed. Acute dialysis began per orders via right IJ tunneled catheter.

## 2019-10-25 NOTE — PLAN OF CARE
09:20 AM Per MD Team patient is ready for discharge to LTAC today;Per CHARLES NICHOLS, she is waiting on Insurer auth. See her notes.     10:10-10:46 AM Notified by MAGDALENA that [facility] LT[facility] rep/Lalita [phone], states Insurer notified them a Fnvy-zm-Miiu (P2P) is needed for the LTAC. MAGDALENA gave CM P2P insurer phone # [phone]. ROLAND left voice message w/assigned insurer Gabriela [phone], opt 2, [phone], asking her to call CM for CM was not notified of this.     CM spoke to Dr. Solo notifying of this;obtained his contact information. He agrees to do P2P.     CM called P2P insurer phone number & spoke to Jennifer, informing of above. She states there is not LTAC denial letter, for insurer offers P2P (Ref # [id number]) before this is issued. She states Janel is the Insurer CM/PH same, [phone], & states RIVAS Eubanks was notified by voice mail yesterday around 2:30 PM. ROLAND was not given this information, &  informed her that this information should be called to the hospital assigned CM, for we are waiting on this information. She then transferred CM to Nhi/P2P specialist;P2P was arranged giving 1. Dr. RIVAS Estrada & 2. Dr. ALEJANDRO Solo w/their contact #'s. P2P will be w/Insurer MD: Dr. Tripp Devi for 11:30 AM today. Secure chatted this information to the DrNichole's.    ROLAND left voice message on Janel Insurer ROLAND, phone, informing of above & asking for her Fax #.     Faxed 10/24 , along w/coversheet note, to Nhi/P2P specialist F [phone]:     Attn Insurer ROLAND: I was given message from [facility] SW, A. [phone], that [facility]/Lalita ph [phone] was told by Insurer (? Name) that Insurer wants to do a P2P for LTAC. Of Note: This LTAC referral was initiated on 10/21, w/[facility] wanting to accept & waiting on insurer auth., w/Pt being ready today.  I know you have EMR access, w/ Notes been saying Pt needs LTAC.   1. No one has called [facility] CM, nor MAGDALENA, to tell us this, & 2. I am in  need of LTAC denial letter so we can proceed w/P2P.     Per MD note today:   Patient transferred to floor overnight.  Permacath placed.  Tolerating HD.  Continue to work with speech therapy.  Ok for diet when cleared from a swallowing standpoint.  Downsized to 6 uncuffed Shiley tracheostomy yesterday.  Chest tubes removed.  DENISE drain removed.  Discharge planning for LTAC.   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 Trach placed on 10/17/19. Permacath 10/24/19.     Clamp G-tube today, ok to unclamp for nausea or vomiting  Continue tube feeds per J tube  Continue on trach collar O2 5L 28 %  Continue to work with speech for swallow  Permacath placed yesterday. New HD   PT/OT recs SN    11:40 AM Notified by Dr. REX Schulte insurer has denied P2P for LTAC. ROLAND called Insurer P2P line, spoke to Janes, asking her what are our options. She states there is a Fast Appeal the  Can do;P2P denial letter pending. She gave PH for Fast Appeal: [phone], option 3/Fax [phone]. ROLAND faxed above to this number, asking for expedited appeal.   12:03-12:26 PM Called Expedited line, speaking to Dianne, asking her to assist w/scheduling this fast appeal for LTAC services are needed. Expedited appeal submitted w/Ref # [id number], giving her Dr's: 1. ALEJANDRO Solo-see # above, & 2. REX BLANCO [phone].  & SW updated.     2:25 PM Was notified by MAGDALENA she has received 5 SNF denials. Faxed this information: UPDATE: 5 SNF referrals sent w/all 5 declining to accept for they can not provide services to him (No TRACH Care): 1. [facility], 2. [facility], [facility]. [facility], [facility]. [facility], 5. [facility], on cover sheet to expedited fax #, above, & 2 other HG insurer CM's faxes: 1.  [phone] Karen, 2. [phone] Attn Gabriela, w/leaving 2nd voice message to covering P2P ROLAND Islas, we are in need of her fax #.     2:59 PM Jaenl, insurer ROLAND, called CM back stating they are still waiting to write up LTAC denial letter. ROLAND updated her on above, specifically 5 SNF denials for reason above, & informing her P2P specialist, Nhi, told CM an LTAC denial letter should be written within 1 hr of P2P denial. Janel gave CM same fax #/[phone]. Faxed this to her.    3:09 PM Reshma/Insurer PH same as above/[phone]  called ROLAND, stating LTAC denied for patient not meeting for LTAC criteria due to pt does not have respiratory distress. She states it can take up to 72 hrs for expedited appeal.

## 2019-10-26 LAB
ANION GAP SERPL CALC-SCNC: 13 MMOL/L (ref 8–16)
BASOPHILS # BLD AUTO: 0.09 K/UL (ref 0–0.2)
BASOPHILS NFR BLD: 1 % (ref 0–1.9)
BUN SERPL-MCNC: 61 MG/DL (ref 8–23)
CALCIUM SERPL-MCNC: 8.7 MG/DL (ref 8.7–10.5)
CHLORIDE SERPL-SCNC: 103 MMOL/L (ref 95–110)
CO2 SERPL-SCNC: 26 MMOL/L (ref 23–29)
CREAT SERPL-MCNC: 3.5 MG/DL (ref 0.5–1.4)
DIFFERENTIAL METHOD: ABNORMAL
EOSINOPHIL # BLD AUTO: 0.3 K/UL (ref 0–0.5)
EOSINOPHIL NFR BLD: 3.7 % (ref 0–8)
ERYTHROCYTE [DISTWIDTH] IN BLOOD BY AUTOMATED COUNT: 19.5 % (ref 11.5–14.5)
EST. GFR  (AFRICAN AMERICAN): 18.9 ML/MIN/1.73 M^2
EST. GFR  (NON AFRICAN AMERICAN): 16.3 ML/MIN/1.73 M^2
GLUCOSE SERPL-MCNC: 108 MG/DL (ref 70–110)
HCT VFR BLD AUTO: 26.1 % (ref 40–54)
HGB BLD-MCNC: 7.5 G/DL (ref 14–18)
IMM GRANULOCYTES # BLD AUTO: 0.07 K/UL (ref 0–0.04)
IMM GRANULOCYTES NFR BLD AUTO: 0.8 % (ref 0–0.5)
LYMPHOCYTES # BLD AUTO: 0.8 K/UL (ref 1–4.8)
LYMPHOCYTES NFR BLD: 8.5 % (ref 18–48)
MAGNESIUM SERPL-MCNC: 2.1 MG/DL (ref 1.6–2.6)
MCH RBC QN AUTO: 25.3 PG (ref 27–31)
MCHC RBC AUTO-ENTMCNC: 28.7 G/DL (ref 32–36)
MCV RBC AUTO: 88 FL (ref 82–98)
MONOCYTES # BLD AUTO: 1.3 K/UL (ref 0.3–1)
MONOCYTES NFR BLD: 13.6 % (ref 4–15)
NEUTROPHILS # BLD AUTO: 6.7 K/UL (ref 1.8–7.7)
NEUTROPHILS NFR BLD: 72.4 % (ref 38–73)
NRBC BLD-RTO: 0 /100 WBC
PHOSPHATE SERPL-MCNC: 4.2 MG/DL (ref 2.7–4.5)
PLATELET # BLD AUTO: 576 K/UL (ref 150–350)
PMV BLD AUTO: 10 FL (ref 9.2–12.9)
POCT GLUCOSE: 113 MG/DL (ref 70–110)
POCT GLUCOSE: 113 MG/DL (ref 70–110)
POCT GLUCOSE: 115 MG/DL (ref 70–110)
POCT GLUCOSE: 89 MG/DL (ref 70–110)
POCT GLUCOSE: 99 MG/DL (ref 70–110)
POTASSIUM SERPL-SCNC: 4.6 MMOL/L (ref 3.5–5.1)
RBC # BLD AUTO: 2.97 M/UL (ref 4.6–6.2)
SODIUM SERPL-SCNC: 142 MMOL/L (ref 136–145)
WBC # BLD AUTO: 9.25 K/UL (ref 3.9–12.7)

## 2019-10-26 PROCEDURE — 83735 ASSAY OF MAGNESIUM: CPT

## 2019-10-26 PROCEDURE — 99900035 HC TECH TIME PER 15 MIN (STAT)

## 2019-10-26 PROCEDURE — 85025 COMPLETE CBC W/AUTO DIFF WBC: CPT

## 2019-10-26 PROCEDURE — 25000242 PHARM REV CODE 250 ALT 637 W/ HCPCS: Performed by: STUDENT IN AN ORGANIZED HEALTH CARE EDUCATION/TRAINING PROGRAM

## 2019-10-26 PROCEDURE — 80048 BASIC METABOLIC PNL TOTAL CA: CPT

## 2019-10-26 PROCEDURE — 63600175 PHARM REV CODE 636 W HCPCS: Performed by: SURGERY

## 2019-10-26 PROCEDURE — 63600175 PHARM REV CODE 636 W HCPCS: Performed by: STUDENT IN AN ORGANIZED HEALTH CARE EDUCATION/TRAINING PROGRAM

## 2019-10-26 PROCEDURE — 25000003 PHARM REV CODE 250: Performed by: STUDENT IN AN ORGANIZED HEALTH CARE EDUCATION/TRAINING PROGRAM

## 2019-10-26 PROCEDURE — 27000221 HC OXYGEN, UP TO 24 HOURS

## 2019-10-26 PROCEDURE — S4991 NICOTINE PATCH NONLEGEND: HCPCS | Performed by: PHYSICIAN ASSISTANT

## 2019-10-26 PROCEDURE — 36415 COLL VENOUS BLD VENIPUNCTURE: CPT

## 2019-10-26 PROCEDURE — 94640 AIRWAY INHALATION TREATMENT: CPT

## 2019-10-26 PROCEDURE — 94761 N-INVAS EAR/PLS OXIMETRY MLT: CPT

## 2019-10-26 PROCEDURE — 94668 MNPJ CHEST WALL SBSQ: CPT

## 2019-10-26 PROCEDURE — 25000003 PHARM REV CODE 250: Performed by: PHYSICIAN ASSISTANT

## 2019-10-26 PROCEDURE — 84100 ASSAY OF PHOSPHORUS: CPT

## 2019-10-26 PROCEDURE — 20600001 HC STEP DOWN PRIVATE ROOM

## 2019-10-26 RX ORDER — OXYCODONE HCL 5 MG/5 ML
5 SOLUTION, ORAL ORAL EVERY 4 HOURS PRN
Status: DISCONTINUED | OUTPATIENT
Start: 2019-10-26 | End: 2019-10-31 | Stop reason: HOSPADM

## 2019-10-26 RX ADMIN — IPRATROPIUM BROMIDE AND ALBUTEROL SULFATE 3 ML: .5; 3 SOLUTION RESPIRATORY (INHALATION) at 04:10

## 2019-10-26 RX ADMIN — NICOTINE 1 PATCH: 21 PATCH, EXTENDED RELEASE TRANSDERMAL at 09:10

## 2019-10-26 RX ADMIN — FAMOTIDINE 20 MG: 40 POWDER, FOR SUSPENSION ORAL at 09:10

## 2019-10-26 RX ADMIN — FENTANYL CITRATE 25 MCG: 50 INJECTION INTRAMUSCULAR; INTRAVENOUS at 11:10

## 2019-10-26 RX ADMIN — ACETAMINOPHEN 650 MG: 160 SOLUTION ORAL at 02:10

## 2019-10-26 RX ADMIN — IPRATROPIUM BROMIDE AND ALBUTEROL SULFATE 3 ML: .5; 3 SOLUTION RESPIRATORY (INHALATION) at 07:10

## 2019-10-26 RX ADMIN — RAMELTEON 8 MG: 8 TABLET ORAL at 08:10

## 2019-10-26 RX ADMIN — ONDANSETRON 4 MG: 2 INJECTION INTRAMUSCULAR; INTRAVENOUS at 04:10

## 2019-10-26 RX ADMIN — IPRATROPIUM BROMIDE AND ALBUTEROL SULFATE 3 ML: .5; 3 SOLUTION RESPIRATORY (INHALATION) at 11:10

## 2019-10-26 RX ADMIN — LIDOCAINE 1 PATCH: 50 PATCH TOPICAL at 11:10

## 2019-10-26 RX ADMIN — CHLORHEXIDINE GLUCONATE 0.12% ORAL RINSE 15 ML: 1.2 LIQUID ORAL at 09:10

## 2019-10-26 RX ADMIN — IPRATROPIUM BROMIDE AND ALBUTEROL SULFATE 3 ML: .5; 3 SOLUTION RESPIRATORY (INHALATION) at 03:10

## 2019-10-26 RX ADMIN — HEPARIN SODIUM 7500 UNITS: 5000 INJECTION, SOLUTION INTRAVENOUS; SUBCUTANEOUS at 02:10

## 2019-10-26 RX ADMIN — ACETAMINOPHEN 650 MG: 160 SOLUTION ORAL at 06:10

## 2019-10-26 RX ADMIN — HEPARIN SODIUM 7500 UNITS: 5000 INJECTION, SOLUTION INTRAVENOUS; SUBCUTANEOUS at 06:10

## 2019-10-26 RX ADMIN — POLYETHYLENE GLYCOL 3350 17 G: 17 POWDER, FOR SOLUTION ORAL at 09:10

## 2019-10-26 RX ADMIN — OXYCODONE HYDROCHLORIDE 5 MG: 5 SOLUTION ORAL at 04:10

## 2019-10-26 RX ADMIN — OXYCODONE HYDROCHLORIDE 5 MG: 5 SOLUTION ORAL at 08:10

## 2019-10-26 RX ADMIN — HEPARIN SODIUM 7500 UNITS: 5000 INJECTION, SOLUTION INTRAVENOUS; SUBCUTANEOUS at 09:10

## 2019-10-26 RX ADMIN — OXYCODONE HYDROCHLORIDE 5 MG: 5 SOLUTION ORAL at 11:10

## 2019-10-26 RX ADMIN — SENNOSIDES 10 ML: 8.8 SYRUP ORAL at 09:10

## 2019-10-26 RX ADMIN — IPRATROPIUM BROMIDE AND ALBUTEROL SULFATE 3 ML: .5; 3 SOLUTION RESPIRATORY (INHALATION) at 08:10

## 2019-10-26 RX ADMIN — DOCUSATE SODIUM 100 MG: 50 LIQUID ORAL at 09:10

## 2019-10-26 NOTE — CARE UPDATE
.          Rapid Response Respiratory Therapy Proactive Rounding Note      Time of visit: 820    Code Status: Full Code   : 1945  Age: 74 y.o.  Weight:   Wt Readings from Last 1 Encounters:   10/18/19 (!) 150 kg (330 lb 11 oz)     Sex: male  Race: White   [facility]: [facility]:   MRN: [id number]    SITUATION     Evaluated patient for: LDA Check     BACKGROUND     Patient has a past medical history of COPD (chronic obstructive pulmonary disease), Diabetes mellitus, Emphysema of lung, Gastric ulcer, Hypertension, and MI (myocardial infarction).  Clinically Significant Surgical Hx: tracheostomy    ASSESSMENT/INTERVENTIONS     Upon arrival in room Pt alert and currently a 6 shiley on trach collar 5 LPM 28%. Pt appears to be comfortable and showing no signs of distress at this time.    Pulse: 100 Respiratory rate: 20 Temperature: Temp: 98.2 °F (36.8 °C) BP: BP: 131/61 SpO2:97   Level of Consciousness: Level of Consciousness (AVPU): alert  Respiratory Effort: Respiratory Effort: Normal, Unlabored Expansion/Accessory Muscle Usage: Expansion/Accessory Muscles/Retractions: expansion symmetric, no retractions, no use of accessory muscles  All Lung Field Breath Sounds: All Lung Fields Breath Sounds: Anterior:, Lateral:, diminished, clear  EMILINAO Breath Sounds: clear  LLL Breath Sounds: diminished  RUL Breath Sounds: clear  RML Breath Sounds: coarse  RLL Breath Sounds: diminished  Mobility at time of assessment: General Mobility: generalized weakness, moderately impaired  O2 Device/Concentration: 28%  Most recent blood gas: No results for input(s): PH, PCO2, PO2, HCO3, POCSATURATED, BE in the last 72 hours.  P/F Ratio:    Surgical airway: Yes, Type: trach Size: 6  Ambu at bedside: Ambu bag with the patient?: Yes, Adult Ambu    Current Respiratory Care Orders: oxygen, trach care, and nebulizer treatments    RECOMMENDATIONS     We recommend: continue to monitor respiratory status    ESCALATION              FOLLOW-UP     Please call  back the Rapid Response RT, Castro De La Rosa, RRT at x [phone] for any questions or concerns.

## 2019-10-26 NOTE — ASSESSMENT & PLAN NOTE
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 Trach placed on 10/17/19. Permacath 10/24/19.    Plan:  Clamp G-tube today (clamping trial), ok to unclamp for nausea or vomiting  Continue tube feeds per J tube, advance to goal  Continue on trach collar  Only move with nursing assistance; has decannulated twice with moving over past two evenings.   Passed swallow study but with high G tube output previously; advance slowly with G tube clamping trial today  PT/OT  Ambulate    Progressing well, stable for discharge to LTAC. Awaiting insurance approval.

## 2019-10-26 NOTE — CARE UPDATE
Called to patients room due trach being decannulated. Amarilis LOPEZ reinserted trach with obturator and tightened sutures at bedside. Patient sats remained 95 and patient tolerated well. Will continue to monitor.

## 2019-10-26 NOTE — PLAN OF CARE
Plan of care reviewed with patient who verbalized understanding. AAOx4. VSS on room air. NPO. Tube feeding going @50 mL/hr. No complaints of nausea. PRN Fentanyl and Tylenol given for pain. G tube clamped. J tube unclamped w/ tube feedings going. Q4 accuchecks performed. G tube clamping trials performed. Patient put out 75 mL. Call light within reach. Bed locked and in the lowest position. No acute events this shift. WCTM.

## 2019-10-26 NOTE — SUBJECTIVE & OBJECTIVE
Interval History: Trach dislodged/decannulated again last night with movement. Patient stable, adequate sats per on call team. Respiratory care at bedside and on call provider re-inserted trach without issue. Trach re-sutured in place. CXR confirmed placement. Order placed for positioning to only occur with nursing present. Otherwise VSS, AF. Pain well controlled, denies nausea or vomiting with G clamping trial.     Medications:  Continuous Infusions:  Scheduled Meds:   sodium chloride 0.9%   Intravenous Once    albuterol-ipratropium  3 mL Nebulization Q4H    chlorhexidine  15 mL Mouth/Throat BID    docusate  100 mg Per J Tube Daily    famotidine  20 mg Per J Tube Daily    fluticasone furoate-vilanterol  1 puff Inhalation Daily    heparin (porcine)  7,500 Units Subcutaneous Q8H    lidocaine  1 patch Transdermal Q24H    nicotine  1 patch Transdermal Daily    polyethylene glycol  17 g Per J Tube Daily    promethazine (PHENERGAN) IVPB  6.25 mg Intravenous Once    ramelteon  8 mg Oral Once    sennosides 8.8 mg/5 ml  10 mL Per J Tube Daily    sevelamer carbonate  0.8 g Oral TID WM     PRN Meds:sodium chloride, sodium chloride 0.9%, acetaminophen, albuterol-ipratropium, Dextrose 10% Bolus, Dextrose 10% Bolus, fentaNYL, glucagon (human recombinant), heparin (porcine), hydrALAZINE, insulin aspart U-100, ondansetron, sodium chloride 0.9%     Review of patient's allergies indicates:   Allergen Reactions    Meloxicam Other (See Comments)     Ulcer, GI bleed      Objective:     Vital Signs (Most Recent):  Temp: 98 °F (36.7 °C) (10/26/19 0500)  Pulse: 97 (10/26/19 0500)  Resp: 16 (10/26/19 0500)  BP: 131/70 (10/26/19 0500)  SpO2: 96 % (10/26/19 0400) Vital Signs (24h Range):  Temp:  [97.4 °F (36.3 °C)-98.5 °F (36.9 °C)] 98 °F (36.7 °C)  Pulse:  [] 97  Resp:  [16-22] 16  SpO2:  [93 %-98 %] 96 %  BP: (100-183)/(54-84) 131/70     Weight: (!) 150 kg (330 lb 11 oz)  Body mass index is 42.44  kg/m².    Intake/Output - Last 3 Shifts       10/24 0700 - 10/25 0659 10/25 0700 - 10/26 0659 10/26 0700 - 10/27 0659    P.O.  0     I.V. (mL/kg) 250 (1.7)      Blood       Other  600     NG/ 120     Total Intake(mL/kg) 650 (4.3) 720 (4.8)     Urine (mL/kg/hr) 1595 (0.4) 1450 (0.4)     Drains 662 500     Other  600     Stool 0      Chest Tube       Total Output 2257 2550     Net -1607 -1830            Stool Occurrence 0 x            Physical Exam   Constitutional: He appears well-developed and well-nourished. He is intubated (Trach in place).   Cardiovascular: Normal rate and regular rhythm.   Pulmonary/Chest: Effort normal. He is intubated (Trach in place). No respiratory distress.   On trach collar   Abdominal: Soft. He exhibits no distension. There is no tenderness. There is no guarding.   G tube in place, clamped  Jtube in place  Incision CDI   Neurological: He is alert.   Skin: Skin is warm and dry.   Vitals reviewed.      Significant Labs:  CBC:   Recent Labs   Lab 10/26/19  0419   WBC 9.25   RBC 2.97*   HGB 7.5*   HCT 26.1*   *   MCV 88   MCH 25.3*   MCHC 28.7*     CMP:   Recent Labs   Lab 10/24/19  1214  10/26/19  0419      < > 108   CALCIUM 8.8   < > 8.7   ALBUMIN 2.5*  --   --    PROT 5.9*  --   --       < > 142   K 4.9   < > 4.6   CO2 25   < > 26      < > 103   *   < > 61*   CREATININE 4.9*   < > 3.5*   ALKPHOS 100  --   --    ALT 34  --   --    AST 31  --   --    BILITOT 0.5  --   --     < > = values in this interval not displayed.       Significant Diagnostics:  I have reviewed all pertinent imaging results/findings within the past 24 hours.

## 2019-10-26 NOTE — PROGRESS NOTES
"[facility]  General Surgery  Progress Note    Subjective:     History of Present Illness:  Patient is a 72 yo M with history of COPD and bleeding gastric mass who underwent EGD with AES yesterday where they removed a 7 cm "ulcerated lipoma" from his gastric antrum using submucosal endoscopic dissection. When removing the mass, it became stuck in the distal esophagus. They had to removed it piecemeal, and caused some mucosal esophageal tears. He was admitted for obs overnight, and this morning his WBC was elevated and he was complaining of abd pain. CXR showed free intraperitoneal free air and general surgery was consulted for evaluation.    Post-Op Info:  Procedure(s) (LRB):  Right IJ Hemodialysis Catheter Insertion (Right)   2 Days Post-Op     Interval History: Trach dislodged/decannulated again last night with movement. Patient stable, adequate sats per on call team. Respiratory care at bedside and on call provider re-inserted trach without issue. Trach re-sutured in place. CXR confirmed placement. Order placed for positioning to only occur with nursing present. Otherwise VSS, AF. Pain well controlled, denies nausea or vomiting with G clamping trial.     Medications:  Continuous Infusions:  Scheduled Meds:   sodium chloride 0.9%   Intravenous Once    albuterol-ipratropium  3 mL Nebulization Q4H    chlorhexidine  15 mL Mouth/Throat BID    docusate  100 mg Per J Tube Daily    famotidine  20 mg Per J Tube Daily    fluticasone furoate-vilanterol  1 puff Inhalation Daily    heparin (porcine)  7,500 Units Subcutaneous Q8H    lidocaine  1 patch Transdermal Q24H    nicotine  1 patch Transdermal Daily    polyethylene glycol  17 g Per J Tube Daily    promethazine (PHENERGAN) IVPB  6.25 mg Intravenous Once    ramelteon  8 mg Oral Once    sennosides 8.8 mg/5 ml  10 mL Per J Tube Daily    sevelamer carbonate  0.8 g Oral TID WM     PRN Meds:sodium chloride, sodium chloride 0.9%, acetaminophen, " albuterol-ipratropium, Dextrose 10% Bolus, Dextrose 10% Bolus, fentaNYL, glucagon (human recombinant), heparin (porcine), hydrALAZINE, insulin aspart U-100, ondansetron, sodium chloride 0.9%     Review of patient's allergies indicates:   Allergen Reactions    Meloxicam Other (See Comments)     Ulcer, GI bleed      Objective:     Vital Signs (Most Recent):  Temp: 98 °F (36.7 °C) (10/26/19 0500)  Pulse: 97 (10/26/19 0500)  Resp: 16 (10/26/19 0500)  BP: 131/70 (10/26/19 0500)  SpO2: 96 % (10/26/19 0400) Vital Signs (24h Range):  Temp:  [97.4 °F (36.3 °C)-98.5 °F (36.9 °C)] 98 °F (36.7 °C)  Pulse:  [] 97  Resp:  [16-22] 16  SpO2:  [93 %-98 %] 96 %  BP: (100-183)/(54-84) 131/70     Weight: (!) 150 kg (330 lb 11 oz)  Body mass index is 42.44 kg/m².    Intake/Output - Last 3 Shifts       10/24 0700 - 10/25 0659 10/25 0700 - 10/26 0659 10/26 0700 - 10/27 0659    P.O.  0     I.V. (mL/kg) 250 (1.7)      Blood       Other  600     NG/ 120     Total Intake(mL/kg) 650 (4.3) 720 (4.8)     Urine (mL/kg/hr) 1595 (0.4) 1450 (0.4)     Drains 662 500     Other  600     Stool 0      Chest Tube       Total Output 2257 2550     Net -1607 -1830            Stool Occurrence 0 x            Physical Exam   Constitutional: He appears well-developed and well-nourished. He is intubated (Trach in place).   Cardiovascular: Normal rate and regular rhythm.   Pulmonary/Chest: Effort normal. He is intubated (Trach in place). No respiratory distress.   On trach collar   Abdominal: Soft. He exhibits no distension. There is no tenderness. There is no guarding.   G tube in place, clamped  Jtube in place  Incision CDI   Neurological: He is alert.   Skin: Skin is warm and dry.   Vitals reviewed.      Significant Labs:  CBC:   Recent Labs   Lab 10/26/19  0419   WBC 9.25   RBC 2.97*   HGB 7.5*   HCT 26.1*   *   MCV 88   MCH 25.3*   MCHC 28.7*     CMP:   Recent Labs   Lab 10/24/19  1214  10/26/19  0419      < > 108   CALCIUM 8.8   <  > 8.7   ALBUMIN 2.5*  --   --    PROT 5.9*  --   --       < > 142   K 4.9   < > 4.6   CO2 25   < > 26      < > 103   *   < > 61*   CREATININE 4.9*   < > 3.5*   ALKPHOS 100  --   --    ALT 34  --   --    AST 31  --   --    BILITOT 0.5  --   --     < > = values in this interval not displayed.       Significant Diagnostics:  I have reviewed all pertinent imaging results/findings within the past 24 hours.    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 Trach placed on 10/17/19. Permacath 10/24/19.    Plan:  Clamp G-tube today (clamping trial), ok to unclamp for nausea or vomiting  Continue tube feeds per J tube, advance to goal  Continue on trach collar  Only move with nursing assistance; has decannulated twice with moving over past two evenings.   Passed swallow study but with high G tube output previously; advance slowly with G tube clamping trial today  PT/OT  Ambulate    Progressing well, stable for discharge to LTAC. Awaiting insurance approval.       Cynthia Bautista MD  General Surgery  [facility]

## 2019-10-26 NOTE — CARE UPDATE
Was paged    Trach decannulated. Patient was stable, adequate stats. Respiratory care was called to bedside. Trach was re-inserted without any trouble, fix to the skin. CXR showed trach in adequate position. Order for continuous pulse oxymetry.

## 2019-10-27 LAB
ANION GAP SERPL CALC-SCNC: 11 MMOL/L (ref 8–16)
BASOPHILS # BLD AUTO: 0.06 K/UL (ref 0–0.2)
BASOPHILS NFR BLD: 0.7 % (ref 0–1.9)
BUN SERPL-MCNC: 61 MG/DL (ref 8–23)
CALCIUM SERPL-MCNC: 8.9 MG/DL (ref 8.7–10.5)
CHLORIDE SERPL-SCNC: 104 MMOL/L (ref 95–110)
CO2 SERPL-SCNC: 26 MMOL/L (ref 23–29)
CREAT SERPL-MCNC: 3.2 MG/DL (ref 0.5–1.4)
DIFFERENTIAL METHOD: ABNORMAL
EOSINOPHIL # BLD AUTO: 0.2 K/UL (ref 0–0.5)
EOSINOPHIL NFR BLD: 3 % (ref 0–8)
ERYTHROCYTE [DISTWIDTH] IN BLOOD BY AUTOMATED COUNT: 18.9 % (ref 11.5–14.5)
EST. GFR  (AFRICAN AMERICAN): 21.1 ML/MIN/1.73 M^2
EST. GFR  (NON AFRICAN AMERICAN): 18.2 ML/MIN/1.73 M^2
GLUCOSE SERPL-MCNC: 119 MG/DL (ref 70–110)
HCT VFR BLD AUTO: 24.6 % (ref 40–54)
HGB BLD-MCNC: 7.5 G/DL (ref 14–18)
IMM GRANULOCYTES # BLD AUTO: 0.06 K/UL (ref 0–0.04)
IMM GRANULOCYTES NFR BLD AUTO: 0.7 % (ref 0–0.5)
LYMPHOCYTES # BLD AUTO: 0.9 K/UL (ref 1–4.8)
LYMPHOCYTES NFR BLD: 11 % (ref 18–48)
MAGNESIUM SERPL-MCNC: 2.1 MG/DL (ref 1.6–2.6)
MCH RBC QN AUTO: 25.9 PG (ref 27–31)
MCHC RBC AUTO-ENTMCNC: 30.5 G/DL (ref 32–36)
MCV RBC AUTO: 85 FL (ref 82–98)
MONOCYTES # BLD AUTO: 1 K/UL (ref 0.3–1)
MONOCYTES NFR BLD: 11.8 % (ref 4–15)
NEUTROPHILS # BLD AUTO: 5.9 K/UL (ref 1.8–7.7)
NEUTROPHILS NFR BLD: 72.8 % (ref 38–73)
NRBC BLD-RTO: 0 /100 WBC
PHOSPHATE SERPL-MCNC: 4.6 MG/DL (ref 2.7–4.5)
PLATELET # BLD AUTO: 536 K/UL (ref 150–350)
PMV BLD AUTO: 9.9 FL (ref 9.2–12.9)
POCT GLUCOSE: 105 MG/DL (ref 70–110)
POCT GLUCOSE: 117 MG/DL (ref 70–110)
POCT GLUCOSE: 122 MG/DL (ref 70–110)
POCT GLUCOSE: 137 MG/DL (ref 70–110)
POTASSIUM SERPL-SCNC: 4.2 MMOL/L (ref 3.5–5.1)
RBC # BLD AUTO: 2.9 M/UL (ref 4.6–6.2)
SODIUM SERPL-SCNC: 141 MMOL/L (ref 136–145)
WBC # BLD AUTO: 8.07 K/UL (ref 3.9–12.7)

## 2019-10-27 PROCEDURE — 85025 COMPLETE CBC W/AUTO DIFF WBC: CPT

## 2019-10-27 PROCEDURE — 25000003 PHARM REV CODE 250: Performed by: STUDENT IN AN ORGANIZED HEALTH CARE EDUCATION/TRAINING PROGRAM

## 2019-10-27 PROCEDURE — 63600175 PHARM REV CODE 636 W HCPCS: Performed by: SURGERY

## 2019-10-27 PROCEDURE — 83735 ASSAY OF MAGNESIUM: CPT

## 2019-10-27 PROCEDURE — 63600175 PHARM REV CODE 636 W HCPCS: Performed by: STUDENT IN AN ORGANIZED HEALTH CARE EDUCATION/TRAINING PROGRAM

## 2019-10-27 PROCEDURE — 99900035 HC TECH TIME PER 15 MIN (STAT)

## 2019-10-27 PROCEDURE — 27000221 HC OXYGEN, UP TO 24 HOURS

## 2019-10-27 PROCEDURE — 94761 N-INVAS EAR/PLS OXIMETRY MLT: CPT

## 2019-10-27 PROCEDURE — 84100 ASSAY OF PHOSPHORUS: CPT

## 2019-10-27 PROCEDURE — 36415 COLL VENOUS BLD VENIPUNCTURE: CPT

## 2019-10-27 PROCEDURE — 25000003 PHARM REV CODE 250: Performed by: NURSE PRACTITIONER

## 2019-10-27 PROCEDURE — 31720 CLEARANCE OF AIRWAYS: CPT

## 2019-10-27 PROCEDURE — 20600001 HC STEP DOWN PRIVATE ROOM

## 2019-10-27 PROCEDURE — 80048 BASIC METABOLIC PNL TOTAL CA: CPT

## 2019-10-27 PROCEDURE — 25000003 PHARM REV CODE 250: Performed by: PHYSICIAN ASSISTANT

## 2019-10-27 PROCEDURE — S4991 NICOTINE PATCH NONLEGEND: HCPCS | Performed by: PHYSICIAN ASSISTANT

## 2019-10-27 PROCEDURE — 94640 AIRWAY INHALATION TREATMENT: CPT

## 2019-10-27 PROCEDURE — 25000242 PHARM REV CODE 250 ALT 637 W/ HCPCS: Performed by: STUDENT IN AN ORGANIZED HEALTH CARE EDUCATION/TRAINING PROGRAM

## 2019-10-27 PROCEDURE — 94668 MNPJ CHEST WALL SBSQ: CPT

## 2019-10-27 RX ORDER — BISACODYL 10 MG
10 SUPPOSITORY, RECTAL RECTAL DAILY PRN
Status: DISCONTINUED | OUTPATIENT
Start: 2019-10-27 | End: 2019-10-31 | Stop reason: HOSPADM

## 2019-10-27 RX ADMIN — POLYETHYLENE GLYCOL 3350 17 G: 17 POWDER, FOR SOLUTION ORAL at 09:10

## 2019-10-27 RX ADMIN — ONDANSETRON 4 MG: 2 INJECTION INTRAMUSCULAR; INTRAVENOUS at 10:10

## 2019-10-27 RX ADMIN — IPRATROPIUM BROMIDE AND ALBUTEROL SULFATE 3 ML: .5; 3 SOLUTION RESPIRATORY (INHALATION) at 01:10

## 2019-10-27 RX ADMIN — OXYCODONE HYDROCHLORIDE 5 MG: 5 SOLUTION ORAL at 06:10

## 2019-10-27 RX ADMIN — HEPARIN SODIUM 7500 UNITS: 5000 INJECTION, SOLUTION INTRAVENOUS; SUBCUTANEOUS at 03:10

## 2019-10-27 RX ADMIN — NICOTINE 1 PATCH: 21 PATCH, EXTENDED RELEASE TRANSDERMAL at 09:10

## 2019-10-27 RX ADMIN — HEPARIN SODIUM 7500 UNITS: 5000 INJECTION, SOLUTION INTRAVENOUS; SUBCUTANEOUS at 09:10

## 2019-10-27 RX ADMIN — FENTANYL CITRATE 25 MCG: 50 INJECTION INTRAMUSCULAR; INTRAVENOUS at 05:10

## 2019-10-27 RX ADMIN — ONDANSETRON 4 MG: 2 INJECTION INTRAMUSCULAR; INTRAVENOUS at 02:10

## 2019-10-27 RX ADMIN — IPRATROPIUM BROMIDE AND ALBUTEROL SULFATE 3 ML: .5; 3 SOLUTION RESPIRATORY (INHALATION) at 03:10

## 2019-10-27 RX ADMIN — LIDOCAINE 1 PATCH: 50 PATCH TOPICAL at 09:10

## 2019-10-27 RX ADMIN — IPRATROPIUM BROMIDE AND ALBUTEROL SULFATE 3 ML: .5; 3 SOLUTION RESPIRATORY (INHALATION) at 09:10

## 2019-10-27 RX ADMIN — DOCUSATE SODIUM 100 MG: 50 LIQUID ORAL at 09:10

## 2019-10-27 RX ADMIN — OXYCODONE HYDROCHLORIDE 5 MG: 5 SOLUTION ORAL at 10:10

## 2019-10-27 RX ADMIN — BISACODYL 10 MG: 10 SUPPOSITORY RECTAL at 10:10

## 2019-10-27 RX ADMIN — IPRATROPIUM BROMIDE AND ALBUTEROL SULFATE 3 ML: .5; 3 SOLUTION RESPIRATORY (INHALATION) at 08:10

## 2019-10-27 RX ADMIN — FAMOTIDINE 20 MG: 40 POWDER, FOR SUSPENSION ORAL at 04:10

## 2019-10-27 RX ADMIN — CHLORHEXIDINE GLUCONATE 0.12% ORAL RINSE 15 ML: 1.2 LIQUID ORAL at 08:10

## 2019-10-27 RX ADMIN — SENNOSIDES 10 ML: 8.8 SYRUP ORAL at 09:10

## 2019-10-27 RX ADMIN — HEPARIN SODIUM 7500 UNITS: 5000 INJECTION, SOLUTION INTRAVENOUS; SUBCUTANEOUS at 06:10

## 2019-10-27 RX ADMIN — FENTANYL CITRATE 25 MCG: 50 INJECTION INTRAMUSCULAR; INTRAVENOUS at 07:10

## 2019-10-27 RX ADMIN — SEVELAMER CARBONATE 0.8 G: 800 POWDER, FOR SUSPENSION ORAL at 05:10

## 2019-10-27 RX ADMIN — OXYCODONE HYDROCHLORIDE 5 MG: 5 SOLUTION ORAL at 03:10

## 2019-10-27 RX ADMIN — CHLORHEXIDINE GLUCONATE 0.12% ORAL RINSE 15 ML: 1.2 LIQUID ORAL at 09:10

## 2019-10-27 RX ADMIN — OXYCODONE HYDROCHLORIDE 5 MG: 5 SOLUTION ORAL at 01:10

## 2019-10-27 RX ADMIN — IPRATROPIUM BROMIDE AND ALBUTEROL SULFATE 3 ML: .5; 3 SOLUTION RESPIRATORY (INHALATION) at 04:10

## 2019-10-27 RX ADMIN — IPRATROPIUM BROMIDE AND ALBUTEROL SULFATE 3 ML: .5; 3 SOLUTION RESPIRATORY (INHALATION) at 11:10

## 2019-10-27 NOTE — PROGRESS NOTES
"[facility]  General Surgery  Progress Note    Subjective:     History of Present Illness:  Patient is a 72 yo M with history of COPD and bleeding gastric mass who underwent EGD with AES yesterday where they removed a 7 cm "ulcerated lipoma" from his gastric antrum using submucosal endoscopic dissection. When removing the mass, it became stuck in the distal esophagus. They had to removed it piecemeal, and caused some mucosal esophageal tears. He was admitted for obs overnight, and this morning his WBC was elevated and he was complaining of abd pain. CXR showed free intraperitoneal free air and general surgery was consulted for evaluation.    Post-Op Info:  Procedure(s) (LRB):  Right IJ Hemodialysis Catheter Insertion (Right)   3 Days Post-Op     Interval History: NAEON. VSS, AF. Pain well controlled, denies nausea or vomiting with G clamping trial. 80 cc total output recorded. Family very upset overnight about patient care-spent about 60 minutes in patient's room discussing nursing care. Family want's patient to have shrestha-explained that patient is not incontinent-just needs to alert nursing when he needs to urinate as it is an infection risk. Also discussed logistics of nursing care.     Medications:  Continuous Infusions:  Scheduled Meds:   sodium chloride 0.9%   Intravenous Once    albuterol-ipratropium  3 mL Nebulization Q4H    chlorhexidine  15 mL Mouth/Throat BID    docusate  100 mg Per J Tube Daily    famotidine  20 mg Per J Tube Daily    fluticasone furoate-vilanterol  1 puff Inhalation Daily    heparin (porcine)  7,500 Units Subcutaneous Q8H    lidocaine  1 patch Transdermal Q24H    nicotine  1 patch Transdermal Daily    polyethylene glycol  17 g Per J Tube Daily    promethazine (PHENERGAN) IVPB  6.25 mg Intravenous Once    sennosides 8.8 mg/5 ml  10 mL Per J Tube Daily    sevelamer carbonate  0.8 g Oral TID WM     PRN Meds:sodium chloride, sodium chloride 0.9%, " acetaminophen, albuterol-ipratropium, bisacodyl, Dextrose 10% Bolus, Dextrose 10% Bolus, fentaNYL, glucagon (human recombinant), heparin (porcine), hydrALAZINE, insulin aspart U-100, ondansetron, oxyCODONE, sodium chloride 0.9%     Review of patient's allergies indicates:   Allergen Reactions    Meloxicam Other (See Comments)     Ulcer, GI bleed      Objective:     Vital Signs (Most Recent):  Temp: 97.5 °F (36.4 °C) (10/27/19 0725)  Pulse: 87 (10/27/19 0725)  Resp: 19 (10/27/19 0725)  BP: (!) 141/68 (10/27/19 0725)  SpO2: 95 % (10/27/19 0725) Vital Signs (24h Range):  Temp:  [96.3 °F (35.7 °C)-98.4 °F (36.9 °C)] 97.5 °F (36.4 °C)  Pulse:  [] 87  Resp:  [18-20] 19  SpO2:  [90 %-97 %] 95 %  BP: (120-162)/(56-72) 141/68     Weight: (!) 150 kg (330 lb 11 oz)  Body mass index is 42.44 kg/m².    Intake/Output - Last 3 Shifts       10/25 0700 - 10/26 0659 10/26 0700 - 10/27 0659 10/27 0700 - 10/28 0659    P.O. 0      I.V. (mL/kg)       Other 600      NG/ 1273     Total Intake(mL/kg) 1020 (6.8) 1273 (8.5)     Urine (mL/kg/hr) 1800 (0.5) 1815 (0.5)     Drains 575 80     Other 600      Stool  0     Total Output 2975 1895     Net -1955 -622            Stool Occurrence  0 x           Physical Exam   Constitutional: He appears well-developed and well-nourished. He is intubated (Trach in place).   Cardiovascular: Normal rate and regular rhythm.   Pulmonary/Chest: Effort normal. He is intubated (Trach in place). No respiratory distress.   On trach collar   Abdominal: Soft. He exhibits no distension. There is no tenderness. There is no guarding.   G tube in place, clamped  Jtube in place  Incision CDI   Neurological: He is alert.   Skin: Skin is warm and dry.   Vitals reviewed.      Significant Labs:  CBC:   Recent Labs   Lab 10/27/19  0426   WBC 8.07   RBC 2.90*   HGB 7.5*   HCT 24.6*   *   MCV 85   MCH 25.9*   MCHC 30.5*     CMP:   Recent Labs   Lab 10/24/19  1214  10/27/19  0426      < > 119*   CALCIUM  8.8   < > 8.9   ALBUMIN 2.5*  --   --    PROT 5.9*  --   --       < > 141   K 4.9   < > 4.2   CO2 25   < > 26      < > 104   *   < > 61*   CREATININE 4.9*   < > 3.2*   ALKPHOS 100  --   --    ALT 34  --   --    AST 31  --   --    BILITOT 0.5  --   --     < > = values in this interval not displayed.       Significant Diagnostics:  I have reviewed all pertinent imaging results/findings within the past 24 hours.    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 Trach placed on 10/17/19. Permacath 10/24/19.    Plan:  Clamp G-tube today.  J tube feeds at goal.  Continue on trach collar  Only move with nursing assistance; has decannulated twice recently with moving.  Passed swallow study but no bowel movements. Will check residuals today. Get KUB. If ok can advance to soft diet.   Condom cath/diaper to improve wound care  PT/OT  Ambulate    Progressing well, stable for discharge to LTAC. Awaiting insurance approval.         Cynthia Bautista MD  General Surgery  [facility]

## 2019-10-27 NOTE — PLAN OF CARE
Pt is A&Ox4. Pt's daughters spent the night and were upset and demanding.  The daughter wanted the pt to have a shrestha due to the pt's incontinence. The had developed a stage 2 on his coccyx. The nurse tried to explain to the daughter why he could not give the pt a shrestha and why the Dr would not allow it.  The daughter became more agitated and demanded that a upper level Dr come to see her immediately. Dr Bautista along with the charge nurse spoke with the daughters. the pt's pain was controlled with 5 mg of oxycodone thru the J tube. the G tube was unclamped once during the night shift see chart for output. The pt's blood sugar was checked during the night and was well with in normal limits. Tube feeding was ran at 65 cc hr due to occasional nausea, it never did get up to goal of 70 cc.

## 2019-10-27 NOTE — ASSESSMENT & PLAN NOTE
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 Trach placed on 10/17/19. Permacath 10/24/19.    Plan:  Clamp G-tube today.  J tube feeds at goal.  Continue on trach collar  Only move with nursing assistance; has decannulated twice recently with moving.  Passed swallow study but no bowel movements. Will check residuals today. Get KUB. If ok can advance to soft diet.   Condom cath/diaper to improve wound care  PT/OT  Ambulate    Progressing well, stable for discharge to LTAC. Awaiting insurance approval.

## 2019-10-27 NOTE — SUBJECTIVE & OBJECTIVE
Interval History: NAEON. VSS, AF. Pain well controlled, denies nausea or vomiting with G clamping trial. 80 cc total output recorded. Family very upset overnight about patient care-spent about 60 minutes in patient's room discussing nursing care. Family want's patient to have shrestha-explained that patient is not incontinent-just needs to alert nursing when he needs to urinate as it is an infection risk. Also discussed logistics of nursing care.     Medications:  Continuous Infusions:  Scheduled Meds:   sodium chloride 0.9%   Intravenous Once    albuterol-ipratropium  3 mL Nebulization Q4H    chlorhexidine  15 mL Mouth/Throat BID    docusate  100 mg Per J Tube Daily    famotidine  20 mg Per J Tube Daily    fluticasone furoate-vilanterol  1 puff Inhalation Daily    heparin (porcine)  7,500 Units Subcutaneous Q8H    lidocaine  1 patch Transdermal Q24H    nicotine  1 patch Transdermal Daily    polyethylene glycol  17 g Per J Tube Daily    promethazine (PHENERGAN) IVPB  6.25 mg Intravenous Once    sennosides 8.8 mg/5 ml  10 mL Per J Tube Daily    sevelamer carbonate  0.8 g Oral TID WM     PRN Meds:sodium chloride, sodium chloride 0.9%, acetaminophen, albuterol-ipratropium, bisacodyl, Dextrose 10% Bolus, Dextrose 10% Bolus, fentaNYL, glucagon (human recombinant), heparin (porcine), hydrALAZINE, insulin aspart U-100, ondansetron, oxyCODONE, sodium chloride 0.9%     Review of patient's allergies indicates:   Allergen Reactions    Meloxicam Other (See Comments)     Ulcer, GI bleed      Objective:     Vital Signs (Most Recent):  Temp: 97.5 °F (36.4 °C) (10/27/19 0725)  Pulse: 87 (10/27/19 0725)  Resp: 19 (10/27/19 0725)  BP: (!) 141/68 (10/27/19 0725)  SpO2: 95 % (10/27/19 0725) Vital Signs (24h Range):  Temp:  [96.3 °F (35.7 °C)-98.4 °F (36.9 °C)] 97.5 °F (36.4 °C)  Pulse:  [] 87  Resp:  [18-20] 19  SpO2:  [90 %-97 %] 95 %  BP: (120-162)/(56-72) 141/68     Weight: (!) 150 kg (330 lb 11 oz)  Body mass index  is 42.44 kg/m².    Intake/Output - Last 3 Shifts       10/25 0700 - 10/26 0659 10/26 0700 - 10/27 0659 10/27 0700 - 10/28 0659    P.O. 0      I.V. (mL/kg)       Other 600      NG/ 1273     Total Intake(mL/kg) 1020 (6.8) 1273 (8.5)     Urine (mL/kg/hr) 1800 (0.5) 1815 (0.5)     Drains 575 80     Other 600      Stool  0     Total Output 2975 1895     Net -1955 -622            Stool Occurrence  0 x           Physical Exam   Constitutional: He appears well-developed and well-nourished. He is intubated (Trach in place).   Cardiovascular: Normal rate and regular rhythm.   Pulmonary/Chest: Effort normal. He is intubated (Trach in place). No respiratory distress.   On trach collar   Abdominal: Soft. He exhibits no distension. There is no tenderness. There is no guarding.   G tube in place, clamped  Jtube in place  Incision CDI   Neurological: He is alert.   Skin: Skin is warm and dry.   Vitals reviewed.      Significant Labs:  CBC:   Recent Labs   Lab 10/27/19  0426   WBC 8.07   RBC 2.90*   HGB 7.5*   HCT 24.6*   *   MCV 85   MCH 25.9*   MCHC 30.5*     CMP:   Recent Labs   Lab 10/24/19  1214  10/27/19  0426      < > 119*   CALCIUM 8.8   < > 8.9   ALBUMIN 2.5*  --   --    PROT 5.9*  --   --       < > 141   K 4.9   < > 4.2   CO2 25   < > 26      < > 104   *   < > 61*   CREATININE 4.9*   < > 3.2*   ALKPHOS 100  --   --    ALT 34  --   --    AST 31  --   --    BILITOT 0.5  --   --     < > = values in this interval not displayed.       Significant Diagnostics:  I have reviewed all pertinent imaging results/findings within the past 24 hours.

## 2019-10-28 LAB
ANION GAP SERPL CALC-SCNC: 9 MMOL/L (ref 8–16)
BASOPHILS # BLD AUTO: 0.06 K/UL (ref 0–0.2)
BASOPHILS NFR BLD: 0.8 % (ref 0–1.9)
BUN SERPL-MCNC: 68 MG/DL (ref 8–23)
CALCIUM SERPL-MCNC: 9 MG/DL (ref 8.7–10.5)
CHLORIDE SERPL-SCNC: 103 MMOL/L (ref 95–110)
CO2 SERPL-SCNC: 26 MMOL/L (ref 23–29)
CREAT SERPL-MCNC: 2.7 MG/DL (ref 0.5–1.4)
DIFFERENTIAL METHOD: ABNORMAL
EOSINOPHIL # BLD AUTO: 0.2 K/UL (ref 0–0.5)
EOSINOPHIL NFR BLD: 3.1 % (ref 0–8)
ERYTHROCYTE [DISTWIDTH] IN BLOOD BY AUTOMATED COUNT: 19 % (ref 11.5–14.5)
EST. GFR  (AFRICAN AMERICAN): 25.9 ML/MIN/1.73 M^2
EST. GFR  (NON AFRICAN AMERICAN): 22.4 ML/MIN/1.73 M^2
GLUCOSE SERPL-MCNC: 108 MG/DL (ref 70–110)
HCT VFR BLD AUTO: 25.6 % (ref 40–54)
HGB BLD-MCNC: 7.4 G/DL (ref 14–18)
IMM GRANULOCYTES # BLD AUTO: 0.05 K/UL (ref 0–0.04)
IMM GRANULOCYTES NFR BLD AUTO: 0.6 % (ref 0–0.5)
LYMPHOCYTES # BLD AUTO: 0.9 K/UL (ref 1–4.8)
LYMPHOCYTES NFR BLD: 12.1 % (ref 18–48)
MAGNESIUM SERPL-MCNC: 2 MG/DL (ref 1.6–2.6)
MCH RBC QN AUTO: 25.5 PG (ref 27–31)
MCHC RBC AUTO-ENTMCNC: 28.9 G/DL (ref 32–36)
MCV RBC AUTO: 88 FL (ref 82–98)
MONOCYTES # BLD AUTO: 1 K/UL (ref 0.3–1)
MONOCYTES NFR BLD: 13 % (ref 4–15)
NEUTROPHILS # BLD AUTO: 5.5 K/UL (ref 1.8–7.7)
NEUTROPHILS NFR BLD: 70.4 % (ref 38–73)
NRBC BLD-RTO: 0 /100 WBC
PHOSPHATE SERPL-MCNC: 4.8 MG/DL (ref 2.7–4.5)
PLATELET # BLD AUTO: 598 K/UL (ref 150–350)
PMV BLD AUTO: 10.5 FL (ref 9.2–12.9)
POCT GLUCOSE: 112 MG/DL (ref 70–110)
POCT GLUCOSE: 113 MG/DL (ref 70–110)
POCT GLUCOSE: 114 MG/DL (ref 70–110)
POCT GLUCOSE: 116 MG/DL (ref 70–110)
POCT GLUCOSE: 149 MG/DL (ref 70–110)
POTASSIUM SERPL-SCNC: 4.2 MMOL/L (ref 3.5–5.1)
RBC # BLD AUTO: 2.9 M/UL (ref 4.6–6.2)
SODIUM SERPL-SCNC: 138 MMOL/L (ref 136–145)
WBC # BLD AUTO: 7.76 K/UL (ref 3.9–12.7)

## 2019-10-28 PROCEDURE — 63600175 PHARM REV CODE 636 W HCPCS: Performed by: STUDENT IN AN ORGANIZED HEALTH CARE EDUCATION/TRAINING PROGRAM

## 2019-10-28 PROCEDURE — 99900022

## 2019-10-28 PROCEDURE — 97530 THERAPEUTIC ACTIVITIES: CPT

## 2019-10-28 PROCEDURE — 99232 SBSQ HOSP IP/OBS MODERATE 35: CPT | Mod: ,,, | Performed by: NURSE PRACTITIONER

## 2019-10-28 PROCEDURE — 99232 PR SUBSEQUENT HOSPITAL CARE,LEVL II: ICD-10-PCS | Mod: ,,, | Performed by: NURSE PRACTITIONER

## 2019-10-28 PROCEDURE — S4991 NICOTINE PATCH NONLEGEND: HCPCS | Performed by: PHYSICIAN ASSISTANT

## 2019-10-28 PROCEDURE — 25000003 PHARM REV CODE 250: Performed by: STUDENT IN AN ORGANIZED HEALTH CARE EDUCATION/TRAINING PROGRAM

## 2019-10-28 PROCEDURE — 97110 THERAPEUTIC EXERCISES: CPT

## 2019-10-28 PROCEDURE — 94668 MNPJ CHEST WALL SBSQ: CPT

## 2019-10-28 PROCEDURE — 84100 ASSAY OF PHOSPHORUS: CPT

## 2019-10-28 PROCEDURE — 25000242 PHARM REV CODE 250 ALT 637 W/ HCPCS: Performed by: STUDENT IN AN ORGANIZED HEALTH CARE EDUCATION/TRAINING PROGRAM

## 2019-10-28 PROCEDURE — 25000003 PHARM REV CODE 250: Performed by: PHYSICIAN ASSISTANT

## 2019-10-28 PROCEDURE — 99900035 HC TECH TIME PER 15 MIN (STAT)

## 2019-10-28 PROCEDURE — 27000221 HC OXYGEN, UP TO 24 HOURS

## 2019-10-28 PROCEDURE — 97116 GAIT TRAINING THERAPY: CPT

## 2019-10-28 PROCEDURE — 80048 BASIC METABOLIC PNL TOTAL CA: CPT

## 2019-10-28 PROCEDURE — 63600175 PHARM REV CODE 636 W HCPCS: Performed by: SURGERY

## 2019-10-28 PROCEDURE — 83735 ASSAY OF MAGNESIUM: CPT

## 2019-10-28 PROCEDURE — 94761 N-INVAS EAR/PLS OXIMETRY MLT: CPT

## 2019-10-28 PROCEDURE — 36415 COLL VENOUS BLD VENIPUNCTURE: CPT

## 2019-10-28 PROCEDURE — 20600001 HC STEP DOWN PRIVATE ROOM

## 2019-10-28 PROCEDURE — 25000003 PHARM REV CODE 250: Performed by: NURSE PRACTITIONER

## 2019-10-28 PROCEDURE — 85025 COMPLETE CBC W/AUTO DIFF WBC: CPT

## 2019-10-28 PROCEDURE — 92609 USE OF SPEECH DEVICE SERVICE: CPT

## 2019-10-28 PROCEDURE — 97535 SELF CARE MNGMENT TRAINING: CPT

## 2019-10-28 PROCEDURE — 94640 AIRWAY INHALATION TREATMENT: CPT

## 2019-10-28 RX ORDER — IPRATROPIUM BROMIDE AND ALBUTEROL SULFATE 2.5; .5 MG/3ML; MG/3ML
3 SOLUTION RESPIRATORY (INHALATION) EVERY 6 HOURS
Status: DISCONTINUED | OUTPATIENT
Start: 2019-10-28 | End: 2019-10-31 | Stop reason: HOSPADM

## 2019-10-28 RX ORDER — SODIUM CHLORIDE 9 MG/ML
INJECTION, SOLUTION INTRAVENOUS ONCE
Status: DISCONTINUED | OUTPATIENT
Start: 2019-10-28 | End: 2019-10-31 | Stop reason: HOSPADM

## 2019-10-28 RX ADMIN — OXYCODONE HYDROCHLORIDE 5 MG: 5 SOLUTION ORAL at 07:10

## 2019-10-28 RX ADMIN — SEVELAMER CARBONATE 0.8 G: 800 POWDER, FOR SUSPENSION ORAL at 12:10

## 2019-10-28 RX ADMIN — FAMOTIDINE 20 MG: 40 POWDER, FOR SUSPENSION ORAL at 09:10

## 2019-10-28 RX ADMIN — IPRATROPIUM BROMIDE AND ALBUTEROL SULFATE 3 ML: .5; 3 SOLUTION RESPIRATORY (INHALATION) at 08:10

## 2019-10-28 RX ADMIN — HEPARIN SODIUM 7500 UNITS: 5000 INJECTION, SOLUTION INTRAVENOUS; SUBCUTANEOUS at 03:10

## 2019-10-28 RX ADMIN — OXYCODONE HYDROCHLORIDE 5 MG: 5 SOLUTION ORAL at 12:10

## 2019-10-28 RX ADMIN — NICOTINE 1 PATCH: 21 PATCH, EXTENDED RELEASE TRANSDERMAL at 09:10

## 2019-10-28 RX ADMIN — CHLORHEXIDINE GLUCONATE 0.12% ORAL RINSE 15 ML: 1.2 LIQUID ORAL at 09:10

## 2019-10-28 RX ADMIN — IPRATROPIUM BROMIDE AND ALBUTEROL SULFATE 3 ML: .5; 3 SOLUTION RESPIRATORY (INHALATION) at 07:10

## 2019-10-28 RX ADMIN — HEPARIN SODIUM 7500 UNITS: 5000 INJECTION, SOLUTION INTRAVENOUS; SUBCUTANEOUS at 09:10

## 2019-10-28 RX ADMIN — IPRATROPIUM BROMIDE AND ALBUTEROL SULFATE 3 ML: .5; 3 SOLUTION RESPIRATORY (INHALATION) at 12:10

## 2019-10-28 RX ADMIN — OXYCODONE HYDROCHLORIDE 5 MG: 5 SOLUTION ORAL at 09:10

## 2019-10-28 RX ADMIN — SEVELAMER CARBONATE 0.8 G: 800 POWDER, FOR SUSPENSION ORAL at 07:10

## 2019-10-28 RX ADMIN — HEPARIN SODIUM 7500 UNITS: 5000 INJECTION, SOLUTION INTRAVENOUS; SUBCUTANEOUS at 06:10

## 2019-10-28 RX ADMIN — ONDANSETRON 4 MG: 2 INJECTION INTRAMUSCULAR; INTRAVENOUS at 04:10

## 2019-10-28 RX ADMIN — FENTANYL CITRATE 25 MCG: 50 INJECTION INTRAMUSCULAR; INTRAVENOUS at 09:10

## 2019-10-28 RX ADMIN — IPRATROPIUM BROMIDE AND ALBUTEROL SULFATE 3 ML: .5; 3 SOLUTION RESPIRATORY (INHALATION) at 03:10

## 2019-10-28 RX ADMIN — LIDOCAINE 1 PATCH: 50 PATCH TOPICAL at 09:10

## 2019-10-28 NOTE — PROGRESS NOTES
[facility]  Nephrology  Progress Note    Patient Name: Pasha Harmon  MRN: [id number]  Admission Date: 10/9/2019  Hospital Length of Stay: 18 days  Attending Provider: Paulino Parrish MD   Primary Care Physician: Eze Root MD  Principal Problem:Bowel perforation      Interval History: Patient seen and examined at bedside. On trach collar 28% FiO2. 2L UOP/past 24hrs. sCr down to 2.7; from 3.2 yesterday. Resting comfortably in bed. NAEON.     Review of patient's allergies indicates:   Allergen Reactions    Meloxicam Other (See Comments)     Ulcer, GI bleed      Current Facility-Administered Medications   Medication Frequency    0.9%  NaCl infusion (for blood administration) Q24H PRN    0.9%  NaCl infusion PRN    0.9%  NaCl infusion Once    0.9%  NaCl infusion Once    acetaminophen oral solution 650 mg Q6H PRN    albuterol-ipratropium 2.5 mg-0.5 mg/3 mL nebulizer solution 3 mL Q4H PRN    albuterol-ipratropium 2.5 mg-0.5 mg/3 mL nebulizer solution 3 mL Q4H    bisacodyl suppository 10 mg Daily PRN    chlorhexidine 0.12 % solution 15 mL BID    dextrose 10% (D10W) Bolus PRN    dextrose 10% (D10W) Bolus PRN    docusate 50 mg/5 mL liquid 100 mg Daily    famotidine 40 mg/5 mL (8 mg/mL) suspension 20 mg Daily    fentaNYL injection 25 mcg Q1H PRN    fluticasone furoate-vilanterol 100-25 mcg/dose diskus inhaler 1 puff Daily    glucagon (human recombinant) injection 1 mg PRN    heparin (porcine) injection 1,000 Units PRN    heparin (porcine) injection 7,500 Units Q8H    hydrALAZINE injection 20 mg Q6H PRN    insulin aspart U-100 pen 0-5 Units QID (AC + HS) PRN    lidocaine 5 % patch 1 patch Q24H    nicotine 21 mg/24 hr 1 patch Daily    ondansetron injection 4 mg Q6H PRN    oxyCODONE 5 mg/5 mL solution 5 mg Q4H PRN    polyethylene glycol packet 17 g Daily    promethazine (PHENERGAN) 6.25 mg in dextrose 5 % 50 mL IVPB Once    sennosides 8.8 mg/5 ml syrup 10 mL Daily     sevelamer carbonate pwpk 0.8 g TID WM    sodium chloride 0.9% flush 10 mL PRN     Family History     Problem Relation (Age of Onset)    Cancer Father    Heart disease Mother        Tobacco Use    Smoking status: Current Every Day Smoker     Packs/day: 2.00     Types: Cigarettes    Smokeless tobacco: Never Used   Substance and Sexual Activity    Alcohol use: No    Drug use: No    Sexual activity: Not on file       Objective:     Vital Signs (Most Recent):  Temp: 97.1 °F (36.2 °C) (10/28/19 0750)  Pulse: 99 (10/28/19 0839)  Resp: 18 (10/28/19 0839)  BP: (!) 149/75 (10/28/19 0750)  SpO2: (!) 92 % (10/28/19 0839)  O2 Device (Oxygen Therapy): Trach Collar (10/28/19 0839) Vital Signs (24h Range):  Temp:  [97.1 °F (36.2 °C)-98.4 °F (36.9 °C)] 97.1 °F (36.2 °C)  Pulse:  [] 99  Resp:  [17-20] 18  SpO2:  [91 %-95 %] 92 %  BP: (137-165)/(68-78) 149/75     Weight: (!) 150 kg (330 lb 11 oz) (10/18/19 0500)  Body mass index is 42.44 kg/m².  Body surface area is 2.8 meters squared.    I/O last 3 completed shifts:  In: 3174 [P.O.:600; NG/GT:2574]  Out: 2995 [Urine:2805; Drains:190]    Physical Exam   Constitutional: He appears well-developed and well-nourished.   Eyes: Pupils are equal, round, and reactive to light. Conjunctivae and EOM are normal.   Neck: Normal range of motion. Neck supple.   Cardiovascular: Normal rate and regular rhythm.   Pulmonary/Chest: Effort normal. He has rales.   Trached with supplemental O2 in place    Abdominal: He exhibits no distension and no mass. There is tenderness. There is no guarding. No hernia.   Midline surgical incision to abdomen. Multiple abdominal drains noted.    Musculoskeletal: He exhibits edema (1+ pitting edema to BLE ). He exhibits no tenderness or deformity.   Neurological: He is alert.   Skin: Skin is warm and dry. No rash noted. He is not diaphoretic. No erythema. No pallor.   Psychiatric:   Follows commands and nods and gestures appropriately        Significant  Labs:  CBC:   Recent Labs   Lab 10/28/19  0603   WBC 7.76   RBC 2.90*   HGB 7.4*   HCT 25.6*   *   MCV 88   MCH 25.5*   MCHC 28.9*     CMP:   Recent Labs   Lab 10/24/19  1214  10/28/19  0603      < > 108   CALCIUM 8.8   < > 9.0   ALBUMIN 2.5*  --   --    PROT 5.9*  --   --       < > 138   K 4.9   < > 4.2   CO2 25   < > 26      < > 103   *   < > 68*   CREATININE 4.9*   < > 2.7*   ALKPHOS 100  --   --    ALT 34  --   --    AST 31  --   --    BILITOT 0.5  --   --     < > = values in this interval not displayed.     All labs within the past 24 hours have been reviewed.        Assessment/Plan:     BHASKAR (acute kidney injury)  BHASKAR 2/2 ischemic ATN due to intraoperative hypotension and acute anemia.      Plan/Recommendations:  -Hold RRT at this time and monitor labs closely. No urgent need for RRT at this time.   -Continue renvela with meals   -Avoid nephrotoxic medications  -Renally dose medications  -Avoid contrast studies   -Daily renal function panels   -Strict I/O's   -Will continue to follow closely            Thank you for your consult. I will follow-up with patient. Please contact us if you have any additional questions.    Ranjeet Judd NP  Nephrology  [facility]

## 2019-10-28 NOTE — PT/OT/SLP PROGRESS
Speech Language Pathology Treatment    Patient Name:  Pasha Harmon   MRN:  [id number]  Admitting Diagnosis: Bowel perforation    Recommendations:                 General Recommendations:  ongoing swallow assessment and PMSV trials  Diet recommendations:  Dental Soft, Liquid Diet Level: Thin   Aspiration Precautions: No straws, Small bites/sips and Standard aspiration precautions   General Precautions: Standard, fall  Communication strategies:  none    Subjective   Patient asleep, Daughter present.     Pain/Comfort:  · Pain Rating 1: 0/10    Objective:     Has the patient been evaluated by SLP for swallowing?   Yes  Keep patient NPO? No   Current Respiratory Status: room air      Upon SLP entry to room, patient asleep with PMSV in place, and daughter present.  SLP educated patient on instruction to remove valve prior to sleep, and that valve should be utilized all waking hours.   Patient remains on clear liquid diet.  Patient and daughter reported no difficulty with meals at this time.  SLP to decreased frequency of therapy at this time given patient is tolerating valve and is not appropriate for diet advancement to solids. Patient and daughter verbalized understanding to all education.     Assessment:     Pasha Harmon is a 74 y.o. male with an SLP diagnosis of S/P Trach and One Way Speaking Valve Training.   Goals:   Multidisciplinary Problems     SLP Goals        Problem: SLP Goal    Goal Priority Disciplines Outcome   SLP Goal     SLP Ongoing, Progressing   Description:  Speech Language Pathology Goals  Goals expected to be met by 10/28:  1. Pt will tolerate PMSV with O2 >93% and VSS to improve respiration and phonation.   2. Pt will demonstrate placement/removal of PMSV x5 indep.   3. Pt will vocalize x3 with PMSV in place  4. Pt will recall instructions/precautions of PMSV with 100% acc indep.   5. Pt will participate in ongoing swallow assessment to determine least restrictive diet  recommendations.                         Plan:     · Patient to be seen:  4 x/week   · Plan of Care expires:  11/21/19  · Plan of Care reviewed with:  patient, daughter   · SLP Follow-Up:  Yes       Discharge recommendations:  nursing facility, skilled   Barriers to Discharge:  None    Time Tracking:     SLP Treatment Date:   10/28/19  Speech Start Time:  1000  Speech Stop Time:  1008     Speech Total Time (min):  8 min    Billable Minutes: Therapeutic Speech Device 8    Emily Abadie, CCC-SLP  10/28/2019

## 2019-10-28 NOTE — SUBJECTIVE & OBJECTIVE
Interval History: NAEON. VSS, AF. Pain well controlled, denies nausea or vomiting with CLD. Minimal residuals throughout day; < 35 mL @ noon and 25mL @ 6pm. Patient with very close nursing care throughout shifts, family much happier with care. Will continue to work on dispo planning.     Medications:  Continuous Infusions:  Scheduled Meds:   sodium chloride 0.9%   Intravenous Once    albuterol-ipratropium  3 mL Nebulization Q4H    chlorhexidine  15 mL Mouth/Throat BID    docusate  100 mg Per J Tube Daily    famotidine  20 mg Per J Tube Daily    fluticasone furoate-vilanterol  1 puff Inhalation Daily    heparin (porcine)  7,500 Units Subcutaneous Q8H    lidocaine  1 patch Transdermal Q24H    nicotine  1 patch Transdermal Daily    polyethylene glycol  17 g Per J Tube Daily    promethazine (PHENERGAN) IVPB  6.25 mg Intravenous Once    sennosides 8.8 mg/5 ml  10 mL Per J Tube Daily    sevelamer carbonate  0.8 g Oral TID WM     PRN Meds:sodium chloride, sodium chloride 0.9%, acetaminophen, albuterol-ipratropium, bisacodyl, Dextrose 10% Bolus, Dextrose 10% Bolus, fentaNYL, glucagon (human recombinant), heparin (porcine), hydrALAZINE, insulin aspart U-100, ondansetron, oxyCODONE, sodium chloride 0.9%     Review of patient's allergies indicates:   Allergen Reactions    Meloxicam Other (See Comments)     Ulcer, GI bleed      Objective:     Vital Signs (Most Recent):  Temp: 97.7 °F (36.5 °C) (10/28/19 0405)  Pulse: 92 (10/28/19 0405)  Resp: 20 (10/28/19 0405)  BP: (!) 142/73 (10/28/19 0405)  SpO2: 95 % (10/28/19 0405) Vital Signs (24h Range):  Temp:  [97.5 °F (36.4 °C)-98.4 °F (36.9 °C)] 97.7 °F (36.5 °C)  Pulse:  [] 92  Resp:  [17-20] 20  SpO2:  [91 %-96 %] 95 %  BP: (137-165)/(68-78) 142/73     Weight: (!) 150 kg (330 lb 11 oz)  Body mass index is 42.44 kg/m².    Intake/Output - Last 3 Shifts       10/26 0700 - 10/27 0659 10/27 0700 - 10/28 0659    P.O.  600    NG/GT 1273 1875    Total Intake(mL/kg) 1277  (8.5) 2475 (16.5)    Urine (mL/kg/hr) 1815 (0.5) 2015 (0.6)    Emesis/NG output  0    Drains 80 110    Other  0    Stool 0 0    Total Output 1895 2125    Net -622 +350          Urine Occurrence  2 x    Stool Occurrence 0 x 3 x    Emesis Occurrence  0 x          Physical Exam   Constitutional: He appears well-developed and well-nourished. He is intubated (Trach in place).   Cardiovascular: Normal rate and regular rhythm.   Pulmonary/Chest: Effort normal. He is intubated (Trach in place). No respiratory distress.   On trach collar   Abdominal: Soft. He exhibits no distension. There is no tenderness. There is no guarding.   G tube in place, clamped  Jtube in place  Incision CDI   Neurological: He is alert.   Skin: Skin is warm and dry.   Vitals reviewed.      Significant Labs:  CBC:   Recent Labs   Lab 10/27/19  0426   WBC 8.07   RBC 2.90*   HGB 7.5*   HCT 24.6*   *   MCV 85   MCH 25.9*   MCHC 30.5*     CMP:   Recent Labs   Lab 10/24/19  1214  10/27/19  0426      < > 119*   CALCIUM 8.8   < > 8.9   ALBUMIN 2.5*  --   --    PROT 5.9*  --   --       < > 141   K 4.9   < > 4.2   CO2 25   < > 26      < > 104   *   < > 61*   CREATININE 4.9*   < > 3.2*   ALKPHOS 100  --   --    ALT 34  --   --    AST 31  --   --    BILITOT 0.5  --   --     < > = values in this interval not displayed.       Significant Diagnostics:  I have reviewed all pertinent imaging results/findings within the past 24 hours.

## 2019-10-28 NOTE — PLAN OF CARE
POC reviewed w/ pt, verbalized understanding. Pt AAOx4. VSS on 5L 28% trach collar. G-tube clamped, intact and patent. J-tube intact and patent with TF running at 70cc. Pain managed with PRN pain meds. Pt voids per urinal, with adequate UOP overnight. Pt up to BSC once overnight; pt became very weak and needed 4 people to assist pt back to bed. Would not recommend to get pt OOB. Pt tolerating clear diet with no c/o nausea. Pt slept between care. Frequent rounds made for pt safety. Call light in reach and bed in lowest position. TM

## 2019-10-28 NOTE — PLAN OF CARE
Problem: SLP Goal  Goal: SLP Goal  Description  Speech Language Pathology Goals  Goals expected to be met by 10/28:  1. Pt will tolerate PMSV with O2 >93% and VSS to improve respiration and phonation.   2. Pt will demonstrate placement/removal of PMSV x5 indep.   3. Pt will vocalize x3 with PMSV in place  4. Pt will recall instructions/precautions of PMSV with 100% acc indep.   5. Pt will participate in ongoing swallow assessment to determine least restrictive diet recommendations.        Outcome: Ongoing, Progressing     Goals remain appropriate.   Emily P. Abadie M.S., CCC-SLP  Speech Language Pathologist  [phone]  10/28/2019

## 2019-10-28 NOTE — PLAN OF CARE
Pt's functional outcomes remain appropriate and he continues to progress well w/ therapy.       Problem: Occupational Therapy Goal  Goal: Occupational Therapy Goal  Description  Goals to be met by: 10/25/2019    Patient will increase functional independence with ADLs by performing:    Feeding with [place].  UE Dressing with Minimal Assistance.  LE Dressing with Moderate Assistance.  Grooming while standing with Minimal Assistance.  Toileting from bedside commode with Minimal Assistance for hygiene and clothing management.   Supine to sit with Minimal Assistance. -- Met (10/28)  Stand pivot transfers with Moderate Assistance. -- Met (10/28)  Toilet transfer to bedside commode with Moderate Assistance.      Outcome: Ongoing, Progressing

## 2019-10-28 NOTE — SUBJECTIVE & OBJECTIVE
Interval History: Patient seen and examined at bedside. On trach collar 28% FiO2. 2L UOP/past 24hrs. sCr down to 2.7; from 3.2 yesterday. Resting comfortably in bed. NAEON.     Review of patient's allergies indicates:   Allergen Reactions    Meloxicam Other (See Comments)     Ulcer, GI bleed      Current Facility-Administered Medications   Medication Frequency    0.9%  NaCl infusion (for blood administration) Q24H PRN    0.9%  NaCl infusion PRN    0.9%  NaCl infusion Once    0.9%  NaCl infusion Once    acetaminophen oral solution 650 mg Q6H PRN    albuterol-ipratropium 2.5 mg-0.5 mg/3 mL nebulizer solution 3 mL Q4H PRN    albuterol-ipratropium 2.5 mg-0.5 mg/3 mL nebulizer solution 3 mL Q4H    bisacodyl suppository 10 mg Daily PRN    chlorhexidine 0.12 % solution 15 mL BID    dextrose 10% (D10W) Bolus PRN    dextrose 10% (D10W) Bolus PRN    docusate 50 mg/5 mL liquid 100 mg Daily    famotidine 40 mg/5 mL (8 mg/mL) suspension 20 mg Daily    fentaNYL injection 25 mcg Q1H PRN    fluticasone furoate-vilanterol 100-25 mcg/dose diskus inhaler 1 puff Daily    glucagon (human recombinant) injection 1 mg PRN    heparin (porcine) injection 1,000 Units PRN    heparin (porcine) injection 7,500 Units Q8H    hydrALAZINE injection 20 mg Q6H PRN    insulin aspart U-100 pen 0-5 Units QID (AC + HS) PRN    lidocaine 5 % patch 1 patch Q24H    nicotine 21 mg/24 hr 1 patch Daily    ondansetron injection 4 mg Q6H PRN    oxyCODONE 5 mg/5 mL solution 5 mg Q4H PRN    polyethylene glycol packet 17 g Daily    promethazine (PHENERGAN) 6.25 mg in dextrose 5 % 50 mL IVPB Once    sennosides 8.8 mg/5 ml syrup 10 mL Daily    sevelamer carbonate pwpk 0.8 g TID WM    sodium chloride 0.9% flush 10 mL PRN     Family History     Problem Relation (Age of Onset)    Cancer Father    Heart disease Mother        Tobacco Use    Smoking status: Current Every Day Smoker     Packs/day: 2.00     Types: Cigarettes    Smokeless  tobacco: Never Used   Substance and Sexual Activity    Alcohol use: No    Drug use: No    Sexual activity: Not on file       Objective:     Vital Signs (Most Recent):  Temp: 97.1 °F (36.2 °C) (10/28/19 0750)  Pulse: 99 (10/28/19 0839)  Resp: 18 (10/28/19 0839)  BP: (!) 149/75 (10/28/19 0750)  SpO2: (!) 92 % (10/28/19 0839)  O2 Device (Oxygen Therapy): Trach Collar (10/28/19 0839) Vital Signs (24h Range):  Temp:  [97.1 °F (36.2 °C)-98.4 °F (36.9 °C)] 97.1 °F (36.2 °C)  Pulse:  [] 99  Resp:  [17-20] 18  SpO2:  [91 %-95 %] 92 %  BP: (137-165)/(68-78) 149/75     Weight: (!) 150 kg (330 lb 11 oz) (10/18/19 0500)  Body mass index is 42.44 kg/m².  Body surface area is 2.8 meters squared.    I/O last 3 completed shifts:  In: 3174 [P.O.:600; NG/GT:2574]  Out: 2995 [Urine:2805; Drains:190]    Physical Exam   Constitutional: He appears well-developed and well-nourished.   Eyes: Pupils are equal, round, and reactive to light. Conjunctivae and EOM are normal.   Neck: Normal range of motion. Neck supple.   Cardiovascular: Normal rate and regular rhythm.   Pulmonary/Chest: Effort normal. He has rales.   Trached with supplemental O2 in place    Abdominal: He exhibits no distension and no mass. There is tenderness. There is no guarding. No hernia.   Midline surgical incision to abdomen. Multiple abdominal drains noted.    Musculoskeletal: He exhibits edema (1+ pitting edema to BLE ). He exhibits no tenderness or deformity.   Neurological: He is alert.   Skin: Skin is warm and dry. No rash noted. He is not diaphoretic. No erythema. No pallor.   Psychiatric:   Follows commands and nods and gestures appropriately        Significant Labs:  CBC:   Recent Labs   Lab 10/28/19  0603   WBC 7.76   RBC 2.90*   HGB 7.4*   HCT 25.6*   *   MCV 88   MCH 25.5*   MCHC 28.9*     CMP:   Recent Labs   Lab 10/24/19  1214  10/28/19  0603      < > 108   CALCIUM 8.8   < > 9.0   ALBUMIN 2.5*  --   --    PROT 5.9*  --   --        < > 138   K 4.9   < > 4.2   CO2 25   < > 26      < > 103   *   < > 68*   CREATININE 4.9*   < > 2.7*   ALKPHOS 100  --   --    ALT 34  --   --    AST 31  --   --    BILITOT 0.5  --   --     < > = values in this interval not displayed.     All labs within the past 24 hours have been reviewed.

## 2019-10-28 NOTE — ASSESSMENT & PLAN NOTE
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 Trach placed on 10/17/19. Permacath 10/24/19.    Plan:  Clamp G-tube today.  J tube feeds at goal.  Continue on trach collar.  Only move with nursing assistance; has decannulated twice recently with moving.  Passed swallow study, tolerating CLD without n/v. Minimal residuals.   Condom cath/diaper to improve wound care  PT/OT  Ambulate    Progressing well, stable for discharge to LTAC. Awaiting insurance approval.

## 2019-10-28 NOTE — PROGRESS NOTES
"[facility]  General Surgery  Progress Note    Subjective:     History of Present Illness:  Patient is a 74 yo M with history of COPD and bleeding gastric mass who underwent EGD with AES yesterday where they removed a 7 cm "ulcerated lipoma" from his gastric antrum using submucosal endoscopic dissection. When removing the mass, it became stuck in the distal esophagus. They had to removed it piecemeal, and caused some mucosal esophageal tears. He was admitted for obs overnight, and this morning his WBC was elevated and he was complaining of abd pain. CXR showed free intraperitoneal free air and general surgery was consulted for evaluation.    Post-Op Info:  Procedure(s) (LRB):  Right IJ Hemodialysis Catheter Insertion (Right)   4 Days Post-Op     Interval History: NAEON. VSS, AF. Pain well controlled, denies nausea or vomiting with CLD. Minimal residuals throughout day; < 35 mL @ noon and 25mL @ 6pm. Patient with very close nursing care throughout shifts, family much happier with care. Will continue to work on dispo planning.     Medications:  Continuous Infusions:  Scheduled Meds:   sodium chloride 0.9%   Intravenous Once    albuterol-ipratropium  3 mL Nebulization Q4H    chlorhexidine  15 mL Mouth/Throat BID    docusate  100 mg Per J Tube Daily    famotidine  20 mg Per J Tube Daily    fluticasone furoate-vilanterol  1 puff Inhalation Daily    heparin (porcine)  7,500 Units Subcutaneous Q8H    lidocaine  1 patch Transdermal Q24H    nicotine  1 patch Transdermal Daily    polyethylene glycol  17 g Per J Tube Daily    promethazine (PHENERGAN) IVPB  6.25 mg Intravenous Once    sennosides 8.8 mg/5 ml  10 mL Per J Tube Daily    sevelamer carbonate  0.8 g Oral TID WM     PRN Meds:sodium chloride, sodium chloride 0.9%, acetaminophen, albuterol-ipratropium, bisacodyl, Dextrose 10% Bolus, Dextrose 10% Bolus, fentaNYL, glucagon (human recombinant), heparin (porcine), hydrALAZINE, insulin aspart " U-100, ondansetron, oxyCODONE, sodium chloride 0.9%     Review of patient's allergies indicates:   Allergen Reactions    Meloxicam Other (See Comments)     Ulcer, GI bleed      Objective:     Vital Signs (Most Recent):  Temp: 97.7 °F (36.5 °C) (10/28/19 0405)  Pulse: 92 (10/28/19 0405)  Resp: 20 (10/28/19 0405)  BP: (!) 142/73 (10/28/19 0405)  SpO2: 95 % (10/28/19 0405) Vital Signs (24h Range):  Temp:  [97.5 °F (36.4 °C)-98.4 °F (36.9 °C)] 97.7 °F (36.5 °C)  Pulse:  [] 92  Resp:  [17-20] 20  SpO2:  [91 %-96 %] 95 %  BP: (137-165)/(68-78) 142/73     Weight: (!) 150 kg (330 lb 11 oz)  Body mass index is 42.44 kg/m².    Intake/Output - Last 3 Shifts       10/26 0700 - 10/27 0659 10/27 0700 - 10/28 0659    P.O.  600    NG/GT 1273 1875    Total Intake(mL/kg) 1273 (8.5) 2475 (16.5)    Urine (mL/kg/hr) 1815 (0.5) 2015 (0.6)    Emesis/NG output  0    Drains 80 110    Other  0    Stool 0 0    Total Output 1895 2125    Net -622 +350          Urine Occurrence  2 x    Stool Occurrence 0 x 3 x    Emesis Occurrence  0 x          Physical Exam   Constitutional: He appears well-developed and well-nourished. He is intubated (Trach in place).   Cardiovascular: Normal rate and regular rhythm.   Pulmonary/Chest: Effort normal. He is intubated (Trach in place). No respiratory distress.   On trach collar   Abdominal: Soft. He exhibits no distension. There is no tenderness. There is no guarding.   G tube in place, clamped  Jtube in place  Incision CDI   Neurological: He is alert.   Skin: Skin is warm and dry.   Vitals reviewed.      Significant Labs:  CBC:   Recent Labs   Lab 10/27/19  0426   WBC 8.07   RBC 2.90*   HGB 7.5*   HCT 24.6*   *   MCV 85   MCH 25.9*   MCHC 30.5*     CMP:   Recent Labs   Lab 10/24/19  1214  10/27/19  0426      < > 119*   CALCIUM 8.8   < > 8.9   ALBUMIN 2.5*  --   --    PROT 5.9*  --   --       < > 141   K 4.9   < > 4.2   CO2 25   < > 26      < > 104   *   < > 61*    CREATININE 4.9*   < > 3.2*   ALKPHOS 100  --   --    ALT 34  --   --    AST 31  --   --    BILITOT 0.5  --   --     < > = values in this interval not displayed.       Significant Diagnostics:  I have reviewed all pertinent imaging results/findings within the past 24 hours.    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 Trach placed on 10/17/19. Permacath 10/24/19.    Plan:  Clamp G-tube today.  J tube feeds at goal.  Continue on trach collar.  Only move with nursing assistance; has decannulated twice recently with moving.  Passed swallow study, tolerating CLD without n/v. Minimal residuals.   Condom cath/diaper to improve wound care  PT/OT  Ambulate    Progressing well, stable for discharge to LTAC. Awaiting insurance approval.         Cynthia Bautista MD  General Surgery  [facility]

## 2019-10-28 NOTE — NURSING
POC reviewed. VSS on 5L/28% TC. #6 Shiley trach intact. ML incision are CDI. G-tube clamped with Q8 hr trials, <35 ml @ noon and 25ml @ 6pm. J-tube intact and flushes well with no resistance. Continuous TF @ goal rate of 70 ml/hr since 7am. Oral diet advanced to Clear liquids with no complaints of nausea. Ordered suppository effective today with two large continent BMs. Incontinent of urine @ times d/t hesitancy and unable to direct a urinal. Frequent perineal care complete with pads changed each time, with barrier paste applied to sacrum. Sacrum red, nonblanchable with darken skin noted. Patient's daughters blame skin breakdown to not turning patient. Waffle mattress applied to bed yesterday by me. Pt turned at least every two hrs with use of wedge and/or pillows. Pain now better controlled with Oxy elixir per J-tube, with IV Fentanyl available for breakthrough pain. Pt verbalizes improvements with recommends d/c fentanyl IV and add 10 mg Oxy elixir per J-tube. Daughters x 3 and family rotate bedside assistance to patient and complains constantly that night shift hasn't been turning patient, pt sat in urine for hours, never had bath since admit, and room not cleaned or mopped in over a day. Throughout shift with me pt has been turned at least every 2 hrs with initials marked on white board for presence. Complete sop and water bedbath complete with RN and PCT @ 4pm. Pt able to assist with turning side-to-side for linen change with mild SOB noted otherwise tolerated bath and linen change. Floor was mopped today. Daughter that arrived at end of shift has taken extra initative to rub lotion on patient, trimming finger nails, etc. No acute distress or needs at this time. TM

## 2019-10-28 NOTE — ASSESSMENT & PLAN NOTE
BHASKAR 2/2 ischemic ATN due to intraoperative hypotension and acute anemia.      Plan/Recommendations:  -Hold RRT at this time and monitor labs closely. No urgent need for RRT at this time.   -Continue renvela with meals   -Avoid nephrotoxic medications  -Renally dose medications  -Avoid contrast studies   -Daily renal function panels   -Strict I/O's   -Will continue to follow closely

## 2019-10-28 NOTE — TREATMENT PLAN
"AES Treatment Plan  10/28/2019  8:54 AM    Interval History:  No acute events overnight and patient with no complaints this morning.    Focused Physical Exam:  BP (!) 149/75 (BP Location: Right arm, Patient Position: Lying)   Pulse 99   Temp 97.1 °F (36.2 °C) (Oral)   Resp 18   Ht 6' 2.02" (1.88 m)   Wt (!) 150 kg (330 lb 11 oz)   SpO2 (!) 92%   BMI 42.44 kg/m²   General: patient comfortable with trach collar  Abdomen: soft with G tube (clamped) and J tube (feeds at 70 cc/hour)    Labs:   10/28/2019 06:03   Sodium 138   Potassium 4.2   Chloride 103   CO2 26   Anion Gap 9   BUN, Bld 68 (H)   Creatinine 2.7 (H)   eGFR if non  22.4 (A)   eGFR if African American 25.9 (A)   Glucose 108   Calcium 9.0   Phosphorus 4.8 (H)   Magnesium 2.0      10/28/2019 06:03   WBC 7.76   RBC 2.90 (L)   Hemoglobin 7.4 (L)   Hematocrit 25.6 (L)   MCV 88   MCH 25.5 (L)   MCHC 28.9 (L)   RDW 19.0 (H)   Platelets 598 (H)     Recommendations:  --Diet per primary team   --Nephrology recs appreciated  --We will continue to follow alongside primary team     Oli Cristina M.D.  Gastroenterology Fellow, PGY-VI  Pager: [phone]  [facility]    "

## 2019-10-28 NOTE — PT/OT/SLP PROGRESS
Occupational Therapy   Treatment    Name: Pasha Harmon  MRN: [id number]  Admitting Diagnosis:  Bowel perforation  4 Days Post-Op    Recommendations:     Discharge Recommendations: nursing facility, skilled  Discharge Equipment Recommendations:  (TBD as pt progresses)  Barriers to discharge:  None    Assessment:     Pasha Harmon is a 74 y.o. male with a medical diagnosis of Bowel perforation.  He presents with the following performance deficits affecting function:  weakness, impaired endurance, impaired self care skills, impaired functional mobilty, gait instability, impaired balance, decreased safety awareness, impaired cardiopulmonary response to activity.     Pt tolerated session well. He presents w/ functional improvements evidenced by increased activity tolerance and decreased assistance required to perform functional mobility tasks. Pt educated on LBD adaptive technique but continues to require increased assistance. Pt demonstrated ability to bring LE into figure four position in long sitting but very adamant that he was unable to perform sitting EOB. Pt required CGA for bed mobility and Min A x2 for sit<>stand from EOB w/ HHA. Pt required Mod A to step transfer to bedside commode due to impaired balance and B LE weakness. Pt educated on B LE strengthening exercises seated UIC and performed all w/ good understanding. He continues to benefit from skilled OT to address the above listed impairments.     Rehab Prognosis:  Good; patient would benefit from acute skilled OT services to address these deficits and reach maximum level of function.       Plan:     Patient to be seen 4 x/week to address the above listed problems via self-care/home management, therapeutic activities, therapeutic exercises  · Plan of Care Expires: 11/25/19  · Plan of Care Reviewed with: patient, daughter    Subjective     Pain/Comfort:  · Pain Rating 1: (did not rate)  · Location - Orientation 1: generalized  · Location 1:  neck  · Pain Addressed 1: Reposition, Distraction  · Pain Rating Post-Intervention 1: 0/10    Objective:     Communicated with: RN prior to session.  Patient found HOB elevated with telemetry, tracheostomy, PEG Tube, DENISE drain(J tube) upon OT entry to room.    General Precautions: Standard, fall   Orthopedic Precautions:N/A   Braces: N/A     Occupational Performance:     Bed Mobility:    · Patient completed Rolling/Turning to Right with contact guard assistance  · Patient completed Scooting/Bridging with contact guard assistance  · Patient completed Supine to Sit with contact guard assistance     Functional Mobility/Transfers:  · Patient completed Sit <> Stand Transfer with minimum assistance and of 2 persons  with  hand-held assist   · Patient completed Bed <> Chair Transfer using Step Transfer technique with moderate assistance with hand-held assist  · Functional Mobility: Pt ambulated 3-4 steps to bedside chair from EOB w/ Mod A and HHA. Pt very fearful of functional ambulation but did not experience  LOB or any other complication w/ transfer.     Activities of Daily Living:  · Lower Body Dressing: maximal assistance donning B non skid socks w/ max education and encouragement for pt attempt      [facility] 6 Click ADL: 11    Treatment & Education:  - OT POC  - Importance of OOB activity to maximize recovery   - Safety w/ functional mobility; hand placement to ensure safe transfers  - LBD adaptive technique - requires reinforcement  - B LE strengthening exercises seated [facility]    Patient left up in chair with all lines intact, call button in reach, RN notified and daughter presentEducation:      GOALS:   Multidisciplinary Problems     Occupational Therapy Goals        Problem: Occupational Therapy Goal    Goal Priority Disciplines Outcome Interventions   Occupational Therapy Goal     OT, PT/OT Ongoing, Progressing    Description:  Goals to be met by: 10/25/2019    Patient will increase functional independence with ADLs by  performing:    Feeding with [place].  UE Dressing with Minimal Assistance.  LE Dressing with Moderate Assistance.  Grooming while standing with Minimal Assistance.  Toileting from bedside commode with Minimal Assistance for hygiene and clothing management.   Supine to sit with Minimal Assistance. -- Met (10/28)  Stand pivot transfers with Moderate Assistance. -- Met (10/28)  Toilet transfer to bedside commode with Moderate Assistance.                       Time Tracking:     OT Date of Treatment: 10/28/19  OT Start Time: 1038  OT Stop Time: 1102  OT Total Time (min): 24 min    Billable Minutes:Self Care/Home Management 14  Therapeutic Exercise 10    Jeanie Dozier OT  10/28/2019

## 2019-10-28 NOTE — PLAN OF CARE
10/28/19 1116   Post-Acute Status   Post-Acute Authorization Placement   Post-Acute Placement Status Pending Payor Medical Review   Sw spoke with Lalita from [employer]. She reported she will be at [facility] today and will initiate family appeal on their end regarding the denial.    Mona Jj, NICHOLAS  [facility]  [phone]

## 2019-10-28 NOTE — PLAN OF CARE
Problem: Physical Therapy Goal  Goal: Physical Therapy Goal  Description  Goals to be met by: 11/10/19     Patient will increase functional independence with mobility by performin. Supine to sit with Maximum Assistance-met 10/24  1a. Supine to sit with Minimum Assistance -  Met 10/28  Revised goal: supine to sit with CGA-not met  2. Sit to supine with Maximum Assistance -  Met 10/28  Revised goal: sit to supine with moderate assist-not met  3. Rolling to Left and Right with Maximum Assistance and use of bedrail as needed. -  Met 10/28  Revised goal: roll to L and R with bedrail with CGA-not met  4. Sitting at edge of bed x10 minutes with M aximum Assistance -  met  5. Lower extremity exercise program x10 reps per handout, with assistance as needed - not met  6. Sit <> stand with Moderate Assistance using LRAD - not met  7. Stand for 3 minutes with HHA or LRAD with CGA to demonstrate improved standing tolerance  8. Gait 40ft with RW with minimal assist-not met  9. PT up/down 3 steps with L UE rail with moderate assist-not met        Outcome: Ongoing, Progressing   Pt's goals revised as needed and pt will continue to benefit from skilled PT services to work towards improved functional mobility including: bed mobility, transfers, up/down steps, and gait.   Ave Santoro, PT  10/28/2019

## 2019-10-28 NOTE — PT/OT/SLP PROGRESS
"Physical Therapy Treatment    Patient Name:  Pasha Harmon   MRN:  [id number]    Recommendations:     Discharge Recommendations:  nursing facility, skilled   Discharge Equipment Recommendations: (TBD as pt progresses)   Barriers to discharge: Inaccessible home 3 EVELINA with L UE rail    Assessment:     Pasha Harmon is a 74 y.o. male admitted with a medical diagnosis of Bowel perforation.  He presents with the following impairments/functional limitations:  weakness, impaired endurance, gait instability, impaired balance, impaired functional mobilty, decreased safety awareness, impaired cardiopulmonary response to activity . Pt has fearful of falling , required verbal and manual cues to reassure pt.     Rehab Prognosis: Good; patient would benefit from acute skilled PT services to address these deficits and reach maximum level of function.    Recent Surgery: Procedure(s) (LRB):  Right IJ Hemodialysis Catheter Insertion (Right) 4 Days Post-Op    Plan:     During this hospitalization, patient to be seen 4 x/week to address the identified rehab impairments via gait training, therapeutic activities, therapeutic exercises, neuromuscular re-education and progress toward the following goals:    · Plan of Care Expires:  11/05/19    Subjective   "My goal is to walk but I just don't want to fall"    Pain/Comfort:  · Pain Rating 1: (pt c/o neck pain from the pad securing his trach, did not grade)  · Location - Side 1: Right  · Location 1: neck  · Pain Addressed 1: Reposition, Distraction  · Pain Rating Post-Intervention 1: 0/10      Objective:     Communicated with nurse prior to session.  Patient found supine with telemetry, tracheostomy, PEG Tube, DENISE drain(J tube) upon PT entry to room.     General Precautions: Standard, fall   Orthopedic Precautions:N/A   Braces: N/A     Functional Mobility:  · Bed Mobility:     · Rolling Left:  minimum assistance  · Rolling Right: minimum assistance  · Supine to Sit: minimum " assistance  · Sit to Supine: minimum assistance  · Transfers:     · Sit to Stand:  moderate assistance and of 2 persons with hand-held assist  · Bed to bedside Chair; bedside chair to bedside commode; bedside commode to bed: moderate assistance and of 2 persons with  hand-held assist  using  Stand Pivot  · Gait: 3 sidesteps to bedside chair, 4 sidesteps to bedside commode, 8 steps to bed from [facility] with B HHA with moderate assist. pt performed gait with flexed trunk, decreased clearance of B feet during swing phase, and c/o weakness and SOB after gait.     AM-PAC 6 CLICK MOBILITY  Turning over in bed (including adjusting bedclothes, sheets and blankets)?: 3  Sitting down on and standing up from a chair with arms (e.g., wheelchair, bedside commode, etc.): 2  Moving from lying on back to sitting on the side of the bed?: 3  Moving to and from a bed to a chair (including a wheelchair)?: 2  Need to walk in hospital room?: 2  Climbing 3-5 steps with a railing?: 1  Basic Mobility Total Score: 13     Therapeutic Activities and Exercises:   Pt sat on the EOB ~ 10 min with CGA/minimal assist prior to transfers. At one point, pt leaned to the L onto his elbow to pull his R LE onto the bed, pt required moderate assist to return to midline position.    Patient left supine (pt remained sitting up in bedside chair ~ 40 min prior to return to bed)with all lines intact, call button in reach, nurse  notified and family present..    GOALS:   Multidisciplinary Problems     Physical Therapy Goals        Problem: Physical Therapy Goal    Goal Priority Disciplines Outcome Goal Variances Interventions   Physical Therapy Goal     PT, PT/OT Ongoing, Progressing     Description:  Goals to be met by: 11/10/19     Patient will increase functional independence with mobility by performin. Supine to sit with Maximum Assistance-met 10/24  1a. Supine to sit with Minimum Assistance -  Met 10/28  Revised goal: supine to sit with CGA-not met  2.  Sit to supine with Maximum Assistance -  Met 10/28  Revised goal: sit to supine with moderate assist-not met  3. Rolling to Left and Right with Maximum Assistance and use of bedrail as needed. -  Met 10/28  Revised goal: roll to L and R with bedrail with CGA-not met  4. Sitting at edge of bed x10 minutes with M aximum Assistance -  met  5. Lower extremity exercise program x10 reps per handout, with assistance as needed - not met  6. Sit <> stand with Moderate Assistance using LRAD - not met  7. Stand for 3 minutes with HHA or LRAD with CGA to demonstrate improved standing tolerance  8. Gait 40ft with RW with minimal assist-not met                        Time Tracking:     PT Received On: 10/28/19  PT Start Time: 1040     PT Stop Time: 1121(time added due to PT returned to work with pt)  PT Total Time (min): 41 min     Billable Minutes: Gait Training 25 and Therapeutic Activity 16    Treatment Type: Treatment  PT/PTA: PT     PTA Visit Number: 1     Ave Santoro, PT  10/28/2019

## 2019-10-28 NOTE — PLAN OF CARE
"10:49-11:15 AM Called Expedited line, speaking to many representatives before getting to Expedited rep: [phone], Opt 3, asking her what is the status of fast appeal, filed 10/25, for denied LTAC services. (Expedited appeal submitted w/Ref # [id number], giving her Dr's: 1. ALEJANDRO Hopesee # above, & 2. REX BLANCO [phone].) She states they are waiting for "AOR form from patient w/pt signature;Appointment of Representative form." She directed CM on how to obtain this form, at [email or web address] down loaded AOR form, completed form, & obtained patient's signature. Faxed completed AOR form to F [phone].  "

## 2019-10-28 NOTE — PROGRESS NOTES
Patient with excellent urine output and decreasing creatinine. Will hold dialysis today, patient is recovering his renal function.

## 2019-10-28 NOTE — PROGRESS NOTES
Plan of care reviewed with patient who verbalized understanding.  Speaks well with PMV.  #6 shiley in place.  Turned Q 2 hours using a wedge.  No redness noted to sacral area, daughter did get to see the pt's skin and agreed it looks much better.  Pt denied heel protectors.  Tube feeds at goal.  No complaints of nausea.  Call bell remains within reach.  Bed in lowest position.  Frequent rounds made for pt safety.  Patient remains free of any new or additional falls/injury at this time.  VSS, will continue to monitor.    This afternoon pt noted to desat while sleeping.  Pt reports he has a CPAP machine at home and sometimes wakes up feeling SOB.  Dr. Bautista notified, ok to put pt on 8L 35% TC while asleep to prevent sats from dropping.  Tele and continuous pulse ox on patient.

## 2019-10-29 LAB
ANION GAP SERPL CALC-SCNC: 10 MMOL/L (ref 8–16)
BASOPHILS # BLD AUTO: 0.04 K/UL (ref 0–0.2)
BASOPHILS NFR BLD: 0.5 % (ref 0–1.9)
BUN SERPL-MCNC: 64 MG/DL (ref 8–23)
CALCIUM SERPL-MCNC: 9.2 MG/DL (ref 8.7–10.5)
CHLORIDE SERPL-SCNC: 101 MMOL/L (ref 95–110)
CO2 SERPL-SCNC: 25 MMOL/L (ref 23–29)
CREAT SERPL-MCNC: 2.2 MG/DL (ref 0.5–1.4)
DIFFERENTIAL METHOD: ABNORMAL
EOSINOPHIL # BLD AUTO: 0.3 K/UL (ref 0–0.5)
EOSINOPHIL NFR BLD: 3.4 % (ref 0–8)
ERYTHROCYTE [DISTWIDTH] IN BLOOD BY AUTOMATED COUNT: 19.5 % (ref 11.5–14.5)
EST. GFR  (AFRICAN AMERICAN): 33.1 ML/MIN/1.73 M^2
EST. GFR  (NON AFRICAN AMERICAN): 28.7 ML/MIN/1.73 M^2
GLUCOSE SERPL-MCNC: 110 MG/DL (ref 70–110)
HCT VFR BLD AUTO: 23.3 % (ref 40–54)
HGB BLD-MCNC: 7.3 G/DL (ref 14–18)
IMM GRANULOCYTES # BLD AUTO: 0.07 K/UL (ref 0–0.04)
IMM GRANULOCYTES NFR BLD AUTO: 0.9 % (ref 0–0.5)
LYMPHOCYTES # BLD AUTO: 1 K/UL (ref 1–4.8)
LYMPHOCYTES NFR BLD: 12.5 % (ref 18–48)
MAGNESIUM SERPL-MCNC: 1.8 MG/DL (ref 1.6–2.6)
MCH RBC QN AUTO: 26.5 PG (ref 27–31)
MCHC RBC AUTO-ENTMCNC: 31.3 G/DL (ref 32–36)
MCV RBC AUTO: 85 FL (ref 82–98)
MONOCYTES # BLD AUTO: 0.9 K/UL (ref 0.3–1)
MONOCYTES NFR BLD: 10.7 % (ref 4–15)
NEUTROPHILS # BLD AUTO: 5.7 K/UL (ref 1.8–7.7)
NEUTROPHILS NFR BLD: 72 % (ref 38–73)
NRBC BLD-RTO: 0 /100 WBC
PHOSPHATE SERPL-MCNC: 4.2 MG/DL (ref 2.7–4.5)
PLATELET # BLD AUTO: 562 K/UL (ref 150–350)
PMV BLD AUTO: 10.9 FL (ref 9.2–12.9)
POCT GLUCOSE: 100 MG/DL (ref 70–110)
POCT GLUCOSE: 109 MG/DL (ref 70–110)
POCT GLUCOSE: 110 MG/DL (ref 70–110)
POCT GLUCOSE: 112 MG/DL (ref 70–110)
POCT GLUCOSE: 117 MG/DL (ref 70–110)
POCT GLUCOSE: 142 MG/DL (ref 70–110)
POTASSIUM SERPL-SCNC: 4.6 MMOL/L (ref 3.5–5.1)
RBC # BLD AUTO: 2.75 M/UL (ref 4.6–6.2)
SODIUM SERPL-SCNC: 136 MMOL/L (ref 136–145)
WBC # BLD AUTO: 7.91 K/UL (ref 3.9–12.7)

## 2019-10-29 PROCEDURE — 25000003 PHARM REV CODE 250: Performed by: STUDENT IN AN ORGANIZED HEALTH CARE EDUCATION/TRAINING PROGRAM

## 2019-10-29 PROCEDURE — 63600175 PHARM REV CODE 636 W HCPCS: Performed by: STUDENT IN AN ORGANIZED HEALTH CARE EDUCATION/TRAINING PROGRAM

## 2019-10-29 PROCEDURE — 25000242 PHARM REV CODE 250 ALT 637 W/ HCPCS: Performed by: STUDENT IN AN ORGANIZED HEALTH CARE EDUCATION/TRAINING PROGRAM

## 2019-10-29 PROCEDURE — 97530 THERAPEUTIC ACTIVITIES: CPT

## 2019-10-29 PROCEDURE — 63600175 PHARM REV CODE 636 W HCPCS: Performed by: SURGERY

## 2019-10-29 PROCEDURE — 83735 ASSAY OF MAGNESIUM: CPT

## 2019-10-29 PROCEDURE — 20600001 HC STEP DOWN PRIVATE ROOM

## 2019-10-29 PROCEDURE — 85025 COMPLETE CBC W/AUTO DIFF WBC: CPT

## 2019-10-29 PROCEDURE — 99232 PR SUBSEQUENT HOSPITAL CARE,LEVL II: ICD-10-PCS | Mod: ,,, | Performed by: INTERNAL MEDICINE

## 2019-10-29 PROCEDURE — 36415 COLL VENOUS BLD VENIPUNCTURE: CPT

## 2019-10-29 PROCEDURE — 27000221 HC OXYGEN, UP TO 24 HOURS

## 2019-10-29 PROCEDURE — 84100 ASSAY OF PHOSPHORUS: CPT

## 2019-10-29 PROCEDURE — 94668 MNPJ CHEST WALL SBSQ: CPT

## 2019-10-29 PROCEDURE — 31720 CLEARANCE OF AIRWAYS: CPT

## 2019-10-29 PROCEDURE — 25000003 PHARM REV CODE 250: Performed by: PHYSICIAN ASSISTANT

## 2019-10-29 PROCEDURE — 25000242 PHARM REV CODE 250 ALT 637 W/ HCPCS: Performed by: PHYSICIAN ASSISTANT

## 2019-10-29 PROCEDURE — 99900026 HC AIRWAY MAINTENANCE (STAT)

## 2019-10-29 PROCEDURE — S4991 NICOTINE PATCH NONLEGEND: HCPCS | Performed by: PHYSICIAN ASSISTANT

## 2019-10-29 PROCEDURE — 94640 AIRWAY INHALATION TREATMENT: CPT

## 2019-10-29 PROCEDURE — 94761 N-INVAS EAR/PLS OXIMETRY MLT: CPT

## 2019-10-29 PROCEDURE — 99232 SBSQ HOSP IP/OBS MODERATE 35: CPT | Mod: ,,, | Performed by: INTERNAL MEDICINE

## 2019-10-29 PROCEDURE — 25000003 PHARM REV CODE 250: Performed by: NURSE PRACTITIONER

## 2019-10-29 PROCEDURE — 99900035 HC TECH TIME PER 15 MIN (STAT)

## 2019-10-29 PROCEDURE — 80048 BASIC METABOLIC PNL TOTAL CA: CPT

## 2019-10-29 RX ADMIN — FLUTICASONE FUROATE AND VILANTEROL TRIFENATATE 1 PUFF: 100; 25 POWDER RESPIRATORY (INHALATION) at 09:10

## 2019-10-29 RX ADMIN — SENNOSIDES 10 ML: 8.8 SYRUP ORAL at 09:10

## 2019-10-29 RX ADMIN — SEVELAMER CARBONATE 0.8 G: 800 POWDER, FOR SUSPENSION ORAL at 09:10

## 2019-10-29 RX ADMIN — POLYETHYLENE GLYCOL 3350 17 G: 17 POWDER, FOR SOLUTION ORAL at 09:10

## 2019-10-29 RX ADMIN — IPRATROPIUM BROMIDE AND ALBUTEROL SULFATE 3 ML: .5; 3 SOLUTION RESPIRATORY (INHALATION) at 12:10

## 2019-10-29 RX ADMIN — ONDANSETRON 4 MG: 2 INJECTION INTRAMUSCULAR; INTRAVENOUS at 04:10

## 2019-10-29 RX ADMIN — HEPARIN SODIUM 7500 UNITS: 5000 INJECTION, SOLUTION INTRAVENOUS; SUBCUTANEOUS at 09:10

## 2019-10-29 RX ADMIN — OXYCODONE HYDROCHLORIDE 5 MG: 5 SOLUTION ORAL at 01:10

## 2019-10-29 RX ADMIN — FAMOTIDINE 20 MG: 40 POWDER, FOR SUSPENSION ORAL at 09:10

## 2019-10-29 RX ADMIN — IPRATROPIUM BROMIDE AND ALBUTEROL SULFATE 3 ML: .5; 3 SOLUTION RESPIRATORY (INHALATION) at 08:10

## 2019-10-29 RX ADMIN — NICOTINE 1 PATCH: 21 PATCH, EXTENDED RELEASE TRANSDERMAL at 09:10

## 2019-10-29 RX ADMIN — SEVELAMER CARBONATE 0.8 G: 800 POWDER, FOR SUSPENSION ORAL at 12:10

## 2019-10-29 RX ADMIN — LIDOCAINE 1 PATCH: 50 PATCH TOPICAL at 09:10

## 2019-10-29 RX ADMIN — IPRATROPIUM BROMIDE AND ALBUTEROL SULFATE 3 ML: .5; 3 SOLUTION RESPIRATORY (INHALATION) at 02:10

## 2019-10-29 RX ADMIN — SEVELAMER CARBONATE 0.8 G: 800 POWDER, FOR SUSPENSION ORAL at 04:10

## 2019-10-29 RX ADMIN — OXYCODONE HYDROCHLORIDE 5 MG: 5 SOLUTION ORAL at 07:10

## 2019-10-29 RX ADMIN — OXYCODONE HYDROCHLORIDE 5 MG: 5 SOLUTION ORAL at 08:10

## 2019-10-29 RX ADMIN — HYDRALAZINE HYDROCHLORIDE 20 MG: 20 INJECTION INTRAMUSCULAR; INTRAVENOUS at 02:10

## 2019-10-29 RX ADMIN — OXYCODONE HYDROCHLORIDE 5 MG: 5 SOLUTION ORAL at 04:10

## 2019-10-29 RX ADMIN — RACEPINEPHRINE HYDROCHLORIDE 0.5 ML: 11.25 SOLUTION RESPIRATORY (INHALATION) at 03:10

## 2019-10-29 RX ADMIN — FENTANYL CITRATE 25 MCG: 50 INJECTION INTRAMUSCULAR; INTRAVENOUS at 04:10

## 2019-10-29 RX ADMIN — HEPARIN SODIUM 7500 UNITS: 5000 INJECTION, SOLUTION INTRAVENOUS; SUBCUTANEOUS at 01:10

## 2019-10-29 RX ADMIN — HEPARIN SODIUM 7500 UNITS: 5000 INJECTION, SOLUTION INTRAVENOUS; SUBCUTANEOUS at 06:10

## 2019-10-29 NOTE — PLAN OF CARE
Paged by General Surgery to bedside for trach evaluation. Concern for trach displacement into false passage due to inability to pass suction. Flexible laryngoscopy confirmed trach was within false passage. Patient was on non-rebreather and ventilating well at this time. Elected to remove trach and evaluate tracheal stoma. Noted trachea with tracheal incision posterior to false passage. Confirmed with flexible laryngoscope. Was able to replace trach into trachea with obturator. Confirmed placement with flexible laryngoscope. Rosaline visualized. Trach fastened tightly to neck with Kj collar.  Remainder of care per General Surgery. Call ENT with airway concerns or questions.    Addison Joyner MD

## 2019-10-29 NOTE — PLAN OF CARE
09:45-10:05 AM CM called Insurer Expedited Appeal line/Spoke to Peterson/ASHLEIGH [phone], Opt 3/Ref # [id number] inquiring the status of 10/25 LTAC Denial Expedited Appeal. He states the date they have is until 11/11/19;Asked to speak to his supervisor;Marv Acct Supervisor, came on phone & above discussed;She states section 3 of AOR Form not signed by CM nurse. She asks for this to signed & resent;then CM needs to call this line again to ask for them to change to the Expedited time/72 hrs. Updated clinicals faxed to [phone].    10:06-10:44 AM CM signed section 3 of AOR form & refaxed to F [phone];It did not go through-states it abandoned sending.     11:30 AM Refaxed AOR form to above fax w/confirmation it went through.     11:51 AM CM called Insurer Expedited appeal line/Spoke to Agata/# above, informing of above;She reopened expedited Appeal for LTAC denial w/new ref # [id number], stating it can take up to 72 hrs. CM asked her to consider having their supervisors push this through quicker since insurer did not tell CM about AOR form until 10/28.     12:00 PM Discussed patient's barrier to discharge in LOS meeting, as above. MAGDALENA's, ELIZABET Helton, & ELIZABET Gutiérrez, state their are SNF that take trach's. Updated CHARLES NICHOLS, on this, & above. Patient request is to return to close to his home, near [place], [place]. MAGDALENA Lowry, states JULIUS LTAC liaisonLalita, filed a Family appeal regarding the insurer LTAC denial as well/Ref # [id number].

## 2019-10-29 NOTE — PROGRESS NOTES
"[facility]  General Surgery  Progress Note    Subjective:     History of Present Illness:  Patient is a 72 yo M with history of COPD and bleeding gastric mass who underwent EGD with AES yesterday where they removed a 7 cm "ulcerated lipoma" from his gastric antrum using submucosal endoscopic dissection. When removing the mass, it became stuck in the distal esophagus. They had to removed it piecemeal, and caused some mucosal esophageal tears. He was admitted for obs overnight, and this morning his WBC was elevated and he was complaining of abd pain. CXR showed free intraperitoneal free air and general surgery was consulted for evaluation.    Post-Op Info:  Procedure(s) (LRB):  Right IJ Hemodialysis Catheter Insertion (Right)   5 Days Post-Op     Interval History: Increasing oxygen requirements overnight; 8L 35% on trach collar. Difficult time suctioning; suction catheter unable to pass. ENT consulted and trach evaluated; trach in false lumen. Replaced in correct lumen. Patient tolerated well. Otherwise VSS. AF overnight. Tolerating CLD without n/v. TF at goal.    Medications:  Continuous Infusions:  Scheduled Meds:   sodium chloride 0.9%   Intravenous Once    sodium chloride 0.9%   Intravenous Once    albuterol-ipratropium  3 mL Nebulization Q6H    chlorhexidine  15 mL Mouth/Throat BID    docusate  100 mg Per J Tube Daily    famotidine  20 mg Per J Tube Daily    fluticasone furoate-vilanterol  1 puff Inhalation Daily    heparin (porcine)  7,500 Units Subcutaneous Q8H    lidocaine  1 patch Transdermal Q24H    nicotine  1 patch Transdermal Daily    polyethylene glycol  17 g Per J Tube Daily    promethazine (PHENERGAN) IVPB  6.25 mg Intravenous Once    sennosides 8.8 mg/5 ml  10 mL Per J Tube Daily    sevelamer carbonate  0.8 g Oral TID WM     PRN Meds:sodium chloride, sodium chloride 0.9%, acetaminophen, albuterol-ipratropium, bisacodyl, Dextrose 10% Bolus, Dextrose 10% Bolus, " fentaNYL, glucagon (human recombinant), heparin (porcine), hydrALAZINE, insulin aspart U-100, ondansetron, oxyCODONE, racepinephrine, sodium chloride 0.9%     Review of patient's allergies indicates:   Allergen Reactions    Meloxicam Other (See Comments)     Ulcer, GI bleed      Objective:     Vital Signs (Most Recent):  Temp: 98.4 °F (36.9 °C) (10/29/19 0336)  Pulse: 73 (10/29/19 0443)  Resp: 18 (10/29/19 0443)  BP: (!) 174/78(Patient in pain) (10/29/19 0435)  SpO2: 96 % (10/29/19 0443) Vital Signs (24h Range):  Temp:  [97.1 °F (36.2 °C)-98.4 °F (36.9 °C)] 98.4 °F (36.9 °C)  Pulse:  [73-99] 73  Resp:  [16-26] 18  SpO2:  [87 %-98 %] 96 %  BP: (134-177)/(64-81) 174/78     Weight: (!) 150 kg (330 lb 11 oz)  Body mass index is 42.44 kg/m².    Intake/Output - Last 3 Shifts       10/27 0700 - 10/28 0659 10/28 0700 - 10/29 0659 10/29 0700 - 10/30 0659    P.O. 600 460     NG/GT 1875 1280     Total Intake(mL/kg) 2475 (16.5) 1740 (11.6)     Urine (mL/kg/hr) 2015 (0.6) 1550 (0.4)     Emesis/NG output 0      Drains 110      Other 0      Stool 0      Total Output 2125 1550     Net +350 +190            Urine Occurrence 2 x      Stool Occurrence 3 x      Emesis Occurrence 0 x            Physical Exam   Constitutional: He appears well-developed and well-nourished. He is intubated (Trach in place).   Cardiovascular: Normal rate and regular rhythm.   Pulmonary/Chest: Effort normal. He is intubated (Trach in place). No respiratory distress.   On trach collar   Abdominal: Soft. He exhibits no distension. There is no tenderness. There is no guarding.   G tube in place, clamped  Jtube in place  Incision CDI   Neurological: He is alert.   Skin: Skin is warm and dry.   Vitals reviewed.      Significant Labs:  CBC:   Recent Labs   Lab 10/28/19  0603   WBC 7.76   RBC 2.90*   HGB 7.4*   HCT 25.6*   *   MCV 88   MCH 25.5*   MCHC 28.9*     CMP:   Recent Labs   Lab 10/24/19  1214  10/28/19  0603      < > 108   CALCIUM 8.8   < >  9.0   ALBUMIN 2.5*  --   --    PROT 5.9*  --   --       < > 138   K 4.9   < > 4.2   CO2 25   < > 26      < > 103   *   < > 68*   CREATININE 4.9*   < > 2.7*   ALKPHOS 100  --   --    ALT 34  --   --    AST 31  --   --    BILITOT 0.5  --   --     < > = values in this interval not displayed.       Significant Diagnostics:  I have reviewed all pertinent imaging results/findings within the past 24 hours.    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 Trach placed on 10/17/19. Permacath 10/24/19.    Plan:  Continue G-tube clamped.  J tube feeds at goal.  Continue on trach collar.  Only move with nursing assistance; has decannulated twice recently with moving, trach in false lumen this am.  Passed swallow study, tolerating CLD without n/v. Minimal residuals.   Condom cath/diaper to improve wound care. Wound care improved overall.   PT/OT.  Ambulate with assistance.     Progressing well, stable for discharge to LTAC. Awaiting insurance approval.          Cynthia Bautista MD  General Surgery  [facility]

## 2019-10-29 NOTE — PLAN OF CARE
POC reviewed with pt, verbalized understanding. AAOx4. BP elevated for noon vitals, PRN meds given. Trach with 5L and 35%, maintaining continuous plus ox. 2 assist, up to chair for couple of hours, complained of nausea. Turned pt q2h. Voiding per urinal with adequate UOP.     LLQ G-tube clamped, opened for nausea, thick green output. LLQ J-tube, continuous TF at 70 ml/hr. Tolerating clear liquid diet. Pt vomited, MD aware. Pain managed with PRN meds. Call light within reach, WCTM.

## 2019-10-29 NOTE — SUBJECTIVE & OBJECTIVE
Interval History: Patient seen and examined at bedside. Renal function continues to improve. On trach collar 28% FiO2. 1.5L UOP/past 24hrs. sCr down to 2.2; from 2.7 yesterday. Resting comfortably in bed. NAEON. Hopeful that patient's renal function will return to baseline.     Review of patient's allergies indicates:   Allergen Reactions    Meloxicam Other (See Comments)     Ulcer, GI bleed      Current Facility-Administered Medications   Medication Frequency    0.9%  NaCl infusion (for blood administration) Q24H PRN    0.9%  NaCl infusion PRN    0.9%  NaCl infusion Once    0.9%  NaCl infusion Once    acetaminophen oral solution 650 mg Q6H PRN    albuterol-ipratropium 2.5 mg-0.5 mg/3 mL nebulizer solution 3 mL Q4H PRN    albuterol-ipratropium 2.5 mg-0.5 mg/3 mL nebulizer solution 3 mL Q6H    bisacodyl suppository 10 mg Daily PRN    chlorhexidine 0.12 % solution 15 mL BID    dextrose 10% (D10W) Bolus PRN    dextrose 10% (D10W) Bolus PRN    docusate 50 mg/5 mL liquid 100 mg Daily    famotidine 40 mg/5 mL (8 mg/mL) suspension 20 mg Daily    fentaNYL injection 25 mcg Q1H PRN    fluticasone furoate-vilanterol 100-25 mcg/dose diskus inhaler 1 puff Daily    glucagon (human recombinant) injection 1 mg PRN    heparin (porcine) injection 1,000 Units PRN    heparin (porcine) injection 7,500 Units Q8H    hydrALAZINE injection 20 mg Q6H PRN    insulin aspart U-100 pen 0-5 Units QID (AC + HS) PRN    lidocaine 5 % patch 1 patch Q24H    nicotine 21 mg/24 hr 1 patch Daily    ondansetron injection 4 mg Q6H PRN    oxyCODONE 5 mg/5 mL solution 5 mg Q4H PRN    polyethylene glycol packet 17 g Daily    promethazine (PHENERGAN) 6.25 mg in dextrose 5 % 50 mL IVPB Once    racepinephrine 2.25 % nebulizer solution 0.5 mL Q4H PRN    sennosides 8.8 mg/5 ml syrup 10 mL Daily    sevelamer carbonate pwpk 0.8 g TID WM    sodium chloride 0.9% flush 10 mL PRN     Family History     Problem Relation (Age of Onset)     Cancer Father    Heart disease Mother        Tobacco Use    Smoking status: Current Every Day Smoker     Packs/day: 2.00     Types: Cigarettes    Smokeless tobacco: Never Used   Substance and Sexual Activity    Alcohol use: No    Drug use: No    Sexual activity: Not on file       Objective:     Vital Signs (Most Recent):  Temp: 98.7 °F (37.1 °C) (10/29/19 0752)  Pulse: 89 (10/29/19 0954)  Resp: 20 (10/29/19 0954)  BP: (!) 142/58 (10/29/19 0752)  SpO2: 95 % (10/29/19 0932)  O2 Device (Oxygen Therapy): Trach Collar (10/29/19 0932) Vital Signs (24h Range):  Temp:  [97.9 °F (36.6 °C)-98.7 °F (37.1 °C)] 98.7 °F (37.1 °C)  Pulse:  [] 89  Resp:  [16-26] 20  SpO2:  [87 %-98 %] 95 %  BP: (134-177)/(58-81) 142/58     Weight: (!) 150 kg (330 lb 11 oz) (10/18/19 0500)  Body mass index is 42.44 kg/m².  Body surface area is 2.8 meters squared.    I/O last 3 completed shifts:  In: 2849 [P.O.:460; NG/GT:2389]  Out: 2550 [Urine:2475; Drains:75]    Physical Exam   Constitutional: He appears well-developed and well-nourished.   Eyes: Pupils are equal, round, and reactive to light. Conjunctivae and EOM are normal.   Neck: Normal range of motion. Neck supple.   Cardiovascular: Normal rate and regular rhythm.   Pulmonary/Chest: Effort normal. He has rales.   Trached with supplemental O2 in place    Abdominal: He exhibits no distension and no mass. There is tenderness. There is no guarding. No hernia.   Midline surgical incision to abdomen. Multiple abdominal drains noted.    Musculoskeletal: He exhibits edema (1+ pitting edema to BLE ). He exhibits no tenderness or deformity.   Neurological: He is alert.   Skin: Skin is warm and dry. No rash noted. He is not diaphoretic. No erythema. No pallor.   Psychiatric:   Follows commands and nods and gestures appropriately        Significant Labs:  CBC:   Recent Labs   Lab 10/29/19  0601   WBC 7.91   RBC 2.75*   HGB 7.3*   HCT 23.3*   *   MCV 85   MCH 26.5*   MCHC 31.3*     CMP:    Recent Labs   Lab 10/24/19  1214  10/29/19  0601      < > 110   CALCIUM 8.8   < > 9.2   ALBUMIN 2.5*  --   --    PROT 5.9*  --   --       < > 136   K 4.9   < > 4.6   CO2 25   < > 25      < > 101   *   < > 64*   CREATININE 4.9*   < > 2.2*   ALKPHOS 100  --   --    ALT 34  --   --    AST 31  --   --    BILITOT 0.5  --   --     < > = values in this interval not displayed.     All labs within the past 24 hours have been reviewed.

## 2019-10-29 NOTE — NURSING
Pt had 1 emesis. Pt states swallowing wrong and gagging followed by vomiting. After vomiting pt states feeling nauseated. G-tube to gravity. MD notified.

## 2019-10-29 NOTE — TREATMENT PLAN
"AES Treatment Plan  10/29/2019  8:08 AM    Interval History:  Overview patient had issues with oxygenation due to malposition trach which was fixed by ENT.  On rounds this morning both patient/daughter were in good spirits and denied any acute complaints.    Focused Physical Exam:  BP (!) 142/58 (BP Location: Right arm, Patient Position: Lying)   Pulse 94   Temp 98.7 °F (37.1 °C) (Oral)   Resp 18   Ht 6' 2.02" (1.88 m)   Wt (!) 150 kg (330 lb 11 oz)   SpO2 97%   BMI 42.44 kg/m²   General: patient comfortable with trach collar  Abdomen: soft with G tube (clamped) and J tube (feeds at 70 cc/hour)    Labs:  BMP in process     10/29/2019 06:01   WBC 7.91   RBC 2.75 (L)   Hemoglobin 7.3 (L)   Hematocrit 23.3 (L)   MCV 85   MCH 26.5 (L)   MCHC 31.3 (L)   RDW 19.5 (H)   Platelets 562 (H)     Recommendations:  --Follow up morning labs  --Diet per primary team   --Nephrology recs appreciated  --We will continue to follow alongside primary team, please call with any additional questions or concerns    Oli Cristina M.D.  Gastroenterology Fellow, PGY-VI  Pager: [phone]  [facility]    "

## 2019-10-29 NOTE — PLAN OF CARE
VS stable. Telemetry= NSR 80s. Glucose stable. SpO= 95-96% on 35% trach collar. G-tube clamped overnight, no C/o N/V, J-tube in place with Peptanem feedings at 70ml/hr continuously. Encouraged turing every two hours, c/o back pain, relieved with Oxycodone. Healed midline abdominal incision noted, dressing changed on right lateral old CT site.  At approx 0230, patient called with c/o discomfort inside his tracheotomy; attempted to suction , but suction catheter would not go all the way down, not triggering cough reflex. Charge nurse notified, respiratory Therapy notified, and MD on call for Surgery came to the floor to see patient. Patient noted to be breathing normally, no SOB noted. No mucus plug see inside inner cannula. SpO2 remained 96% on 35% trach collar. Will continue to monitor and notified Primary team per Surgery on call.

## 2019-10-29 NOTE — PROGRESS NOTES
[facility]  Nephrology  Progress Note    Patient Name: Pasha Harmon  MRN: [id number]  Admission Date: 10/9/2019  Hospital Length of Stay: 19 days  Attending Provider: Paulino Parrish MD   Primary Care Physician: Eze Root MD  Principal Problem:Bowel perforation      Interval History: Patient seen and examined at bedside. Renal function continues to improve. On trach collar 28% FiO2. 1.5L UOP/past 24hrs. sCr down to 2.2; from 2.7 yesterday. Resting comfortably in bed. NAEON. Hopeful that patient's renal function will return to baseline.     Review of patient's allergies indicates:   Allergen Reactions    Meloxicam Other (See Comments)     Ulcer, GI bleed      Current Facility-Administered Medications   Medication Frequency    0.9%  NaCl infusion (for blood administration) Q24H PRN    0.9%  NaCl infusion PRN    0.9%  NaCl infusion Once    0.9%  NaCl infusion Once    acetaminophen oral solution 650 mg Q6H PRN    albuterol-ipratropium 2.5 mg-0.5 mg/3 mL nebulizer solution 3 mL Q4H PRN    albuterol-ipratropium 2.5 mg-0.5 mg/3 mL nebulizer solution 3 mL Q6H    bisacodyl suppository 10 mg Daily PRN    chlorhexidine 0.12 % solution 15 mL BID    dextrose 10% (D10W) Bolus PRN    dextrose 10% (D10W) Bolus PRN    docusate 50 mg/5 mL liquid 100 mg Daily    famotidine 40 mg/5 mL (8 mg/mL) suspension 20 mg Daily    fentaNYL injection 25 mcg Q1H PRN    fluticasone furoate-vilanterol 100-25 mcg/dose diskus inhaler 1 puff Daily    glucagon (human recombinant) injection 1 mg PRN    heparin (porcine) injection 1,000 Units PRN    heparin (porcine) injection 7,500 Units Q8H    hydrALAZINE injection 20 mg Q6H PRN    insulin aspart U-100 pen 0-5 Units QID (AC + HS) PRN    lidocaine 5 % patch 1 patch Q24H    nicotine 21 mg/24 hr 1 patch Daily    ondansetron injection 4 mg Q6H PRN    oxyCODONE 5 mg/5 mL solution 5 mg Q4H PRN    polyethylene glycol packet 17 g Daily    promethazine  (PHENERGAN) 6.25 mg in dextrose 5 % 50 mL IVPB Once    racepinephrine 2.25 % nebulizer solution 0.5 mL Q4H PRN    sennosides 8.8 mg/5 ml syrup 10 mL Daily    sevelamer carbonate pwpk 0.8 g TID WM    sodium chloride 0.9% flush 10 mL PRN     Family History     Problem Relation (Age of Onset)    Cancer Father    Heart disease Mother        Tobacco Use    Smoking status: Current Every Day Smoker     Packs/day: 2.00     Types: Cigarettes    Smokeless tobacco: Never Used   Substance and Sexual Activity    Alcohol use: No    Drug use: No    Sexual activity: Not on file       Objective:     Vital Signs (Most Recent):  Temp: 98.7 °F (37.1 °C) (10/29/19 0752)  Pulse: 89 (10/29/19 0954)  Resp: 20 (10/29/19 0954)  BP: (!) 142/58 (10/29/19 0752)  SpO2: 95 % (10/29/19 0932)  O2 Device (Oxygen Therapy): Trach Collar (10/29/19 0932) Vital Signs (24h Range):  Temp:  [97.9 °F (36.6 °C)-98.7 °F (37.1 °C)] 98.7 °F (37.1 °C)  Pulse:  [] 89  Resp:  [16-26] 20  SpO2:  [87 %-98 %] 95 %  BP: (134-177)/(58-81) 142/58     Weight: (!) 150 kg (330 lb 11 oz) (10/18/19 0500)  Body mass index is 42.44 kg/m².  Body surface area is 2.8 meters squared.    I/O last 3 completed shifts:  In: 2849 [P.O.:460; NG/GT:2389]  Out: 2550 [Urine:2475; Drains:75]    Physical Exam   Constitutional: He appears well-developed and well-nourished.   Eyes: Pupils are equal, round, and reactive to light. Conjunctivae and EOM are normal.   Neck: Normal range of motion. Neck supple.   Cardiovascular: Normal rate and regular rhythm.   Pulmonary/Chest: Effort normal. He has rales.   Trached with supplemental O2 in place    Abdominal: He exhibits no distension and no mass. There is tenderness. There is no guarding. No hernia.   Midline surgical incision to abdomen. Multiple abdominal drains noted.    Musculoskeletal: He exhibits edema (1+ pitting edema to BLE ). He exhibits no tenderness or deformity.   Neurological: He is alert.   Skin: Skin is warm and dry.  No rash noted. He is not diaphoretic. No erythema. No pallor.   Psychiatric:   Follows commands and nods and gestures appropriately        Significant Labs:  CBC:   Recent Labs   Lab 10/29/19  0601   WBC 7.91   RBC 2.75*   HGB 7.3*   HCT 23.3*   *   MCV 85   MCH 26.5*   MCHC 31.3*     CMP:   Recent Labs   Lab 10/24/19  1214  10/29/19  0601      < > 110   CALCIUM 8.8   < > 9.2   ALBUMIN 2.5*  --   --    PROT 5.9*  --   --       < > 136   K 4.9   < > 4.6   CO2 25   < > 25      < > 101   *   < > 64*   CREATININE 4.9*   < > 2.2*   ALKPHOS 100  --   --    ALT 34  --   --    AST 31  --   --    BILITOT 0.5  --   --     < > = values in this interval not displayed.     All labs within the past 24 hours have been reviewed.        Assessment/Plan:     BHASKAR (acute kidney injury)  BHASKAR 2/2 ischemic ATN due to intraoperative hypotension and acute anemia.      Plan/Recommendations:  -Hold RRT at this time and monitor labs closely. No urgent need for RRT at this time.   -Continue renvela with meals   -Avoid nephrotoxic medications  -Renally dose medications  -Avoid contrast studies   -Daily renal function panels   -Strict I/O's   -Will continue to follow closely            Thank you for your consult. I will follow-up with patient. Please contact us if you have any additional questions.    Ranjeet Judd NP  Nephrology  [facility]

## 2019-10-29 NOTE — PT/OT/SLP PROGRESS
"Physical Therapy Treatment    Patient Name:  Pasha Harmon   MRN:  [id number]    Recommendations:     Discharge Recommendations:  nursing facility, skilled   Discharge Equipment Recommendations: bedside commode, walker, rolling, wheelchair   Barriers to discharge: Inaccessible home 3 EVELINA with L UE rail    Assessment:     Pasha Harmon is a 74 y.o. male admitted with a medical diagnosis of Bowel perforation.  He presents with the following impairments/functional limitations:  weakness, impaired endurance, impaired balance, impaired functional mobilty . Pt limited with functional mobility due to pt vomited.     Rehab Prognosis: Good; patient would benefit from acute skilled PT services to address these deficits and reach maximum level of function.    Recent Surgery: Procedure(s) (LRB):  Right IJ Hemodialysis Catheter Insertion (Right) 5 Days Post-Op    Plan:     During this hospitalization, patient to be seen 4 x/week to address the identified rehab impairments via gait training, therapeutic activities, therapeutic exercises, neuromuscular re-education and progress toward the following goals:    · Plan of Care Expires:  11/05/19    Subjective   "I was up most of the night with them fooling with my trach"    Pain/Comfort:  · Pain Rating 1: (pt c/o nausea, no pain)  · Pain Rating Post-Intervention 1: 0/10      Objective:     Communicated with nurse prior to session.  Patient found supine with telemetry, tracheostomy, PEG Tube, DENISE drain(J tube and G tube) upon PT entry to room.     General Precautions: Standard, fall   Orthopedic Precautions:N/A   Braces: N/A     Functional Mobility:  · Bed Mobility:     · Rolling Left:  minimum assistance  · Rolling Right: minimum assistance  · Sit to Supine: contact guard assistance  · Transfers:     · Sit to Stand:  moderate assistance with hand-held assist  · Bed to Chair: moderate assistance with  hand-held assist  using  Stand Pivot  · Gait: 4 steps with HHA to the L " from bedside chair to bed with minimal assist. pt performed gait with decreased clearance of B feet during swing phase.    AM-PAC 6 CLICK MOBILITY  Turning over in bed (including adjusting bedclothes, sheets and blankets)?: 3  Sitting down on and standing up from a chair with arms (e.g., wheelchair, bedside commode, etc.): 2  Moving from lying on back to sitting on the side of the bed?: 3  Moving to and from a bed to a chair (including a wheelchair)?: 2  Need to walk in hospital room?: 2  Climbing 3-5 steps with a railing?: 1  Basic Mobility Total Score: 13     Therapeutic Activities and Exercises:   pt repositioned in the bedside chair with pillows for comfort.    Patient left supine (remained up in bedside chair ~ 2 hours prior to return to bed)with all lines intact, call button in reach, nurse notified and daughter present..    GOALS:   Multidisciplinary Problems     Physical Therapy Goals        Problem: Physical Therapy Goal    Goal Priority Disciplines Outcome Goal Variances Interventions   Physical Therapy Goal     PT, PT/OT Ongoing, Progressing     Description:  Goals to be met by: 11/10/19     Patient will increase functional independence with mobility by performin. Supine to sit with Maximum Assistance-met 10/24  1a. Supine to sit with Minimum Assistance -  Met 10/28  Revised goal: supine to sit with CGA-not met  2. Sit to supine with Maximum Assistance -  Met 10/28  Revised goal: sit to supine with moderate assist-not met  3. Rolling to Left and Right with Maximum Assistance and use of bedrail as needed. -  Met 10/28  Revised goal: roll to L and R with bedrail with CGA-not met  4. Sitting at edge of bed x10 minutes with M aximum Assistance -  met  5. Lower extremity exercise program x10 reps per handout, with assistance as needed - not met  6. Sit <> stand with Moderate Assistance using LRAD - not met  7. Stand for 3 minutes with HHA or LRAD with CGA to demonstrate improved standing tolerance  8.  Gait 40ft with RW with minimal assist-not met  9. PT up/down 3 steps with L UE rail with moderate assist-not met                         Time Tracking:     PT Received On: 10/29/19  PT Start Time: 1300     PT Stop Time: 1316(time added due to PT returned to work with pt)  PT Total Time (min): 16 min     Billable Minutes: Therapeutic Activity 16    Treatment Type: Treatment  PT/PTA: PT     PTA Visit Number: 1     Ave Santoro, PT  10/29/2019

## 2019-10-29 NOTE — PLAN OF CARE
Problem: Physical Therapy Goal  Goal: Physical Therapy Goal  Description  Goals to be met by: 11/10/19     Patient will increase functional independence with mobility by performin. Supine to sit with Maximum Assistance-met 10/24  1a. Supine to sit with Minimum Assistance -  Met 10/28  Revised goal: supine to sit with CGA-not met  2. Sit to supine with Maximum Assistance -  Met 10/28  Revised goal: sit to supine with moderate assist-not met  3. Rolling to Left and Right with Maximum Assistance and use of bedrail as needed. -  Met 10/28  Revised goal: roll to L and R with bedrail with CGA-not met  4. Sitting at edge of bed x10 minutes with M aximum Assistance -  met  5. Lower extremity exercise program x10 reps per handout, with assistance as needed - not met  6. Sit <> stand with Moderate Assistance using LRAD - not met  7. Stand for 3 minutes with HHA or LRAD with CGA to demonstrate improved standing tolerance  8. Gait 40ft with RW with minimal assist-not met  9. PT up/down 3 steps with L UE rail with moderate assist-not met        Outcome: Ongoing, Progressing   Pt's goals remain appropriate and pt will continue to benefit from skilled PT services to work towards improved functional mobility including: bed mobility, transfers, and gait.   Ave Santoro, PT  10/29/2019

## 2019-10-29 NOTE — SUBJECTIVE & OBJECTIVE
Interval History: Increasing oxygen requirements overnight; 8L 35% on trach collar. Difficult time suctioning; suction catheter unable to pass. ENT consulted and trach evaluated; trach in false lumen. Replaced in correct lumen. Patient tolerated well. Otherwise VSS. AF overnight. Tolerating CLD without n/v.     Medications:  Continuous Infusions:  Scheduled Meds:   sodium chloride 0.9%   Intravenous Once    sodium chloride 0.9%   Intravenous Once    albuterol-ipratropium  3 mL Nebulization Q6H    chlorhexidine  15 mL Mouth/Throat BID    docusate  100 mg Per J Tube Daily    famotidine  20 mg Per J Tube Daily    fluticasone furoate-vilanterol  1 puff Inhalation Daily    heparin (porcine)  7,500 Units Subcutaneous Q8H    lidocaine  1 patch Transdermal Q24H    nicotine  1 patch Transdermal Daily    polyethylene glycol  17 g Per J Tube Daily    promethazine (PHENERGAN) IVPB  6.25 mg Intravenous Once    sennosides 8.8 mg/5 ml  10 mL Per J Tube Daily    sevelamer carbonate  0.8 g Oral TID WM     PRN Meds:sodium chloride, sodium chloride 0.9%, acetaminophen, albuterol-ipratropium, bisacodyl, Dextrose 10% Bolus, Dextrose 10% Bolus, fentaNYL, glucagon (human recombinant), heparin (porcine), hydrALAZINE, insulin aspart U-100, ondansetron, oxyCODONE, racepinephrine, sodium chloride 0.9%     Review of patient's allergies indicates:   Allergen Reactions    Meloxicam Other (See Comments)     Ulcer, GI bleed      Objective:     Vital Signs (Most Recent):  Temp: 98.4 °F (36.9 °C) (10/29/19 0336)  Pulse: 73 (10/29/19 0443)  Resp: 18 (10/29/19 0443)  BP: (!) 174/78(Patient in pain) (10/29/19 0435)  SpO2: 96 % (10/29/19 0443) Vital Signs (24h Range):  Temp:  [97.1 °F (36.2 °C)-98.4 °F (36.9 °C)] 98.4 °F (36.9 °C)  Pulse:  [73-99] 73  Resp:  [16-26] 18  SpO2:  [87 %-98 %] 96 %  BP: (134-177)/(64-81) 174/78     Weight: (!) 150 kg (330 lb 11 oz)  Body mass index is 42.44 kg/m².    Intake/Output - Last 3 Shifts       10/27  0700 - 10/28 0659 10/28 0700 - 10/29 0659 10/29 0700 - 10/30 0659    P.O. 600 460     NG/GT 1875 1280     Total Intake(mL/kg) 2475 (16.5) 1740 (11.6)     Urine (mL/kg/hr) 2015 (0.6) 1550 (0.4)     Emesis/NG output 0      Drains 110      Other 0      Stool 0      Total Output 2125 1550     Net +350 +190            Urine Occurrence 2 x      Stool Occurrence 3 x      Emesis Occurrence 0 x            Physical Exam   Constitutional: He appears well-developed and well-nourished. He is intubated (Trach in place).   Cardiovascular: Normal rate and regular rhythm.   Pulmonary/Chest: Effort normal. He is intubated (Trach in place). No respiratory distress.   On trach collar   Abdominal: Soft. He exhibits no distension. There is no tenderness. There is no guarding.   G tube in place, clamped  Jtube in place  Incision CDI   Neurological: He is alert.   Skin: Skin is warm and dry.   Vitals reviewed.      Significant Labs:  CBC:   Recent Labs   Lab 10/28/19  0603   WBC 7.76   RBC 2.90*   HGB 7.4*   HCT 25.6*   *   MCV 88   MCH 25.5*   MCHC 28.9*     CMP:   Recent Labs   Lab 10/24/19  1214  10/28/19  0603      < > 108   CALCIUM 8.8   < > 9.0   ALBUMIN 2.5*  --   --    PROT 5.9*  --   --       < > 138   K 4.9   < > 4.2   CO2 25   < > 26      < > 103   *   < > 68*   CREATININE 4.9*   < > 2.7*   ALKPHOS 100  --   --    ALT 34  --   --    AST 31  --   --    BILITOT 0.5  --   --     < > = values in this interval not displayed.       Significant Diagnostics:  I have reviewed all pertinent imaging results/findings within the past 24 hours.

## 2019-10-29 NOTE — PHYSICIAN QUERY
"PT Name: Pasha Harmon  MR #: [id number]    Physician Query Form -Present on Admission (POA) Diagnosis Clarification     CDS/: Savannah Forde RN, CDS               Contact information: [email or web address]                                                                                                                         [phone]    This form is a permanent document in the medical record.     Query Date: October 29, 2019    By submitting this query, we are merely seeking further clarification of documentation. Please utilize your independent clinical judgment when addressing the question(s) below.       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 S/P ESD successfully. The lesion was too large and bulky and caused a long  mucosal esophageal trear and got lodged into the mid esophagus. The mass had to be cut into smaller pieces to be retrieved. Mucosal tear in the middle and lower third of the esophagus    This AM pt with increasing WBC 32k, BHASKAR cr 2.0. Notified that pt had worsening abd pain this AM. Ordered CXR, abd x-ray, UA, Lactic acid, IVF. CXR showing free air under diaphragm. GI discussed with hospital medicine staff MD. Case discussed with general surgery. CT C/A/P ordered. Evaluated pt at bedside with gen surg, likely plan for surgery today. Pt made NPO, started Cefepime/Flagyl    POST-OP DIAGNOSIS: Gastric Perforation    Recovering after intra-abdominal sepsis.    Encephalopathic, likely multifactorial (sedation, ICU delirium, recent sepsis)     WBC: 21.6 --> 32.63 --> 16.20 --> 18.40 --> 21.66    Vital Signs (24h Range):  Temp:  [97.9 °F -98.8 °F] 97.9 °F   Pulse:  [64-82] 74  Resp:  [16-23] 18  BP: ()/(50-76) " 118/57    Vital Signs (24h Range):  Temp:  [97.1 °F -99.5 °F ] 98 °F   Pulse:  [] 105  Resp:  [16-36] 36  BP: ()/(50-76) 120/62    Vital Signs (24h Range):  Temp:  [98.9 °F -99.8 °F ] 99.8 °F   Pulse:  [] 94  Resp:  [20-38] 25  Arterial Line BP: ()/(44-62) 121/58   H&P 10/9                     PN 10/10                  OP Note 10/10    Critical Care PN 10/11    Critical Care PN 10/15      Labs 10/9- 10/12    H&P 10/9             PN 10/10            Critical Care PN 10/11         Doctor, Please specify Present On Admission (POA) status of ____sepsis____.    [  ] Present on Admission    [ x ] Not Present on Admission   [  ] Clinically Undetermined

## 2019-10-29 NOTE — CARE UPDATE
Was paged     Respiratory was having trouble passing suction through the tracheostomy. Patient was stable, adequate saturations (around ). At PE no concern findings, normal breath sounds and ventilation. No cyanosis, no respiratory distress. Patient complained of some difficulty passing secretions and coughing.     Continuous pulse oxymetry started.Trach was inspected, no evidence of mucus plug. Respiratory was paged at bedside, breathing treatment was ordered for symptomatic management. Tried to pass suction, was not able to. Case was discussed with upper levels due to concerns of trach being in false lumen. ENT was paged.

## 2019-10-29 NOTE — CARE UPDATE
Rapid Response Respiratory Therapy Proactive Rounding Note      Time of visit: 09     Code Status: Full Code   : 1945  Age: 74 y.o.  Weight:   Wt Readings from Last 1 Encounters:   10/18/19 (!) 150 kg (330 lb 11 oz)     Sex: male  Race: White   [facility]:   MRN: [id number]    SITUATION     Evaluated patient for: LDA Check     BACKGROUND     Patient has a past medical history of COPD (chronic obstructive pulmonary disease), Diabetes mellitus, Emphysema of lung, Gastric ulcer, Hypertension, and MI (myocardial infarction).  Clinically Significant Surgical Hx: tracheostomy    ASSESSMENT/INTERVENTIONS     Upon arrival in room, pt found with size 6.0 cuffless Shiley on trach collar at 5L, 28%. Pt complains of shortness of breath due to moving with physical therapy a few minutes prior. Pt declines suction at this time. No respiratory distress noted. Will continue to monitor.    Pulse: 85 Respiratory rate: 16 Temperature: Temp: 98.7 °F (37.1 °C) BP: BP: (!) 142/58 SpO2:95%   Level of Consciousness: Level of Consciousness (AVPU): alert  Respiratory Effort: Respiratory Effort: Normal, Unlabored Expansion/Accessory Muscle Usage: Expansion/Accessory Muscles/Retractions: expansion symmetric  All Lung Field Breath Sounds: All Lung Fields Breath Sounds: Anterior:, Lateral:, diminished  Mobility at time of assessment: General Mobility: generalized weakness, moderately impaired  O2 Device/Concentration: Trach collar 5L, 28%  Most recent blood gas: No results for input(s): PH, PCO2, PO2, HCO3, POCSATURATED, BE in the last 72 hours.   Surgical airway: Yes, Type: Shiley Size: 6.0 cuffless  Ambu at bedside: Ambu bag with the patient?: Yes, Adult Ambu    Current Respiratory Care Orders:   10/29/19 0248  Inhalation Treatment Once Once      10/29/19 0248   10/28/19 1300  Inhalation Treatment Q6H Every 6 hours ( released)    Release    10/28/19 1133   10/28/19 1300  Chest physiotherapy Q6H Every 6 hours (  released)    Release   Question: Indications: Answer: LOBAR OR > ATELECTASIS    10/28/19 1135   10/24/19 1817  Pulse Oximetry Continuous Continuous      10/24/19 1817   10/23/19 0855  Oxygen Continuous Continuous     References: Oxygen Titration Protocol   Question Answer Comment   Device type: Low flow    Device: Trach Collar 8L   FiO2%: 28    Titrate O2 per Oxygen Titration Protocol: Yes    To maintain SpO2 goal of: >= 90%    Notify MD of: Inability to achieve desired SpO2; Sudden change in patient status and requires 20% increase in FiO2; Patient requires >60% FiO2        10/23/19 0855   Unscheduled  Inhalation Treatment Q4H PRN Every 4 hours PRN (0 of 85763 released)    Release    10/09/19 1443   Unscheduled  POCT Arterial Blood Gas-Resp PRN Use PRN (2 of 86095 released)    Release   Comments: Notify Physician if: see parameters below.   Question: Component: Answer: Blood Gas    10/11/19 0327     10/29/19 0247  racepinephrine 2.25 % nebulizer solution 0.5 mL (racepinephrine (VAPONEFRIN) 2.25 % nebulizer solution panel)    Discontinue    -- Dispensed NEBULIZATION Every 4 hours PRN 10/29/19 0248   10/28/19 1300  albuterol-ipratropium 2.5 mg-0.5 mg/3 mL nebulizer solution 3 mL (albuterol-ipratropium (DUO-NEB) 0.5 - 3 mg (2.5 mg base)/ 3 mL nebulizer panel)    Discontinue    -- Dispensed NEBULIZATION Every 6 hours 10/28/19 1134   10/10/19 0900  fluticasone furoate-vilanterol 100-25 mcg/dose diskus inhaler 1 puff    Discontinue    -- Dispensed Inhl Daily 10/09/19 1552   10/09/19 1541  albuterol-ipratropium 2.5 mg-0.5 mg/3 mL nebulizer solution 3 mL (albuterol-ipratropium (DUO-NEB) 0.5 - 3 mg (2.5 mg base)/ 3 mL nebulizer panel)    Discontinue    -- Dispensed NEBULIZATION Every 4 hours PRN 10/09/19 1443         RECOMMENDATIONS     We recommend: Continue plan of care.    ESCALATION      Physician Escalation (Yes/No) No     Discussed plan of care primary RT, Samir      FOLLOW-UP     Please call back the Rapid Response  RT, Latanya Lomas, RRT at x [phone] for any questions or concerns.

## 2019-10-29 NOTE — ASSESSMENT & PLAN NOTE
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 Trach placed on 10/17/19. Permacath 10/24/19.    Plan:  Continue G-tube clamped.  J tube feeds at goal.  Continue on trach collar.  Only move with nursing assistance; has decannulated twice recently with moving, trach in false lumen this am.  Passed swallow study, tolerating CLD without n/v. Minimal residuals.   Condom cath/diaper to improve wound care. Wound care improved overall.   PT/OT.  Ambulate with assistance.     Progressing well, stable for discharge to LTAC. Awaiting insurance approval.

## 2019-10-30 LAB
ANION GAP SERPL CALC-SCNC: 10 MMOL/L (ref 8–16)
ANION GAP SERPL CALC-SCNC: 7 MMOL/L (ref 8–16)
BASOPHILS # BLD AUTO: 0.04 K/UL (ref 0–0.2)
BASOPHILS # BLD AUTO: 0.05 K/UL (ref 0–0.2)
BASOPHILS NFR BLD: 0.5 % (ref 0–1.9)
BASOPHILS NFR BLD: 0.6 % (ref 0–1.9)
BUN SERPL-MCNC: 61 MG/DL (ref 8–23)
BUN SERPL-MCNC: 64 MG/DL (ref 8–23)
CALCIUM SERPL-MCNC: 9 MG/DL (ref 8.7–10.5)
CALCIUM SERPL-MCNC: 9.8 MG/DL (ref 8.7–10.5)
CHLORIDE SERPL-SCNC: 100 MMOL/L (ref 95–110)
CHLORIDE SERPL-SCNC: 103 MMOL/L (ref 95–110)
CO2 SERPL-SCNC: 25 MMOL/L (ref 23–29)
CO2 SERPL-SCNC: 29 MMOL/L (ref 23–29)
CREAT SERPL-MCNC: 1.8 MG/DL (ref 0.5–1.4)
CREAT SERPL-MCNC: 1.9 MG/DL (ref 0.5–1.4)
DIFFERENTIAL METHOD: ABNORMAL
DIFFERENTIAL METHOD: ABNORMAL
EOSINOPHIL # BLD AUTO: 0.3 K/UL (ref 0–0.5)
EOSINOPHIL # BLD AUTO: 0.3 K/UL (ref 0–0.5)
EOSINOPHIL NFR BLD: 3.6 % (ref 0–8)
EOSINOPHIL NFR BLD: 4 % (ref 0–8)
ERYTHROCYTE [DISTWIDTH] IN BLOOD BY AUTOMATED COUNT: 19.6 % (ref 11.5–14.5)
ERYTHROCYTE [DISTWIDTH] IN BLOOD BY AUTOMATED COUNT: 19.8 % (ref 11.5–14.5)
EST. GFR  (AFRICAN AMERICAN): 39.6 ML/MIN/1.73 M^2
EST. GFR  (AFRICAN AMERICAN): 42.2 ML/MIN/1.73 M^2
EST. GFR  (NON AFRICAN AMERICAN): 34.2 ML/MIN/1.73 M^2
EST. GFR  (NON AFRICAN AMERICAN): 36.5 ML/MIN/1.73 M^2
GLUCOSE SERPL-MCNC: 114 MG/DL (ref 70–110)
GLUCOSE SERPL-MCNC: 127 MG/DL (ref 70–110)
HCT VFR BLD AUTO: 23.3 % (ref 40–54)
HCT VFR BLD AUTO: 25 % (ref 40–54)
HGB BLD-MCNC: 6.8 G/DL (ref 14–18)
HGB BLD-MCNC: 7.4 G/DL (ref 14–18)
IMM GRANULOCYTES # BLD AUTO: 0.04 K/UL (ref 0–0.04)
IMM GRANULOCYTES # BLD AUTO: 0.04 K/UL (ref 0–0.04)
IMM GRANULOCYTES NFR BLD AUTO: 0.4 % (ref 0–0.5)
IMM GRANULOCYTES NFR BLD AUTO: 0.5 % (ref 0–0.5)
LYMPHOCYTES # BLD AUTO: 1 K/UL (ref 1–4.8)
LYMPHOCYTES # BLD AUTO: 1.1 K/UL (ref 1–4.8)
LYMPHOCYTES NFR BLD: 12.7 % (ref 18–48)
LYMPHOCYTES NFR BLD: 13.2 % (ref 18–48)
MAGNESIUM SERPL-MCNC: 1.8 MG/DL (ref 1.6–2.6)
MCH RBC QN AUTO: 25.8 PG (ref 27–31)
MCH RBC QN AUTO: 26 PG (ref 27–31)
MCHC RBC AUTO-ENTMCNC: 29.2 G/DL (ref 32–36)
MCHC RBC AUTO-ENTMCNC: 29.6 G/DL (ref 32–36)
MCV RBC AUTO: 88 FL (ref 82–98)
MCV RBC AUTO: 88 FL (ref 82–98)
MONOCYTES # BLD AUTO: 0.9 K/UL (ref 0.3–1)
MONOCYTES # BLD AUTO: 1 K/UL (ref 0.3–1)
MONOCYTES NFR BLD: 11.5 % (ref 4–15)
MONOCYTES NFR BLD: 12.3 % (ref 4–15)
NEUTROPHILS # BLD AUTO: 5.1 K/UL (ref 1.8–7.7)
NEUTROPHILS # BLD AUTO: 6.4 K/UL (ref 1.8–7.7)
NEUTROPHILS NFR BLD: 69.5 % (ref 38–73)
NEUTROPHILS NFR BLD: 71.2 % (ref 38–73)
NRBC BLD-RTO: 0 /100 WBC
NRBC BLD-RTO: 0 /100 WBC
PHOSPHATE SERPL-MCNC: 3.9 MG/DL (ref 2.7–4.5)
PLATELET # BLD AUTO: 560 K/UL (ref 150–350)
PLATELET # BLD AUTO: 590 K/UL (ref 150–350)
PMV BLD AUTO: 10.1 FL (ref 9.2–12.9)
PMV BLD AUTO: 9.8 FL (ref 9.2–12.9)
POCT GLUCOSE: 110 MG/DL (ref 70–110)
POCT GLUCOSE: 112 MG/DL (ref 70–110)
POCT GLUCOSE: 123 MG/DL (ref 70–110)
POCT GLUCOSE: 123 MG/DL (ref 70–110)
POTASSIUM SERPL-SCNC: 4.1 MMOL/L (ref 3.5–5.1)
POTASSIUM SERPL-SCNC: 4.1 MMOL/L (ref 3.5–5.1)
RBC # BLD AUTO: 2.64 M/UL (ref 4.6–6.2)
RBC # BLD AUTO: 2.85 M/UL (ref 4.6–6.2)
SODIUM SERPL-SCNC: 136 MMOL/L (ref 136–145)
SODIUM SERPL-SCNC: 138 MMOL/L (ref 136–145)
WBC # BLD AUTO: 7.29 K/UL (ref 3.9–12.7)
WBC # BLD AUTO: 8.97 K/UL (ref 3.9–12.7)

## 2019-10-30 PROCEDURE — 25000003 PHARM REV CODE 250: Performed by: STUDENT IN AN ORGANIZED HEALTH CARE EDUCATION/TRAINING PROGRAM

## 2019-10-30 PROCEDURE — 80048 BASIC METABOLIC PNL TOTAL CA: CPT

## 2019-10-30 PROCEDURE — 85025 COMPLETE CBC W/AUTO DIFF WBC: CPT

## 2019-10-30 PROCEDURE — 63600175 PHARM REV CODE 636 W HCPCS: Performed by: SURGERY

## 2019-10-30 PROCEDURE — S4991 NICOTINE PATCH NONLEGEND: HCPCS | Performed by: PHYSICIAN ASSISTANT

## 2019-10-30 PROCEDURE — 27000221 HC OXYGEN, UP TO 24 HOURS

## 2019-10-30 PROCEDURE — 63600175 PHARM REV CODE 636 W HCPCS: Performed by: STUDENT IN AN ORGANIZED HEALTH CARE EDUCATION/TRAINING PROGRAM

## 2019-10-30 PROCEDURE — 80048 BASIC METABOLIC PNL TOTAL CA: CPT | Mod: 91

## 2019-10-30 PROCEDURE — 94668 MNPJ CHEST WALL SBSQ: CPT

## 2019-10-30 PROCEDURE — 99900026 HC AIRWAY MAINTENANCE (STAT)

## 2019-10-30 PROCEDURE — 25000242 PHARM REV CODE 250 ALT 637 W/ HCPCS: Performed by: STUDENT IN AN ORGANIZED HEALTH CARE EDUCATION/TRAINING PROGRAM

## 2019-10-30 PROCEDURE — 25000003 PHARM REV CODE 250: Performed by: NURSE PRACTITIONER

## 2019-10-30 PROCEDURE — 94761 N-INVAS EAR/PLS OXIMETRY MLT: CPT

## 2019-10-30 PROCEDURE — 84100 ASSAY OF PHOSPHORUS: CPT

## 2019-10-30 PROCEDURE — 99900035 HC TECH TIME PER 15 MIN (STAT)

## 2019-10-30 PROCEDURE — 36415 COLL VENOUS BLD VENIPUNCTURE: CPT

## 2019-10-30 PROCEDURE — 94640 AIRWAY INHALATION TREATMENT: CPT

## 2019-10-30 PROCEDURE — 83735 ASSAY OF MAGNESIUM: CPT

## 2019-10-30 PROCEDURE — 25000003 PHARM REV CODE 250: Performed by: PHYSICIAN ASSISTANT

## 2019-10-30 PROCEDURE — 20600001 HC STEP DOWN PRIVATE ROOM

## 2019-10-30 RX ADMIN — POLYETHYLENE GLYCOL 3350 17 G: 17 POWDER, FOR SOLUTION ORAL at 09:10

## 2019-10-30 RX ADMIN — ONDANSETRON 4 MG: 2 INJECTION INTRAMUSCULAR; INTRAVENOUS at 12:10

## 2019-10-30 RX ADMIN — OXYCODONE HYDROCHLORIDE 5 MG: 5 SOLUTION ORAL at 02:10

## 2019-10-30 RX ADMIN — DOCUSATE SODIUM 100 MG: 50 LIQUID ORAL at 09:10

## 2019-10-30 RX ADMIN — IPRATROPIUM BROMIDE AND ALBUTEROL SULFATE 3 ML: .5; 3 SOLUTION RESPIRATORY (INHALATION) at 12:10

## 2019-10-30 RX ADMIN — HEPARIN SODIUM 7500 UNITS: 5000 INJECTION, SOLUTION INTRAVENOUS; SUBCUTANEOUS at 02:10

## 2019-10-30 RX ADMIN — LIDOCAINE 1 PATCH: 50 PATCH TOPICAL at 12:10

## 2019-10-30 RX ADMIN — FLUTICASONE FUROATE AND VILANTEROL TRIFENATATE 1 PUFF: 100; 25 POWDER RESPIRATORY (INHALATION) at 09:10

## 2019-10-30 RX ADMIN — IPRATROPIUM BROMIDE AND ALBUTEROL SULFATE 3 ML: .5; 3 SOLUTION RESPIRATORY (INHALATION) at 08:10

## 2019-10-30 RX ADMIN — OXYCODONE HYDROCHLORIDE 5 MG: 5 SOLUTION ORAL at 05:10

## 2019-10-30 RX ADMIN — FENTANYL CITRATE 25 MCG: 50 INJECTION INTRAMUSCULAR; INTRAVENOUS at 03:10

## 2019-10-30 RX ADMIN — FENTANYL CITRATE 25 MCG: 50 INJECTION INTRAMUSCULAR; INTRAVENOUS at 05:10

## 2019-10-30 RX ADMIN — SEVELAMER CARBONATE 0.8 G: 800 POWDER, FOR SUSPENSION ORAL at 05:10

## 2019-10-30 RX ADMIN — OXYCODONE HYDROCHLORIDE 5 MG: 5 SOLUTION ORAL at 12:10

## 2019-10-30 RX ADMIN — OXYCODONE HYDROCHLORIDE 5 MG: 5 SOLUTION ORAL at 08:10

## 2019-10-30 RX ADMIN — FAMOTIDINE 20 MG: 40 POWDER, FOR SUSPENSION ORAL at 09:10

## 2019-10-30 RX ADMIN — IPRATROPIUM BROMIDE AND ALBUTEROL SULFATE 3 ML: .5; 3 SOLUTION RESPIRATORY (INHALATION) at 01:10

## 2019-10-30 RX ADMIN — IPRATROPIUM BROMIDE AND ALBUTEROL SULFATE 3 ML: .5; 3 SOLUTION RESPIRATORY (INHALATION) at 07:10

## 2019-10-30 RX ADMIN — CHLORHEXIDINE GLUCONATE 0.12% ORAL RINSE 15 ML: 1.2 LIQUID ORAL at 09:10

## 2019-10-30 RX ADMIN — NICOTINE 1 PATCH: 21 PATCH, EXTENDED RELEASE TRANSDERMAL at 09:10

## 2019-10-30 RX ADMIN — SEVELAMER CARBONATE 0.8 G: 800 POWDER, FOR SUSPENSION ORAL at 07:10

## 2019-10-30 RX ADMIN — SENNOSIDES 10 ML: 8.8 SYRUP ORAL at 09:10

## 2019-10-30 RX ADMIN — HEPARIN SODIUM 7500 UNITS: 5000 INJECTION, SOLUTION INTRAVENOUS; SUBCUTANEOUS at 05:10

## 2019-10-30 RX ADMIN — SEVELAMER CARBONATE 0.8 G: 800 POWDER, FOR SUSPENSION ORAL at 01:10

## 2019-10-30 RX ADMIN — HEPARIN SODIUM 7500 UNITS: 5000 INJECTION, SOLUTION INTRAVENOUS; SUBCUTANEOUS at 08:10

## 2019-10-30 NOTE — ASSESSMENT & PLAN NOTE
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 Trach placed on 10/17/19. Permacath 10/24/19.    Plan:  Full liquid diet  Continue G-tube clamped.  J tube feeds at goal.  Pain/nausea meds PRN  Bowel regimen  Continue on trach collar - will do capping trial today for respiratory   Only move with nursing assistance due to multiple decannulation events  Condom cath/diaper to improve wound care. Wound care improved overall.   PT/OT.  DVT/GI prophylaxis  Ambulate with assistance.     Progressing well, stable for discharge

## 2019-10-30 NOTE — CARE UPDATE
Capped per Dr. Solo.  RT remained bedside 10 minutes with patient on room air maintaining Spo2 of >93.

## 2019-10-30 NOTE — PLAN OF CARE
Pt AAOX4. Pain managed with PRN pain meds. Clears, no complaints of nausea or vomiting. G tube clamped. J tube, TF @ 70 cont. Adequate urine output overnight. #6 shiley. VS stable on 8L TC. Pt slept between care. Daughter at bedside. Turned Q2. Telle, cont pulse ox. Bed low and locked, call bell within reach. Will continue to monitor.

## 2019-10-30 NOTE — PROGRESS NOTES
"[facility]  General Surgery  Progress Note    Subjective:     History of Present Illness:  Patient is a 74 yo M with history of COPD and bleeding gastric mass who underwent EGD with AES yesterday where they removed a 7 cm "ulcerated lipoma" from his gastric antrum using submucosal endoscopic dissection. When removing the mass, it became stuck in the distal esophagus. They had to removed it piecemeal, and caused some mucosal esophageal tears. He was admitted for obs overnight, and this morning his WBC was elevated and he was complaining of abd pain. CXR showed free intraperitoneal free air and general surgery was consulted for evaluation.    Post-Op Info:  Procedure(s) (LRB):  Right IJ Hemodialysis Catheter Insertion (Right)   6 Days Post-Op     Interval History:   Patient seen and examined, no acute events overnight  Tolerating clear liquid diet and tube feeds  G-tube put out 375  On trach collar  Afebrile/VSS      Medications:  Continuous Infusions:  Scheduled Meds:   sodium chloride 0.9%   Intravenous Once    sodium chloride 0.9%   Intravenous Once    albuterol-ipratropium  3 mL Nebulization Q6H    chlorhexidine  15 mL Mouth/Throat BID    docusate  100 mg Per J Tube Daily    famotidine  20 mg Per J Tube Daily    fluticasone furoate-vilanterol  1 puff Inhalation Daily    heparin (porcine)  7,500 Units Subcutaneous Q8H    lidocaine  1 patch Transdermal Q24H    nicotine  1 patch Transdermal Daily    polyethylene glycol  17 g Per J Tube Daily    promethazine (PHENERGAN) IVPB  6.25 mg Intravenous Once    sennosides 8.8 mg/5 ml  10 mL Per J Tube Daily    sevelamer carbonate  0.8 g Oral TID WM     PRN Meds:sodium chloride, sodium chloride 0.9%, acetaminophen, albuterol-ipratropium, bisacodyl, Dextrose 10% Bolus, Dextrose 10% Bolus, fentaNYL, glucagon (human recombinant), heparin (porcine), hydrALAZINE, insulin aspart U-100, ondansetron, oxyCODONE, racepinephrine, sodium chloride 0.9% "     Review of patient's allergies indicates:   Allergen Reactions    Meloxicam Other (See Comments)     Ulcer, GI bleed      Objective:     Vital Signs (Most Recent):  Temp: 98.4 °F (36.9 °C) (10/30/19 0419)  Pulse: 90 (10/30/19 0419)  Resp: 18 (10/30/19 0419)  BP: (!) 145/66 (10/30/19 0419)  SpO2: 95 % (10/30/19 0419) Vital Signs (24h Range):  Temp:  [98.1 °F (36.7 °C)-98.7 °F (37.1 °C)] 98.4 °F (36.9 °C)  Pulse:  [] 90  Resp:  [16-20] 18  SpO2:  [92 %-99 %] 95 %  BP: (142-170)/(58-84) 145/66     Weight: (!) 150 kg (330 lb 11 oz)  Body mass index is 42.44 kg/m².    Intake/Output - Last 3 Shifts       10/28 0700 - 10/29 0659 10/29 0700 - 10/30 0659    P.O. 460     NG/GT 1910 60    Total Intake(mL/kg) 2370 (15.8) 60 (0.4)    Urine (mL/kg/hr) 1550 (0.4) 2150 (0.6)    Emesis/NG output  0    Drains  375    Total Output 1550 2525    Net +820 -2465          Emesis Occurrence  1 x          Physical Exam   Constitutional: He appears well-developed and well-nourished. No distress. He is intubated (Trach in place).   HENT:   Head: Normocephalic and atraumatic.   Cardiovascular: Normal rate and regular rhythm.   Pulmonary/Chest: Effort normal. He is intubated (Trach in place). No respiratory distress.   On trach collar   Abdominal:   Soft, appropriate TTP  G tube in place, clamped  Jtube in place  Incision CDI       Significant Labs:  CBC:   Recent Labs   Lab 10/30/19  0359   WBC 7.29   RBC 2.64*   HGB 6.8*   HCT 23.3*   *   MCV 88   MCH 25.8*   MCHC 29.2*     CMP:   Recent Labs   Lab 10/24/19  1214  10/30/19  0359      < > 114*   CALCIUM 8.8   < > 9.0   ALBUMIN 2.5*  --   --    PROT 5.9*  --   --       < > 138   K 4.9   < > 4.1   CO2 25   < > 25      < > 103   *   < > 64*   CREATININE 4.9*   < > 1.9*   ALKPHOS 100  --   --    ALT 34  --   --    AST 31  --   --    BILITOT 0.5  --   --     < > = values in this interval not displayed.     Assessment/Plan:     * Bowel perforation  Patient  is 75 yo M with 7 cm contained esophageal perforation and gastric perforation after AES procedure who underwent emergent ex lap and EGD on 10/10/19. Trach placed on 10/17/19. Permacath 10/24/19.    Plan:  Full liquid diet  Continue G-tube clamped.  J tube feeds at goal.  Pain/nausea meds PRN  Bowel regimen  Continue on trach collar - will do capping trial today for respiratory   Only move with nursing assistance due to multiple decannulation events  Condom cath/diaper to improve wound care. Wound care improved overall.   PT/OT.  DVT/GI prophylaxis  Ambulate with assistance.     Progressing well, stable for discharge       Esophageal tear         Cigarette smoker  Patch     COPD (chronic obstructive pulmonary disease)  Home meds, inhalers  Aggressive pulm toilet        Zelda William PA-C   [id number]  General Surgery  [facility]

## 2019-10-30 NOTE — TREATMENT PLAN
"AES Treatment Plan  10/30/2019  3:27 PM    Interval History:  No acute events overnight including no overt signs of bleeding.  Patient seen this morning, comfortable watching TV, and inquiring about when his trach would be capped.     Focused Physical Exam:  BP (!) 154/72   Pulse 97   Temp 98.2 °F (36.8 °C)   Resp 20   Ht 6' 2.02" (1.88 m)   Wt (!) 150 kg (330 lb 11 oz)   SpO2 (!) 91%   BMI 42.44 kg/m²   General: patient comfortable with trach collar  Abdomen: soft abdomen with G and J tube in place    Labs:   10/30/2019 03:59   Sodium 138   Potassium 4.1   Chloride 103   CO2 25   Anion Gap 10   BUN, Bld 64 (H)   Creatinine 1.9 (H)   eGFR if non  34.2 (A)   eGFR if African American 39.6 (A)   Glucose 114 (H)   Calcium 9.0   Phosphorus 3.9   Magnesium 1.8        10/30/2019 03:59   WBC 7.29   RBC 2.64 (L)   Hemoglobin 6.8 (L)   Hematocrit 23.3 (L)   MCV 88   MCH 25.8 (L)   MCHC 29.2 (L)   RDW 19.6 (H)   Platelets 560 (H)     Recommendations:  --Diet per primary team   --Continue current medications  --Nephrology recs appreciated (Cr continues to improve)  --We will continue to follow alongside primary team, please call with any additional questions or concerns    Oli Cristina M.D.  Gastroenterology Fellow, PGY-VI  Pager: [phone]  [facility]    "

## 2019-10-30 NOTE — PLAN OF CARE
"CM called insurer Expedited phone [phone], Opt 3 for the Insurer denial of LTAC/new ref # [id number], initially filed 10/25-spoke to melissa Winkler/ROLAND asking about status? He states the Medical Director is reviewing it, w/date 11/01/19 as date they have to give their determination. (Insurer policy states insurer has 72 hrs from receipt AOR form.) Updated CHARLES NICHOLS, & patient's daughter-  Tena Eubanks.   Daughter [phone]     . Tena states they are "disgusted by the insurance lack urgency, & will be firing the  Insurer". She "appreciates all the help that ROLAND & MAGDALENA is giving them".  "

## 2019-10-30 NOTE — SUBJECTIVE & OBJECTIVE
Interval History:   Patient seen and examined, no acute events overnight  Tolerating clear liquid diet and tube feeds  G-tube put out 375  On trach collar  Afebrile/VSS      Medications:  Continuous Infusions:  Scheduled Meds:   sodium chloride 0.9%   Intravenous Once    sodium chloride 0.9%   Intravenous Once    albuterol-ipratropium  3 mL Nebulization Q6H    chlorhexidine  15 mL Mouth/Throat BID    docusate  100 mg Per J Tube Daily    famotidine  20 mg Per J Tube Daily    fluticasone furoate-vilanterol  1 puff Inhalation Daily    heparin (porcine)  7,500 Units Subcutaneous Q8H    lidocaine  1 patch Transdermal Q24H    nicotine  1 patch Transdermal Daily    polyethylene glycol  17 g Per J Tube Daily    promethazine (PHENERGAN) IVPB  6.25 mg Intravenous Once    sennosides 8.8 mg/5 ml  10 mL Per J Tube Daily    sevelamer carbonate  0.8 g Oral TID WM     PRN Meds:sodium chloride, sodium chloride 0.9%, acetaminophen, albuterol-ipratropium, bisacodyl, Dextrose 10% Bolus, Dextrose 10% Bolus, fentaNYL, glucagon (human recombinant), heparin (porcine), hydrALAZINE, insulin aspart U-100, ondansetron, oxyCODONE, racepinephrine, sodium chloride 0.9%     Review of patient's allergies indicates:   Allergen Reactions    Meloxicam Other (See Comments)     Ulcer, GI bleed      Objective:     Vital Signs (Most Recent):  Temp: 98.4 °F (36.9 °C) (10/30/19 0419)  Pulse: 90 (10/30/19 0419)  Resp: 18 (10/30/19 0419)  BP: (!) 145/66 (10/30/19 0419)  SpO2: 95 % (10/30/19 0419) Vital Signs (24h Range):  Temp:  [98.1 °F (36.7 °C)-98.7 °F (37.1 °C)] 98.4 °F (36.9 °C)  Pulse:  [] 90  Resp:  [16-20] 18  SpO2:  [92 %-99 %] 95 %  BP: (142-170)/(58-84) 145/66     Weight: (!) 150 kg (330 lb 11 oz)  Body mass index is 42.44 kg/m².    Intake/Output - Last 3 Shifts       10/28 0700 - 10/29 0659 10/29 0700 - 10/30 0659    P.O. 460     NG/GT 1910 60    Total Intake(mL/kg) 2370 (15.8) 60 (0.4)    Urine (mL/kg/hr) 1550 (0.4) 2150  (0.6)    Emesis/NG output  0    Drains  375    Total Output 1550 2525    Net +820 -2465          Emesis Occurrence  1 x          Physical Exam   Constitutional: He appears well-developed and well-nourished. No distress. He is intubated (Trach in place).   HENT:   Head: Normocephalic and atraumatic.   Cardiovascular: Normal rate and regular rhythm.   Pulmonary/Chest: Effort normal. He is intubated (Trach in place). No respiratory distress.   On trach collar   Abdominal:   Soft, appropriate TTP  G tube in place, clamped  Jtube in place  Incision CDI       Significant Labs:  CBC:   Recent Labs   Lab 10/30/19  0359   WBC 7.29   RBC 2.64*   HGB 6.8*   HCT 23.3*   *   MCV 88   MCH 25.8*   MCHC 29.2*     CMP:   Recent Labs   Lab 10/24/19  1214  10/30/19  0359      < > 114*   CALCIUM 8.8   < > 9.0   ALBUMIN 2.5*  --   --    PROT 5.9*  --   --       < > 138   K 4.9   < > 4.1   CO2 25   < > 25      < > 103   *   < > 64*   CREATININE 4.9*   < > 1.9*   ALKPHOS 100  --   --    ALT 34  --   --    AST 31  --   --    BILITOT 0.5  --   --     < > = values in this interval not displayed.      No

## 2019-10-30 NOTE — CARE UPDATE
Rapid Response Respiratory Therapy Proactive Rounding Note      Time of visit: 1000    Code Status: Full Code   : 1945  Age: 74 y.o.  Weight:   Wt Readings from Last 1 Encounters:   10/18/19 (!) 150 kg (330 lb 11 oz)     Sex: male  Race: White   Bed: [facility] A:   MRN: [id number]    SITUATION     Evaluated patient for: LDA Check     BACKGROUND     Patient has a past medical history of COPD (chronic obstructive pulmonary disease), Diabetes mellitus, Emphysema of lung, Gastric ulcer, Hypertension, and MI (myocardial infarction).  Clinically Significant Surgical Hx: tracheostomy    ASSESSMENT/INTERVENTIONS     Upon arrival in room pt's trach has been capped. Tolerating at this time. All supplies at bedside.     Pulse: 92 Respiratory rate: 16Temperature: Temp: 97.7 °F (36.5 °C) BP: BP: (!) 152/70 SpO2:97  Level of Consciousness: Level of Consciousness (AVPU): alert  Respiratory Effort: Respiratory Effort: Normal, Unlabored Expansion/Accessory Muscle Usage: Expansion/Accessory Muscles/Retractions: no use of accessory muscles, no retractions, expansion symmetric  All Lung Field Breath Sounds: All Lung Fields Breath Sounds: coarse  EMILIANO Breath Sounds: coarse, wheezes, expiratory  LLL Breath Sounds: diminished  RUL Breath Sounds: coarse, wheezes, expiratory  RML Breath Sounds: coarse, diminished  RLL Breath Sounds: coarse, diminished  Mobility at time of assessment: General Mobility: mildly impaired  O2 Device/Concentration: RA  Most recent blood gas: No results for input(s): PH, PCO2, PO2, HCO3, POCSATURATED, BE in the last 72 hours.  P/F Ratio:   Surgical airway: Yes, Type: Shiley Size: 6.0  Ambu at bedside: Ambu bag with the patient?: Yes, Adult Ambu    Current Respiratory Care Orders:   10/30/19 0831  RESPIRATORY COMMUNICATION Until discontinued     Comments: Please perform capping trial of patient's tracheostomy today. Please monitor closely initially, for any respiratory distress, and then check on  patient frequently throughout the day.    10/30/19 0833   10/29/19 0248  Inhalation Treatment Once Once      10/29/19 0248   10/28/19 1300  Inhalation Treatment Q6H Every 6 hours (10 of 26 released)    Release    10/28/19 1133   10/28/19 1300  Chest physiotherapy Q6H Every 6 hours (10 of 26 released)    Release   Question: Indications: Answer: LOBAR OR > ATELECTASIS    10/28/19 1135   10/24/19 1817  Pulse Oximetry Continuous Continuous      10/24/19 1817   10/23/19 0855  Oxygen Continuous Continuous     References: Oxygen Titration Protocol   Question Answer Comment   Device type: Low flow    Device: Trach Collar 8L   FiO2%: 28    Titrate O2 per Oxygen Titration Protocol: Yes    To maintain SpO2 goal of: >= 90%    Notify MD of: Inability to achieve desired SpO2; Sudden change in patient status and requires 20% increase in FiO2; Patient requires >60% FiO2        10/23/19 0855   Unscheduled  Inhalation Treatment Q4H PRN Every 4 hours PRN (0 of 67219 released)    Release    10/09/19 1443   Unscheduled  POCT Arterial Blood Gas-Resp PRN Use PRN (2 of 13410 released)    Release   Comments: Notify Physician if: see parameters below.   Question: Component: Answer: Blood Gas         10/29/19 0247  racepinephrine 2.25 % nebulizer solution 0.5 mL (racepinephrine (VAPONEFRIN) 2.25 % nebulizer solution panel)    Discontinue    -- Dispensed NEBULIZATION Every 4 hours PRN 10/29/19 0248   10/28/19 1300  albuterol-ipratropium 2.5 mg-0.5 mg/3 mL nebulizer solution 3 mL (albuterol-ipratropium (DUO-NEB) 0.5 - 3 mg (2.5 mg base)/ 3 mL nebulizer panel)    Discontinue    -- Dispensed NEBULIZATION Every 6 hours 10/28/19 1134   10/10/19 0900  fluticasone furoate-vilanterol 100-25 mcg/dose diskus inhaler 1 puff    Discontinue    -- Dispensed Inhl Daily 10/09/19 1552   10/09/19 1541  albuterol-ipratropium 2.5 mg-0.5 mg/3 mL nebulizer solution 3 mL (albuterol-ipratropium (DUO-NEB) 0.5 - 3 mg (2.5 mg base)/ 3 mL nebulizer panel)    Discontinue     -- Dispensed NEBULIZATION Every 4 hours P          RECOMMENDATIONS     We recommend: Continue current POC and monitor pt due to trach being capped this AM. Call with any acute issues.     ESCALATION      Physician Escalation (Yes/No)NO   Care discussed with: n/a  Discussed plan of care primary RT, Sai    FOLLOW-UP     Please call back the Rapid Response RT, Ranjana Daniels, PABLO at x [phone] for any questions or concerns.

## 2019-10-31 VITALS
RESPIRATION RATE: 18 BRPM | TEMPERATURE: 98 F | OXYGEN SATURATION: 96 % | WEIGHT: 315 LBS | BODY MASS INDEX: 40.43 KG/M2 | HEART RATE: 98 BPM | HEIGHT: 74 IN | DIASTOLIC BLOOD PRESSURE: 71 MMHG | SYSTOLIC BLOOD PRESSURE: 154 MMHG

## 2019-10-31 LAB
ANION GAP SERPL CALC-SCNC: 11 MMOL/L (ref 8–16)
ANION GAP SERPL CALC-SCNC: 8 MMOL/L (ref 8–16)
BASOPHILS # BLD AUTO: 0.04 K/UL (ref 0–0.2)
BASOPHILS NFR BLD: 0.5 % (ref 0–1.9)
BUN SERPL-MCNC: 55 MG/DL (ref 8–23)
BUN SERPL-MCNC: 56 MG/DL (ref 8–23)
CALCIUM SERPL-MCNC: 9.1 MG/DL (ref 8.7–10.5)
CALCIUM SERPL-MCNC: 9.5 MG/DL (ref 8.7–10.5)
CHLORIDE SERPL-SCNC: 103 MMOL/L (ref 95–110)
CHLORIDE SERPL-SCNC: 104 MMOL/L (ref 95–110)
CO2 SERPL-SCNC: 25 MMOL/L (ref 23–29)
CO2 SERPL-SCNC: 27 MMOL/L (ref 23–29)
CREAT SERPL-MCNC: 1.5 MG/DL (ref 0.5–1.4)
CREAT SERPL-MCNC: 1.6 MG/DL (ref 0.5–1.4)
DIFFERENTIAL METHOD: ABNORMAL
EOSINOPHIL # BLD AUTO: 0.3 K/UL (ref 0–0.5)
EOSINOPHIL NFR BLD: 3.7 % (ref 0–8)
ERYTHROCYTE [DISTWIDTH] IN BLOOD BY AUTOMATED COUNT: 19.9 % (ref 11.5–14.5)
EST. GFR  (AFRICAN AMERICAN): 48.7 ML/MIN/1.73 M^2
EST. GFR  (AFRICAN AMERICAN): 52.6 ML/MIN/1.73 M^2
EST. GFR  (NON AFRICAN AMERICAN): 42.1 ML/MIN/1.73 M^2
EST. GFR  (NON AFRICAN AMERICAN): 45.5 ML/MIN/1.73 M^2
GLUCOSE SERPL-MCNC: 109 MG/DL (ref 70–110)
GLUCOSE SERPL-MCNC: 113 MG/DL (ref 70–110)
HCT VFR BLD AUTO: 23.5 % (ref 40–54)
HGB BLD-MCNC: 7.1 G/DL (ref 14–18)
IMM GRANULOCYTES # BLD AUTO: 0.06 K/UL (ref 0–0.04)
IMM GRANULOCYTES NFR BLD AUTO: 0.7 % (ref 0–0.5)
LYMPHOCYTES # BLD AUTO: 1 K/UL (ref 1–4.8)
LYMPHOCYTES NFR BLD: 11.6 % (ref 18–48)
MAGNESIUM SERPL-MCNC: 1.8 MG/DL (ref 1.6–2.6)
MCH RBC QN AUTO: 25.5 PG (ref 27–31)
MCHC RBC AUTO-ENTMCNC: 30.2 G/DL (ref 32–36)
MCV RBC AUTO: 85 FL (ref 82–98)
MONOCYTES # BLD AUTO: 0.9 K/UL (ref 0.3–1)
MONOCYTES NFR BLD: 11.3 % (ref 4–15)
NEUTROPHILS # BLD AUTO: 5.9 K/UL (ref 1.8–7.7)
NEUTROPHILS NFR BLD: 72.2 % (ref 38–73)
NRBC BLD-RTO: 0 /100 WBC
PHOSPHATE SERPL-MCNC: 3.9 MG/DL (ref 2.7–4.5)
PLATELET # BLD AUTO: 550 K/UL (ref 150–350)
PMV BLD AUTO: 10.3 FL (ref 9.2–12.9)
POCT GLUCOSE: 104 MG/DL (ref 70–110)
POCT GLUCOSE: 149 MG/DL (ref 70–110)
POTASSIUM SERPL-SCNC: 4.2 MMOL/L (ref 3.5–5.1)
POTASSIUM SERPL-SCNC: 4.5 MMOL/L (ref 3.5–5.1)
RBC # BLD AUTO: 2.78 M/UL (ref 4.6–6.2)
SODIUM SERPL-SCNC: 139 MMOL/L (ref 136–145)
SODIUM SERPL-SCNC: 139 MMOL/L (ref 136–145)
WBC # BLD AUTO: 8.17 K/UL (ref 3.9–12.7)

## 2019-10-31 PROCEDURE — 63600175 PHARM REV CODE 636 W HCPCS: Performed by: STUDENT IN AN ORGANIZED HEALTH CARE EDUCATION/TRAINING PROGRAM

## 2019-10-31 PROCEDURE — 80048 BASIC METABOLIC PNL TOTAL CA: CPT

## 2019-10-31 PROCEDURE — 63600175 PHARM REV CODE 636 W HCPCS: Performed by: SURGERY

## 2019-10-31 PROCEDURE — 94761 N-INVAS EAR/PLS OXIMETRY MLT: CPT

## 2019-10-31 PROCEDURE — 25000003 PHARM REV CODE 250: Performed by: NURSE PRACTITIONER

## 2019-10-31 PROCEDURE — 36415 COLL VENOUS BLD VENIPUNCTURE: CPT

## 2019-10-31 PROCEDURE — 25000003 PHARM REV CODE 250: Performed by: SURGERY

## 2019-10-31 PROCEDURE — 83735 ASSAY OF MAGNESIUM: CPT

## 2019-10-31 PROCEDURE — 25000003 PHARM REV CODE 250: Performed by: STUDENT IN AN ORGANIZED HEALTH CARE EDUCATION/TRAINING PROGRAM

## 2019-10-31 PROCEDURE — 85025 COMPLETE CBC W/AUTO DIFF WBC: CPT

## 2019-10-31 PROCEDURE — 94640 AIRWAY INHALATION TREATMENT: CPT

## 2019-10-31 PROCEDURE — 97535 SELF CARE MNGMENT TRAINING: CPT

## 2019-10-31 PROCEDURE — 25000003 PHARM REV CODE 250: Performed by: PHYSICIAN ASSISTANT

## 2019-10-31 PROCEDURE — S4991 NICOTINE PATCH NONLEGEND: HCPCS | Performed by: PHYSICIAN ASSISTANT

## 2019-10-31 PROCEDURE — 99900035 HC TECH TIME PER 15 MIN (STAT)

## 2019-10-31 PROCEDURE — 84100 ASSAY OF PHOSPHORUS: CPT

## 2019-10-31 PROCEDURE — 25000242 PHARM REV CODE 250 ALT 637 W/ HCPCS: Performed by: STUDENT IN AN ORGANIZED HEALTH CARE EDUCATION/TRAINING PROGRAM

## 2019-10-31 PROCEDURE — 92526 ORAL FUNCTION THERAPY: CPT

## 2019-10-31 PROCEDURE — 25000242 PHARM REV CODE 250 ALT 637 W/ HCPCS: Performed by: PHYSICIAN ASSISTANT

## 2019-10-31 RX ORDER — FAMOTIDINE 40 MG/5ML
20 POWDER, FOR SUSPENSION ORAL 2 TIMES DAILY
Status: DISCONTINUED | OUTPATIENT
Start: 2019-10-31 | End: 2019-10-31 | Stop reason: HOSPADM

## 2019-10-31 RX ADMIN — IPRATROPIUM BROMIDE AND ALBUTEROL SULFATE 3 ML: .5; 3 SOLUTION RESPIRATORY (INHALATION) at 08:10

## 2019-10-31 RX ADMIN — SENNOSIDES 10 ML: 8.8 SYRUP ORAL at 08:10

## 2019-10-31 RX ADMIN — LIDOCAINE 1 PATCH: 50 PATCH TOPICAL at 10:10

## 2019-10-31 RX ADMIN — OXYCODONE HYDROCHLORIDE 5 MG: 5 SOLUTION ORAL at 10:10

## 2019-10-31 RX ADMIN — POLYETHYLENE GLYCOL 3350 17 G: 17 POWDER, FOR SOLUTION ORAL at 08:10

## 2019-10-31 RX ADMIN — NICOTINE 1 PATCH: 21 PATCH, EXTENDED RELEASE TRANSDERMAL at 08:10

## 2019-10-31 RX ADMIN — HEPARIN SODIUM 7500 UNITS: 5000 INJECTION, SOLUTION INTRAVENOUS; SUBCUTANEOUS at 05:10

## 2019-10-31 RX ADMIN — IPRATROPIUM BROMIDE AND ALBUTEROL SULFATE 3 ML: .5; 3 SOLUTION RESPIRATORY (INHALATION) at 01:10

## 2019-10-31 RX ADMIN — FAMOTIDINE 20 MG: 40 POWDER, FOR SUSPENSION ORAL at 08:10

## 2019-10-31 RX ADMIN — DOCUSATE SODIUM 100 MG: 50 LIQUID ORAL at 08:10

## 2019-10-31 RX ADMIN — IPRATROPIUM BROMIDE AND ALBUTEROL SULFATE 3 ML: .5; 3 SOLUTION RESPIRATORY (INHALATION) at 12:10

## 2019-10-31 RX ADMIN — FLUTICASONE FUROATE AND VILANTEROL TRIFENATATE 1 PUFF: 100; 25 POWDER RESPIRATORY (INHALATION) at 09:10

## 2019-10-31 RX ADMIN — FENTANYL CITRATE 25 MCG: 50 INJECTION INTRAMUSCULAR; INTRAVENOUS at 06:10

## 2019-10-31 RX ADMIN — SEVELAMER CARBONATE 0.8 G: 800 POWDER, FOR SUSPENSION ORAL at 08:10

## 2019-10-31 RX ADMIN — FENTANYL CITRATE 25 MCG: 50 INJECTION INTRAMUSCULAR; INTRAVENOUS at 02:10

## 2019-10-31 RX ADMIN — CHLORHEXIDINE GLUCONATE 0.12% ORAL RINSE 15 ML: 1.2 LIQUID ORAL at 08:10

## 2019-10-31 RX ADMIN — SEVELAMER CARBONATE 0.8 G: 800 POWDER, FOR SUSPENSION ORAL at 12:10

## 2019-10-31 NOTE — PLAN OF CARE
POC reviewed with pt, verbalized understanding. AAOx4. Pt capped in early morning, continuous pulse ox maintained. O2 >90%. Voiding per urinal with adequate UOP. TF at goal rate of 70 ml/hr, tolerating well. Tolerating full liquid. Pt up in chair for couple of hrs, complained of nausea. Pain managed with PRN meds.     Family upset with: pain meds not being given in a timely manner, PT leaving Pt in chair and not coming back to check on them, and anxiety. Addressed concerns with family and set goals to work together on pain management, anxiety and PT, family and pt verbalized understanding. MD and charge aware of situation. Call light within reach, WCTM.

## 2019-10-31 NOTE — PT/OT/SLP PROGRESS
Speech Language Pathology Treatment  Discharge Summary    Patient Name:  Pasha Harmon   MRN:  [id number]  Admitting Diagnosis: Bowel perforation    Recommendations:                 General Recommendations:  Follow-up not indicated  Diet recommendations:  Regular, Liquid Diet Level: Thin   Aspiration Precautions: Standard aspiration precautions   General Precautions: Standard, fall  Communication strategies:  none    Subjective     Patient awake; alert.     Pain/Comfort:  · Pain Rating 1: 0/10    Objective:     Has the patient been evaluated by SLP for swallowing?   Yes  Keep patient NPO? No   Current Respiratory Status: room air      Patient seen for follow up swallow assessment. Patient now with trach capped. Patient tolerated thin liquids via straw sips x6 along with regular solids x3 with no overt signs of airway compromise.  Patient now with dentures in place, with improved tolerance of solids. Patient appropriate for diet upgrade to regular consistencies and continued thin liquids. Patient educated on need for continued aspiration precautions to increase swallow safety and discharge status from acute ST as patient presents with no further ST needs.     Assessment:     Pasha Harmon is a 74 y.o. male with an SLP diagnosis of no oropharygneal dysphagia. .  He presents with no further acute speech therapy needs at this time. .    Goals:   Multidisciplinary Problems     SLP Goals     Not on file          Multidisciplinary Problems (Resolved)        Problem: SLP Goal    Goal Priority Disciplines Outcome   SLP Goal   (Resolved)     SLP Met   Description:  Speech Language Pathology Goals  Goals expected to be met by 10/28:  1. Pt will tolerate PMSV with O2 >93% and VSS to improve respiration and phonation.   2. Pt will demonstrate placement/removal of PMSV x5 indep.   3. Pt will vocalize x3 with PMSV in place  4. Pt will recall instructions/precautions of PMSV with 100% acc indep.   5. Pt will  participate in ongoing swallow assessment to determine least restrictive diet recommendations.                         Plan:     · Patient to be seen:  4 x/week   · Plan of Care expires:  11/21/19  · Plan of Care reviewed with:  patient, daughter   · SLP Follow-Up:  No       Discharge recommendations:  nursing facility, skilled   Barriers to Discharge:  None    Time Tracking:     SLP Treatment Date:   10/31/19  Speech Start Time:  0940  Speech Stop Time:  0956     Speech Total Time (min):  16 min    Billable Minutes: Treatment Swallowing Dysfunction 8 and Seld Care/Home Management Training 8    Emily Abadie, CCC-SLP  10/31/2019

## 2019-10-31 NOTE — PLAN OF CARE
10/31/19 1530   Final Note   Assessment Type Final Discharge Note   Anticipated Discharge Disposition LTAC  ([facility] [place] [place])   What phone number can be called within the next 1-3 days to see how you are doing after discharge?   ([phone])   Hospital Follow Up  Appt(s) scheduled? Yes   Discharge plans and expectations educations in teach back method with documentation complete? Yes  (Gave copy of follow-up Appts to floor nurse, Bhanu, to give to pt. )   Right Care Referral Info   Post Acute Recommendation Other

## 2019-10-31 NOTE — PLAN OF CARE
10/31/19 1445   Post-Acute Status   Post-Acute Authorization Placement   Post-Acute Placement Status Set-up Complete  ([facility])     Yuliya Engle LMSW  [facility]

## 2019-10-31 NOTE — PLAN OF CARE
SW contacted pt's daughter Tena to discuss alternate plans for discharge. SW discussed SNF and Rehab options. Tena was very upset that pt is not discharging to the LTAC today as planned. Tena states that pt's care in the hospital has not been the best and family has been staying in the hospital for pt for the last month. Tena states that she does not have family coverage for pt this weekend. MAGDALENA explained the process of pt getting pt accept to another facility and Tena states that she would like pt moved today.    MAGDALENA sent out blast referrals for SNF. Patient has been denied by [facility] because they are unable to provide trach care.    MAGDALENA spoke with Lalita ([phone]) with [facility]. Laltia states that she can accept patient today. She does have insurance approval. Lalita states that she has contacted pt's family as well. Lalita states that she will contact SW with a bed number within the hour. Lalita requested discharge orders. MAGDALENA informed team.    Yuliya Engle, NICHOLAS  [facility]

## 2019-10-31 NOTE — SUBJECTIVE & OBJECTIVE
Interval History:   Patient seen and examined, no acute events overnight  Tolerating full liquid diet and tube feeds  G-tube clamped  On trach collar  Afebrile/VSS      Medications:  Continuous Infusions:  Scheduled Meds:   sodium chloride 0.9%   Intravenous Once    sodium chloride 0.9%   Intravenous Once    albuterol-ipratropium  3 mL Nebulization Q6H    chlorhexidine  15 mL Mouth/Throat BID    docusate  100 mg Per J Tube Daily    famotidine  20 mg Per J Tube Daily    fluticasone furoate-vilanterol  1 puff Inhalation Daily    heparin (porcine)  7,500 Units Subcutaneous Q8H    lidocaine  1 patch Transdermal Q24H    nicotine  1 patch Transdermal Daily    polyethylene glycol  17 g Per J Tube Daily    promethazine (PHENERGAN) IVPB  6.25 mg Intravenous Once    sennosides 8.8 mg/5 ml  10 mL Per J Tube Daily    sevelamer carbonate  0.8 g Oral TID WM     PRN Meds:sodium chloride, sodium chloride 0.9%, acetaminophen, albuterol-ipratropium, bisacodyl, Dextrose 10% Bolus, Dextrose 10% Bolus, fentaNYL, glucagon (human recombinant), heparin (porcine), hydrALAZINE, insulin aspart U-100, ondansetron, oxyCODONE, racepinephrine, sodium chloride 0.9%     Review of patient's allergies indicates:   Allergen Reactions    Meloxicam Other (See Comments)     Ulcer, GI bleed      Objective:     Vital Signs (Most Recent):  Temp: 98.1 °F (36.7 °C) (10/31/19 0545)  Pulse: 92 (10/31/19 0545)  Resp: 16 (10/31/19 0545)  BP: (!) 169/76 (10/31/19 0545)  SpO2: (!) 93 % (10/31/19 0545) Vital Signs (24h Range):  Temp:  [97.7 °F (36.5 °C)-98.2 °F (36.8 °C)] 98.1 °F (36.7 °C)  Pulse:  [] 92  Resp:  [14-20] 16  SpO2:  [84 %-100 %] 93 %  BP: (134-169)/(63-76) 169/76     Weight: (!) 150 kg (330 lb 11 oz)  Body mass index is 42.44 kg/m².    Intake/Output - Last 3 Shifts       10/29 0700 - 10/30 0659 10/30 0700 - 10/31 0659    NG/GT 60 1410    Total Intake(mL/kg) 60 (0.4) 1410 (9.4)    Urine (mL/kg/hr) 2150 (0.6) 3150 (0.9)    Emesis/NG  output 0     Drains 375     Stool  0    Total Output 2525 3150    Net -2465 -5630          Stool Occurrence  0 x    Emesis Occurrence 1 x           Physical Exam   Constitutional: He appears well-developed and well-nourished. No distress.   HENT:   Head: Normocephalic and atraumatic.   Cardiovascular: Normal rate and regular rhythm.   Pulmonary/Chest: Effort normal. No respiratory distress.   On trach collar   Abdominal:   Soft, appropriate TTP  G tube in place, clamped  Jtube in place  Incision CDI       Significant Labs:  CBC:   Recent Labs   Lab 10/31/19  0357   WBC 8.17   RBC 2.78*   HGB 7.1*   HCT 23.5*   *   MCV 85   MCH 25.5*   MCHC 30.2*     CMP:   Recent Labs   Lab 10/24/19  1214  10/31/19  0357      < > 113*   CALCIUM 8.8   < > 9.1   ALBUMIN 2.5*  --   --    PROT 5.9*  --   --       < > 139   K 4.9   < > 4.2   CO2 25   < > 25      < > 103   *   < > 55*   CREATININE 4.9*   < > 1.6*   ALKPHOS 100  --   --    ALT 34  --   --    AST 31  --   --    BILITOT 0.5  --   --     < > = values in this interval not displayed.

## 2019-10-31 NOTE — PROGRESS NOTES
"[facility]  General Surgery  Progress Note    Subjective:     History of Present Illness:  Patient is a 74 yo M with history of COPD and bleeding gastric mass who underwent EGD with AES yesterday where they removed a 7 cm "ulcerated lipoma" from his gastric antrum using submucosal endoscopic dissection. When removing the mass, it became stuck in the distal esophagus. They had to removed it piecemeal, and caused some mucosal esophageal tears. He was admitted for obs overnight, and this morning his WBC was elevated and he was complaining of abd pain. CXR showed free intraperitoneal free air and general surgery was consulted for evaluation.    Post-Op Info:  Procedure(s) (LRB):  Right IJ Hemodialysis Catheter Insertion (Right)   7 Days Post-Op     Interval History:   Patient seen and examined, no acute events overnight  Tolerating full liquid diet and tube feeds  G-tube clamped  On trach collar  Afebrile/VSS      Medications:  Continuous Infusions:  Scheduled Meds:   sodium chloride 0.9%   Intravenous Once    sodium chloride 0.9%   Intravenous Once    albuterol-ipratropium  3 mL Nebulization Q6H    chlorhexidine  15 mL Mouth/Throat BID    docusate  100 mg Per J Tube Daily    famotidine  20 mg Per J Tube Daily    fluticasone furoate-vilanterol  1 puff Inhalation Daily    heparin (porcine)  7,500 Units Subcutaneous Q8H    lidocaine  1 patch Transdermal Q24H    nicotine  1 patch Transdermal Daily    polyethylene glycol  17 g Per J Tube Daily    promethazine (PHENERGAN) IVPB  6.25 mg Intravenous Once    sennosides 8.8 mg/5 ml  10 mL Per J Tube Daily    sevelamer carbonate  0.8 g Oral TID WM     PRN Meds:sodium chloride, sodium chloride 0.9%, acetaminophen, albuterol-ipratropium, bisacodyl, Dextrose 10% Bolus, Dextrose 10% Bolus, fentaNYL, glucagon (human recombinant), heparin (porcine), hydrALAZINE, insulin aspart U-100, ondansetron, oxyCODONE, racepinephrine, sodium chloride 0.9% "     Review of patient's allergies indicates:   Allergen Reactions    Meloxicam Other (See Comments)     Ulcer, GI bleed      Objective:     Vital Signs (Most Recent):  Temp: 98.1 °F (36.7 °C) (10/31/19 0545)  Pulse: 92 (10/31/19 0545)  Resp: 16 (10/31/19 0545)  BP: (!) 169/76 (10/31/19 0545)  SpO2: (!) 93 % (10/31/19 0545) Vital Signs (24h Range):  Temp:  [97.7 °F (36.5 °C)-98.2 °F (36.8 °C)] 98.1 °F (36.7 °C)  Pulse:  [] 92  Resp:  [14-20] 16  SpO2:  [84 %-100 %] 93 %  BP: (134-169)/(63-76) 169/76     Weight: (!) 150 kg (330 lb 11 oz)  Body mass index is 42.44 kg/m².    Intake/Output - Last 3 Shifts       10/29 0700 - 10/30 0659 10/30 0700 - 10/31 0659    NG/GT 60 1410    Total Intake(mL/kg) 60 (0.4) 1410 (9.4)    Urine (mL/kg/hr) 2150 (0.6) 3150 (0.9)    Emesis/NG output 0     Drains 375     Stool  0    Total Output 2525 3150    Net -2465 -1740          Stool Occurrence  0 x    Emesis Occurrence 1 x           Physical Exam   Constitutional: He appears well-developed and well-nourished. No distress.   HENT:   Head: Normocephalic and atraumatic.   Cardiovascular: Normal rate and regular rhythm.   Pulmonary/Chest: Effort normal. No respiratory distress.   On trach collar   Abdominal:   Soft, appropriate TTP  G tube in place, clamped  Jtube in place  Incision CDI       Significant Labs:  CBC:   Recent Labs   Lab 10/31/19  0357   WBC 8.17   RBC 2.78*   HGB 7.1*   HCT 23.5*   *   MCV 85   MCH 25.5*   MCHC 30.2*     CMP:   Recent Labs   Lab 10/24/19  1214  10/31/19  0357      < > 113*   CALCIUM 8.8   < > 9.1   ALBUMIN 2.5*  --   --    PROT 5.9*  --   --       < > 139   K 4.9   < > 4.2   CO2 25   < > 25      < > 103   *   < > 55*   CREATININE 4.9*   < > 1.6*   ALKPHOS 100  --   --    ALT 34  --   --    AST 31  --   --    BILITOT 0.5  --   --     < > = values in this interval not displayed.     Assessment/Plan:     * Bowel perforation  Patient is 75 yo M with 7 cm contained  esophageal perforation and gastric perforation after AES procedure who underwent emergent ex lap and EGD on 10/10/19. Trach placed on 10/17/19. Permacath 10/24/19.    Plan:  Dental soft diet, tube feeds  Continue G-tube clamped.  Pain/nausea meds PRN  Bowel regimen  Continue on trach collar - will do capping trial today for respiratory   Only move with nursing assistance due to multiple decannulation events  Condom cath/diaper to improve wound care. Wound care improved overall.   PT/OT.  DVT/GI prophylaxis  Ambulate with assistance.     Progressing well, stable for discharge       Esophageal tear         Cigarette smoker  Patch     COPD (chronic obstructive pulmonary disease)  Home meds, inhalers  Aggressive pulm toilet        Zelda William PA-C   [id number]  General Surgery  [facility]

## 2019-10-31 NOTE — DISCHARGE SUMMARY
"[facility]  General Surgery  Discharge Summary      Patient Name: Pasha Harmon  MRN: [id number]  Admission Date: 10/9/2019  Hospital Length of Stay: 21 days  Discharge Date and Time:  10/31/2019 1:19 PM  Attending Physician: Paulino Parrish MD   Discharging Provider: Zelda William PA-C  Primary Care Provider: Eze Root MD    HPI:   Patient is a 72 yo M with history of COPD and bleeding gastric mass who underwent EGD with AES yesterday where they removed a 7 cm "ulcerated lipoma" from his gastric antrum using submucosal endoscopic dissection. When removing the mass, it became stuck in the distal esophagus. They had to removed it piecemeal, and caused some mucosal esophageal tears. He was admitted for obs overnight, and this morning his WBC was elevated and he was complaining of abd pain. CXR showed free intraperitoneal free air and general surgery was consulted for evaluation.    Procedure(s) (LRB):  Right IJ Hemodialysis Catheter Insertion (Right)      Indwelling Lines/Drains at time of discharge:   Lines/Drains/Airways     Central Venous Catheter Line                 Hemodialysis Catheter 10/24/19 1119 right internal jugular 7 days          Drain                 Gastrostomy/Enterostomy 10/10/19 1400 Jejunostomy tube 20 days         Gastrostomy/Enterostomy 10/10/19 1400 LUQ 20 days          Airway                 Surgical Airway 10/24/19 1135 Shiley Uncuffed 7 days          Pressure Ulcer                 Pressure Injury 10/27/19 0200 lower Coccyx #2 Stage 2 4 days              Hospital Course:   Patient was on hospital medicine service from 10/9 to 10/10. Please see their notes and orders for further hospital details.   On 10/10 he underwent ex lap for bowel perforation. He tolerated the procedure well and was transferred to the ICU after recovery from anesthesia. Please see the operative note for further procedure details.He remained in ICU recovering. He developed acute " respiratory failure and required tracheostomy placement. He developed acute kidney injury and required dialysis. His trach was capped and he was no longer requiring dialysis prior to discharge. His vitals remained stable, and he was afebrile all throughout the post operative period. Labs were reviewed and electrolytes were replaced appropriately. Physical exam was appropriate for post operative state.  He worked with SLP, his diet was advanced, and  was able to tolerate a dental soft diet prior to discharge. Final nutrition rec regular. He was ambulating with assistance and had normal bowel function prior to discharge. Patient was deemed suitable for discharge on hospital day 21. He was discharged to an LTAC with medications and instructions as below. He voiced understanding of the instructions prior to discharge.     For more thorough information, please refer to the hospital record and operative report.    Consults:   Consults (From admission, onward)        Status Ordering Provider     Inpatient consult to Cardiothoracic Surgery  Once     Provider:  (Not yet assigned)    Completed SHASHA STRONG     Inpatient consult to General Surgery  Once     Provider:  (Not yet assigned)    Completed ROC RUSSELL     Inpatient consult to Nephrology  Once     Provider:  (Not yet assigned)    Completed ABDIFATAH ELKINS     Inpatient consult to Registered Dietitian/Nutritionist  Once     Provider:  (Not yet assigned)    Completed ABDIFATAH ELKINS     Inpatient consult to Social Work  Once     Provider:  (Not yet assigned)    Completed BENNIE ISAACS          Significant Diagnostic Studies: Labs:   BMP:   Recent Labs   Lab 10/30/19  0359 10/30/19  1508 10/31/19  0357   * 127* 113*    136 139   K 4.1 4.1 4.2    100 103   CO2 25 29 25   BUN 64* 61* 55*   CREATININE 1.9* 1.8* 1.6*   CALCIUM 9.0 9.8 9.1   MG 1.8  --  1.8   , CMP   Recent Labs   Lab 10/30/19  0359 10/30/19  1508 10/31/19  0357     136 139   K 4.1 4.1 4.2    100 103   CO2 25 29 25   * 127* 113*   BUN 64* 61* 55*   CREATININE 1.9* 1.8* 1.6*   CALCIUM 9.0 9.8 9.1   ANIONGAP 10 7* 11   ESTGFRAFRICA 39.6* 42.2* 48.7*   EGFRNONAA 34.2* 36.5* 42.1*   , CBC   Recent Labs   Lab 10/30/19  0359 10/30/19  1137 10/31/19  0357   WBC 7.29 8.97 8.17   HGB 6.8* 7.4* 7.1*   HCT 23.3* 25.0* 23.5*   * 590* 550*   , INR   Lab Results   Component Value Date    INR 1.0 10/12/2019    INR 1.2 10/10/2019    INR 1.2 05/24/2019    and All labs within the past 24 hours have been reviewed    Radiology: X-Ray: CXR: X-Ray Chest 1 View (CXR):   Results for orders placed or performed during the hospital encounter of 10/09/19   X-Ray Chest 1 View    Narrative    EXAMINATION:  XR CHEST 1 VIEW    CLINICAL HISTORY:  s/p episode of emesis in patient with trach; confirm no change in status;    TECHNIQUE:  Single frontal view of the chest was performed.    COMPARISON:  10/26/2019    FINDINGS:  Tracheostomy tube and vascular catheter in place.  Heart size is normal.  There is some mild patchy atelectatic changes at both lung bases.      Impression    See above      Electronically signed by: Sonu Paredes MD  Date:    10/29/2019  Time:    13:35      and X-Ray Chest PA and Lateral (CXR):   Results for orders placed or performed during the hospital encounter of 10/09/19   X-Ray Chest PA And Lateral    Narrative    EXAMINATION:  XR CHEST PA AND LATERAL    CLINICAL HISTORY:  leukocytosis;    TECHNIQUE:  PA and lateral views of the chest were performed.    COMPARISON:  09/19/2019.    FINDINGS:  I suspect the study was performed with the patient seated and that frontal views were performed with AP technique.    X-ray beam attenuation and scatter occur in generous overlying soft tissues.    There is pneumoperitoneum new since the most recent study of 9/19/2019.  I identified the finding today, 10/10/2019 at 12:00 hours.  I have discussed this finding with  Jeanie Bedoya PA today at 12:13; she confirms that General Surgery has been consulted.    There is a small amount of dependent fluid in each pleural space, new since 09/19/2019.    On lateral view there is a metallic artifact projected over the distal esophagus; the appearance suggests a marker applied at endoscopy, not evident on the lateral view of 9/19/2019.  This foreign body is not confirmed or excluded on frontal views due to underpenetration of the mediastinum and epigastrium.  Ingested foreign body is not excluded.    I detect no pneumothorax or pneumomediastinum and no convincing evidence of pulmonary disease, pulmonary edema, subcutaneous emphysema or significant osseous abnormality.      Impression    Pneumoperitoneum discussed above.    Metallic foreign body projects over the distal esophagus as seen on lateral view.      Electronically signed by: Astrid García MD  Date:    10/10/2019  Time:    12:16      and KUB: X-Ray Abdomen AP 1 View (KUB):   Results for orders placed or performed during the hospital encounter of 10/09/19   X-Ray Abdomen AP 1 View    Narrative    EXAMINATION:  XR ABDOMEN AP 1 VIEW    CLINICAL HISTORY:  increased nausea/vomiting;    TECHNIQUE:  AP View(s) of the abdomen was performed.    COMPARISON:  10/27/2019    FINDINGS:  Limited views the abdomen show contrast in the colon from previous examination.  Amount of contrast in the colon is decreased and I see no significant distention or obstruction.  Degenerative changes are noted in the spine.      Impression    See above      Electronically signed by: Sonu Paredes MD  Date:    10/29/2019  Time:    13:36       Pending Diagnostic Studies:     Procedure Component Value Units Date/Time    Basic Metabolic Panel (BMP) [[id number]]     Order Status:  Sent Lab Status:  No result     Specimen:  Blood         Final Active Diagnoses:    Diagnosis Date Noted POA    PRINCIPAL PROBLEM:  Bowel perforation [K63.1] 10/10/2019 No     Sepsis [A41.9]  Yes    Esophageal perforation [K22.3]  No    Metabolic acidosis [E87.2]  No    ATN (acute tubular necrosis) [N17.0]  No    BHASKAR (acute kidney injury) [N17.9] 10/15/2019 Yes    Acute respiratory failure with hypoxia and hypercarbia [J96.01, J96.02]  No    Esophageal tear [S11.21XA] 10/11/2019 No    Perforated stomach, acute [K31.89] 10/10/2019 Yes    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] 10/09/2019 Yes    Insomnia [G47.00] 10/09/2019 Yes    MIGUELINA (obstructive sleep apnea) [G47.33] 08/20/2019 Yes    Cigarette smoker [F17.210] 08/20/2019 Yes    Anemia [D64.9] 07/03/2016 Yes    Type 2 diabetes mellitus without complication, without long-term current use of insulin [E11.9] 07/01/2016 Yes    Hyperlipidemia [E78.5] 07/01/2016 Yes    Essential hypertension [I10] 07/01/2016 Yes    COPD (chronic obstructive pulmonary disease) [J44.9] 06/30/2016 Yes      Problems Resolved During this Admission:      Patient Active Problem List   Diagnosis    COPD (chronic obstructive pulmonary disease)    Abscess of gluteal cleft    Type 2 diabetes mellitus without complication, without long-term current use of insulin    Hyperlipidemia    Essential hypertension    Hyponatremia    Depression    Anemia    Gastric tumor    Peptic ulcer disease with hemorrhage    Leiomyoma of stomach    Stromal tumor of digestive system    History of anemia    Atherosclerosis of abdominal aorta    Obesity (BMI 35.0-39.9 without comorbidity)    Cigarette smoker    Benign prostatic hyperplasia with nocturia    MIGUELINA (obstructive sleep apnea)    Upper GI bleed    Chronic blood loss anemia    Gastrointestinal hemorrhage with melena    Syncope    Leukocytosis    Thrombocytosis    Iron deficiency anemia, unspecified    Subepithelial gastric lesion    Insomnia    Bowel perforation    Perforated stomach, acute    Esophageal tear    Acute respiratory failure with hypoxia and hypercarbia    BHASKAR (acute kidney injury)     Esophageal perforation    Metabolic acidosis    ATN (acute tubular necrosis)    Sepsis     Discharged Condition: good    Disposition: Long Term Care    Follow Up:  Follow-up Information     Paulino Parrish MD In 2 weeks.    Specialty:  General Surgery  Contact information:  [address]  [facility] [address] [phone]                 Patient Instructions:      Diet Adult Regular     Lifting restrictions   Order Comments: No heavy lifting     Notify your health care provider if you experience any of the following:  difficulty breathing or increased cough     Notify your health care provider if you experience any of the following:  redness, tenderness, or signs of infection (pain, swelling, redness, odor or green/yellow discharge around incision site)     Notify your health care provider if you experience any of the following:  severe uncontrolled pain     Notify your health care provider if you experience any of the following:  persistent nausea and vomiting or diarrhea     Notify your health care provider if you experience any of the following:  temperature >100.4     No dressing needed     Medications:  Reconciled Home Medications:      Medication List      CONTINUE taking these medications    acetaminophen 650 MG Tbsr  Commonly known as:  TYLENOL  Take 1,300 mg by mouth 2 (two) times daily.     albuterol 1.25 mg/3 mL Nebu  Commonly known as:  ACCUNEB  Take 3 mLs (1.25 mg total) by nebulization every 6 (six) hours as needed. Rescue     albuterol-ipratropium 2.5 mg-0.5 mg/3 mL nebulizer solution  Commonly known as:  DUO-NEB  Take 3 mLs by nebulization every 6 (six) hours as needed for Wheezing. Rescue     budesonide-formoterol 160-4.5 mcg 160-4.5 mcg/actuation Hfaa  Commonly known as:  SYMBICORT  Inhale 2 puffs into the lungs every 12 (twelve) hours.     citalopram 20 MG tablet  Commonly known as:  CELEXA  Take 1 tablet (20 mg total) by mouth nightly.     diphenhydrAMINE 25 mg capsule  Commonly  known as:  BENADRYL  Take 25 mg by mouth every evening. 1 tab po am, 3 tabs po HS     lisinopril 20 MG tablet  Commonly known as:  PRINIVIL,ZESTRIL  Take 1 tablet (20 mg total) by mouth once daily.     metFORMIN 500 MG tablet  Commonly known as:  GLUCOPHAGE  Take 1 tablet (500 mg total) by mouth daily with breakfast.     nicotine 21 mg/24 hr  Commonly known as:  NICODERM CQ  Place 1 patch onto the skin once daily.     ONE-A-DAY MEN'S MULTIVITAMIN 400- mcg Tab  Generic drug:  multivit-min-folic-vit K-lycop  Take 1 tablet by mouth nightly.     pantoprazole 40 MG tablet  Commonly known as:  PROTONIX  Take 1 tablet (40 mg total) by mouth 2 (two) times daily.     simvastatin 40 MG tablet  Commonly known as:  ZOCOR  Take 1 tablet (40 mg total) by mouth every evening.     sucralfate 1 gram tablet  Commonly known as:  CARAFATE  Take 1 tablet (1 g total) by mouth 4 (four) times daily.     VITAMIN D3 5,000 unit Tab  Generic drug:  cholecalciferol (vitamin D3)  Take 5,000 Units by mouth once daily.          Time spent on the discharge of patient: 2 minutes    Zelda William PA-C  General Surgery  [facility]

## 2019-10-31 NOTE — PROGRESS NOTES
Pt transferred to [facility] via [facility] transportation.  Pt daughter Estefani at bedside.  Discharge paperwork with appointments given to Estefani.  Tubefeed paused for ride.  Pt states he is in no pain at this time.  L 22 PIV left in place per RN request during report.  Pt with R IJ chest HD catheter.  G tube clamped.  J tube clamped for ride.  #6 shiley capped.  Trach supplies with patient.  Packet for transport given to transporter.  Pt resting comfortably on RA in no acute distress.

## 2019-10-31 NOTE — PT/OT/SLP PROGRESS
Physical Therapy      Patient Name:  Pasha Harmon   MRN:  [id number]    Patient not seen today secondary to Patient unwilling to participate. Will follow-up 11/1/19.    Mimi Deleon PTA

## 2019-10-31 NOTE — PLAN OF CARE
Pt is AAOx3.Pt is in good spirits.  Family at bedside. Pt needs max assistance ( x2 people)NAD noted.Telly monitoring on going. Standard precautions maintained.  Pain well controlled over night.Pt turns independently, pt is aware of bony area and pressure reduction positions. Tube feeding continues.  Pt remains injury and fall free, non skid footwear donned, call light within reach, personal items within reach, bed in low/locked position, pt able to voice needs all needs voiced have been met at this time.

## 2019-10-31 NOTE — PLAN OF CARE
[facility]     Department of Hospital Medicine     [address] [place] [address]     [phone] after hours  [phone] fax       NURSING HOME ORDERS    10/31/2019    Admit to Nursing Home:    Skilled Bed                                                Diagnoses:  Active Hospital Problems    Diagnosis  POA    *Bowel perforation [K63.1]  No    Sepsis [A41.9]  Yes    Esophageal perforation [K22.3]  No    Metabolic acidosis [E87.2]  No    ATN (acute tubular necrosis) [N17.0]  No    BHASKAR (acute kidney injury) [N17.9]  Yes    Acute respiratory failure with hypoxia and hypercarbia [J96.01, J96.02]  No    Esophageal tear [S11.21XA]  No    Perforated stomach, acute [K31.89]  Yes    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       Patient is homebound due to:  Bowel perforation    Allergies:  Review of patient's allergies indicates:   Allergen Reactions    Meloxicam Other (See Comments)     Ulcer, GI bleed        Vitals:    Every shift (Skilled Nursing patients)    Diet: dental soft, thin liquid    Supplement:  1 can every three times a day with meals                         Type:  Ensure  Question Answer Comment   Patient location: [facility]     Select Adult Formula: Peptamen Intense VHP or equivalent     Formula Rate (mL/hr): 70     Route: Jejunostomy     Container for Formula Delivery Ready to Hang Liter      Acitivities:      - Up in a chair each morning as tolerated   - Ambulate with assistance to bathroom   -  Scheduled walks once each shift (every 8 hours)   - May ambulate independently   - May use walker or cane    LABS:  Per facility protocol    Nursing Precautions:     - Aspiration precautions:             - Total assistance with meals            -  Upright 90 degrees befor during and after meals             -  Suction at bedside          - Fall precautions per nursing home protocol   - Seizure precaution per retirement protocol   - Decubitus precautions:        -  for positioning   - Pressure reducing foam mattress   - Turn patient every two hours. Use wedge pillows to anchor patient    CONSULTS:      Physical Therapy to evaluate and treat     Occupational Therapy to evaluate and treat     Speech Therapy  to evaluate and treat     Nutrition to evaluate and recommend diet     Psychiatry to evaluate and follow patients for delirium    MISCELLANEOUS CARE:     PEG Care:  Clean site every 24 hours     Routine Skin for Bedridden Patients:  Apply moisture barrier cream to all    skin folds and wet areas in perineal area daily and after baths and                           all bowel movements.      Trach care - keep site clean and dry. Suction trach PRN    Medications: Discontinue all previous medication orders, if any. See new list below.      Pasha Harmon [place]   Home Medication Instructions MARIO:[id number]    Printed on:10/31/19 1046   Medication Information                      acetaminophen (TYLENOL) 650 MG TbSR  Take 1,300 mg by mouth 2 (two) times daily.              albuterol (ACCUNEB) 1.25 mg/3 mL Nebu  Take 3 mLs (1.25 mg total) by nebulization every 6 (six) hours as needed. Rescue             albuterol-ipratropium (DUO-NEB) 2.5 mg-0.5 mg/3 mL nebulizer solution  Take 3 mLs by nebulization every 6 (six) hours as needed for Wheezing. Rescue             budesonide-formoterol 160-4.5 mcg (SYMBICORT) 160-4.5 mcg/actuation HF  Inhale 2 puffs into the lungs every 12 (twelve) hours.              cholecalciferol, vitamin D3, (VITAMIN D3) 5,000 unit Tab  Take 5,000 Units by mouth once daily.             citalopram (CELEXA) 20 MG tablet  Take 1 tablet (20 mg total) by mouth nightly.             diphenhydrAMINE (BENADRYL) 25 mg capsule  Take 25 mg by mouth every evening. 1 tab po am, 3 tabs po HS              lisinopril (PRINIVIL,ZESTRIL) 20 MG tablet  Take 1 tablet (20 mg total) by mouth once daily.             metFORMIN (GLUCOPHAGE) 500 MG tablet  Take 1 tablet (500 mg total) by mouth daily with breakfast.             multivit with min-FA-lycopene (ONE-A-DAY MEN'S MULTIVITAMIN) 400-300 mcg Tab  Take 1 tablet by mouth nightly.             nicotine (NICODERM CQ) 21 mg/24 hr  Place 1 patch onto the skin once daily.             pantoprazole (PROTONIX) 40 MG tablet  Take 1 tablet (40 mg total) by mouth 2 (two) times daily.             simvastatin (ZOCOR) 40 MG tablet  Take 1 tablet (40 mg total) by mouth every evening.             sucralfate (CARAFATE) 1 gram tablet  Take 1 tablet (1 g total) by mouth 4 (four) times daily.                       _________________________________  Zelda William PA-C  10/31/2019

## 2019-10-31 NOTE — PLAN OF CARE
09:45 AM Notified by insurer Appeals [phone], [phone], stating she has good news & LTAC denial has been overturned & now approved w/auth # [id number], w/dates 10/31-11/08/19, w/LTAC needing to sent progress notes q7d to extend date;Ave is faxing a letter to [facility] regarding this. Updated covering MARY NICHOLS/X [id number], & NARCISA Fitzpatrick.     10:10 AM Per KENNETH William, & Dr. KENNETH Bautista, patient no longer needs LTAC for his current needs: Debility, trach capped, w/now diet advancing to dental soft this am. CM informed MAGDALENA will re-refer to the SNF that declined, last week, & will now have to wait for a SNF to accept & get insurer to authorize. They verbalized their understanding. Updated SW, & left voice message to Cedric Ochoa CM, [phone], option [id number]. Faxed updated MD progress note w/cover sheet denoting this to her.     11:55 AM See MAGDALENA notes. Patient family is very upset patient is not moving closer to home today. MAGDALENA states [facility] will still [facility] pt today, stating they have insurer auth. MAGDALENA updated Dr. Perez, on secure chat, that the family wants to move along w/LTAC, w/[facility] in agreement to take pt, w/having it authorized by insurer. And  left voice message to Cedric Ochoa CM, [phone], option [id number]. Faxed updated MD progress note w/cover sheet denoting this to her.      12:05 PM ROLAND saw patient's daughter, Bessie, in quach outside patient's room. Patient is asleep. Bessie was updated on above.     1:00 PM ROLAND spoke to NARCISA Fitzpatrick, confirming she is aware of above;She is updating LTAC orders to reflect his current needs, to wean J-tube TF's are included, along w/above. MARY NICHOLS, is aware.     1:25 PM Updated floor nurse, Bhanu/X [phone].

## 2019-10-31 NOTE — TREATMENT PLAN
"AES Treatment Plan  10/31/2019  9:58 AM    Interval History:  No acute events overnight.    Focused Physical Exam:  BP (!) 156/60 (BP Location: Right arm, Patient Position: Lying)   Pulse 94   Temp 98.3 °F (36.8 °C) (Oral)   Resp 20   Ht 6' 2.02" (1.88 m)   Wt (!) 150 kg (330 lb 11 oz)   SpO2 (!) 94%   BMI 42.44 kg/m²   General: patient comfortable in bed with trach capped  Abdomen: soft abdomen with G and J tube in place    Labs:   10/31/2019 03:57   Sodium 139   Potassium 4.2   Chloride 103   CO2 25   Anion Gap 11   BUN, Bld 55 (H)   Creatinine 1.6 (H)   eGFR if non  42.1 (A)   eGFR if African American 48.7 (A)   Glucose 113 (H)   Calcium 9.1   Phosphorus 3.9   Magnesium 1.8       Recommendations:  --Diet per primary team   --Daily labs  --Monitor UOP and Cr closely, avoid nephrotoxic agents  --Continue current medications  --We will continue to follow alongside primary team, please call with any additional questions or concerns    Oli Cristnia M.D.  Gastroenterology Fellow, PGY-VI  Pager: [phone]  [facility]    "

## 2019-10-31 NOTE — ASSESSMENT & PLAN NOTE
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 Trach placed on 10/17/19. Permacath 10/24/19.    Plan:  Dental soft diet, tube feeds  Continue G-tube clamped.  Pain/nausea meds PRN  Bowel regimen  Continue on trach collar - will do capping trial today for respiratory   Only move with nursing assistance due to multiple decannulation events  Condom cath/diaper to improve wound care. Wound care improved overall.   PT/OT.  DVT/GI prophylaxis  Ambulate with assistance.     Progressing well, stable for discharge

## 2019-10-31 NOTE — PLAN OF CARE
MAGDALENA sent updated LTAC orders and MAR to Lalita with [facility] for review. MAGDALENA waiting to hear back. MAGDALENA discussed health updates with Lalita. Lalita states that she is aware.     2:24 PM  RN can call report to Geoff at [facility] to [phone]. Patient will be in room 204.    Transportation arranged for stretcher via [facility] (x. [phone]). Requested pickup time is 3:40 PM. Requested pickup time is not a guarantee of time of arrival.     HEENA Drummond aware.    MAGDALENA updated daughter Tena with this information.    Yuliya Engle, MAGDALENA  [facility]

## 2019-11-12 LAB — FUNGUS SPEC CULT: ABNORMAL

## 2019-11-18 ENCOUNTER — OFFICE VISIT (OUTPATIENT)
Dept: SURGERY | Facility: CLINIC | Age: 74
End: 2019-11-18
Payer: MEDICARE

## 2019-11-18 VITALS
HEIGHT: 74 IN | SYSTOLIC BLOOD PRESSURE: 173 MMHG | HEART RATE: 67 BPM | WEIGHT: 276 LBS | BODY MASS INDEX: 35.42 KG/M2 | DIASTOLIC BLOOD PRESSURE: 87 MMHG | TEMPERATURE: 98 F

## 2019-11-18 DIAGNOSIS — N17.9 AKI (ACUTE KIDNEY INJURY): Primary | ICD-10-CM

## 2019-11-18 PROCEDURE — 36590 REMOVAL TUNNELED CV CATH: CPT | Mod: 79,S$GLB,, | Performed by: SURGERY

## 2019-11-18 PROCEDURE — 99024 PR POST-OP FOLLOW-UP VISIT: ICD-10-PCS | Mod: S$GLB,,, | Performed by: SURGERY

## 2019-11-18 PROCEDURE — 99024 POSTOP FOLLOW-UP VISIT: CPT | Mod: S$GLB,,, | Performed by: SURGERY

## 2019-11-18 PROCEDURE — 99999 PR PBB SHADOW E&M-EST. PATIENT-LVL IV: CPT | Mod: PBBFAC,,, | Performed by: SURGERY

## 2019-11-18 PROCEDURE — 36590 PR REMOVAL TUNNELED CV CATH W SUBQ PORT OR PUMP: ICD-10-PCS | Mod: 79,S$GLB,, | Performed by: SURGERY

## 2019-11-18 PROCEDURE — 99999 PR PBB SHADOW E&M-EST. PATIENT-LVL IV: ICD-10-PCS | Mod: PBBFAC,,, | Performed by: SURGERY

## 2019-11-19 NOTE — PROGRESS NOTES
PREOPERATIVE DIAGNOSIS:    1.) Hemodialysis catheter nonuse   2.) Acute Kidney Injury    POSTOPERATIVE DIAGNOSIS: Same    PROCEDURE PERFORMED: Removal Permacath    ATTENDING SURGEON: Paulino Parrish MD      HOUSESTAFF SURGEON: Butch    ANESTHESIA:  Local    ESTIMATED BLOOD LOSS: 4cc      INDICATIONS: Pasha Harmon is a 74 y.o. male referred to my [facility] Surgery Clinic with a hemodialysis catheter that is no longer required.  He has not required dialysis since he was discharged from inpatient facility.    PROCEDURE IN DETAIL: The patient was identified and brought back to minor   procedure room in the General Surgery Clinic. Patient was positioned appropriately   and prepped and draped sterilely. Local anesthesia was administered and a 1cm skin incision was made with the scalpel. Subcutaneous tissue was divided sharply and the permacath was sharply excised away from the normal surrounding   subcutaneous tissue sharply, was passed off the field as specimen. Hemostasis   was obtained.  A sterile dressing was applied. The patient tolerated the procedure well, was discharged from the minor procedure room.

## 2019-12-12 LAB
ACID FAST MOD KINY STN SPEC: NORMAL
MYCOBACTERIUM SPEC QL CULT: NORMAL

## 2020-01-15 PROBLEM — E11.65 TYPE 2 DIABETES MELLITUS WITH HYPERGLYCEMIA: Status: ACTIVE | Noted: 2020-01-15

## 2020-01-31 PROBLEM — D47.3 ESSENTIAL THROMBOCYTHEMIA: Status: ACTIVE | Noted: 2020-01-31

## 2020-02-19 PROBLEM — R91.8 PULMONARY NODULES: Status: ACTIVE | Noted: 2020-02-19

## 2020-12-24 ENCOUNTER — CLINICAL SUPPORT (OUTPATIENT)
Dept: URGENT CARE | Facility: CLINIC | Age: 75
End: 2020-12-24
Payer: MEDICARE

## 2020-12-24 VITALS — TEMPERATURE: 99 F | OXYGEN SATURATION: 100 %

## 2020-12-24 DIAGNOSIS — Z20.822 ENCOUNTER FOR LABORATORY TESTING FOR COVID-19 VIRUS: Primary | ICD-10-CM

## 2020-12-24 LAB
CTP QC/QA: YES
SARS-COV-2 RDRP RESP QL NAA+PROBE: NEGATIVE

## 2020-12-24 PROCEDURE — U0002: ICD-10-PCS | Mod: QW,S$GLB,, | Performed by: FAMILY MEDICINE

## 2020-12-24 PROCEDURE — U0002 COVID-19 LAB TEST NON-CDC: HCPCS | Mod: QW,S$GLB,, | Performed by: FAMILY MEDICINE

## 2020-12-24 NOTE — PROGRESS NOTES
"Pt has symptoms today has chosen not to see a provider today, pt will follow up with your provider. If symptoms worsen, return to our urgent care or proceed to the nearest emergency room.     Thank you for choosing [facility]     · NEGATIVE COVID TEST   o You have tested negative for COVID-19 today. If you did not have a close exposure you can return to your normal daily activities to include social distancing, wearing a mask and frequent handwashing.   o A "close exposure" is defined as anyone who has had an exposure (masked or unmasked) to a known COVID -19 positive person within 6 ft for longer than 15 minutes. If your exposure meets this definition, you are required by CDC guidelines to quarantine for at least 7-10 days from time of exposure.   o The CDC states that a test can be performed for an asymptomatic patient (someone who does not have any symptoms) after a close exposure, and that a test should be done if you develop symptoms after a close exposure as described above.   o Specifically, you can test at day 5 or later if asymptomatic in order to get released from quarantine on day 7 or later. If you develop symptoms sooner, you should test when your symptoms start.   o If you developed symptoms since the exposure, and your test was negative today and less than 5 days from your exposure, you still have to quarantine for 7-10 days from the date of the exposure.   o The 7-10 day quarantine begins from the day you were exposed, not the day of your test. For example, if your exposure was on a Monday, and you waited until Friday of the same week to get tested and it was negative, your 7-10 day quarantine begins from that Monday, not the Friday you tested negative.   o Please note, if you decide to test as an asymptomatic during your quarantine and you are positive, you will be restarting your quarantine and moving from a possible 10 day quarantine (if you do not test), to a 11 day or greater " quarantine.         · POSITIVE COVID TEST   o You have tested positive for COVID-19 today. Please note that patients who test positive for COVID-19 are required by the CDC to undergo isolation for 10 days after their symptoms first began.   o This isolation starts from the day you first developed symptoms, not the day of your positive test. For example, if your symptoms began on a Monday but tested positive on the following Wednesday, your 10-day isolation begins from that Monday, not the Wednesday you tested positive.   o However, if you are asymptomatic (a person who does not have any symptoms) and COVID-19 positive, your 10-day isolation begins on the day you tested positive, regardless of exposure date.   o Also, per the CDC guidelines, once your 10 days have passed, and you have not had fever greater than 100.4F in the last 24 hours without taking any fever reducers such as Tylenol (Acetaminophen) or Motrin (Ibuprofen), you may return to your normal activities including social distancing, wearing masks, and frequent handwashing - YOU DO NOT NEED ANOTHER TEST IN ORDER TO END YOUR QUARANTINE.

## 2021-04-15 NOTE — ASSESSMENT & PLAN NOTE
April 15, 2021     Patient: Li Handy   YOB: 1977   Date of Visit: 4/15/2021       To Whom it May Concern:    Li Handy was seen in my clinic on 4/15/2021 at 9:30 am.    Please excuse Li for her absence from work on 4/15  To  4/19    EBONIE Mayfield MD    Medical information is confidential and cannot be disclosed without the written consent of the patient or her representative.       Patch

## 2021-08-09 PROBLEM — C49.A0: Status: RESOLVED | Noted: 2019-06-04 | Resolved: 2021-08-09

## 2022-02-10 PROBLEM — K31.89 PERFORATED STOMACH, ACUTE: Status: RESOLVED | Noted: 2019-10-10 | Resolved: 2022-02-10

## 2022-02-10 PROBLEM — K63.1 BOWEL PERFORATION: Status: RESOLVED | Noted: 2019-10-10 | Resolved: 2022-02-10

## 2022-02-10 PROBLEM — K92.2 UPPER GI BLEED: Status: RESOLVED | Noted: 2019-09-01 | Resolved: 2022-02-10

## 2022-03-09 NOTE — SUBJECTIVE & OBJECTIVE
Advance Care Planning     Advance Care Planning (ACP) Physician/NP/PA Conversation      Date of Conversation: 3/9/2022  Conducted with: Patient with Decision Making Capacity    Healthcare Decision Maker:   No healthcare decision makers have been documented. Click here to complete [address] including selection of the Healthcare Decision Maker Relationship (ie \"Primary\")      Today we documented Decision Maker(s). The patient will provide ACP documents. Care Preferences:    Hospitalization: \"If your health worsens and it becomes clear that your chance of recovery is unlikely, what would be your preference regarding hospitalization? \"  The patient would prefer comfort-focused treatment without hospitalization. Ventilation: \"If you were unable to breathe on your own and your chance of recovery was unlikely, what would be your preference about the use of a ventilator (breathing machine) if it was available to you? \"   The patient would NOT desire the use of a ventilator. Resuscitation: \"In the event your heart stopped as a result of an underlying serious health condition, would you want attempts to be made to restart your heart, or would you prefer a natural death? \"   Yes, attempt to resuscitate.     Additional topics discussed: ventilation preferences, hospitalization preferences and resuscitation preferences    Conversation Outcomes / Follow-Up Plan:   ACP in process - completing/providing documents   Reviewed DNR/DNI and patient elects Full Code (Attempt Resuscitation)     Length of Voluntary ACP Conversation in minutes:  16 minutes    Ismael David DNP Interval History: Patient seen and examined at bedside. Received about 4hrs of SLED before CRRT clotted. Appropriate change seen in BUN, but still elevated (86 today, from 109 yesterday). 805mL UOP/past 24hrs. Net positive 0.9L/past 24hrs.      Review of patient's allergies indicates:   Allergen Reactions    Meloxicam Other (See Comments)     Ulcer, GI bleed      Current Facility-Administered Medications   Medication Frequency    0.9%  NaCl infusion (CRRT USE ONLY) Continuous    albuterol-ipratropium 2.5 mg-0.5 mg/3 mL nebulizer solution 3 mL Q4H PRN    albuterol-ipratropium 2.5 mg-0.5 mg/3 mL nebulizer solution 3 mL Q4H    chlorhexidine 0.12 % solution 15 mL BID    dexmedetomidine (PRECEDEX) 400mcg/100mL 0.9% NaCL infusion Continuous    dextrose 10% (D10W) Bolus PRN    dextrose 10% (D10W) Bolus PRN    [START ON 10/18/2019] famotidine 40 mg/5 mL (8 mg/mL) suspension 20 mg Daily    fentaNYL injection 25 mcg Q1H PRN    fluconazole (DIFLUCAN) IVPB 400 mg 200 mL Q24H    fluticasone furoate-vilanterol 100-25 mcg/dose diskus inhaler 1 puff Daily    glucagon (human recombinant) injection 1 mg PRN    heparin (porcine) injection 5,000 Units Q8H    hydrALAZINE injection 20 mg Q6H PRN    insulin aspart U-100 pen 0-5 Units QID (AC + HS) PRN    iohexol (OMNIPAQUE) oral solution 15 mL PRN    magnesium sulfate 2g in water 50mL IVPB (premix) PRN    nicotine 21 mg/24 hr 1 patch Daily    norepinephrine 4 mg in dextrose 5% 250 mL infusion (premix) (titrating) Continuous    ondansetron injection 4 mg Q6H PRN    sodium chloride 0.9% flush 10 mL PRN    sodium phosphate 20.01 mmol in dextrose 5 % 250 mL IVPB PRN    sodium phosphate 30 mmol in dextrose 5 % 250 mL IVPB PRN    sodium phosphate 39.99 mmol in dextrose 5 % 250 mL IVPB PRN     Family History     Problem Relation (Age of Onset)    Cancer Father    Heart disease Mother        Tobacco Use    Smoking status: Current Every Day Smoker     Packs/day: 2.00      Types: Cigarettes    Smokeless tobacco: Never Used   Substance and Sexual Activity    Alcohol use: No    Drug use: No    Sexual activity: Not on file       Objective:     Vital Signs (Most Recent):  Temp: 98.5 °F (36.9 °C) (10/17/19 0700)  Pulse: 77 (10/17/19 0830)  Resp: (!) 26 (10/17/19 0735)  BP: (!) 114/57 (10/17/19 0800)  SpO2: (!) 92 % (10/17/19 0830)  O2 Device (Oxygen Therapy): ventilator (10/17/19 0800) Vital Signs (24h Range):  Temp:  [98.5 °F (36.9 °C)-100.5 °F (38.1 °C)] 98.5 °F (36.9 °C)  Pulse:  [] 77  Resp:  [22-33] 26  SpO2:  [89 %-100 %] 92 %  BP: (103-131)/(51-70) 114/57  Arterial Line BP: ()/(40-74) 123/53     Weight: (!) 150.9 kg (332 lb 10.8 oz) (10/17/19 0400)  Body mass index is 42.69 kg/m².  Body surface area is 2.81 meters squared.    I/O last 3 completed shifts:  In: 5175 [I.V.:3765; NG/GT:1410]  Out: 3467 [Urine:1215; Drains:510; Other:1452; Chest Tube:290]    Physical Exam   Constitutional: He appears well-developed and well-nourished.   Eyes: Pupils are equal, round, and reactive to light. Conjunctivae and EOM are normal.   Neck: Normal range of motion. Neck supple.   Cardiovascular: Normal rate and regular rhythm.   Pulmonary/Chest: He has rales.   Intubated and mechanically ventilated    Abdominal: He exhibits no distension and no mass. There is tenderness. There is no guarding. No hernia.   Midline surgical incision to abdomen. Multiple abdominal drains noted.    Musculoskeletal: He exhibits edema (1+ pitting edema to BLE ). He exhibits no tenderness or deformity.   Neurological:   Sedated   Skin: Skin is warm and dry. No rash noted. He is not diaphoretic. No erythema. No pallor.       Significant Labs:  CBC:   Recent Labs   Lab 10/17/19  0300   WBC 18.48*   RBC 3.34*   HGB 8.4*   HCT 27.2*      MCV 81*   MCH 25.1*   MCHC 30.9*     CMP:   Recent Labs   Lab 10/14/19  1500  10/17/19  0300   *   < > 145*  145*   CALCIUM 8.7   < > 8.2*  8.2*   ALBUMIN 2.1*    < > 1.9*   PROT 5.6*  --   --    *   < > 139  139   K 4.3   < > 4.5  4.5   CO2 17*   < > 19*  19*      < > 108  108   BUN 79*   < > 87*  87*   CREATININE 2.8*   < > 2.8*  2.8*   ALKPHOS 67  --   --    ALT 12  --   --    AST 19  --   --    BILITOT 1.0  --   --     < > = values in this interval not displayed.     All labs within the past 24 hours have been reviewed.

## 2023-02-28 ENCOUNTER — TELEPHONE (OUTPATIENT)
Dept: NEPHROLOGY | Facility: CLINIC | Age: 78
End: 2023-02-28
Payer: MEDICARE

## 2023-02-28 NOTE — TELEPHONE ENCOUNTER
----- Message from Ramiro Horne sent at 2/28/2023 10:48 AM CST -----  Caller: patient     Requesting Appt With Provider: Gabriel     Preferred Date Range: asap    Preferred Times asap    Reason For Visit: schedule from referral     Additional Information:

## 2023-03-13 ENCOUNTER — OFFICE VISIT (OUTPATIENT)
Dept: NEPHROLOGY | Facility: CLINIC | Age: 78
End: 2023-03-13
Payer: MEDICARE

## 2023-03-13 VITALS
WEIGHT: 280 LBS | HEIGHT: 74 IN | BODY MASS INDEX: 35.94 KG/M2 | DIASTOLIC BLOOD PRESSURE: 82 MMHG | SYSTOLIC BLOOD PRESSURE: 134 MMHG

## 2023-03-13 DIAGNOSIS — E11.9 TYPE 2 DIABETES MELLITUS WITHOUT COMPLICATION, WITHOUT LONG-TERM CURRENT USE OF INSULIN: ICD-10-CM

## 2023-03-13 DIAGNOSIS — R80.9 MICROALBUMINURIA: ICD-10-CM

## 2023-03-13 DIAGNOSIS — N28.1 ACQUIRED CYST OF KIDNEY: ICD-10-CM

## 2023-03-13 DIAGNOSIS — E66.01 SEVERE OBESITY: Primary | ICD-10-CM

## 2023-03-13 PROCEDURE — 1160F PR REVIEW ALL MEDS BY PRESCRIBER/CLIN PHARMACIST DOCUMENTED: ICD-10-PCS | Mod: CPTII,S$GLB,, | Performed by: INTERNAL MEDICINE

## 2023-03-13 PROCEDURE — 3288F FALL RISK ASSESSMENT DOCD: CPT | Mod: CPTII,S$GLB,, | Performed by: INTERNAL MEDICINE

## 2023-03-13 PROCEDURE — 1101F PR PT FALLS ASSESS DOC 0-1 FALLS W/OUT INJ PAST YR: ICD-10-PCS | Mod: CPTII,S$GLB,, | Performed by: INTERNAL MEDICINE

## 2023-03-13 PROCEDURE — 3075F PR MOST RECENT SYSTOLIC BLOOD PRESS GE 130-139MM HG: ICD-10-PCS | Mod: CPTII,S$GLB,, | Performed by: INTERNAL MEDICINE

## 2023-03-13 PROCEDURE — 3079F DIAST BP 80-89 MM HG: CPT | Mod: CPTII,S$GLB,, | Performed by: INTERNAL MEDICINE

## 2023-03-13 PROCEDURE — 1160F RVW MEDS BY RX/DR IN RCRD: CPT | Mod: CPTII,S$GLB,, | Performed by: INTERNAL MEDICINE

## 2023-03-13 PROCEDURE — 99999 PR PBB SHADOW E&M-EST. PATIENT-LVL IV: CPT | Mod: PBBFAC,,, | Performed by: INTERNAL MEDICINE

## 2023-03-13 PROCEDURE — 99999 PR PBB SHADOW E&M-EST. PATIENT-LVL IV: ICD-10-PCS | Mod: PBBFAC,,, | Performed by: INTERNAL MEDICINE

## 2023-03-13 PROCEDURE — 1159F PR MEDICATION LIST DOCUMENTED IN MEDICAL RECORD: ICD-10-PCS | Mod: CPTII,S$GLB,, | Performed by: INTERNAL MEDICINE

## 2023-03-13 PROCEDURE — 3075F SYST BP GE 130 - 139MM HG: CPT | Mod: CPTII,S$GLB,, | Performed by: INTERNAL MEDICINE

## 2023-03-13 PROCEDURE — 3079F PR MOST RECENT DIASTOLIC BLOOD PRESSURE 80-89 MM HG: ICD-10-PCS | Mod: CPTII,S$GLB,, | Performed by: INTERNAL MEDICINE

## 2023-03-13 PROCEDURE — 3288F PR FALLS RISK ASSESSMENT DOCUMENTED: ICD-10-PCS | Mod: CPTII,S$GLB,, | Performed by: INTERNAL MEDICINE

## 2023-03-13 PROCEDURE — 1159F MED LIST DOCD IN RCRD: CPT | Mod: CPTII,S$GLB,, | Performed by: INTERNAL MEDICINE

## 2023-03-13 PROCEDURE — 1101F PT FALLS ASSESS-DOCD LE1/YR: CPT | Mod: CPTII,S$GLB,, | Performed by: INTERNAL MEDICINE

## 2023-03-13 PROCEDURE — 99204 PR OFFICE/OUTPT VISIT, NEW, LEVL IV, 45-59 MIN: ICD-10-PCS | Mod: S$GLB,,, | Performed by: INTERNAL MEDICINE

## 2023-03-13 PROCEDURE — 99204 OFFICE O/P NEW MOD 45 MIN: CPT | Mod: S$GLB,,, | Performed by: INTERNAL MEDICINE

## 2023-03-13 NOTE — PROGRESS NOTES
Subjective:       Patient ID: Pasha Harmon is a 77 y.o. White male who presents for new patient evaluation for chronic renal failure.    Pasha Harmon is referred by Eze Root MD to be evaluated for chronic renal failure.  He was referred due to protein in his urine.  He has no uremic or urinary symptoms and is in his usual state of health.  There have been no recent illnesses, hospitalizations or procedures.  He denies chronic NSAIDs.  He is a current smoker.      Review of Systems   Constitutional:  Positive for appetite change.   HENT:  Negative for congestion.    Respiratory:  Positive for cough and shortness of breath.    Cardiovascular:  Positive for leg swelling.   Genitourinary:  Negative for difficulty urinating, dysuria and hematuria.   Neurological:  Positive for headaches.   Hematological:  Bruises/bleeds easily.   Psychiatric/Behavioral:  Positive for sleep disturbance.        The past medical, family and social histories were reviewed for this encounter.     Past Medical History:   Diagnosis Date    COPD (chronic obstructive pulmonary disease)     Diabetes mellitus     Emphysema of lung     Gastric ulcer     Hypertension     MI (myocardial infarction)      Past Surgical History:   Procedure Laterality Date    APPENDECTOMY      BONE MARROW BIOPSY N/A 1/24/2020    Procedure: Biopsy-bone marrow;  Surgeon: Yuriy Keen MD;  Location: [facility];  Service: Interventional Radiology;  Laterality: N/A;    CHOLECYSTECTOMY      COLONOSCOPY  05/09/2016    ENDOSCOPIC ULTRASOUND OF UPPER GASTROINTESTINAL TRACT Left 6/4/2019    Procedure: ULTRASOUND, UPPER GI TRACT, ENDOSCOPIC;  Surgeon: Lavon Parra MD;  Location: [facility];  Service: Endoscopy;  Laterality: Left;  GIF plus Radial    ESOPHAGOGASTRODUODENOSCOPY N/A 5/25/2019    Procedure: EGD (ESOPHAGOGASTRODUODENOSCOPY);  Surgeon: Mitch Buitrago MD;  Location: [facility];  Service: Endoscopy;  Laterality: N/A;     ESOPHAGOGASTRODUODENOSCOPY N/A 6/4/2019    Procedure: EGD (ESOPHAGOGASTRODUODENOSCOPY);  Surgeon: Lavon Parra MD;  Location: [facility] ENDO;  Service: Endoscopy;  Laterality: N/A;    ESOPHAGOGASTRODUODENOSCOPY N/A 9/3/2019    Procedure: EGD (ESOPHAGOGASTRODUODENOSCOPY);  Surgeon: Keshav Harris MD;  Location: [facility] ENDO;  Service: Endoscopy;  Laterality: N/A;    ESOPHAGOGASTRODUODENOSCOPY N/A 10/10/2019    Procedure: EGD (ESOPHAGOGASTRODUODENOSCOPY);  Surgeon: Paulino Parrish MD;  Location: [facility];  Service: General;  Laterality: N/A;    KNEE SURGERY Right     PERCUTANEOUS INSERTION OF GASTROSTOMY TUBE Left 10/10/2019    Procedure: INSERTION, GASTROSTOMY TUBE, PERCUTANEOUS;  Surgeon: Paulino Parrish MD;  Location: [facility];  Service: General;  Laterality: Left;    PLACEMENT OF DUAL-LUMEN VASCULAR CATHETER Right 10/24/2019    Procedure: Right IJ Hemodialysis Catheter Insertion;  Surgeon: Paulino Parrish MD;  [facility]: [facility];  Service: General;  Laterality: Right;    PLACEMENT OF JEJUNOSTOMY TUBE Left 10/10/2019    Procedure: INSERTION, JEJUNOSTOMY TUBE;  Surgeon: Paulino Parrish MD;  [facility];  Service: General;  Laterality: Left;    REPAIR OF BOWEL PERFORATION N/A 10/10/2019    Procedure: REPAIR, PERFORATION, GASTRIC;  Surgeon: Paulino Parrish MD;  Location: [facility];  Service: General;  Laterality: N/A;    TRACHEOSTOMY N/A 10/17/2019    Procedure: CREATION, TRACHEOSTOMY;  Surgeon: Paulino Parrish MD;  [facility];  Service: General;  Laterality: N/A;    TUBE THORACOTOMY Bilateral 10/10/2019    Procedure: INSERTION, CATHETER, INTERCOSTAL, FOR DRAINAGE;  Surgeon: Paulino Parrish MD;  Location: [facility];  Service: General;  Laterality: Bilateral;     Social History     Socioeconomic History    Marital status:     Number of children: 7   Tobacco Use    Smoking status: Every Day     Packs/day: 2.00     Years: 60.00      Pack years: 120.00     Types: Cigarettes    Smokeless tobacco: Never    Tobacco comments:     Pt enrolled in smoking cessation   Substance and Sexual Activity    Alcohol use: Yes     Comment: rarely beer with crawfish    Drug use: Never    Sexual activity: Yes     Partners: Female     Birth control/protection: None     Current Outpatient Medications   Medication Sig    acetaminophen (TYLENOL) 650 MG TbSR Take 1,300 mg by mouth 2 (two) times daily.     albuterol (ACCUNEB) 1.25 mg/3 mL Nebu Take 3 mLs (1.25 mg total) by nebulization every 6 (six) hours as needed. Rescue    albuterol (PROVENTIL/VENTOLIN HFA) 90 mcg/actuation inhaler Inhale 2 puffs into the lungs every 6 (six) hours as needed for Wheezing or Shortness of Breath. Rescue    albuterol-ipratropium (DUO-NEB) 2.5 mg-0.5 mg/3 mL nebulizer solution INHALE THE CONTENTS OF 1 VIAL VIA NEBULIZER THREE TIMES DAILY    amLODIPine (NORVASC) 5 MG tablet TAKE 1 TABLET EVERY DAY    BD ALCOHOL SWABS PadM APPLY TOPICALLY EVERY DAY    blood glucose control, low (TRUE METRIX LEVEL 1) Soln 1 drop by Misc.(Non-Drug; Combo Route) route as needed (to calibrate glucomter).    blood sugar diagnostic (TRUE METRIX GLUCOSE TEST STRIP) Strp 1 each by Misc.(Non-Drug; Combo Route) route once daily.    blood-glucose meter kit 1 each by Other route once daily. Use as instructed    cholecalciferol, vitamin D3, 125 mcg (5,000 unit) Tab Take 5,000 Units by mouth once daily.    citalopram (CELEXA) 20 MG tablet TAKE 1 TABLET EVERY NIGHT    diphenhydrAMINE (BENADRYL) 25 mg capsule Take 25 mg by mouth every evening. 1 tab po am, 3 tabs po HS    fluticasone-umeclidin-vilanter (TRELEGY ELLIPTA) 100-62.5-25 mcg DsDv Inhale 1 puff into the lungs once daily.    hydroxyurea (HYDREA) 500 mg Cap Take 500 mg by mouth once daily Pt does not know how  to take this medication**.    lancets Misc 1 each by Misc.(Non-Drug; Combo Route) route once daily. TRUEPLUS 33G LANCET    lisinopriL (PRINIVIL,ZESTRIL) 40 MG  "tablet 1 tablet by mouth daily       metFORMIN (GLUCOPHAGE) 500 MG tablet TAKE 1 TABLET EVERY DAY WITH BREAKFAST    montelukast (SINGULAIR) 10 mg tablet TAKE 1 TABLET ONE TIME DAILY IN THE EVENING    multivit-min-folic-vit K-lycop 400- mcg Tab Take 1 tablet by mouth nightly.    ondansetron (ZOFRAN) 8 MG tablet TAKE 1 TABLET BY MOUTH EVERY 8 HOURS AS NEEDED FOR NAUSEA    pantoprazole (PROTONIX) 40 MG tablet Take 1 tablet (40 mg total) by mouth once daily. (Patient taking differently: Take 40 mg by mouth as needed.)    simvastatin (ZOCOR) 40 MG tablet TAKE 1 TABLET EVERY EVENING    sodium chloride (OCEAN NASAL) 0.65 % nasal spray 1 spray by Nasal route as needed for Congestion.     No current facility-administered medications for this visit.       /82 (BP Location: Left arm, Patient Position: Sitting, BP Method: Large (Manual))   Ht 6' 2" (1.88 m)   Wt 127 kg (280 lb)   BMI 35.95 kg/m²     Objective:      Physical Exam  Vitals reviewed.   Constitutional:       Appearance: He is obese.   HENT:      Head: Normocephalic and atraumatic.   Eyes:      General: No scleral icterus.  Cardiovascular:      Rate and Rhythm: Normal rate.      Heart sounds:     No friction rub.      Comments: Few coarse bs B  Pulmonary:      Effort: Pulmonary effort is normal. No respiratory distress.   Abdominal:      General: There is distension.   Musculoskeletal:      Right lower leg: Edema (trace) present.      Left lower leg: Edema (trace) present.   Skin:     General: Skin is warm and dry.   Neurological:      Mental Status: He is oriented to person, place, and time.       Assessment:       1. Microalbuminuria    2. Type 2 diabetes mellitus without complication, without long-term current use of insulin          Plan:   Return to clinic in 1 year.  Labs for next visit include rp pth upc us.  Baseline creatinine is 0.8-1.1 since 2014.  MAC is 0.23 on an ACE inhibitor.  Renal US shows R 11.9 cm L 12.8 cm.  His kidney function is " age appropriate.  His diabetes is well controlled.

## 2023-06-05 PROBLEM — Z79.899 DRUG-INDUCED IMMUNODEFICIENCY: Status: ACTIVE | Noted: 2023-06-05

## 2023-06-05 PROBLEM — D84.821 DRUG-INDUCED IMMUNODEFICIENCY: Status: ACTIVE | Noted: 2023-06-05

## 2023-06-09 PROBLEM — L05.91 PILONIDAL CYST: Status: ACTIVE | Noted: 2023-06-09

## 2023-08-18 PROBLEM — E66.811 CLASS 1 OBESITY WITH BODY MASS INDEX (BMI) OF 34.0 TO 34.9 IN ADULT: Status: ACTIVE | Noted: 2023-03-13

## 2023-08-18 PROBLEM — E66.9 CLASS 1 OBESITY WITH BODY MASS INDEX (BMI) OF 34.0 TO 34.9 IN ADULT: Status: ACTIVE | Noted: 2023-03-13

## 2023-08-30 PROBLEM — I25.84 CALCIFICATION OF CORONARY ARTERY: Status: ACTIVE | Noted: 2023-08-30

## 2023-08-30 PROBLEM — I25.10 CALCIFICATION OF CORONARY ARTERY: Status: ACTIVE | Noted: 2023-08-30

## 2024-02-16 ENCOUNTER — PATIENT MESSAGE (OUTPATIENT)
Dept: NEPHROLOGY | Facility: CLINIC | Age: 79
End: 2024-02-16
Payer: MEDICARE

## 2024-02-26 PROBLEM — D49.0 GASTRIC TUMOR: Status: RESOLVED | Noted: 2019-05-24 | Resolved: 2024-02-26

## 2024-02-26 PROBLEM — E66.01 SEVERE OBESITY (BMI 35.0-35.9 WITH COMORBIDITY): Status: ACTIVE | Noted: 2019-08-20

## 2024-03-13 ENCOUNTER — TELEPHONE (OUTPATIENT)
Dept: NEPHROLOGY | Facility: CLINIC | Age: 79
End: 2024-03-13

## 2024-03-13 ENCOUNTER — OFFICE VISIT (OUTPATIENT)
Dept: NEPHROLOGY | Facility: CLINIC | Age: 79
End: 2024-03-13
Payer: MEDICARE

## 2024-03-13 VITALS
SYSTOLIC BLOOD PRESSURE: 138 MMHG | BODY MASS INDEX: 35.94 KG/M2 | WEIGHT: 280 LBS | HEIGHT: 74 IN | DIASTOLIC BLOOD PRESSURE: 74 MMHG | OXYGEN SATURATION: 99 % | HEART RATE: 74 BPM

## 2024-03-13 DIAGNOSIS — E11.9 TYPE 2 DIABETES MELLITUS WITHOUT COMPLICATION, WITHOUT LONG-TERM CURRENT USE OF INSULIN: ICD-10-CM

## 2024-03-13 DIAGNOSIS — E66.01 SEVERE OBESITY: ICD-10-CM

## 2024-03-13 DIAGNOSIS — N28.1 ACQUIRED CYST OF KIDNEY: ICD-10-CM

## 2024-03-13 DIAGNOSIS — R80.9 MICROALBUMINURIA: Primary | ICD-10-CM

## 2024-03-13 PROCEDURE — 1159F MED LIST DOCD IN RCRD: CPT | Mod: CPTII,S$GLB,, | Performed by: INTERNAL MEDICINE

## 2024-03-13 PROCEDURE — 99999 PR PBB SHADOW E&M-EST. PATIENT-LVL IV: CPT | Mod: PBBFAC,,, | Performed by: INTERNAL MEDICINE

## 2024-03-13 PROCEDURE — 3288F FALL RISK ASSESSMENT DOCD: CPT | Mod: CPTII,S$GLB,, | Performed by: INTERNAL MEDICINE

## 2024-03-13 PROCEDURE — 3078F DIAST BP <80 MM HG: CPT | Mod: CPTII,S$GLB,, | Performed by: INTERNAL MEDICINE

## 2024-03-13 PROCEDURE — 1160F RVW MEDS BY RX/DR IN RCRD: CPT | Mod: CPTII,S$GLB,, | Performed by: INTERNAL MEDICINE

## 2024-03-13 PROCEDURE — 1101F PT FALLS ASSESS-DOCD LE1/YR: CPT | Mod: CPTII,S$GLB,, | Performed by: INTERNAL MEDICINE

## 2024-03-13 PROCEDURE — 99214 OFFICE O/P EST MOD 30 MIN: CPT | Mod: S$GLB,,, | Performed by: INTERNAL MEDICINE

## 2024-03-13 PROCEDURE — 3075F SYST BP GE 130 - 139MM HG: CPT | Mod: CPTII,S$GLB,, | Performed by: INTERNAL MEDICINE

## 2024-03-13 NOTE — PROGRESS NOTES
"    Subjective:       Patient ID: Pasha Harmon is a 78 y.o. White male who presents for return patient evaluation for chronic renal failure.      He has no uremic or urinary symptoms and is in his usual state of health.  There have been no recent illnesses, hospitalizations or procedures.  He denies chronic NSAIDs.  He is a current smoker.      Review of Systems   Constitutional:  Positive for appetite change.   HENT:  Negative for congestion.    Respiratory:  Positive for cough and shortness of breath.    Cardiovascular:  Positive for leg swelling.   Genitourinary:  Negative for difficulty urinating, dysuria and hematuria.   Neurological:  Positive for headaches.   Hematological:  Bruises/bleeds easily.   Psychiatric/Behavioral:  Positive for sleep disturbance.        The past medical, family and social histories were reviewed for this encounter.     /74 (BP Location: Right arm, Patient Position: Sitting)   Pulse 74   Ht 6' 2" (1.88 m)   Wt 127 kg (280 lb)   SpO2 99%   BMI 35.95 kg/m²     Objective:      Physical Exam  Vitals reviewed.   Constitutional:       Appearance: He is obese.   HENT:      Head: Normocephalic and atraumatic.   Eyes:      General: No scleral icterus.  Cardiovascular:      Rate and Rhythm: Normal rate.      Heart sounds:      No friction rub.      Comments: Few coarse bs B  Pulmonary:      Effort: Pulmonary effort is normal. No respiratory distress.   Abdominal:      General: There is distension.   Musculoskeletal:      Right lower leg: Edema (trace) present.      Left lower leg: Edema (trace) present.   Skin:     General: Skin is warm and dry.   Neurological:      Mental Status: He is oriented to person, place, and time.         Assessment:       1. Microalbuminuria    2. Type 2 diabetes mellitus without complication, without long-term current use of insulin    3. Severe obesity    4. Acquired cyst of kidney       Lab Results   Component Value Date    CREATININE 0.80 " 03/06/2024    BUN 13 03/06/2024     03/06/2024    K 4.2 03/06/2024     03/06/2024    CO2 26 03/06/2024     Lab Results   Component Value Date    PTH 37.1 03/06/2024    CALCIUM 8.9 03/06/2024    CAION 1.15 10/20/2019    PHOS 3.7 03/06/2024     Lab Results   Component Value Date    HCT 37.8 (L) 02/01/2024     Prot/Creat Ratio, Urine   Date Value Ref Range Status   03/06/2024 594.9 (H) 15.0 - 68.0 mg/g Final   10/15/2019 0.44 (H) 0.00 - 0.20 Final        Plan:   Return to clinic in 1 year.  Labs for next visit include rp pth upc uacr.  Baseline creatinine is 0.8-1.1 since 2014.  PTH is 37 with a calcium of 8.9.  UPC is 0.55 on an ACE inhibitor.  Renal US shows R 11.9 cm L 12.8 cm.  His kidney function is age appropriate.  His diabetes is well controlled.  I will send this note to Dr. Wiley to ask his opinion on the new septated cyst seen on this recent US.     Computed KFRE 2-Year unavailable. Necessary lab results were not found in the last year.    Computed KFRE 5-Year unavailable. Necessary lab results were not found in the last year.

## 2025-01-04 NOTE — PROGRESS NOTES
SUBJECTIVE:  The patient is a 74 y.o. y/o male s/p exploratory lapartomy, repair of gastric perforation gastrostomy, jejunostomy tube placement, bilateral thoracostomy tube placement, permacath placement, tracheostomy.  He has now been discharged from [facility], his tracheostomy has been decannulated.  He is doing well, tolerating diet.  Is no longer requiring dialysis.  Has no complaints other than some intermittent nausea.      OBJECTIVE:  GEN: male in NAD  ABD: soft, non-tender, non-distended  INCISIONS: healing well without signs of infection or hernia    ASSESSMENT/PLAN:  Doing well s/p laparotomy, g, j tubes, thoracostomy tubes, tracheostomy, permacath placement for gastric perforation and esophageal injury. Patient is advised to avoid heavy lifting or strenuous activity for another 2-4 weeks.  Plan to remove g tube and permacath today in clinic.  Will follow-up on PRN basis.   alteration in breathing pattern/non-productive cough

## 2025-03-13 ENCOUNTER — OFFICE VISIT (OUTPATIENT)
Dept: NEPHROLOGY | Facility: CLINIC | Age: 80
End: 2025-03-13
Payer: MEDICARE

## 2025-03-13 VITALS — HEART RATE: 68 BPM | DIASTOLIC BLOOD PRESSURE: 70 MMHG | OXYGEN SATURATION: 96 % | SYSTOLIC BLOOD PRESSURE: 136 MMHG

## 2025-03-13 DIAGNOSIS — E66.01 SEVERE OBESITY: ICD-10-CM

## 2025-03-13 DIAGNOSIS — N28.1 ACQUIRED CYST OF KIDNEY: ICD-10-CM

## 2025-03-13 DIAGNOSIS — E11.9 TYPE 2 DIABETES MELLITUS WITHOUT COMPLICATION, WITHOUT LONG-TERM CURRENT USE OF INSULIN: ICD-10-CM

## 2025-03-13 DIAGNOSIS — R80.9 MICROALBUMINURIA: Primary | ICD-10-CM

## 2025-03-13 PROCEDURE — 99214 OFFICE O/P EST MOD 30 MIN: CPT | Mod: S$GLB,,, | Performed by: INTERNAL MEDICINE

## 2025-03-13 PROCEDURE — 3078F DIAST BP <80 MM HG: CPT | Mod: CPTII,S$GLB,, | Performed by: INTERNAL MEDICINE

## 2025-03-13 PROCEDURE — 1159F MED LIST DOCD IN RCRD: CPT | Mod: CPTII,S$GLB,, | Performed by: INTERNAL MEDICINE

## 2025-03-13 PROCEDURE — 3075F SYST BP GE 130 - 139MM HG: CPT | Mod: CPTII,S$GLB,, | Performed by: INTERNAL MEDICINE

## 2025-03-13 PROCEDURE — 1160F RVW MEDS BY RX/DR IN RCRD: CPT | Mod: CPTII,S$GLB,, | Performed by: INTERNAL MEDICINE

## 2025-03-13 PROCEDURE — 99999 PR PBB SHADOW E&M-EST. PATIENT-LVL II: CPT | Mod: PBBFAC,,, | Performed by: INTERNAL MEDICINE

## 2025-03-13 NOTE — PROGRESS NOTES
Subjective:       Patient ID: Pasha Harmon is a 79 y.o. White male who presents for return patient evaluation for chronic renal failure.      He has no uremic or urinary symptoms and is in his usual state of health.  He just got over the flu.  He denies chronic NSAIDs.  He is a current smoker.      Review of Systems   Constitutional:  Positive for appetite change.   HENT:  Negative for congestion.    Respiratory:  Positive for cough and shortness of breath.    Cardiovascular:  Positive for leg swelling.   Genitourinary:  Negative for difficulty urinating, dysuria and hematuria.   Neurological:  Positive for headaches.   Hematological:  Bruises/bleeds easily.   Psychiatric/Behavioral:  Positive for sleep disturbance.        The past medical, family and social histories were reviewed for this encounter.     /70 (BP Location: Left arm, Patient Position: Sitting)   Pulse 68   SpO2 96%     Objective:      Physical Exam  Vitals reviewed.   Constitutional:       Appearance: He is obese.   HENT:      Head: Normocephalic and atraumatic.   Eyes:      General: No scleral icterus.  Cardiovascular:      Rate and Rhythm: Normal rate.      Heart sounds:      No friction rub.      Comments: Few coarse bs B  Pulmonary:      Effort: Pulmonary effort is normal. No respiratory distress.   Abdominal:      General: There is distension.   Musculoskeletal:      Right lower leg: No edema.      Left lower leg: No edema.   Skin:     General: Skin is warm and dry.   Neurological:      Mental Status: He is oriented to person, place, and time.         Assessment:       1. Microalbuminuria    2. Type 2 diabetes mellitus without complication, without long-term current use of insulin    3. Severe obesity    4. Acquired cyst of kidney       Lab Results   Component Value Date    CREATININE 0.97 01/14/2025    BUN 16 01/14/2025     01/14/2025    K 4.6 01/14/2025     01/14/2025    CO2 27 01/14/2025     Lab Results    Component Value Date    PTH 37.1 03/06/2024    CALCIUM 9.1 01/14/2025    CAION 1.15 10/20/2019    PHOS 3.7 03/06/2024     Lab Results   Component Value Date    HCT 35.8 (L) 01/14/2025     Prot/Creat Ratio, Urine   Date Value Ref Range Status   03/06/2024 594.9 (H) 15.0 - 68.0 mg/g Final   10/15/2019 0.44 (H) 0.00 - 0.20 Final        Plan:   Return to clinic in 1 year.  Labs for next visit include rp pth upc uacr.  Baseline creatinine is 0.8-1.1 since 2014.  PTH is 37 with a calcium of 9.1.  UPC is 0.5 on an ACE inhibitor.  Renal US shows R 11.9 cm L 12.8 cm.  His kidney function is age appropriate.  His diabetes is well controlled.    Computed KFRE 2-Year unavailable. One or more values for this score either were not found within the given timeframe or did not fit some other criterion.    Computed KFRE 5-Year unavailable. One or more values for this score either were not found within the given timeframe or did not fit some other criterion.

## 2025-06-12 PROBLEM — D47.3 ESSENTIAL THROMBOCYTHEMIA: Status: RESOLVED | Noted: 2020-01-31 | Resolved: 2025-06-12

## 2025-06-12 PROBLEM — J96.01 ACUTE HYPOXIC RESPIRATORY FAILURE: Status: ACTIVE | Noted: 2025-06-12

## 2025-06-12 PROBLEM — R91.8 PULMONARY NODULES: Status: RESOLVED | Noted: 2020-02-19 | Resolved: 2025-06-12

## 2025-06-12 PROBLEM — E66.9 OBESITY (BMI 30-39.9): Status: ACTIVE | Noted: 2025-06-12

## 2025-06-12 PROBLEM — N28.1 ACQUIRED CYST OF KIDNEY: Status: RESOLVED | Noted: 2023-03-13 | Resolved: 2025-06-12

## 2025-06-12 PROBLEM — E11.65 TYPE 2 DIABETES MELLITUS WITH HYPERGLYCEMIA: Status: RESOLVED | Noted: 2020-01-15 | Resolved: 2025-06-12

## 2025-06-12 PROBLEM — E66.01 SEVERE OBESITY: Status: RESOLVED | Noted: 2019-08-20 | Resolved: 2025-06-12

## 2025-06-12 PROBLEM — E66.811 CLASS 1 OBESITY WITH BODY MASS INDEX (BMI) OF 34.0 TO 34.9 IN ADULT: Status: RESOLVED | Noted: 2023-03-13 | Resolved: 2025-06-12

## 2025-06-13 PROBLEM — J96.21 ACUTE ON CHRONIC RESPIRATORY FAILURE WITH HYPOXIA: Status: ACTIVE | Noted: 2025-06-12

## 2025-06-23 ENCOUNTER — TELEPHONE (OUTPATIENT)
Dept: PULMONOLOGY | Facility: CLINIC | Age: 80
End: 2025-06-23
Payer: MEDICARE

## 2025-06-23 NOTE — TELEPHONE ENCOUNTER
Copied from CRM #[id number]. Topic: Appointments - Appointment Rescheduling  >> Jun 23, 2025 11:55 AM Adilia wrote:  Tena/daughter calling to schedule appt,dx: PFT scheduled CXR Ordered Respiratory Failure. Pt is scheduled for 11/21. Would like pt to be seen sooner. Open to any provider on [facility]. Please call [phone]

## (undated) DEVICE — SEE MEDLINE ITEM 146417

## (undated) DEVICE — SEE MEDLINE ITEM 157117

## (undated) DEVICE — DRAIN CHEST DRY SUCTION

## (undated) DEVICE — LUBRICANT SURGILUBE 2 OZ

## (undated) DEVICE — GOWN SURGICAL X-LARGE

## (undated) DEVICE — DRAIN CHANNEL ROUND 19FR

## (undated) DEVICE — SUT MCRYL PLUS 4-0 PS2 27IN

## (undated) DEVICE — SUT SILK SH 2-0 30IN MP BLK

## (undated) DEVICE — Device

## (undated) DEVICE — SEE MEDLINE ITEM 157131

## (undated) DEVICE — BLADE 4 INCH EDGE UN-INS

## (undated) DEVICE — SET DECANTER MEDICHOICE

## (undated) DEVICE — DRAPE C ARM 42 X 120 10/BX

## (undated) DEVICE — STAPLER SKIN PROXIMATE WIDE

## (undated) DEVICE — EVACUATOR WOUND BULB 100CC

## (undated) DEVICE — ELECTRODE REM PLYHSV RETURN 9

## (undated) DEVICE — TOWEL OR XRAY WHITE 17X26IN

## (undated) DEVICE — SEE MEDLINE ITEM 156902

## (undated) DEVICE — CHLORAPREP 10.5 ML APPLICATOR

## (undated) DEVICE — SOL NACL 0.9% INJ PF/50151

## (undated) DEVICE — SUT 1 48IN PDS II VIO MONO

## (undated) DEVICE — SUT VICRYL 3-0 27 SH

## (undated) DEVICE — SUT SILK 3-0 STRANDS 30IN

## (undated) DEVICE — CATH URETHRAL 16FR RED

## (undated) DEVICE — CATH THORACIC 28FR ST

## (undated) DEVICE — SUT SILK 3-0 SH DETACH 30IN

## (undated) DEVICE — GAUZE SPONGE 4X4 12PLY

## (undated) DEVICE — DRESSING TRANS 4X4 TEGADERM

## (undated) DEVICE — BLADE SURG CARBON STEEL SZ11

## (undated) DEVICE — SUT SILK 2-0 STRANDS 30IN

## (undated) DEVICE — SYR ONLY LUER LOCK 20CC

## (undated) DEVICE — APPLICATOR CHLORAPREP ORN 26ML

## (undated) DEVICE — SEE L#95700

## (undated) DEVICE — GAUZE VASELINE PETRO 3X9

## (undated) DEVICE — KIT COLLECTION E SWAB REGULAR

## (undated) DEVICE — SUT VICRYL PLUS 3-0 SH 18IN

## (undated) DEVICE — DRESSING ADH ISLAND 3.6 X 14

## (undated) DEVICE — TRACH TUBE CUFF FLEX DISP 8.5

## (undated) DEVICE — SEE MEDLINE ITEM 146345

## (undated) DEVICE — DRAPE INCISE IOBAN 2 23X17IN

## (undated) DEVICE — ADHESIVE DERMABOND ADVANCED

## (undated) DEVICE — SUT ETHILON 2-0 PSLX 30IN

## (undated) DEVICE — SUT SILK 0 BLK BR FSL 18 IN

## (undated) DEVICE — DRAPE ABDOMINAL TIBURON 14X11

## (undated) DEVICE — DRAPE THYROID WITH ARMBOARD

## (undated) DEVICE — SUT 2/0 30IN SILK BLK BRAI

## (undated) DEVICE — COVERS PROBE NR-48 STERILE

## (undated) DEVICE — SUT PROLENE 2-0 30 SH

## (undated) DEVICE — SYR SLIP TIP 5CC

## (undated) DEVICE — TRAY MINOR GEN SURG